# Patient Record
Sex: FEMALE | Race: WHITE | NOT HISPANIC OR LATINO | Employment: FULL TIME | URBAN - METROPOLITAN AREA
[De-identification: names, ages, dates, MRNs, and addresses within clinical notes are randomized per-mention and may not be internally consistent; named-entity substitution may affect disease eponyms.]

---

## 2025-03-05 ENCOUNTER — TELEPHONE (OUTPATIENT)
Dept: ONCOLOGY | Facility: CLINIC | Age: 40
End: 2025-03-05

## 2025-03-05 ENCOUNTER — CONSULT (OUTPATIENT)
Dept: ONCOLOGY | Facility: CLINIC | Age: 40
End: 2025-03-05
Payer: COMMERCIAL

## 2025-03-05 VITALS
TEMPERATURE: 97.3 F | HEART RATE: 84 BPM | BODY MASS INDEX: 28.29 KG/M2 | OXYGEN SATURATION: 96 % | SYSTOLIC BLOOD PRESSURE: 128 MMHG | WEIGHT: 191 LBS | DIASTOLIC BLOOD PRESSURE: 80 MMHG | HEIGHT: 69 IN

## 2025-03-05 DIAGNOSIS — M54.50 CHRONIC LOW BACK PAIN, UNSPECIFIED BACK PAIN LATERALITY, UNSPECIFIED WHETHER SCIATICA PRESENT: Primary | ICD-10-CM

## 2025-03-05 DIAGNOSIS — C50.912 MALIGNANT NEOPLASM OF LEFT BREAST IN FEMALE, ESTROGEN RECEPTOR NEGATIVE, UNSPECIFIED SITE OF BREAST: ICD-10-CM

## 2025-03-05 DIAGNOSIS — Z17.1 MALIGNANT NEOPLASM OF LEFT BREAST IN FEMALE, ESTROGEN RECEPTOR NEGATIVE, UNSPECIFIED SITE OF BREAST: Primary | ICD-10-CM

## 2025-03-05 DIAGNOSIS — C50.912 MALIGNANT NEOPLASM OF LEFT BREAST IN FEMALE, ESTROGEN RECEPTOR NEGATIVE, UNSPECIFIED SITE OF BREAST: Primary | ICD-10-CM

## 2025-03-05 DIAGNOSIS — Z17.1 MALIGNANT NEOPLASM OF LEFT BREAST IN FEMALE, ESTROGEN RECEPTOR NEGATIVE, UNSPECIFIED SITE OF BREAST: ICD-10-CM

## 2025-03-05 DIAGNOSIS — G89.29 CHRONIC LOW BACK PAIN, UNSPECIFIED BACK PAIN LATERALITY, UNSPECIFIED WHETHER SCIATICA PRESENT: Primary | ICD-10-CM

## 2025-03-05 RX ORDER — GABAPENTIN 400 MG/1
2 CAPSULE ORAL 4 TIMES DAILY
COMMUNITY

## 2025-03-05 RX ORDER — LEVOTHYROXINE SODIUM 25 UG/1
1 TABLET ORAL DAILY
COMMUNITY

## 2025-03-05 RX ORDER — LAMOTRIGINE 100 MG/1
3 TABLET ORAL DAILY
COMMUNITY

## 2025-03-05 RX ORDER — LORAZEPAM 1 MG/1
TABLET ORAL
COMMUNITY
Start: 2025-02-17

## 2025-03-05 RX ORDER — ZOLPIDEM TARTRATE 12.5 MG/1
TABLET, FILM COATED, EXTENDED RELEASE ORAL
COMMUNITY
Start: 2025-03-03

## 2025-03-05 RX ORDER — NORETHINDRONE AND ETHINYL ESTRADIOL 0.5-0.035
KIT ORAL
COMMUNITY
Start: 2025-02-27

## 2025-03-05 RX ORDER — METOPROLOL SUCCINATE 50 MG/1
TABLET, EXTENDED RELEASE ORAL
COMMUNITY
Start: 2025-01-13
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
## 2025-03-05 NOTE — TELEPHONE ENCOUNTER
[facility] received a fax that requires your attention. The document has been indexed to the patient’s chart for your review.      Reason for sending: RECEIVED MEDICAL RECORDS FOR SCHEDULED PATIENT    Documents Description: PROGRESS NOTE 03/04/25.> INDEXED IN CHART     Name of Sender: [employer] [phone]    Date Indexed: 03/05/25    Notes (if needed): THANKS!
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
## 2025-03-10 ENCOUNTER — TELEPHONE (OUTPATIENT)
Dept: ONCOLOGY | Facility: CLINIC | Age: 40
End: 2025-03-10
Payer: COMMERCIAL

## 2025-03-10 ENCOUNTER — PATIENT OUTREACH (OUTPATIENT)
Dept: ONCOLOGY | Facility: CLINIC | Age: 40
End: 2025-03-10
Payer: COMMERCIAL

## 2025-03-10 NOTE — TELEPHONE ENCOUNTER
Advised patient Emla cream will be sent to her pharmacy when the APRN sees her tomorrow at her appointment, medications will be signed and sent.  Patient verbalized understanding.

## 2025-03-10 NOTE — TELEPHONE ENCOUNTER
Patient called she had her port put in today, and wants some numbing cream called in. Call this into [facility] in [place], [place].

## 2025-03-10 NOTE — SIGNIFICANT NOTE
Also called Radiology at  and spoke with Lillie. Sent her a fax request for Mammograms, bone scan, mri head, and CT CAP, then sent email to [facility] to ask them to retrieve images (retrieved per Vira's email).     Notified Esau, RN and Breast Navigator, of requesting path slides of breast bx and images per power share, all from [facility]. And wrote down tracking # on successful fax return paper. Put those 2 successful fax return papers on Esau's desk and will also email her since she is out of office today. Notified Farrah in Cancer Registry that I requested path slides/images.

## 2025-03-11 ENCOUNTER — HOSPITAL ENCOUNTER (OUTPATIENT)
Dept: MRI IMAGING | Facility: HOSPITAL | Age: 40
Discharge: HOME | End: 2025-03-11
Payer: COMMERCIAL

## 2025-03-11 ENCOUNTER — HOSPITAL ENCOUNTER (OUTPATIENT)
Dept: CARDIOLOGY | Facility: HOSPITAL | Age: 40
Discharge: HOME | End: 2025-03-11
Payer: COMMERCIAL

## 2025-03-11 ENCOUNTER — LAB (OUTPATIENT)
Dept: LAB | Facility: HOSPITAL | Age: 40
End: 2025-03-11
Payer: COMMERCIAL

## 2025-03-11 ENCOUNTER — HOSPITAL ENCOUNTER (OUTPATIENT)
Dept: ONCOLOGY | Facility: HOSPITAL | Age: 40
Discharge: HOME | End: 2025-03-11
Payer: COMMERCIAL

## 2025-03-11 ENCOUNTER — OFFICE VISIT (OUTPATIENT)
Dept: ONCOLOGY | Facility: CLINIC | Age: 40
End: 2025-03-11
Payer: COMMERCIAL

## 2025-03-11 ENCOUNTER — TELEPHONE (OUTPATIENT)
Dept: ONCOLOGY | Facility: CLINIC | Age: 40
End: 2025-03-11

## 2025-03-11 ENCOUNTER — TELEPHONE (OUTPATIENT)
Dept: ONCOLOGY | Facility: CLINIC | Age: 40
End: 2025-03-11
Payer: COMMERCIAL

## 2025-03-11 ENCOUNTER — SPECIALTY PHARMACY (OUTPATIENT)
Dept: ONCOLOGY | Facility: HOSPITAL | Age: 40
End: 2025-03-11
Payer: COMMERCIAL

## 2025-03-11 VITALS
BODY MASS INDEX: 29.32 KG/M2 | HEIGHT: 69 IN | WEIGHT: 197.97 LBS | SYSTOLIC BLOOD PRESSURE: 137 MMHG | DIASTOLIC BLOOD PRESSURE: 91 MMHG

## 2025-03-11 VITALS
TEMPERATURE: 98 F | DIASTOLIC BLOOD PRESSURE: 87 MMHG | RESPIRATION RATE: 18 BRPM | SYSTOLIC BLOOD PRESSURE: 138 MMHG | BODY MASS INDEX: 29.33 KG/M2 | WEIGHT: 198 LBS | HEART RATE: 72 BPM | OXYGEN SATURATION: 99 % | HEIGHT: 69 IN

## 2025-03-11 DIAGNOSIS — M54.50 CHRONIC LOW BACK PAIN, UNSPECIFIED BACK PAIN LATERALITY, UNSPECIFIED WHETHER SCIATICA PRESENT: ICD-10-CM

## 2025-03-11 DIAGNOSIS — G89.29 CHRONIC LOW BACK PAIN, UNSPECIFIED BACK PAIN LATERALITY, UNSPECIFIED WHETHER SCIATICA PRESENT: ICD-10-CM

## 2025-03-11 DIAGNOSIS — C50.912 MALIGNANT NEOPLASM OF LEFT BREAST IN FEMALE, ESTROGEN RECEPTOR NEGATIVE, UNSPECIFIED SITE OF BREAST: ICD-10-CM

## 2025-03-11 DIAGNOSIS — Z17.1 MALIGNANT NEOPLASM OF LEFT BREAST IN FEMALE, ESTROGEN RECEPTOR NEGATIVE, UNSPECIFIED SITE OF BREAST: ICD-10-CM

## 2025-03-11 DIAGNOSIS — Z17.1 MALIGNANT NEOPLASM OF LEFT BREAST IN FEMALE, ESTROGEN RECEPTOR NEGATIVE, UNSPECIFIED SITE OF BREAST: Primary | ICD-10-CM

## 2025-03-11 DIAGNOSIS — C50.912 MALIGNANT NEOPLASM OF LEFT BREAST IN FEMALE, ESTROGEN RECEPTOR NEGATIVE, UNSPECIFIED SITE OF BREAST: Primary | ICD-10-CM

## 2025-03-11 LAB
ALBUMIN SERPL-MCNC: 4 G/DL (ref 3.5–5.2)
ALBUMIN/GLOB SERPL: 1.3 G/DL
ALP SERPL-CCNC: 63 U/L (ref 39–117)
ALT SERPL W P-5'-P-CCNC: 13 U/L (ref 1–33)
ANION GAP SERPL CALCULATED.3IONS-SCNC: 13 MMOL/L (ref 5–15)
AORTIC DIMENSIONLESS INDEX: 0.7 (DI)
ASCENDING AORTA: 2.4 CM
AST SERPL-CCNC: 13 U/L (ref 1–32)
AV MEAN PRESS GRAD SYS DOP V1V2: 3 MMHG
AV VMAX SYS DOP: 123 CM/SEC
B-HCG UR QL: NEGATIVE
BASOPHILS # BLD AUTO: 0.04 10*3/MM3 (ref 0–0.2)
BASOPHILS NFR BLD AUTO: 0.3 % (ref 0–1.5)
BH CV ECHO LEFT VENTRICLE GLOBAL LONGITUDINAL STRAIN: -21.2 %
BH CV ECHO MEAS - AO MAX PG: 6.1 MMHG
BH CV ECHO MEAS - AO ROOT DIAM: 2.8 CM
BH CV ECHO MEAS - AO V2 VTI: 27.1 CM
BH CV ECHO MEAS - AVA(I,D): 2.21 CM2
BH CV ECHO MEAS - EDV(CUBED): 110.6 ML
BH CV ECHO MEAS - EDV(MOD-SP2): 115 ML
BH CV ECHO MEAS - EDV(MOD-SP4): 110 ML
BH CV ECHO MEAS - EF(MOD-SP2): 64.8 %
BH CV ECHO MEAS - EF(MOD-SP4): 55.7 %
BH CV ECHO MEAS - ESV(CUBED): 27 ML
BH CV ECHO MEAS - ESV(MOD-SP2): 40.5 ML
BH CV ECHO MEAS - ESV(MOD-SP4): 48.7 ML
BH CV ECHO MEAS - FS: 37.5 %
BH CV ECHO MEAS - IVS/LVPW: 1 CM
BH CV ECHO MEAS - IVSD: 0.8 CM
BH CV ECHO MEAS - LA DIMENSION: 3.7 CM
BH CV ECHO MEAS - LAT PEAK E' VEL: 14.8 CM/SEC
BH CV ECHO MEAS - LV DIASTOLIC VOL/BSA (35-75): 53.5 CM2
BH CV ECHO MEAS - LV MASS(C)D: 126.7 GRAMS
BH CV ECHO MEAS - LV MAX PG: 3 MMHG
BH CV ECHO MEAS - LV MEAN PG: 2 MMHG
BH CV ECHO MEAS - LV SYSTOLIC VOL/BSA (12-30): 23.7 CM2
BH CV ECHO MEAS - LV V1 MAX: 86.5 CM/SEC
BH CV ECHO MEAS - LV V1 VTI: 19.1 CM
BH CV ECHO MEAS - LVIDD: 4.8 CM
BH CV ECHO MEAS - LVIDS: 3 CM
BH CV ECHO MEAS - LVOT AREA: 3.1 CM2
BH CV ECHO MEAS - LVOT DIAM: 2 CM
BH CV ECHO MEAS - LVPWD: 0.8 CM
BH CV ECHO MEAS - MED PEAK E' VEL: 9.8 CM/SEC
BH CV ECHO MEAS - MR MAX PG: 95.3 MMHG
BH CV ECHO MEAS - MR MAX VEL: 488 CM/SEC
BH CV ECHO MEAS - MV A MAX VEL: 70.7 CM/SEC
BH CV ECHO MEAS - MV DEC SLOPE: 280 CM/SEC2
BH CV ECHO MEAS - MV DEC TIME: 0.22 SEC
BH CV ECHO MEAS - MV E MAX VEL: 102 CM/SEC
BH CV ECHO MEAS - MV E/A: 1.44
BH CV ECHO MEAS - MV MAX PG: 3.4 MMHG
BH CV ECHO MEAS - MV MEAN PG: 2 MMHG
BH CV ECHO MEAS - MV P1/2T: 102.6 MSEC
BH CV ECHO MEAS - MV V2 VTI: 29.9 CM
BH CV ECHO MEAS - MVA(P1/2T): 2.14 CM2
BH CV ECHO MEAS - MVA(VTI): 2.01 CM2
BH CV ECHO MEAS - PA ACC TIME: 0.09 SEC
BH CV ECHO MEAS - RAP SYSTOLE: 3 MMHG
BH CV ECHO MEAS - RVSP: 21 MMHG
BH CV ECHO MEAS - SV(LVOT): 60 ML
BH CV ECHO MEAS - SV(MOD-SP2): 74.5 ML
BH CV ECHO MEAS - SV(MOD-SP4): 61.3 ML
BH CV ECHO MEAS - SVI(LVOT): 29.2 ML/M2
BH CV ECHO MEAS - SVI(MOD-SP2): 36.2 ML/M2
BH CV ECHO MEAS - SVI(MOD-SP4): 29.8 ML/M2
BH CV ECHO MEAS - TAPSE (>1.6): 2.17 CM
BH CV ECHO MEAS - TR MAX PG: 18 MMHG
BH CV ECHO MEAS - TR MAX VEL: 212 CM/SEC
BH CV ECHO MEASUREMENTS AVERAGE E/E' RATIO: 8.29
BH CV XLRA - RV BASE: 3.4 CM
BH CV XLRA - RV LENGTH: 7.1 CM
BH CV XLRA - RV MID: 2.7 CM
BH CV XLRA - TDI S': 13.6 CM/SEC
BILIRUB SERPL-MCNC: 0.3 MG/DL (ref 0–1.2)
BUN SERPL-MCNC: 15 MG/DL (ref 6–20)
BUN/CREAT SERPL: 21.7 (ref 7–25)
CALCIUM SPEC-SCNC: 8.9 MG/DL (ref 8.6–10.5)
CHLORIDE SERPL-SCNC: 104 MMOL/L (ref 98–107)
CO2 SERPL-SCNC: 23 MMOL/L (ref 22–29)
CORTIS SERPL-MCNC: 1.81 MCG/DL
CREAT SERPL-MCNC: 0.69 MG/DL (ref 0.57–1)
DEPRECATED RDW RBC AUTO: 41.1 FL (ref 37–54)
EGFRCR SERPLBLD CKD-EPI 2021: 112.7 ML/MIN/1.73
EOSINOPHIL # BLD AUTO: 0.01 10*3/MM3 (ref 0–0.4)
EOSINOPHIL NFR BLD AUTO: 0.1 % (ref 0.3–6.2)
ERYTHROCYTE [DISTWIDTH] IN BLOOD BY AUTOMATED COUNT: 12.4 % (ref 12.3–15.4)
GLOBULIN UR ELPH-MCNC: 3.2 GM/DL
GLUCOSE SERPL-MCNC: 81 MG/DL (ref 65–99)
HCT VFR BLD AUTO: 33.5 % (ref 34–46.6)
HGB BLD-MCNC: 11.5 G/DL (ref 12–15.9)
IMM GRANULOCYTES # BLD AUTO: 0.05 10*3/MM3 (ref 0–0.05)
IMM GRANULOCYTES NFR BLD AUTO: 0.4 % (ref 0–0.5)
LEFT ATRIUM VOLUME INDEX: 36.2 ML/M2
LV EF BIPLANE MOD: 59.6 %
LYMPHOCYTES # BLD AUTO: 2.43 10*3/MM3 (ref 0.7–3.1)
LYMPHOCYTES NFR BLD AUTO: 19 % (ref 19.6–45.3)
MCH RBC QN AUTO: 30.7 PG (ref 26.6–33)
MCHC RBC AUTO-ENTMCNC: 34.3 G/DL (ref 31.5–35.7)
MCV RBC AUTO: 89.6 FL (ref 79–97)
MONOCYTES # BLD AUTO: 0.8 10*3/MM3 (ref 0.1–0.9)
MONOCYTES NFR BLD AUTO: 6.3 % (ref 5–12)
NEUTROPHILS NFR BLD AUTO: 73.9 % (ref 42.7–76)
NEUTROPHILS NFR BLD AUTO: 9.43 10*3/MM3 (ref 1.7–7)
PLATELET # BLD AUTO: 273 10*3/MM3 (ref 140–450)
PMV BLD AUTO: 9.5 FL (ref 6–12)
POTASSIUM SERPL-SCNC: 3.9 MMOL/L (ref 3.5–5.2)
PROT SERPL-MCNC: 7.2 G/DL (ref 6–8.5)
RBC # BLD AUTO: 3.74 10*6/MM3 (ref 3.77–5.28)
SODIUM SERPL-SCNC: 140 MMOL/L (ref 136–145)
T4 FREE SERPL-MCNC: 1.32 NG/DL (ref 0.92–1.68)
TSH SERPL DL<=0.05 MIU/L-ACNC: 1.65 UIU/ML (ref 0.27–4.2)
WBC NRBC COR # BLD AUTO: 12.76 10*3/MM3 (ref 3.4–10.8)

## 2025-03-11 PROCEDURE — A9577 INJ MULTIHANCE: HCPCS | Performed by: INTERNAL MEDICINE

## 2025-03-11 PROCEDURE — 36415 COLL VENOUS BLD VENIPUNCTURE: CPT

## 2025-03-11 PROCEDURE — 99214 OFFICE O/P EST MOD 30 MIN: CPT | Performed by: NURSE PRACTITIONER

## 2025-03-11 PROCEDURE — 84439 ASSAY OF FREE THYROXINE: CPT

## 2025-03-11 PROCEDURE — 81025 URINE PREGNANCY TEST: CPT

## 2025-03-11 PROCEDURE — 80050 GENERAL HEALTH PANEL: CPT

## 2025-03-11 PROCEDURE — 93356 MYOCRD STRAIN IMG SPCKL TRCK: CPT

## 2025-03-11 PROCEDURE — 77049 MRI BREAST C-+ W/CAD BI: CPT

## 2025-03-11 PROCEDURE — 93306 TTE W/DOPPLER COMPLETE: CPT

## 2025-03-11 PROCEDURE — 25510000002 GADOBENATE DIMEGLUMINE 529 MG/ML SOLUTION: Performed by: INTERNAL MEDICINE

## 2025-03-11 PROCEDURE — 82533 TOTAL CORTISOL: CPT

## 2025-03-11 RX ORDER — LIDOCAINE AND PRILOCAINE 25; 25 MG/G; MG/G
1 CREAM TOPICAL AS NEEDED
Qty: 30 G | Refills: 3 | Status: SHIPPED | OUTPATIENT
Start: 2025-03-11

## 2025-03-11 RX ORDER — ONDANSETRON 8 MG/1
8 TABLET, FILM COATED ORAL 3 TIMES DAILY PRN
Qty: 30 TABLET | Refills: 3 | Status: SHIPPED | OUTPATIENT
Start: 2025-03-11

## 2025-03-11 RX ORDER — MELOXICAM 15 MG/1
15 TABLET ORAL DAILY
COMMUNITY

## 2025-03-11 RX ADMIN — GADOBENATE DIMEGLUMINE 14 ML: 529 INJECTION, SOLUTION INTRAVENOUS at 10:20

## 2025-03-11 NOTE — TELEPHONE ENCOUNTER
Caller: Brooke Mendez    Relationship: Self    Best call back number: [phone]     What is the best time to reach you: ASAP    Who are you requesting to speak with (clinical staff, provider,  specific staff member):       What was the call regarding: PLEASE CALL PT TO EXPLAIN HER APPTS FOR TODAY, SHE IS NOT SURE WHAT/WHERE THE RESEARCH EVAL APPT AT 12:30 IS FOR.

## 2025-03-11 NOTE — TELEPHONE ENCOUNTER
PATIENT DROPPED OFF FMLA AND STD FORMS TO BE FILLED OUT. THERE IS A POST IT NOTE ON EACH FORT WHERE TO FAX IT TO.     PUT IN MAS BOX. (CHRIS)

## 2025-03-11 NOTE — PROGRESS NOTES
CHEMOTHERAPY PREPARATION    Brooke Mendez  [id number]  1985    Chief Complaint: Treatment preparation and needs assessment    History of present illness:  Brooke Mendez is a 39 y.o. year old female who is here today with her mother and friend for treatment preparation and needs assessment.  The patient has been diagnosed with triple negative breast cancer and is scheduled to begin treatment with Pembrolizumab 200 mg / PACLitaxel / CARBOplatin.     Oncology History:    Oncology/Hematology History   Malignant neoplasm of left breast in female, estrogen receptor negative   3/5/2025 Initial Diagnosis    Malignant neoplasm of left breast in female, estrogen receptor negative     3/12/2025 -  Chemotherapy    OP BREAST Pembrolizumab 200 mg / PACLitaxel / CARBOplatin AUC=1.5 (Weekly)     6/4/2025 -  Chemotherapy    OP BREAST Pembrolizumab 200 mg / DOXOrubicin / Cyclophosphamide          The current medication list and allergy list were reviewed and reconciled.     Past Medical History, Past Surgical History, Social History, Family History have been reviewed and are without significant changes except as mentioned.    Review of Systems:    Review of Systems   Constitutional:  Positive for fatigue. Negative for appetite change, fever and unexpected weight change.   HENT:  Negative for mouth sores, sore throat and trouble swallowing.    Respiratory:  Negative for cough, shortness of breath and wheezing.    Cardiovascular:  Negative for chest pain, palpitations and leg swelling.   Gastrointestinal:  Negative for abdominal distention, abdominal pain, constipation, diarrhea, nausea and vomiting.   Genitourinary:  Negative for difficulty urinating, dysuria and frequency.   Musculoskeletal:  Negative for arthralgias.   Skin:  Negative for pallor, rash and wound.   Neurological:  Negative for dizziness and weakness.   Hematological:  Does not bruise/bleed easily.   Psychiatric/Behavioral:  Positive for sleep disturbance.  Negative for confusion. The patient is nervous/anxious.      Physical Exam:    Vitals:    03/11/25 1359   BP: 138/87   Pulse: 72   Resp: 18   Temp: 98 °F (36.7 °C)   SpO2: 99%     Vitals:    03/11/25 1359   PainSc: 0-No pain  Comment: No new pain. Discomfort from PORT placement 3/10/2025          ECOG: (0) Fully Active - Able to Carry On All Pre-disease Performance Without Restriction    General: well appearing, in no acute distress    Psych: Mood is stable            NEEDS ASSESSMENTS    Genetics  The patient's new diagnosis and family history have been reviewed for genetic counseling needs. A genetic referral is recommended.  She has appointment with genetic counselor tomorrow.    Psychosocial  The patient has completed a PHQ-9 Depression Screening and the Distress Thermometer (DT) today.   PHQ-9 results show 10-14 (Moderate Depression). The patient scored their distress today as 7 on a scale of 0-10 with 0 being no distress and 10 being extreme distress.   Problems marked by the patient as being an issue for them within the last week include practical problems, family problems, emotional problems, spiritual/Restorationist concerns, and physical problems.   Results were reviewed along with psychosocial resources offered by our cancer center.  Our oncology social worker will be flagged for a DT score of 4 or above, and a same day call will be made for a score of 9 or 10.  A mental health referral is offered at this time. The patient is not interested in a referral to [facility].   Copies of patient's questionnaires will be scanned into EMR for details and further reference.    Barriers to care  A barriers form was also completed by the patient today. We discussed services offered by our facility to help her have adequate access to care. The patient was given the name and contact information for our Oncology Social Worker, Liliana Trammell.  Based upon barriers assessment today, the patient will require a follow-up  "call from the  to further discuss needs.  Referral placed to follow up with patient on 3/13/2025.  A copy of the barriers form will also be scanned into EMR for details and further reference.     VAD Assessment  The patient and I discussed planned intervenous chemotherapy as well as other IV treatments that are often needed throughout the course of treatment. These may include, but are not limited to blood transfusions, antibiotics, and IV hydration. The vasculature does appear to be adequate for multiple peripheral IVs throughout their treatment course. Patient had Port-A-Cath placed yesterday.    Advanced Care Planning  The patient and I discussed advanced care planning, \"Conversations that Matter\".   This service was offered, free of charge, for development of advance directives with a certified ACP facilitator.  The patient does not have an up-to-date advanced directive. This document is not on file with our office. The patient is not interested in an appointment with one of our facilitators to create or update their advanced directives.      Palliative Care  The patient and I discussed palliative care services. Palliative care is not the same as Hospice care. This is specialized medical care for people living with serious illness with the goal of improving quality of life for the patient and their family. [facility] offers our patients outpatient palliative care early along with their treatment to assist in coordination of care, symptom management, pain management, and medical decision making.  Oncology criteria for palliative care referral is not met at this time. The patient is not interested in a palliative care consultation.     Additional Referral needs  none      CHEMOTHERAPY EDUCATION    Chemotherapy education completed and consent obtained per oncology pharmacist.  See their documentation for further details.    Booklets Given: Chemotherapy and You [x]  Nutrition for the Patient with Cancer " During Treatment [x]    Sexuality/Fertility Books []     Chemotherapy Regimen:   Treatment Plans       Name Type Plan Dates Plan Provider         Active    OP BREAST Pembrolizumab 200 mg / PACLitaxel / CARBOplatin AUC=1.5 (Weekly) ONCOLOGY TREATMENT 3/11/2025 - Present Fanny Baker MD               Chemotherapy education comprehension reviewed. Questions answered.    Assessment and Plan:    Diagnoses and all orders for this visit:    1. Malignant neoplasm of left breast in female, estrogen receptor negative, unspecified site of breast (Primary)  -     Ambulatory Referral to ONC Social Work  -     lidocaine-prilocaine (EMLA) 2.5-2.5 % cream; Apply 1 Application topically to the appropriate area as directed As Needed (45-60 minutes prior to port access.  Cover with saran/plastic wrap.).  Dispense: 30 g; Refill: 3  -     ondansetron (ZOFRAN) 8 MG tablet; Take 1 tablet by mouth 3 (Three) Times a Day As Needed for Nausea or Vomiting.  Dispense: 30 tablet; Refill: 3  -     Prosthetic Cranial    The patient and I have reviewed their cancer diagnosis and scheduled treatment plan. Needs assessment was completed including genetics, psychosocial needs, barriers to care, VAD evaluation, advanced care planning, and palliative care services. Referrals have been ordered as appropriate based upon our evaluation and patient desires.     Chemotherapy teaching was also completed today per pharmacy.  Adequate time was given to answer questions.  Patient and family are aware of their care team members and contact information if they have questions or problems throughout the treatment course. The patient is adequately prepared to begin treatment as scheduled on 3/13/2025.     Reviewed with patient education regarding EMLA cream and Zofran prescriptions sent to pharmacy.       Patient will have pretreatment labs drawn today.  She had Port-A-Cath placed yesterday.  She had MRI brain that showed no metastatic disease.  Nuclear bone scan  negative.  I reviewed copy of results and will get scanned into the computer.  She had MRI breast today with results pending.  She is scheduled for an echocardiogram this afternoon.    I spent 30 minutes caring for Brooke on this date of service. This time includes time spent by me in the following activities: preparing for the visit, reviewing tests, counseling and educating the patient/family/caregiver, ordering medications, tests, or procedures, documenting information in the medical record, and care coordination.     Elsy Rizzo, APRN  03/11/25

## 2025-03-11 NOTE — PROGRESS NOTES
[facility]  [address] [place] [address]  [phone]      CHEMOTHERAPY EDUCATION    NAME:  Brooke Mendez      : 1985           DATE: 25    Medication Education Sheets: (select all that apply)  Carboplatin, Cyclophosphamide, Doxorubicin, Paclitaxel, and Pembrolizumab    Other Education Sheets: (select all that apply)  CINV, Constipation, Diarrhea, HOPA Immune Checkpoint Inhibitors, Medication Wallet Card, and Symptom Tracker Sheet    Chemotherapy Regimen:   Part 1:  OP BREAST Pembrolizumab 200 mg / PACLitaxel / CARBOplatin AUC=1.5 (Weekly) IV every 21 days for 4 cycles  -pembrolizumab 200 mg IV on day 1  -pactitaxel 80 mg/m2 IV on days 1, 8, and 15  -carboplatin AUC 1.5 IV on days 1, 8, and 15    Part 2:  -pembrolizumab 200 mg IV on day 1  -doxorubicin 60 mg/m2 IV push on day 1  -cyclophosphamide 600 mg/m2 IV on day 1  -pegfilgrastim (Neulasta OnPro) 6 mg subQ on day 1     TOPICS EDUCATION PROVIDED COMMENTS   ANEMIA:  role of RBC, cause, s/s, ways to manage, role of transfusion [x]  Part 1 & 2: Reviewed the role of RBC and the use of transfusions if hemoglobin decreases too much. Patient to notify us if she experiences shortness of breath, dizziness, or palpitations.  Also let patient know she could feel more tired than usual and to try to stay active, but rest if she needs to.    THROMBOCYTOPENIA:  role of platelet, cause, s/s, ways to prevent bleeding, things to avoid, when to seek help [x]  Part 1 & 2: Reviewed the role of platelets in blood clotting and when to call clinic (bloody nose that bleeds for 5 minutes despite pressure, a cut that won't stop bleeding despite pressure, gums that bleed excessively with brushing or flossing). Recommended using a soft bristle toothbrush and blowing the nose gently.    NEUTROPENIA:  role of WBC, cause, infection precautions, s/s of infection, when to call MD [x]  Part 1 & 2: Reviewed the role of WBC, good infection  prevention practices, and when to call the clinic (temperature 100.4F, sore throat, burning urination, etc).   DIARRHEA:  causes, s/s of dehydration, ways to manage, dietary changes, when to call MD [x]  Part 1 & 2: Discussed the risk of diarrhea and immune related colitis. Can use OTC loperamide with diarrhea onset, but call the clinic if 4-6 episodes in 24 hours not relieved by OTC loperamide. Discussed the role of high-dose steroids for the treatment of immune related colitis.    CONSTIPATION:  causes, ways to manage, dietary changes, when to call MD [x]  Part 1: Discussed signs and symptoms of constipation. Provided a handout with dietary recommendations and OTC therapy options.   NAUSEA & VOMITING:  cause, use of antiemetics, dietary changes, when to call MD [x]  Part 1:  Emetic risk: Moderate  Premeds: Palonosetron, Dexamethasone  PRN home meds: Ondansetron 8mg PO TID PRN N/V     Part 2:   Emetic risk: High  Premeds: Dexamethasone, Fosaprepitant, and Palonosetron  Scheduled home meds: Olanzapine 5 mg by mouth at night on days 1-4 after chemotherapy to prevent delayed nausea  PRN home meds: Ondansetron 8mg PO TID PRN N/V  Pharmacy home meds sent to: Prescriptions have not been sent yet. The patient has a needs assessment with Elsy today.      Instructed the patient to take the scheduled anti-nausea medications even if she does not feel nauseous. I explained this is to prevent delayed nausea.     Instructed the patient to take a dose of the PRN medication at the first onset of nausea and if it's not working to call us for additional medications. Also provided non-drug measures to mitigate nausea.   MOUTH SORES:  causes, oral care, ways to manage [x]  Part 2: Mouth sores can be prevented by making a mouth wash mixture of salt, baking soda, and water. The patient was instructed to swish and spit four times daily after meals and before bedtime. Also recommended using a soft bristle toothbrush and avoiding  alcohol-containing OTC mouthwashes.    ALOPECIA:  cause, ways to manage, resources [x]  Part 1 & 2: Discussed the possibility of hair loss with the patient. Informed patient that she could request a prescription for a wig if desired and most of the cost is usually covered by insurance. Recommend covering the head with a hat and/or protecting the skin on the head with SPF 30 or higher.    NERVOUS SYSTEM CHANGES:  causes, s/s, neuropathies, cognitive changes, ways to manage [x]  Part 1: Discussed the adverse effect of peripheral neuropathy and signs/symptoms associated with this adverse effect. Instructed patient to call if symptoms become more frequent or worsen.    PAIN:  causes, ways to manage [x]  Part 1 & 2:  Vesicant Pain: Instructed patient to notify the nurse of any pain at the IV site during the infusion.    EMLA Cream: Discussed rx for EMLA cream, and the benefit of use 45-60 minutes prior to access of port for lab draws / infusions. Rx will be sent to: Prescription has not been sent yet. The patient has a needs assessment with Elsy today.     Muscle/Joint Pain: Discussed muscle and joint aches/pains with chemotherapy and immunotherapy, and recommended the use of OTC pain relief with ibuprofen or acetaminophen if needed.       Part 2:   Growth Factors: Discussed the possibility for bone pain with pegfilgrastim, and reviewed the use of loratadine 10 mg by mouth at night on days 2-8 of each cycle to help manage this side effect.    SKIN & NAIL CHANGES:  cause, s/s, ways to manage [x]  Part 1 & 2: Discussed the potential for a rash or itchy skin from immunotherapy, offered nonpharmacologic prevention strategies, and instructed the patient to call if a rash develops and worsens.   ORGAN TOXICITIES:  cause, s/s, need for diagnostic tests, labs, when to notify MD [x]  Part 1 & 2: Discussed potential effects on organ systems, monitoring, diagnostic tests, labs, and when to notify the clinic. Discussed the  signs/symptoms of the following: cardiotoxicity (ECHO scheduled today on 3/11/25), central neurotoxicity (confusion, vision changes, stiff neck, seizure), eye changes (blurry vision, watery eyes, photophobia, diplopia, eye pain), hepatotoxicity, hormone gland changes (most commonly the thyroid), lung changes, and nephrotoxicity.   INFUSION RELATED REACTIONS or INJECTION-SITE REACTION:  Cause, s/s, anaphylaxis, monitoring, etc. [x]  Part 1 Premeds: Dexamethasone, Diphenhydramine, and Famotidine    Part 2 Premeds: None needed for infusion-related reaction     Part 1 & 2: Discussed the risk of an infusion reaction and symptoms such as: fever, chills, dizziness, itchiness or rash, flushing, trouble breathing, wheezing, sudden back pain, or feeling faint. Instructed the patient to notify her nurse if she starts feeling weird at any point during her infusion. Reviewed how infusion reactions are managed.   SURVIVORSHIP:  distress, distress assessment, secondary malignancies, early/late effects, follow-up, social issues, social support [x]  Part 2: Discussed the rare, but possible risk of secondary malignancies months to years after treatment, most commonly acute myeloid leukemia.   MISCELLANEOUS:  drug interactions, administration, labs, etc. [x]  Discussed chemotherapy schedule, lab draws, infusion times, and total expected visit time.   DDIs with Part 2: Increased risk of CNS depression with olanzapine, zolpidem, lorazepam, gabapentin, and lamotrigine (monitor for sedation, drowsiness)   Lab draws:   Part 1: Labs on days 1, 8, and 15.   Part 2 : Labs on day 1.    Labs can be up to 3 days early.  Part 2: Doxorubicin Red Body Fluids: Discussed the possibility of red body fluids for about 2-3 days after doxorubicin.  Recommended the patient not wear contacts during this time because they could be stained pink.  Patient instructed to contact clinic if urine appears red / pink more than 3 days after receiving  doxorubicin.  Part 2: Reviewed the purpose and application of the Neulasta OnPro device.     INFERTILITY & SEXUALITY:    causes, fertility preservation options, sexuality changes, ways to manage, importance of birth control [x]  IV Oncology Therapy: Reviewed safe sex practices and the importance of minimizing exposure to body fluids for 48 hours after each dose of IV oncology therapy. The patient is of childbearing potential, discussed the importance of using two forms of birth control, including condoms and spermicide.    HOME CARE:  storing of oral chemo, how to manage bodily fluids [x]  IV - Counseled on management of soiled linens and proper flush technique.  Discussed how to manage all the side effects at home and advised when to contact the MD office.      Medications:  Prior to Admission medications    Medication Sig Start Date End Date Taking? Authorizing Provider   gabapentin (NEURONTIN) 400 MG capsule Take 2 capsules by mouth 4 (Four) Times a Day.    Jerad Granado MD   lamoTRIgine (LaMICtal) 100 MG tablet Take 3 tablets by mouth Daily.    Jerad Granado MD   levothyroxine (SYNTHROID, LEVOTHROID) 25 MCG tablet Take 1 tablet by mouth Daily.    Jerad Granado MD   LORazepam (ATIVAN) 1 MG tablet  2/17/25   Jerad Granado MD   metoprolol succinate XL (TOPROL-XL) 50 MG 24 hr tablet  1/13/25   Jerad Granado MD   Nortrel 0.5/35, 28, 0.5-35 MG-MCG per tablet  2/27/25   Jerad Granado MD   zolpidem CR (AMBIEN CR) 12.5 MG CR tablet  3/3/25   Jerad Granado MD     Notes: All questions and concerns were addressed. Provided a personalized treatment calendar to patient (includes treatment and lab schedule). Provided patient with contact information for the pharmacist and clinic while instructing her to call if any questions or concerns arise. Informed consent for treatment was obtained. Patient was receptive to information and expressed understanding.     Anisa JUNG  , PharmD  Clinic Oncology Pharmacy Specialist  [phone]    3/11/2025  13:58 EDT

## 2025-03-12 ENCOUNTER — CLINICAL SUPPORT (OUTPATIENT)
Dept: GENETICS | Facility: HOSPITAL | Age: 40
End: 2025-03-12
Payer: COMMERCIAL

## 2025-03-12 DIAGNOSIS — C50.919 MALIGNANT NEOPLASM OF BREAST IN FEMALE, ESTROGEN RECEPTOR NEGATIVE, UNSPECIFIED LATERALITY, UNSPECIFIED SITE OF BREAST: Primary | ICD-10-CM

## 2025-03-12 DIAGNOSIS — Z80.3 FAMILY HISTORY OF BREAST CANCER: ICD-10-CM

## 2025-03-12 DIAGNOSIS — Z17.1 MALIGNANT NEOPLASM OF LEFT BREAST IN FEMALE, ESTROGEN RECEPTOR NEGATIVE, UNSPECIFIED SITE OF BREAST: Primary | ICD-10-CM

## 2025-03-12 DIAGNOSIS — Z17.1 MALIGNANT NEOPLASM OF BREAST IN FEMALE, ESTROGEN RECEPTOR NEGATIVE, UNSPECIFIED LATERALITY, UNSPECIFIED SITE OF BREAST: Primary | ICD-10-CM

## 2025-03-12 DIAGNOSIS — C50.912 MALIGNANT NEOPLASM OF LEFT BREAST IN FEMALE, ESTROGEN RECEPTOR NEGATIVE, UNSPECIFIED SITE OF BREAST: Primary | ICD-10-CM

## 2025-03-13 ENCOUNTER — DOCUMENTATION (OUTPATIENT)
Dept: OTHER | Facility: HOSPITAL | Age: 40
End: 2025-03-13
Payer: COMMERCIAL

## 2025-03-13 ENCOUNTER — OFFICE VISIT (OUTPATIENT)
Dept: ONCOLOGY | Facility: CLINIC | Age: 40
End: 2025-03-13
Payer: COMMERCIAL

## 2025-03-13 ENCOUNTER — HOSPITAL ENCOUNTER (OUTPATIENT)
Dept: ONCOLOGY | Facility: HOSPITAL | Age: 40
Discharge: HOME | End: 2025-03-13
Payer: COMMERCIAL

## 2025-03-13 ENCOUNTER — RESEARCH ENCOUNTER (OUTPATIENT)
Dept: OTHER | Facility: OTHER | Age: 40
End: 2025-03-13
Payer: COMMERCIAL

## 2025-03-13 ENCOUNTER — TELEPHONE (OUTPATIENT)
Dept: ONCOLOGY | Facility: CLINIC | Age: 40
End: 2025-03-13

## 2025-03-13 ENCOUNTER — DOCUMENTATION (OUTPATIENT)
Dept: NUTRITION | Facility: HOSPITAL | Age: 40
End: 2025-03-13
Payer: COMMERCIAL

## 2025-03-13 ENCOUNTER — CLINICAL SUPPORT (OUTPATIENT)
Dept: GENETICS | Facility: HOSPITAL | Age: 40
End: 2025-03-13
Payer: COMMERCIAL

## 2025-03-13 VITALS — HEART RATE: 80 BPM | DIASTOLIC BLOOD PRESSURE: 88 MMHG | SYSTOLIC BLOOD PRESSURE: 138 MMHG

## 2025-03-13 VITALS
DIASTOLIC BLOOD PRESSURE: 87 MMHG | SYSTOLIC BLOOD PRESSURE: 124 MMHG | OXYGEN SATURATION: 98 % | BODY MASS INDEX: 29.03 KG/M2 | TEMPERATURE: 97.1 F | HEIGHT: 69 IN | WEIGHT: 196 LBS | HEART RATE: 80 BPM

## 2025-03-13 DIAGNOSIS — C50.912 MALIGNANT NEOPLASM OF LEFT BREAST IN FEMALE, ESTROGEN RECEPTOR NEGATIVE, UNSPECIFIED SITE OF BREAST: Primary | ICD-10-CM

## 2025-03-13 DIAGNOSIS — Z17.1 MALIGNANT NEOPLASM OF LEFT BREAST IN FEMALE, ESTROGEN RECEPTOR NEGATIVE, UNSPECIFIED SITE OF BREAST: Primary | ICD-10-CM

## 2025-03-13 DIAGNOSIS — Z13.79 GENETIC TESTING: Primary | ICD-10-CM

## 2025-03-13 PROCEDURE — 96417 CHEMO IV INFUS EACH ADDL SEQ: CPT

## 2025-03-13 PROCEDURE — 25810000003 SODIUM CHLORIDE 0.9 % SOLUTION 250 ML FLEX CONT: Performed by: INTERNAL MEDICINE

## 2025-03-13 PROCEDURE — 25010000002 PALONOSETRON PER 25 MCG: Performed by: INTERNAL MEDICINE

## 2025-03-13 PROCEDURE — 25010000002 DEXAMETHASONE SODIUM PHOSPHATE 100 MG/10ML SOLUTION: Performed by: INTERNAL MEDICINE

## 2025-03-13 PROCEDURE — 96413 CHEMO IV INFUSION 1 HR: CPT

## 2025-03-13 PROCEDURE — 96375 TX/PRO/DX INJ NEW DRUG ADDON: CPT

## 2025-03-13 PROCEDURE — 25010000002 PEMBROLIZUMAB 100 MG/4ML SOLUTION 4 ML VIAL: Performed by: INTERNAL MEDICINE

## 2025-03-13 PROCEDURE — 25010000002 CARBOPLATIN PER 50 MG: Performed by: INTERNAL MEDICINE

## 2025-03-13 PROCEDURE — 25810000003 SODIUM CHLORIDE 0.9 % SOLUTION: Performed by: INTERNAL MEDICINE

## 2025-03-13 PROCEDURE — 25010000002 HEPARIN LOCK FLUSH PER 10 UNITS: Performed by: INTERNAL MEDICINE

## 2025-03-13 PROCEDURE — 25010000002 DIPHENHYDRAMINE PER 50 MG: Performed by: INTERNAL MEDICINE

## 2025-03-13 PROCEDURE — 25010000002 PACLITAXEL PER 1 MG: Performed by: INTERNAL MEDICINE

## 2025-03-13 RX ORDER — FAMOTIDINE 10 MG/ML
20 INJECTION, SOLUTION INTRAVENOUS AS NEEDED
Status: DISCONTINUED | OUTPATIENT
Start: 2025-03-13 | End: 2025-03-14 | Stop reason: HOSPADM

## 2025-03-13 RX ORDER — FAMOTIDINE 10 MG/ML
20 INJECTION, SOLUTION INTRAVENOUS ONCE
Status: CANCELLED | OUTPATIENT
Start: 2025-03-13

## 2025-03-13 RX ORDER — PALONOSETRON 0.05 MG/ML
0.25 INJECTION, SOLUTION INTRAVENOUS ONCE
Status: COMPLETED | OUTPATIENT
Start: 2025-03-13 | End: 2025-03-13

## 2025-03-13 RX ORDER — FAMOTIDINE 10 MG/ML
20 INJECTION, SOLUTION INTRAVENOUS AS NEEDED
Status: CANCELLED | OUTPATIENT
Start: 2025-03-13

## 2025-03-13 RX ORDER — SODIUM CHLORIDE 0.9 % (FLUSH) 0.9 %
20 SYRINGE (ML) INJECTION AS NEEDED
Status: CANCELLED | OUTPATIENT
Start: 2025-03-13

## 2025-03-13 RX ORDER — HEPARIN SODIUM (PORCINE) LOCK FLUSH IV SOLN 100 UNIT/ML 100 UNIT/ML
500 SOLUTION INTRAVENOUS AS NEEDED
Status: DISCONTINUED | OUTPATIENT
Start: 2025-03-13 | End: 2025-03-14 | Stop reason: HOSPADM

## 2025-03-13 RX ORDER — HYDROCORTISONE SODIUM SUCCINATE 100 MG/2ML
100 INJECTION INTRAMUSCULAR; INTRAVENOUS AS NEEDED
Status: CANCELLED | OUTPATIENT
Start: 2025-03-13

## 2025-03-13 RX ORDER — DIPHENHYDRAMINE HYDROCHLORIDE 50 MG/ML
50 INJECTION INTRAMUSCULAR; INTRAVENOUS ONCE
Status: COMPLETED | OUTPATIENT
Start: 2025-03-13 | End: 2025-03-13

## 2025-03-13 RX ORDER — PALONOSETRON 0.05 MG/ML
0.25 INJECTION, SOLUTION INTRAVENOUS ONCE
OUTPATIENT
Start: 2025-03-26

## 2025-03-13 RX ORDER — SODIUM CHLORIDE 9 MG/ML
20 INJECTION, SOLUTION INTRAVENOUS ONCE
OUTPATIENT
Start: 2025-03-19

## 2025-03-13 RX ORDER — SODIUM CHLORIDE 9 MG/ML
20 INJECTION, SOLUTION INTRAVENOUS ONCE
Status: COMPLETED | OUTPATIENT
Start: 2025-03-13 | End: 2025-03-13

## 2025-03-13 RX ORDER — SODIUM CHLORIDE 9 MG/ML
20 INJECTION, SOLUTION INTRAVENOUS ONCE
OUTPATIENT
Start: 2025-03-26

## 2025-03-13 RX ORDER — DIPHENHYDRAMINE HYDROCHLORIDE 50 MG/ML
50 INJECTION INTRAMUSCULAR; INTRAVENOUS AS NEEDED
OUTPATIENT
Start: 2025-03-26

## 2025-03-13 RX ORDER — HYDROCORTISONE SODIUM SUCCINATE 100 MG/2ML
100 INJECTION INTRAMUSCULAR; INTRAVENOUS AS NEEDED
OUTPATIENT
Start: 2025-03-19

## 2025-03-13 RX ORDER — FAMOTIDINE 10 MG/ML
20 INJECTION, SOLUTION INTRAVENOUS ONCE
Status: COMPLETED | OUTPATIENT
Start: 2025-03-13 | End: 2025-03-13

## 2025-03-13 RX ORDER — SODIUM CHLORIDE 0.9 % (FLUSH) 0.9 %
10 SYRINGE (ML) INJECTION AS NEEDED
OUTPATIENT
Start: 2025-03-13

## 2025-03-13 RX ORDER — FAMOTIDINE 10 MG/ML
20 INJECTION, SOLUTION INTRAVENOUS ONCE
OUTPATIENT
Start: 2025-03-26

## 2025-03-13 RX ORDER — SODIUM CHLORIDE 0.9 % (FLUSH) 0.9 %
10 SYRINGE (ML) INJECTION AS NEEDED
Status: CANCELLED | OUTPATIENT
Start: 2025-03-13

## 2025-03-13 RX ORDER — FAMOTIDINE 10 MG/ML
20 INJECTION, SOLUTION INTRAVENOUS AS NEEDED
OUTPATIENT
Start: 2025-03-26

## 2025-03-13 RX ORDER — DIPHENHYDRAMINE HYDROCHLORIDE 50 MG/ML
50 INJECTION INTRAMUSCULAR; INTRAVENOUS AS NEEDED
OUTPATIENT
Start: 2025-03-19

## 2025-03-13 RX ORDER — BUPROPION HCL 150 MG
TABLET, EXTENDED RELEASE 24 HR ORAL
COMMUNITY

## 2025-03-13 RX ORDER — FAMOTIDINE 10 MG/ML
20 INJECTION, SOLUTION INTRAVENOUS ONCE
OUTPATIENT
Start: 2025-03-19

## 2025-03-13 RX ORDER — DIPHENHYDRAMINE HYDROCHLORIDE 50 MG/ML
50 INJECTION INTRAMUSCULAR; INTRAVENOUS AS NEEDED
Status: DISCONTINUED | OUTPATIENT
Start: 2025-03-13 | End: 2025-03-14 | Stop reason: HOSPADM

## 2025-03-13 RX ORDER — PALONOSETRON 0.05 MG/ML
0.25 INJECTION, SOLUTION INTRAVENOUS ONCE
Status: CANCELLED | OUTPATIENT
Start: 2025-03-13

## 2025-03-13 RX ORDER — SODIUM CHLORIDE 0.9 % (FLUSH) 0.9 %
20 SYRINGE (ML) INJECTION AS NEEDED
OUTPATIENT
Start: 2025-03-13

## 2025-03-13 RX ORDER — FAMOTIDINE 10 MG/ML
20 INJECTION, SOLUTION INTRAVENOUS AS NEEDED
OUTPATIENT
Start: 2025-03-19

## 2025-03-13 RX ORDER — SODIUM CHLORIDE 9 MG/ML
20 INJECTION, SOLUTION INTRAVENOUS ONCE
Status: CANCELLED | OUTPATIENT
Start: 2025-03-13

## 2025-03-13 RX ORDER — HYDROCORTISONE SODIUM SUCCINATE 100 MG/2ML
100 INJECTION INTRAMUSCULAR; INTRAVENOUS AS NEEDED
OUTPATIENT
Start: 2025-03-26

## 2025-03-13 RX ORDER — HEPARIN SODIUM (PORCINE) LOCK FLUSH IV SOLN 100 UNIT/ML 100 UNIT/ML
300 SOLUTION INTRAVENOUS ONCE
Status: CANCELLED | OUTPATIENT
Start: 2025-03-13

## 2025-03-13 RX ORDER — PALONOSETRON 0.05 MG/ML
0.25 INJECTION, SOLUTION INTRAVENOUS ONCE
OUTPATIENT
Start: 2025-03-19

## 2025-03-13 RX ORDER — HYDROCORTISONE SODIUM SUCCINATE 100 MG/2ML
100 INJECTION INTRAMUSCULAR; INTRAVENOUS AS NEEDED
Status: DISCONTINUED | OUTPATIENT
Start: 2025-03-13 | End: 2025-03-14 | Stop reason: HOSPADM

## 2025-03-13 RX ORDER — HEPARIN SODIUM (PORCINE) LOCK FLUSH IV SOLN 100 UNIT/ML 100 UNIT/ML
500 SOLUTION INTRAVENOUS AS NEEDED
OUTPATIENT
Start: 2025-03-13

## 2025-03-13 RX ORDER — DIPHENHYDRAMINE HYDROCHLORIDE 50 MG/ML
50 INJECTION INTRAMUSCULAR; INTRAVENOUS AS NEEDED
Status: CANCELLED | OUTPATIENT
Start: 2025-03-13

## 2025-03-13 RX ORDER — HEPARIN SODIUM (PORCINE) LOCK FLUSH IV SOLN 100 UNIT/ML 100 UNIT/ML
300 SOLUTION INTRAVENOUS ONCE
OUTPATIENT
Start: 2025-03-13

## 2025-03-13 RX ADMIN — SODIUM CHLORIDE 20 ML/HR: 9 INJECTION, SOLUTION INTRAVENOUS at 09:40

## 2025-03-13 RX ADMIN — DEXAMETHASONE SODIUM PHOSPHATE 12 MG: 10 INJECTION, SOLUTION INTRAMUSCULAR; INTRAVENOUS at 10:31

## 2025-03-13 RX ADMIN — DIPHENHYDRAMINE HYDROCHLORIDE 50 MG: 50 INJECTION INTRAMUSCULAR; INTRAVENOUS at 10:44

## 2025-03-13 RX ADMIN — HEPARIN 500 UNITS: 100 SYRINGE at 13:30

## 2025-03-13 RX ADMIN — SODIUM CHLORIDE 160 MG: 9 INJECTION, SOLUTION INTRAVENOUS at 11:35

## 2025-03-13 RX ADMIN — PALONOSETRON HYDROCHLORIDE 0.25 MG: 0.25 INJECTION INTRAVENOUS at 10:43

## 2025-03-13 RX ADMIN — SODIUM CHLORIDE 200 MG: 9 INJECTION, SOLUTION INTRAVENOUS at 09:42

## 2025-03-13 RX ADMIN — CARBOPLATIN 230 MG: 10 INJECTION INTRAVENOUS at 12:45

## 2025-03-13 RX ADMIN — FAMOTIDINE 20 MG: 10 INJECTION, SOLUTION INTRAVENOUS at 10:42
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
## 2025-03-13 NOTE — ADDENDUM NOTE
Encounter addended by: Hortencia Soni RN on: 3/13/2025 2:54 PM   Actions taken: Charge Capture section accepted
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
## 2025-03-13 NOTE — RESEARCH
Patient Name:  Brooke Mendez  YOB: 1985  Patient Age:  39 y.o.  Patient's Sex:  female    Date of Service:  03/13/2025    Provider:  ELVIS Baker MD                     RESEARCH NOTE                  Protocol --ICE COMPRESS: Randomized Trial of Limb Cryocompression Versus Continuous Compression Versus Low Cyclic Compression for the Prevention of Taxane-Induced Peripheral Neuropathy   NCT #[id number]     Subject ID: [id number]  ARM 2:  Continuous Compression       Participant here for C1D1.  Planned Taxol/Cb qwk x 12; Keytruda q3wks.    Port in place.  All extremities used for device intervention.  Upon assessment, there were no signs of redness, open wounds or break in skin integrity prior to start of device intervention.  Participant tolerated device intervention without any adjustments.  No AEs observed.     Prior to start of treatment, neuropathy assessments completed as well as the Timed Get Up and Go evaluation.  Participant agreed to participate in the optional blood collection and biobanking.  Specimens collected and processed per protocol.     RTC in 1 week for #2 Taxol and device intervention.       Thank you.  Yanira Fu, RN, BSN, CRC

## 2025-03-13 NOTE — PROGRESS NOTES
Distress Screening Follow-up    Name: Brooke Mendez    : 1985    Diagnosis: Breast cancer    Location of Distress Screening: Breast Surgery     Distress Level: 7 (3/11/2025  2:00 PM)      Physical Concerns:  Sleep: Y  Substance abuse: N  Fatigue: Y  Tobacco use: N  Memory or concentration: Y  Sexual health: N  Changes in eating: N  Loss or change of physical abilities: N  Pain: Y      Emotional Concerns:  Worry or anxiety: Y  Sadness or depression: Y  Loss of interest or enjoyment: Y  Grief or loss: Y  Fear: Y  Loneliness: Y  Anger: Y  Changes in appearance: Y  Feelings of worthlessness or being a burden: Y      Social Concerns:  Relationship with spouse or partner: N  Relationship with children: Y  Relationship with family members: Y  Relationship with friends or coworkers: Y  Communication with health care team: N  Ability to have children: N      Practical Concerns:  Taking care of myself: Y  Taking care of others: Y  Work: Y  School: N  Housing/Utilities: Y  Finances: Y  Insurance: Y  Transportation: N  : N  Having enough food: Y  Access to medicine: Y  Treatment decisions: Y      Spiritual Concerns:  Sense of meaning or purpose: Y  Changes in zohaib or beliefs: N  Death, dying or afterlife: Y  Conflict between beliefs and cancer treatments: N  Relationship with the sacred: N  Ritual or dietary needs: N       Interventions:    Provided wig resource list    Comments:    SEBASTIAN met with pt and her supportive friend in infusion to provide support and assistance with psychosocial needs. Pt was in good spirits, and inquired about getting a wig. SW provided her with a wig resource list, an overview of wig resources, and what she will need for insurance coverage purposes. Pt thanked SW and denied any other needs at this time. SW inquired about psychosocial needs indicated on pt distress screen but she declined any other needs at this time. SEBASTIAN provided pt with an overview of the role and  available resources and provided contact information. Pt agreed to reach out as needed and thanked SW for the support and assistance. SW will remain available for ongoing support and assistance.

## 2025-03-13 NOTE — TELEPHONE ENCOUNTER
Patient asked infusion RN if she needs to get her clip placed tomorrow.  Contacted patient and advised per Dr. Baker that she does recommend clip placement within one week of chemo start. Offered to move the appointment if needed.  Patient verbalized understanding and stated to leave as scheduled.

## 2025-03-13 NOTE — RESEARCH
Patient Name:  Brooke Mendez  YOB: 1985  Patient Age:  39 y.o.  Patient's Sex:  female    Date of Service:  03/11/2025    Provider:  ELVIS Baker MD                                       RESEARCH INFORMED CONSENT                                     Protocol --ICE COMPRESS: Randomized Trial of Limb Cryocompression Versus Continuous Compression Versus Low Cyclic Compression for the Prevention of Taxane-Induced Peripheral Neuropathy?     NCT #[id number]     Information (all 8 elements of the ICF) concerning the protocol including, description of procedures to be followed, participant responsibilities, confidentiality, foreseeable risks, compensation, availability, benefits and alternatives to participation, was reviewed with the research participant in an understandable language. The participant was encouraged to ask questions throughout the informed consent process. Any questions and/or concerns were answered to the participant’s satisfaction.     After giving the participant time to consider, the participant chose to voluntarily participate in the study. The informed consent document signature process was completed.     Participant authorization for the use and disclosure of protected health information for research purposes (HIPAA Privacy Rule) was acknowledged and signed on the date noted on the consent form.     Contact information for the research department and physician/investigator was provided. A copy of the signed informed consent form was given to the study participant.     No study specific assessments were completed prior to obtaining informed consent.      By signing this document, I attest that I participated in the informed consent discussion with the participant.   At the time of consent, the participant was given the Patient Information Sheet.     Required QOLs were completed prior to randomization.     The participant agreed to specimen biobanking which will be collected prior to  start of treatment.     At time of consent, participant also attested to the following:     -she has not previously received neurotoxic chemotherapy for any reason    -she does not have pre-existing clinical peripheral neuropathy from any cause    -she does not have a history of Raynaud's phenomenon, cold agglutinin disease, cryoglobulinemia, cryofibrinogenemia, post-traumatic cold dystrophy, or peripheral arterial ischemia    -she does not have a history of skin or limb metastases   -she does not have any open skin wounds or ulcers of any limbs      Participant plans to begin treatment, 03/13/2025.     Thank you.     DEVON Velazquez, BSN, RN, RNC

## 2025-03-13 NOTE — PROGRESS NOTES
Sample collected for genetic testing as discussed on 3/12/25. Results expected in 2-3 weeks, and patient will be contacted to discuss once results are available.

## 2025-03-13 NOTE — PROGRESS NOTES
Brooke Mendez is a 39-year-old female who was referred for genetic counseling due to a personal history of breast cancer.  Genetic counseling was provided via telehealth, and Ms. Mendez confirmed her name, date of birth, and that she was located in [place] at the time of the appointment. Ms. Mendez was recently diagnosed with a triple breast cancer at age 39, and will be undergoing neoadjuvant chemotherapy.  Ms. Mendez is premenopausal and retains her uterus and ovaries.  Ms. Mendez was interested in discussing her risk for a hereditary cancer syndrome, and decided to pursue genetic testing.   Ms. Mendez opted to pursue comprehensive testing via the CancerNext panel ordered through [employer] which includes BRCA1/2 and 37 additional genes associated with an increased risk of breast cancer and other cancers. Sample collection is planned for 3/13/25, and results are expected 2-3 weeks after.     PERTINENT FAMILY HISTORY:  Mat. Grandmother:  Lung cancer      Suspected breast cacner  Mat. Grandfather:  Lung cancer  Mat. Great-Grandmother: Breast cancer  Mat. Great-Uncle:  Leukemia  Mat. 1st cousin, once-removed: Breast cancer, 50  Mat. 1st cousin, once-removed: Lung cancer  Mat. 2nd cousin:   Thyroid cancer (metastatic)      RISK ASSESSMENT:  Ms. Mendez's personal history of breast cancer in combination with the paternal family history of breast cancer and prostate cancer raises the question of a hereditary cancer syndrome.   NCCN guidelines recommend BRCA1/2 testing for individuals diagnosed with breast cancer diagnosed at or under age 50.  Therefore, testing is appropriate for Ms. Mendez.  We discussed that standard approach to BRCA1/2 testing is via a multigene panel that evaluates BRCA1/2 and multiple other genes known to impact cancer risk. This risk assessment is based on the family history information provided at the time of the appointment.  The assessment could change in the future should new information be obtained.      GENETIC COUNSELING: We reviewed the family history information in detail.  Cases of breast cancer follow three general patterns: sporadic, familial, and hereditary.  While most cancer is sporadic, some cases appear to occur in family clusters.  These cases are said to be familial and account for 10-20% of breast cancer cases.  Familial cases may be due to a combination of shared genes and environmental factors among family members.  In even fewer cases (5-10%), the risk for cancer is inherited, and the genes responsible for the increased cancer risk are known.       Family histories typical of hereditary cancer syndromes usually include multiple first- and second-degree relatives diagnosed with cancer types that define a syndrome.  These cases tend to be diagnosed at younger-than-expected ages and can be bilateral or multifocal.  The cancer in these families follows an autosomal dominant inheritance pattern, which indicates the likely presence of a mutation in a cancer susceptibility gene.  Children and siblings of an individual believed to carry this mutation have a 50% chance of inheriting that mutation, thereby inheriting the increased risk to develop cancer.  These mutations can be passed down from the maternal or the paternal lineage.     Hereditary breast cancer accounts for 5-10% of all cases of breast cancer.  A significant proportion of hereditary breast cancer can be attributed to mutations in the BRCA1 and BRCA2 genes.  Mutations in these genes confer an increased risk for breast cancer, ovarian cancer, male breast cancer, prostate cancer and pancreatic cancer.  There are other genes that are known to be associated with an increased risk for breast cancer, colon cancer, and other cancers.  We briefly discussed Arellano syndrome, due to Ms. Mendez's personal and family history of colon cancer. The standard approach to genetic testing is via a multigene panel.  Genes included on these panels have varying  degrees of risk associated, and management and screening guidelines vary based on the specific gene.  Hereditary cancer syndromes can demonstrate incomplete penetrance and variable expression within families. We discussed the possibility of results that are unexpected and the possibility of learning about cancer risks that were not anticipated based on family history. Based on Ms. Mendez's personal history and her desire to get more information regarding her personal risks and risks for her family, she opted to pursue testing through a panel evaluating 39 genes associated with hereditary risk for cancer.     GENETIC TESTING:  The risks, benefits and limitations of genetic testing and implications for clinical management following testing were reviewed.  DNA test results can influence decisions regarding screening, prevention and surgical management.  Genetic testing can have significant psychological implications for both individuals and families.       We discussed panel testing, which would involve testing for BRCA1/2 as well as several other cancer susceptibility genes at the same time.  The benefits and limitations of genetic testing were discussed and Ms. Mendez decided to pursue testing. The implications of a positive or negative test result were discussed. We discussed the possibility that, in some cases, genetic test results may be uninformative due to the identification of a genetic variant of uncertain significance (VUS). These variants may or may not be associated with an increased cancer risk.  VUSs are frequently reported through multigene panel testing, given the presence of genetic variation in the population and the number of genes being analyzed. Given her personal history, a negative test result would not eliminate all breast cancer risk to her relatives, although the risk would not be as high as it would with positive genetic testing.          PLAN: Sample collection is planned for 3/13/25 when the  patient is onsite at [facility], and results are expected in 2-3 weeks.  In the meantime, Ms. Mendez is welcome to contact our office with any questions or concerns at [phone].        Abby Watts MS, [facility], [facility]  Licensed Certified Genetic Counselor

## 2025-03-13 NOTE — PROGRESS NOTES
"Outpatient Oncology Nutrition     Reason for Visit: Oncology Nutrition Screening and Patient Education / Met with patient and her mother during her initial chemotherapy infusion appointment.      Patient Name:  Brooke Mendez    :  1985    MRN:  [id number]    Date of Encounter: 2025    Nutrition Assessment     Diagnosis: Left breast cancer, triple negative with multiple lymph nodes positive Stage IIIC (cT2, cN2, cM0, G3, ER-, WA-, HER2-)     Neoadjuvant Chemotherapy:  Part 1:  OP BREAST Pembrolizumab 200 mg / PACLitaxel / CARBOplatin AUC=1.5 (Weekly) IV - every 21 days for 4 cycles (start date 3/13/25)  -pembrolizumab 200 mg IV on day 1  -pactitaxel 80 mg/m2 IV on days 1, 8, and 15  -carboplatin AUC 1.5 IV on days 1, 8, and 15     Part 2:  OP BREAST Pembrolizumab 200 mg / DOXOrubicin / Cyclophosphamide   -pembrolizumab 200 mg IV on day 1 (start date 25)  -doxorubicin 60 mg/m2 IV push on day 1  -cyclophosphamide 600 mg/m2 IV on day 1  -pegfilgrastim (Neulasta OnPro) 6 mg subQ on day 1    Surgery: Recommendation was that she would require a mastectomy regardless of downstaging    Adjuvant Treatment: pembrolizumab for 9 cycles vs additional chemotherapy pending response    Patient Active Problem List:    Patient Active Problem List   Diagnosis    Malignant neoplasm of left breast in female, estrogen receptor negative       Food / Nutrition Related History   Patient reports her appetite has been good and states she is eating her normal amount.  She endorses recent weight gain since smoking cessation back in the .    Hydration Status   Discussed the importance of hydration, reviewed hydrating fluids, and recommended she increase her intake.    Goal: ~96 ounces     Enteral Feeding       Anthropometric Measurements     Height:    Ht Readings from Last 1 Encounters:   25 175.3 cm (69.02\")       Weight:    Wt Readings from Last 1 Encounters:   25 88.9 kg (196 lb)       BMI:  28.9 - " Overweight    Weight Change: no recent weight loss     Review of Lab Data (Time Frame - 1 month / 2 month)   Labs reviewed - 3/11/25    Medication Review   MAR reviewed - Zofran noted     Nutrition Focused Physical Findings       Nutrition Impact Symptoms   Diarrhea - 1-2 bowel movements daily / range from formed to watery     Physical Activity   Normal with no limitations    Current Nutritional Intake     Oral diet:  Regular     Oral nutritional supplements: none currently but has drank Fairlife in the past     Intake: oral intake has been normal     Malnutrition Risk Assessment     Recent weight loss over the past 6 months:  0 = No    Eating poorly because of a decreased appetite:  0 = No    Malnutrition Screening Score:     MST = 0 or 1 Patient not at risk for malnutrition    Nutrition Diagnosis     Problem    Etiology    Signs / Symptoms      Nutrition Intervention   Discussed the importance of good nutrition during her treatment course focusing on adequate calorie, protein, nutrient and fluid intake.  Advised her to be consuming smaller more frequent meals/snacks throughout the day to aid with diarrhea and potential nausea symptom management.  Emphasized the importance of protein and its role in the diet; reviewed high protein foods; and recommended she have a protein source at each meal/snack.  Offered several high protein snack ideas she may find more appealing at this time.  Discussed different types of ONS and their roles in the diet.  Encouraged her to resume drinking Fairlife as needed and provided samples of Ensure Complete for her to try at home.  Offered tips to aid with tolerance of ONS.  Discussed tips to aid with diarrhea and potential taste changes.  Reviewed the basics of survivorship nutrition.      Written nutrition resources provided -   Oral Care Recipes  Tips for Control of Diarrhea  Nutritional Considerations in Breast Cancer    Goal   To achieve adequate nutritional and hydration  intake.  To aid with nutrition impact symptom management as needed.     Monitoring / Evaluation   Answered their questions and both voiced understanding of information discussed.  RD's contact information provided and encouraged to call with questions.  Will follow up as indicated.     Nikki Bergman MS, RD, LD

## 2025-03-13 NOTE — ADDENDUM NOTE
Encounter addended by: Hortencia Soni RN on: 3/13/2025 4:29 PM   Actions taken: Patient Education documented on

## 2025-03-14 ENCOUNTER — HOSPITAL ENCOUNTER (OUTPATIENT)
Dept: ULTRASOUND IMAGING | Facility: HOSPITAL | Age: 40
Discharge: HOME | End: 2025-03-14
Payer: COMMERCIAL

## 2025-03-14 ENCOUNTER — HOSPITAL ENCOUNTER (OUTPATIENT)
Dept: MAMMOGRAPHY | Facility: HOSPITAL | Age: 40
Discharge: HOME | End: 2025-03-14
Payer: COMMERCIAL

## 2025-03-14 DIAGNOSIS — C50.912 MALIGNANT NEOPLASM OF LEFT BREAST IN FEMALE, ESTROGEN RECEPTOR NEGATIVE, UNSPECIFIED SITE OF BREAST: ICD-10-CM

## 2025-03-14 DIAGNOSIS — Z17.1 MALIGNANT NEOPLASM OF LEFT BREAST IN FEMALE, ESTROGEN RECEPTOR NEGATIVE, UNSPECIFIED SITE OF BREAST: ICD-10-CM

## 2025-03-14 PROCEDURE — A4648 IMPLANTABLE TISSUE MARKER: HCPCS

## 2025-03-14 PROCEDURE — 25010000002 LIDOCAINE 1 % SOLUTION: Performed by: RADIOLOGY

## 2025-03-14 RX ORDER — LIDOCAINE HYDROCHLORIDE 10 MG/ML
5 INJECTION, SOLUTION INFILTRATION; PERINEURAL ONCE
Status: COMPLETED | OUTPATIENT
Start: 2025-03-14 | End: 2025-03-14

## 2025-03-14 RX ADMIN — Medication 5 ML: at 11:34

## 2025-03-16 ENCOUNTER — HOSPITAL ENCOUNTER (OUTPATIENT)
Dept: MRI IMAGING | Facility: HOSPITAL | Age: 40
Discharge: HOME | End: 2025-03-16
Payer: COMMERCIAL

## 2025-03-16 DIAGNOSIS — G89.29 CHRONIC LOW BACK PAIN, UNSPECIFIED BACK PAIN LATERALITY, UNSPECIFIED WHETHER SCIATICA PRESENT: ICD-10-CM

## 2025-03-16 DIAGNOSIS — M54.50 CHRONIC LOW BACK PAIN, UNSPECIFIED BACK PAIN LATERALITY, UNSPECIFIED WHETHER SCIATICA PRESENT: ICD-10-CM

## 2025-03-16 PROCEDURE — A9577 INJ MULTIHANCE: HCPCS | Performed by: INTERNAL MEDICINE

## 2025-03-16 PROCEDURE — 25510000002 GADOBENATE DIMEGLUMINE 529 MG/ML SOLUTION: Performed by: INTERNAL MEDICINE

## 2025-03-16 PROCEDURE — 72156 MRI NECK SPINE W/O & W/DYE: CPT

## 2025-03-16 PROCEDURE — 72157 MRI CHEST SPINE W/O & W/DYE: CPT

## 2025-03-16 RX ADMIN — GADOBENATE DIMEGLUMINE 20 ML: 529 INJECTION, SOLUTION INTRAVENOUS at 12:42

## 2025-03-19 ENCOUNTER — TELEPHONE (OUTPATIENT)
Dept: ONCOLOGY | Facility: CLINIC | Age: 40
End: 2025-03-19
Payer: COMMERCIAL

## 2025-03-19 NOTE — TELEPHONE ENCOUNTER
Called and advised patient per Dr. Baker regarding recent MRI Thoracic Spine: Please call the patient: no cancer related findings. Patient verbalized understanding. Patient stated she began having the following symptoms yesterday: chills intermittently, generalized pain that began in her hip, feeling like she has the flu, congestion and headache.  Patient stated she has had burning eyes but otherwise no vision changes.  Patient stated she has been taking Claritin, one tablet PO Daily.  Dr. Baker notified and per MD, patient advised that since she has had chills and hasn't been able to check her temperature, she should go to the ER to be evaluated and tested for COVID/influenza, etc and to report any positive results to this office and not come to appointment tomorrow.  Advised to come to appointment if negative.  Patient verbalized understanding.

## 2025-03-20 ENCOUNTER — HOSPITAL ENCOUNTER (OUTPATIENT)
Dept: ONCOLOGY | Facility: HOSPITAL | Age: 40
End: 2025-03-20
Payer: COMMERCIAL

## 2025-03-20 ENCOUNTER — RESEARCH ENCOUNTER (OUTPATIENT)
Dept: OTHER | Facility: OTHER | Age: 40
End: 2025-03-20
Payer: COMMERCIAL

## 2025-03-20 ENCOUNTER — OFFICE VISIT (OUTPATIENT)
Dept: ONCOLOGY | Facility: CLINIC | Age: 40
End: 2025-03-20
Payer: COMMERCIAL

## 2025-03-20 ENCOUNTER — HOSPITAL ENCOUNTER (OUTPATIENT)
Dept: ONCOLOGY | Facility: HOSPITAL | Age: 40
Discharge: HOME | End: 2025-03-20
Payer: COMMERCIAL

## 2025-03-20 VITALS
SYSTOLIC BLOOD PRESSURE: 98 MMHG | DIASTOLIC BLOOD PRESSURE: 70 MMHG | OXYGEN SATURATION: 97 % | HEART RATE: 134 BPM | BODY MASS INDEX: 29.03 KG/M2 | TEMPERATURE: 98.2 F | HEIGHT: 69 IN | WEIGHT: 196 LBS

## 2025-03-20 DIAGNOSIS — C50.912 MALIGNANT NEOPLASM OF LEFT BREAST IN FEMALE, ESTROGEN RECEPTOR NEGATIVE, UNSPECIFIED SITE OF BREAST: Primary | ICD-10-CM

## 2025-03-20 DIAGNOSIS — Z17.1 MALIGNANT NEOPLASM OF LEFT BREAST IN FEMALE, ESTROGEN RECEPTOR NEGATIVE, UNSPECIFIED SITE OF BREAST: Primary | ICD-10-CM

## 2025-03-20 LAB
ALBUMIN SERPL-MCNC: 3.6 G/DL (ref 3.5–5.2)
ALBUMIN/GLOB SERPL: 1.2 G/DL
ALP SERPL-CCNC: 80 U/L (ref 39–117)
ALT SERPL W P-5'-P-CCNC: 40 U/L (ref 1–33)
ANION GAP SERPL CALCULATED.3IONS-SCNC: 15 MMOL/L (ref 5–15)
AST SERPL-CCNC: 31 U/L (ref 1–32)
BASOPHILS # BLD AUTO: 0.02 10*3/MM3 (ref 0–0.2)
BASOPHILS NFR BLD AUTO: 0.3 % (ref 0–1.5)
BILIRUB SERPL-MCNC: 0.6 MG/DL (ref 0–1.2)
BUN SERPL-MCNC: 12 MG/DL (ref 6–20)
BUN/CREAT SERPL: 13.2 (ref 7–25)
CALCIUM SPEC-SCNC: 8.1 MG/DL (ref 8.6–10.5)
CHLORIDE SERPL-SCNC: 96 MMOL/L (ref 98–107)
CO2 SERPL-SCNC: 19 MMOL/L (ref 22–29)
CREAT SERPL-MCNC: 0.91 MG/DL (ref 0.57–1)
DEPRECATED RDW RBC AUTO: 41.1 FL (ref 37–54)
EGFRCR SERPLBLD CKD-EPI 2021: 82 ML/MIN/1.73
EOSINOPHIL # BLD AUTO: 0 10*3/MM3 (ref 0–0.4)
EOSINOPHIL NFR BLD AUTO: 0 % (ref 0.3–6.2)
ERYTHROCYTE [DISTWIDTH] IN BLOOD BY AUTOMATED COUNT: 12.5 % (ref 12.3–15.4)
GLOBULIN UR ELPH-MCNC: 3.1 GM/DL
GLUCOSE SERPL-MCNC: 120 MG/DL (ref 65–99)
HCT VFR BLD AUTO: 34.2 % (ref 34–46.6)
HGB BLD-MCNC: 11.5 G/DL (ref 12–15.9)
IMM GRANULOCYTES # BLD AUTO: 0.11 10*3/MM3 (ref 0–0.05)
IMM GRANULOCYTES NFR BLD AUTO: 1.5 % (ref 0–0.5)
LYMPHOCYTES # BLD AUTO: 0.72 10*3/MM3 (ref 0.7–3.1)
LYMPHOCYTES NFR BLD AUTO: 9.7 % (ref 19.6–45.3)
MCH RBC QN AUTO: 30.3 PG (ref 26.6–33)
MCHC RBC AUTO-ENTMCNC: 33.6 G/DL (ref 31.5–35.7)
MCV RBC AUTO: 90.2 FL (ref 79–97)
MONOCYTES # BLD AUTO: 0.33 10*3/MM3 (ref 0.1–0.9)
MONOCYTES NFR BLD AUTO: 4.4 % (ref 5–12)
NEUTROPHILS NFR BLD AUTO: 6.25 10*3/MM3 (ref 1.7–7)
NEUTROPHILS NFR BLD AUTO: 84.1 % (ref 42.7–76)
PLATELET # BLD AUTO: 176 10*3/MM3 (ref 140–450)
PMV BLD AUTO: 9.5 FL (ref 6–12)
POTASSIUM SERPL-SCNC: 4.5 MMOL/L (ref 3.5–5.2)
PROT SERPL-MCNC: 6.7 G/DL (ref 6–8.5)
RBC # BLD AUTO: 3.79 10*6/MM3 (ref 3.77–5.28)
SODIUM SERPL-SCNC: 130 MMOL/L (ref 136–145)
WBC NRBC COR # BLD AUTO: 7.43 10*3/MM3 (ref 3.4–10.8)

## 2025-03-20 PROCEDURE — 96360 HYDRATION IV INFUSION INIT: CPT

## 2025-03-20 PROCEDURE — 96361 HYDRATE IV INFUSION ADD-ON: CPT

## 2025-03-20 PROCEDURE — 80053 COMPREHEN METABOLIC PANEL: CPT | Performed by: INTERNAL MEDICINE

## 2025-03-20 PROCEDURE — 85025 COMPLETE CBC W/AUTO DIFF WBC: CPT | Performed by: INTERNAL MEDICINE

## 2025-03-20 PROCEDURE — 25010000002 HEPARIN LOCK FLUSH PER 10 UNITS: Performed by: INTERNAL MEDICINE

## 2025-03-20 PROCEDURE — 25810000003 SODIUM CHLORIDE 0.9 % SOLUTION: Performed by: NURSE PRACTITIONER

## 2025-03-20 RX ORDER — ACETAMINOPHEN 325 MG/1
650 TABLET ORAL ONCE
Status: COMPLETED | OUTPATIENT
Start: 2025-03-20 | End: 2025-03-20

## 2025-03-20 RX ORDER — HEPARIN SODIUM (PORCINE) LOCK FLUSH IV SOLN 100 UNIT/ML 100 UNIT/ML
500 SOLUTION INTRAVENOUS AS NEEDED
OUTPATIENT
Start: 2025-03-20

## 2025-03-20 RX ORDER — SODIUM CHLORIDE 0.9 % (FLUSH) 0.9 %
10 SYRINGE (ML) INJECTION AS NEEDED
OUTPATIENT
Start: 2025-03-20

## 2025-03-20 RX ORDER — SODIUM CHLORIDE 0.9 % (FLUSH) 0.9 %
20 SYRINGE (ML) INJECTION AS NEEDED
OUTPATIENT
Start: 2025-03-20

## 2025-03-20 RX ORDER — HEPARIN SODIUM (PORCINE) LOCK FLUSH IV SOLN 100 UNIT/ML 100 UNIT/ML
300 SOLUTION INTRAVENOUS ONCE
OUTPATIENT
Start: 2025-03-20

## 2025-03-20 RX ORDER — SULFAMETHOXAZOLE AND TRIMETHOPRIM 800; 160 MG/1; MG/1
1 TABLET ORAL 2 TIMES DAILY
Qty: 14 TABLET | Refills: 0 | Status: ON HOLD | OUTPATIENT
Start: 2025-03-20 | End: 2025-03-27

## 2025-03-20 RX ORDER — HEPARIN SODIUM (PORCINE) LOCK FLUSH IV SOLN 100 UNIT/ML 100 UNIT/ML
500 SOLUTION INTRAVENOUS AS NEEDED
Status: DISCONTINUED | OUTPATIENT
Start: 2025-03-20 | End: 2025-03-21 | Stop reason: HOSPADM

## 2025-03-20 RX ADMIN — ACETAMINOPHEN 650 MG: 325 TABLET, FILM COATED ORAL at 11:47

## 2025-03-20 RX ADMIN — HEPARIN 500 UNITS: 100 SYRINGE at 13:53

## 2025-03-20 RX ADMIN — SODIUM CHLORIDE 1000 ML: 9 INJECTION, SOLUTION INTRAVENOUS at 11:45

## 2025-03-20 NOTE — RESEARCH
Patient Name:  Brooke Mendez  YOB: 1985  Patient Age:  39 y.o.  Patient's Sex:  female    Date of Service:  03/20/2025    Provider:  ELVIS Baker MD                     RESEARCH NOTE                  Protocol --ICE COMPRESS: Randomized Trial of Limb Cryocompression Versus Continuous Compression Versus Low Cyclic Compression for the Prevention of Taxane-Induced Peripheral Neuropathy   NCT #[id number]     Subject ID: [id number]  ARM 2:  Continuous Compression       Participant here for C2.  Planned Taxol/Cb qwk x 12; Keytruda q3wks.  However, it was determined to delay this cycle and administer IV fluids.  Pt had presented to ER yesterday and received IV fluids as well.    Next appt planned for 3/27/25 for C2.    Thank you.  Yanira Fu, RN, BSN, CRC

## 2025-03-20 NOTE — PROGRESS NOTES
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 WO Contrast  Result Date: 3/11/2025  Narrative: BILATERAL BREAST MRI   HISTORY: 39-year-old female who underwent ultrasound-guided core biopsy of 3 masses in the left breast at [facility] on February 25, 2025. The breast biopsies were performed by a breast surgeon using mammotome core devices with ultrasound guidance according to the records I have received. 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 Negative for microcalcifications, atypical hyperplasia or malignancy. Second specimen was labeled left breast nodules 2 and 3 12:00 9 cm from the nipple invasive ductal carcinoma histologic grade 3 of 3. The patient also underwent fine-needle aspiration of a left axillary lymph node cytology: Malignant epithelial  cells present. Under diagnosis it states consistent with metastatic breast carcinoma. I do have a post procedure mammogram dated 2/20/2025 which demonstrates 2 marking clips in the central to inner upper quadrant. No marking clip is visualized in the axilla where a prominent lymph node is identified.  TECHNIQUE:  MRI was performed on a 1.5 Ai magnet utilizing an 8 channel [employer] breast coil.  Pre-contrast spin-echo T1 weighted and T2 weighted sequences were obtained in the axial plane.  Routine dynamic images were performed following the administration of 14 ml of Multihance contrast.  Four postcontrast runs were obtained. No contrast complications occurred.  Delayed high resolution post contrast T1 weighted sagittal images were also obtained.  A CAD system ([employer]) was utilized for data analysis.   COMPARISON: Outside mammograms dated 2/20/2025, 2/11/2025, 2/7/2025 and 5/4/2023 as well as outside ultrasound dated 2/20/2025. There are no prior breast MRIs available for comparison.   FINDINGS: There is normal vascular enhancement and mild background enhancement. The breast tissue is heterogeneously dense. There is motion artifact which degrades image quality on the sagittal images.  RIGHT BREAST: There are no areas of abnormal morphology or enhancement in the right breast indicate right breast malignancy. There is a small oval enhancing mass that appears to have a fatty hilum in the upper outer quadrant of the right breast consistent with a benign intramammary lymph node which appears stable compared to prior right mammograms.  Mediport projects over the right chest wall  LEFT BREAST: Corresponding to the index malignancy in the central medial superior left breast are 2 masses that appear contiguous particularly on the sagittal sequence. Best visualized on sagittal images is what I believe to be the signal void from the marking clips placed at the time of biopsies which are identified within the anterior aspect  of the first mass in the central aspect of the central mass. Both masses are irregular demonstrating washout kinetics on kinetic analysis. Because of their appearance on MRI I  conclude that the nodule 1 benign core biopsy is nonconcordant and both sites reflect malignancy. The anterior mass measures 2.5 cm x 2.2 cm x 2.4 cm, the posterior mass measures 2 cm x 8.1 mm x 1.9 cm. Once again on the sagittal sequence they do appear contiguous on some images.  There is a third mass measuring 9.4 mm in the posterior upper inner quadrant best visualized on axial subtracted image 205, sagittal image 86 on the DynaCAD software that may have a nonenhancing internal septation. Kinetic analysis demonstrates progressive kinetics. This mass is located 2 cm inferior to the marking clip placed into the most posterior mass. Differential possibilities include an additional site of neoplasia versus a fibroadenoma.  At 3:00 posteriorly in the left breast is a 1.2 cm oval mass with a fatty hilum and nodular cortical thickening best visualized on axial subtracted image 233 sagittal image 39 on the DynaCAD software that appears to reflect an enlarged internal mammary lymph node, I suspect involved with metastatic disease.  There is enhancement of the skin and diffuse skin thickening suggestive of skin involvement.  There is extensive left axillary lymphadenopathy involving level 1 axillary lymph nodes as visualized. On the sagittal image there appears to be a conglomerate of lymph nodes that measures 6.8 cm in greatest dimension when measured sagittally. Because of the posterior location of this area and motion it is difficult to tell whether there is chest wall involvement.  There is an oval mass in the superior left internal mammary chain best visualized on sagittal image 136 that measures 8.3 mm, axial image 188 that does not have a fatty hilum. This may reflect an internal mammary chain lymph node involved with metastatic disease.  Because of motion resolution makes this area difficult to evaluate.         Impression: 1. BI-RADS 6 known biopsy-proven malignancy left breast with metastatic disease diffuse skin wall thickening.  2. There is an additional mass that is not be biopsied which may reflect an additional site of disease versus a fibroadenoma. If this will change clinical management the patient can be referred back for MRI guided biopsy.  3. Similarly there is a prominent intramammary lymph node with nodular cortical thickening for which fine-needle aspiration and /or  core biopsy if this will change clinical management  can be performed.  4. The patient can be referred back for marking clip placement into axillary adenopathy if this will change clinical management.  5. Bulky left axillary lymphadenopathy. Cannot rule out chest wall invasion. PET/CT may be helpful for further evaluation.  6. Indeterminate left internal mammary chain lymph node  RECOMMENDATIONS: Surgical and oncology follow-up. Please refer the patient back for additional procedures as mentioned in the impression if this will change clinical management  BI-RADS CATEGORY: 6 known biopsy-proven malignancy left breast    3/11/2025 3:38 PM by Dr. Nika Eckert MD on Workstation: [id number]      NM outside films  Result Date: 3/10/2025  Narrative: This procedure was auto-finalized with no dictation required.    MRI outside films  Result Date: 3/10/2025  Narrative: This procedure was auto-finalized with no dictation required.    CT outside films  Result Date: 3/10/2025  Narrative: This procedure was auto-finalized with no dictation required.    CT outside films  Result Date: 3/10/2025  Narrative: This procedure was auto-finalized with no dictation required.    MAMMO Outside Films  Result Date: 3/6/2025  Narrative: This procedure was auto-finalized with no dictation required.    MAMMO Outside Films  Result Date: 3/6/2025  Narrative: This procedure was auto-finalized with no  dictation required.      Procedures    Assessment / Plan      Assessment/Plan:   Left breast cancer, triple negative with multiple lymph nodes positive  I reviewed the patient's history, imaging and pathology reports, multifocal T1cN2  We reviewed the potential role for neoadjuvant treatment.  The advantages include potential for downstaging the tumor, and the added prognostic value of assessing pathologic response to chemotherapy.  There is no clear survival advantage to neoadjuvant over adjuvant administration, but for suboptimal neoadjuvant responders, outcomes can be improved with tailoring adjuvant therapy. I recommended Keynote 522 regimen of neoadjuvant pembrolizumab 200 mg Q3W in combination with 4 cycles of paclitaxel + carboplatin, then with 4 cycles of AC. After definitive surgery, recommend adjuvant pembrolizumab for 9 cycles vs additional chemotherapy pending response.   -I personally reviewed her breast MRI and discussed in multidisciplinary breast conference this morning.  Recommendation was that she would require a mastectomy regardless of downstaging.  Therefore, will not pursue additional MRI guided biopsy.  However, will request radiology place an axillary clip for better identification of the positive nodes post neoadjuvant chemotherapy.  -Echocardiogram normal  -MRI of the spine showed no cancer related findings  -Genetics pending    -Will hold treatment today due to above symptoms.  She is tachycardic and hypotensive but afebrile.  White count and ANC are normal.  Platelets are normal at 176 and hemoglobin 11.5.  Sodium is 130 otherwise CMP fairly unremarkable.  Will give her a liter of fluids today.  She will increase her fluid intake at home.  I instructed her to purchase a thermometer and monitor her temperature.  We discussed bowel regimen in detail to include stool softener and MiraLAX daily as needed, can increase to twice daily if needed.  Also discussed use of Senokot.  Discussed with  Dr. Baker given her 1 left axillary swelling and tenderness.  Will order Bactrim in place of the Cefdinir to cover skin/tissue along with UTI.  She will notify us with any new or worsening symptoms.  Otherwise, we will see her back as scheduled on 3/27 for treatment consideration.      2. Peripheral neuropathy risk  -ICE trial screen    3. Back pain, indeterminate CT findings  -MRI showed no cancer related findings    4. HA  -Negative outside head CT with and without contrast is reassuring. Can consider brain MRI if headaches persist.  Could be exacerbated today due to dehydration.      Follow Up:   Weekly with treatment     KELLY Bhardwaj  Hematology and Oncology     Total time of patient care on day of service including time prior to, face to face with patient, and following visit spent in reviewing records, lab results, imaging studies, discussion with patient, discussion with Dr. Baker and documentation/charting was > 40 minutes.

## 2025-03-21 ENCOUNTER — TELEPHONE (OUTPATIENT)
Dept: ONCOLOGY | Facility: CLINIC | Age: 40
End: 2025-03-21
Payer: COMMERCIAL

## 2025-03-21 ENCOUNTER — APPOINTMENT (OUTPATIENT)
Dept: GENERAL RADIOLOGY | Facility: HOSPITAL | Age: 40
DRG: 607 | End: 2025-03-21
Payer: COMMERCIAL

## 2025-03-21 ENCOUNTER — HOSPITAL ENCOUNTER (INPATIENT)
Facility: HOSPITAL | Age: 40
LOS: 2 days | Discharge: HOME | DRG: 607 | End: 2025-03-27
Attending: EMERGENCY MEDICINE | Admitting: STUDENT IN AN ORGANIZED HEALTH CARE EDUCATION/TRAINING PROGRAM
Payer: COMMERCIAL

## 2025-03-21 DIAGNOSIS — C50.912 MALIGNANT NEOPLASM OF LEFT FEMALE BREAST, UNSPECIFIED ESTROGEN RECEPTOR STATUS, UNSPECIFIED SITE OF BREAST: Primary | ICD-10-CM

## 2025-03-21 DIAGNOSIS — R82.81 PYURIA: ICD-10-CM

## 2025-03-21 DIAGNOSIS — R68.89 FLU-LIKE SYMPTOMS: ICD-10-CM

## 2025-03-21 DIAGNOSIS — R21 RASH: ICD-10-CM

## 2025-03-21 PROBLEM — E07.9 THYROID DISEASE: Status: ACTIVE | Noted: 2025-03-21

## 2025-03-21 PROBLEM — I10 ESSENTIAL HYPERTENSION: Status: ACTIVE | Noted: 2025-03-21

## 2025-03-21 PROBLEM — Z79.69 ON ANTINEOPLASTIC CHEMOTHERAPY: Status: ACTIVE | Noted: 2025-03-21

## 2025-03-21 PROBLEM — R50.9 FEVER: Status: ACTIVE | Noted: 2025-03-21

## 2025-03-21 PROBLEM — Z79.899 ON ANTINEOPLASTIC CHEMOTHERAPY: Status: ACTIVE | Noted: 2025-03-21

## 2025-03-21 PROBLEM — K59.00 CONSTIPATION: Status: ACTIVE | Noted: 2025-03-21

## 2025-03-21 LAB
ALBUMIN SERPL-MCNC: 4 G/DL (ref 3.5–5.2)
ALBUMIN/GLOB SERPL: 1 G/DL
ALP SERPL-CCNC: 104 U/L (ref 39–117)
ALT SERPL W P-5'-P-CCNC: 38 U/L (ref 1–33)
ANION GAP SERPL CALCULATED.3IONS-SCNC: 17 MMOL/L (ref 5–15)
AST SERPL-CCNC: 33 U/L (ref 1–32)
B PARAPERT DNA SPEC QL NAA+PROBE: NOT DETECTED
B PERT DNA SPEC QL NAA+PROBE: NOT DETECTED
BACTERIA UR QL AUTO: ABNORMAL /HPF
BASOPHILS # BLD AUTO: 0.03 10*3/MM3 (ref 0–0.2)
BASOPHILS NFR BLD AUTO: 0.5 % (ref 0–1.5)
BILIRUB SERPL-MCNC: 0.5 MG/DL (ref 0–1.2)
BILIRUB UR QL STRIP: NEGATIVE
BUN SERPL-MCNC: 9 MG/DL (ref 6–20)
BUN/CREAT SERPL: 10.7 (ref 7–25)
C PNEUM DNA NPH QL NAA+NON-PROBE: NOT DETECTED
CALCIUM SPEC-SCNC: 9 MG/DL (ref 8.6–10.5)
CHLORIDE SERPL-SCNC: 98 MMOL/L (ref 98–107)
CLARITY UR: CLEAR
CO2 SERPL-SCNC: 20 MMOL/L (ref 22–29)
COLOR UR: YELLOW
CREAT SERPL-MCNC: 0.84 MG/DL (ref 0.57–1)
D-LACTATE SERPL-SCNC: 1.2 MMOL/L (ref 0.5–2)
DEPRECATED RDW RBC AUTO: 41.1 FL (ref 37–54)
EGFRCR SERPLBLD CKD-EPI 2021: 90.2 ML/MIN/1.73
EOSINOPHIL # BLD AUTO: 0.01 10*3/MM3 (ref 0–0.4)
EOSINOPHIL NFR BLD AUTO: 0.2 % (ref 0.3–6.2)
ERYTHROCYTE [DISTWIDTH] IN BLOOD BY AUTOMATED COUNT: 12.7 % (ref 12.3–15.4)
FLUAV SUBTYP SPEC NAA+PROBE: NOT DETECTED
FLUBV RNA ISLT QL NAA+PROBE: NOT DETECTED
GLOBULIN UR ELPH-MCNC: 3.9 GM/DL
GLUCOSE SERPL-MCNC: 98 MG/DL (ref 65–99)
GLUCOSE UR STRIP-MCNC: NEGATIVE MG/DL
HADV DNA SPEC NAA+PROBE: NOT DETECTED
HCOV 229E RNA SPEC QL NAA+PROBE: NOT DETECTED
HCOV HKU1 RNA SPEC QL NAA+PROBE: NOT DETECTED
HCOV NL63 RNA SPEC QL NAA+PROBE: NOT DETECTED
HCOV OC43 RNA SPEC QL NAA+PROBE: NOT DETECTED
HCT VFR BLD AUTO: 38 % (ref 34–46.6)
HGB BLD-MCNC: 12.7 G/DL (ref 12–15.9)
HGB UR QL STRIP.AUTO: NEGATIVE
HMPV RNA NPH QL NAA+NON-PROBE: NOT DETECTED
HOLD SPECIMEN: NORMAL
HPIV1 RNA ISLT QL NAA+PROBE: NOT DETECTED
HPIV2 RNA SPEC QL NAA+PROBE: NOT DETECTED
HPIV3 RNA NPH QL NAA+PROBE: NOT DETECTED
HPIV4 P GENE NPH QL NAA+PROBE: NOT DETECTED
HYALINE CASTS UR QL AUTO: ABNORMAL /LPF
IMM GRANULOCYTES # BLD AUTO: 0.05 10*3/MM3 (ref 0–0.05)
IMM GRANULOCYTES NFR BLD AUTO: 0.8 % (ref 0–0.5)
KETONES UR QL STRIP: ABNORMAL
LEUKOCYTE ESTERASE UR QL STRIP.AUTO: ABNORMAL
LYMPHOCYTES # BLD AUTO: 0.63 10*3/MM3 (ref 0.7–3.1)
LYMPHOCYTES NFR BLD AUTO: 9.8 % (ref 19.6–45.3)
M PNEUMO IGG SER IA-ACNC: NOT DETECTED
MCH RBC QN AUTO: 30 PG (ref 26.6–33)
MCHC RBC AUTO-ENTMCNC: 33.4 G/DL (ref 31.5–35.7)
MCV RBC AUTO: 89.6 FL (ref 79–97)
MONOCYTES # BLD AUTO: 0.27 10*3/MM3 (ref 0.1–0.9)
MONOCYTES NFR BLD AUTO: 4.2 % (ref 5–12)
NEUTROPHILS NFR BLD AUTO: 5.46 10*3/MM3 (ref 1.7–7)
NEUTROPHILS NFR BLD AUTO: 84.5 % (ref 42.7–76)
NITRITE UR QL STRIP: NEGATIVE
NRBC BLD AUTO-RTO: 0 /100 WBC (ref 0–0.2)
PH UR STRIP.AUTO: 6 [PH] (ref 5–8)
PLATELET # BLD AUTO: 182 10*3/MM3 (ref 140–450)
PMV BLD AUTO: 9.6 FL (ref 6–12)
POTASSIUM SERPL-SCNC: 4.9 MMOL/L (ref 3.5–5.2)
PROCALCITONIN SERPL-MCNC: 0.17 NG/ML (ref 0–0.25)
PROT SERPL-MCNC: 7.9 G/DL (ref 6–8.5)
PROT UR QL STRIP: ABNORMAL
RBC # BLD AUTO: 4.24 10*6/MM3 (ref 3.77–5.28)
RBC # UR STRIP: ABNORMAL /HPF
REF LAB TEST METHOD: ABNORMAL
RHINOVIRUS RNA SPEC NAA+PROBE: NOT DETECTED
RSV RNA NPH QL NAA+NON-PROBE: NOT DETECTED
SARS-COV-2 RNA NPH QL NAA+NON-PROBE: NOT DETECTED
SODIUM SERPL-SCNC: 135 MMOL/L (ref 136–145)
SP GR UR STRIP: 1.03 (ref 1–1.03)
SQUAMOUS #/AREA URNS HPF: ABNORMAL /HPF
UROBILINOGEN UR QL STRIP: ABNORMAL
WBC # UR STRIP: ABNORMAL /HPF
WBC NRBC COR # BLD AUTO: 6.45 10*3/MM3 (ref 3.4–10.8)
WHOLE BLOOD HOLD COAG: NORMAL
WHOLE BLOOD HOLD SPECIMEN: NORMAL

## 2025-03-21 PROCEDURE — 80053 COMPREHEN METABOLIC PANEL: CPT | Performed by: EMERGENCY MEDICINE

## 2025-03-21 PROCEDURE — 25010000002 METOCLOPRAMIDE PER 10 MG: Performed by: EMERGENCY MEDICINE

## 2025-03-21 PROCEDURE — 85025 COMPLETE CBC W/AUTO DIFF WBC: CPT | Performed by: EMERGENCY MEDICINE

## 2025-03-21 PROCEDURE — 87086 URINE CULTURE/COLONY COUNT: CPT | Performed by: EMERGENCY MEDICINE

## 2025-03-21 PROCEDURE — 0202U NFCT DS 22 TRGT SARS-COV-2: CPT | Performed by: EMERGENCY MEDICINE

## 2025-03-21 PROCEDURE — 36415 COLL VENOUS BLD VENIPUNCTURE: CPT

## 2025-03-21 PROCEDURE — G0378 HOSPITAL OBSERVATION PER HR: HCPCS

## 2025-03-21 PROCEDURE — 81001 URINALYSIS AUTO W/SCOPE: CPT | Performed by: EMERGENCY MEDICINE

## 2025-03-21 PROCEDURE — 99285 EMERGENCY DEPT VISIT HI MDM: CPT

## 2025-03-21 PROCEDURE — 25010000002 DIPHENHYDRAMINE PER 50 MG: Performed by: EMERGENCY MEDICINE

## 2025-03-21 PROCEDURE — 83605 ASSAY OF LACTIC ACID: CPT | Performed by: EMERGENCY MEDICINE

## 2025-03-21 PROCEDURE — 87040 BLOOD CULTURE FOR BACTERIA: CPT | Performed by: EMERGENCY MEDICINE

## 2025-03-21 PROCEDURE — 25810000003 SODIUM CHLORIDE 0.9 % SOLUTION: Performed by: EMERGENCY MEDICINE

## 2025-03-21 PROCEDURE — 84145 PROCALCITONIN (PCT): CPT | Performed by: EMERGENCY MEDICINE

## 2025-03-21 PROCEDURE — 93005 ELECTROCARDIOGRAM TRACING: CPT | Performed by: EMERGENCY MEDICINE

## 2025-03-21 PROCEDURE — 99222 1ST HOSP IP/OBS MODERATE 55: CPT | Performed by: NURSE PRACTITIONER

## 2025-03-21 PROCEDURE — 25010000002 KETOROLAC TROMETHAMINE PER 15 MG: Performed by: EMERGENCY MEDICINE

## 2025-03-21 RX ORDER — ACETAMINOPHEN 325 MG/1
650 TABLET ORAL ONCE
Status: COMPLETED | OUTPATIENT
Start: 2025-03-21 | End: 2025-03-21

## 2025-03-21 RX ORDER — KETOROLAC TROMETHAMINE 15 MG/ML
15 INJECTION, SOLUTION INTRAMUSCULAR; INTRAVENOUS ONCE
Status: COMPLETED | OUTPATIENT
Start: 2025-03-21 | End: 2025-03-21

## 2025-03-21 RX ORDER — BISACODYL 10 MG
10 SUPPOSITORY, RECTAL RECTAL DAILY PRN
Status: DISCONTINUED | OUTPATIENT
Start: 2025-03-21 | End: 2025-03-25

## 2025-03-21 RX ORDER — ONDANSETRON 2 MG/ML
4 INJECTION INTRAMUSCULAR; INTRAVENOUS EVERY 6 HOURS PRN
Status: DISCONTINUED | OUTPATIENT
Start: 2025-03-21 | End: 2025-03-27 | Stop reason: HOSPADM

## 2025-03-21 RX ORDER — DIPHENHYDRAMINE HYDROCHLORIDE 50 MG/ML
25 INJECTION, SOLUTION INTRAMUSCULAR; INTRAVENOUS EVERY 6 HOURS PRN
Status: DISCONTINUED | OUTPATIENT
Start: 2025-03-21 | End: 2025-03-24 | Stop reason: SDUPTHER

## 2025-03-21 RX ORDER — BUPROPION HYDROCHLORIDE 150 MG/1
150 TABLET ORAL DAILY
Status: DISCONTINUED | OUTPATIENT
Start: 2025-03-22 | End: 2025-03-27 | Stop reason: HOSPADM

## 2025-03-21 RX ORDER — LEVOTHYROXINE SODIUM 25 UG/1
25 TABLET ORAL DAILY
Status: DISCONTINUED | OUTPATIENT
Start: 2025-03-22 | End: 2025-03-27 | Stop reason: HOSPADM

## 2025-03-21 RX ORDER — BISACODYL 5 MG/1
5 TABLET, DELAYED RELEASE ORAL DAILY PRN
Status: DISCONTINUED | OUTPATIENT
Start: 2025-03-21 | End: 2025-03-25

## 2025-03-21 RX ORDER — LAMOTRIGINE 100 MG/1
300 TABLET ORAL DAILY
Status: DISCONTINUED | OUTPATIENT
Start: 2025-03-22 | End: 2025-03-27 | Stop reason: HOSPADM

## 2025-03-21 RX ORDER — SODIUM CHLORIDE 0.9 % (FLUSH) 0.9 %
10 SYRINGE (ML) INJECTION AS NEEDED
Status: DISCONTINUED | OUTPATIENT
Start: 2025-03-21 | End: 2025-03-27 | Stop reason: HOSPADM

## 2025-03-21 RX ORDER — ACETAMINOPHEN 500 MG
1000 TABLET ORAL EVERY 8 HOURS
Status: DISCONTINUED | OUTPATIENT
Start: 2025-03-22 | End: 2025-03-22

## 2025-03-21 RX ORDER — METOCLOPRAMIDE HYDROCHLORIDE 5 MG/ML
5 INJECTION INTRAMUSCULAR; INTRAVENOUS ONCE
Status: COMPLETED | OUTPATIENT
Start: 2025-03-21 | End: 2025-03-21

## 2025-03-21 RX ORDER — SODIUM CHLORIDE 9 MG/ML
75 INJECTION, SOLUTION INTRAVENOUS CONTINUOUS
Status: ACTIVE | OUTPATIENT
Start: 2025-03-22 | End: 2025-03-22

## 2025-03-21 RX ORDER — GABAPENTIN 400 MG/1
800 CAPSULE ORAL EVERY 8 HOURS SCHEDULED
Status: DISCONTINUED | OUTPATIENT
Start: 2025-03-21 | End: 2025-03-27 | Stop reason: HOSPADM

## 2025-03-21 RX ORDER — DIPHENHYDRAMINE HYDROCHLORIDE 50 MG/ML
25 INJECTION, SOLUTION INTRAMUSCULAR; INTRAVENOUS ONCE
Status: COMPLETED | OUTPATIENT
Start: 2025-03-21 | End: 2025-03-21

## 2025-03-21 RX ORDER — NITROGLYCERIN 0.4 MG/1
0.4 TABLET SUBLINGUAL
Status: DISCONTINUED | OUTPATIENT
Start: 2025-03-21 | End: 2025-03-27 | Stop reason: HOSPADM

## 2025-03-21 RX ORDER — AMOXICILLIN 250 MG
2 CAPSULE ORAL 2 TIMES DAILY PRN
Status: DISCONTINUED | OUTPATIENT
Start: 2025-03-21 | End: 2025-03-25

## 2025-03-21 RX ORDER — SODIUM CHLORIDE 0.9 % (FLUSH) 0.9 %
10 SYRINGE (ML) INJECTION EVERY 12 HOURS SCHEDULED
Status: DISCONTINUED | OUTPATIENT
Start: 2025-03-22 | End: 2025-03-27 | Stop reason: HOSPADM

## 2025-03-21 RX ORDER — ZOLPIDEM TARTRATE 5 MG/1
5 TABLET ORAL NIGHTLY PRN
Status: DISPENSED | OUTPATIENT
Start: 2025-03-21 | End: 2025-03-26

## 2025-03-21 RX ORDER — SODIUM CHLORIDE 9 MG/ML
40 INJECTION, SOLUTION INTRAVENOUS AS NEEDED
Status: DISCONTINUED | OUTPATIENT
Start: 2025-03-21 | End: 2025-03-27 | Stop reason: HOSPADM

## 2025-03-21 RX ORDER — POLYETHYLENE GLYCOL 3350 17 G/17G
17 POWDER, FOR SOLUTION ORAL DAILY PRN
Status: DISCONTINUED | OUTPATIENT
Start: 2025-03-21 | End: 2025-03-25

## 2025-03-21 RX ADMIN — METOCLOPRAMIDE 5 MG: 5 INJECTION, SOLUTION INTRAMUSCULAR; INTRAVENOUS at 17:14

## 2025-03-21 RX ADMIN — GABAPENTIN 800 MG: 400 CAPSULE ORAL at 21:33

## 2025-03-21 RX ADMIN — SODIUM CHLORIDE 1000 ML: 9 INJECTION, SOLUTION INTRAVENOUS at 16:40

## 2025-03-21 RX ADMIN — ACETAMINOPHEN 650 MG: 325 TABLET ORAL at 15:28

## 2025-03-21 RX ADMIN — KETOROLAC TROMETHAMINE 15 MG: 15 INJECTION, SOLUTION INTRAMUSCULAR; INTRAVENOUS at 17:13

## 2025-03-21 RX ADMIN — DIPHENHYDRAMINE HYDROCHLORIDE 25 MG: 50 INJECTION INTRAMUSCULAR; INTRAVENOUS at 17:13

## 2025-03-21 RX ADMIN — ACETAMINOPHEN 650 MG: 325 TABLET ORAL at 21:33

## 2025-03-21 RX ADMIN — ZOLPIDEM TARTRATE 5 MG: 5 TABLET ORAL at 21:33

## 2025-03-21 NOTE — TELEPHONE ENCOUNTER
Hub staff attempted to follow warm transfer process and was unsuccessful     Caller: Brooke Mendez    Relationship to patient: Self    Best call back number: [phone]     Patient is needing: PT IS RETURNING MARILOU'S CALL. PLEASE CALL PT BACK.

## 2025-03-21 NOTE — H&P
"    [facility] Medicine Services  HISTORY AND PHYSICAL    Patient Name: Brooke Mendez  : 1985  MRN: [id number]  Primary Care Physician: Sheldon Starkey DO  Date of admission: 3/21/2025    Subjective   Subjective     Chief Complaint:  Flu symptoms and rash    HPI:  Brooke Mendez is a 39 y.o. female with history of metastatic breast cancer, thyroid disease, hypertension presents to the ED tonight for flu symptoms and new onset rash.  Patient has been treated by Dr. Baker for breast cancer with immunotherapy and chemotherapy.  First dose was 1 to 1-1/2 weeks ago.  She felt good after first dose and continue to work until Wednesday when she called in for flulike symptoms including fever 102.5, nausea vomiting, headache, productive coughing, mild nasal congestion, sweats and shakes and aches.  She was seen in the ED from on Wednesday and given cefdinir in hopes that she could get her infusion following day.  She saw Dr. Baker's aprn on Thursday and was started on Bactrim.  She was unable to get her infusion yesterday related to low blood pressure and symptoms so she got IV fluids.  She has tried Claritin.  Tylenol has helped with many of her symptoms.  Benadryl has helped her rash which started today and covers most of her body.  Nothing makes symptoms worse.  Review of systems also positive for intermittent dizziness, a couple of episodes where her vision felt \"off\", mildly sensitive to light, intermittent heart racing with 1 episode of dyspnea.  Also having some constipation last BM 4 days. Also swollen nodes L armipit.  ROS otherwise negative.        Review of Systems   Constitutional:  Positive for chills, diaphoresis, fatigue and fever.   HENT:  Positive for congestion and rhinorrhea. Negative for ear discharge, ear pain, sinus pressure and sore throat.    Eyes:  Positive for photophobia and visual disturbance. Negative for pain.   Respiratory:  Positive for cough. " Negative for shortness of breath and wheezing.    Cardiovascular:  Positive for palpitations. Negative for chest pain and leg swelling.   Gastrointestinal:  Positive for constipation, nausea and vomiting. Negative for abdominal pain and diarrhea.   Genitourinary:  Negative for decreased urine volume, difficulty urinating, dysuria and hematuria.   Musculoskeletal:  Positive for myalgias.   Skin:  Positive for rash. Negative for wound.   Neurological:  Positive for dizziness and headaches. Negative for seizures, syncope and numbness.                Personal History     Past Medical History:   Diagnosis Date    Breast cancer     Disease of thyroid gland     Hypertension          Oncology Problem List:  Malignant neoplasm of left breast in female, estrogen receptor   negative (2025; Status: Active)  Oncology/Hematology History   Malignant neoplasm of left breast in female, estrogen receptor negative   3/5/2025 Initial Diagnosis    Malignant neoplasm of left breast in female, estrogen receptor negative     3/13/2025 -  Chemotherapy    OP BREAST Pembrolizumab 200 mg / PACLitaxel / CARBOplatin AUC=1.5 (Weekly)     3/13/2025 Cancer Staged    Staging form: Breast, AJCC 8th Edition  - Clinical: Stage IIIC (cT2, cN2, cM0, G3, ER-, IA-, HER2-) - Signed by Fanny Baker MD on 3/13/2025     3/13/2025 -  Chemotherapy    OP CENTRAL VENOUS ACCESS DEVICE Access, Care, and Maintenance (CVAD)     3/20/2025 -  Chemotherapy    OP SUPPORTIVE HYDRATION + ANTIEMETICS     2025 -  Chemotherapy    OP BREAST Pembrolizumab 200 mg / DOXOrubicin / Cyclophosphamide          Past Surgical History:   Procedure Laterality Date     SECTION      TONSILLECTOMY         Family History:  family history includes Diabetes in her mother; Hypertension in her mother.     Social History:  reports that she has never smoked. She has never used smokeless tobacco. She reports that she does not currently use alcohol. Drug use questions deferred  to the physician.  Ex smoker- quit in sept 24  Social History     Social History Narrative    Not on file       Medications:  LORazepam, buPROPion XL, gabapentin, lamoTRIgine, levothyroxine, lidocaine-prilocaine, meloxicam, metoprolol succinate XL, ondansetron, sulfamethoxazole-trimethoprim, and zolpidem CR    Allergies   Allergen Reactions    Erythromycin Other (See Comments)    Penicillins Other (See Comments)    Pholcodine Other (See Comments)       Objective   Objective     Vital Signs:   Temp:  [99.1 °F (37.3 °C)] 99.1 °F (37.3 °C)  Heart Rate:  [108-114] 108  Resp:  [20] 20  BP: (117-131)/(72-74) 131/74    Physical Exam     Constitutional: Awake, alert  Eyes: PERRL, sclerae anicteric, no conjunctival injection  HENT: NCAT, mucous membranes moist  Neck: Supple, no thyromegaly, no lymphadenopathy, trachea midline  Respiratory: Clear to auscultation bilaterally, nonlabored respirations   Cardiovascular: RRR, no murmurs, rubs, or gallops, palpable pedal pulses bilaterally, st on monitor  Gastrointestinal: Positive bowel sounds, soft, nontender, nondistended  Musculoskeletal: No bilateral ankle edema, no clubbing or cyanosis to extremities  Psychiatric: Appropriate affect, cooperative  Neurologic: Oriented x 3, strength symmetric in all extremities, Cranial Nerves grossly intact to confrontation, speech clear  Skin: smooth erythematous maculopapular rash covering most of body      Result Review:  I have personally reviewed the results from the time of this admission to 3/21/2025 20:00 EDT and agree with these findings:  [x]  Laboratory list / accordion  [x]  Microbiology  []  Radiology  [x]  EKG/Telemetry   []  Cardiology/Vascular   []  Pathology  [x]  Old records  []  Other:  Most notable findings include: full body rash on exam, recent chemo/immunotherapy    LAB RESULTS:      Lab 03/21/25  1545 03/20/25  1044   WBC 6.45 7.43   HEMOGLOBIN 12.7 11.5*   HEMATOCRIT 38.0 34.2   PLATELETS 182 176   NEUTROS ABS 5.46  6.25   IMMATURE GRANS (ABS) 0.05 0.11*   LYMPHS ABS 0.63* 0.72   MONOS ABS 0.27 0.33   EOS ABS 0.01 0.00   MCV 89.6 90.2   PROCALCITONIN 0.17  --    LACTATE 1.2  --          Lab 03/21/25  1545 03/20/25  1044   SODIUM 135* 130*   POTASSIUM 4.9 4.5   CHLORIDE 98 96*   CO2 20.0* 19.0*   ANION GAP 17.0* 15.0   BUN 9 12   CREATININE 0.84 0.91   EGFR 90.2 82.0   GLUCOSE 98 120*   CALCIUM 9.0 8.1*         Lab 03/21/25  1545 03/20/25  1044   TOTAL PROTEIN 7.9 6.7   ALBUMIN 4.0 3.6   GLOBULIN 3.9 3.1   ALT (SGPT) 38* 40*   AST (SGOT) 33* 31   BILIRUBIN 0.5 0.6   ALK PHOS 104 80                     Brief Urine Lab Results  (Last result in the past 365 days)        Color   Clarity   Blood   Leuk Est   Nitrite   Protein   CREAT   Urine HCG        03/21/25 1555 Yellow   Clear   Negative   Trace   Negative   30 mg/dL (1+)                 Microbiology Results (last 10 days)       Procedure Component Value - Date/Time    Respiratory Panel PCR w/COVID-19(SARS-CoV-2) EFREN/JENNIFER/MICHELLE/PAD/COR/CASSY In-House, NP Swab in UTM/VTM, 2 HR TAT - Swab, Nasopharynx [[id number]]  (Normal) Collected: 03/21/25 1529    Lab Status: Final result Specimen: Swab from Nasopharynx Updated: 03/21/25 3835     ADENOVIRUS, PCR Not Detected     Coronavirus 229E Not Detected     Coronavirus HKU1 Not Detected     Coronavirus NL63 Not Detected     Coronavirus OC43 Not Detected     COVID19 Not Detected     Human Metapneumovirus Not Detected     Human Rhinovirus/Enterovirus Not Detected     Influenza A PCR Not Detected     Influenza B PCR Not Detected     Parainfluenza Virus 1 Not Detected     Parainfluenza Virus 2 Not Detected     Parainfluenza Virus 3 Not Detected     Parainfluenza Virus 4 Not Detected     RSV, PCR Not Detected     Bordetella pertussis pcr Not Detected     Bordetella parapertussis PCR Not Detected     Chlamydophila pneumoniae PCR Not Detected     Mycoplasma pneumo by PCR Not Detected    Narrative:      In the setting of a positive respiratory panel  with a viral infection PLUS a negative procalcitonin without other underlying concern for bacterial infection, consider observing off antibiotics or discontinuation of antibiotics and continue supportive care. If the respiratory panel is positive for atypical bacterial infection (Bordetella pertussis, Chlamydophila pneumoniae, or Mycoplasma pneumoniae), consider antibiotic de-escalation to target atypical bacterial infection.            No radiology results from the last 24 hrs    Results for orders placed during the hospital encounter of 03/11/25    Adult Transthoracic Echo Complete W/ Cont if Necessary Per Protocol    Interpretation Summary    Left ventricular systolic function is normal. Calculated left ventricular EF = 59.6% Left ventricular ejection fraction appears to be 56 - 60%.    Left atrial volume is mildly increased.    Estimated right ventricular systolic pressure from tricuspid regurgitation is normal (<35 mmHg). Calculated right ventricular systolic pressure from tricuspid regurgitation is 21 mmHg.    Normal global longitudinal LV strain (GLS) = -21.2%    There is no previous study available for comparison.      Assessment & Plan   Assessment & Plan       Malignant neoplasm of left breast in female, estrogen receptor negative    Rash    Fever    On antineoplastic chemotherapy and adjuvant immunotherapy regimen    Essential hypertension    Thyroid disease    Feliciano Mendez is a 39 y.o. female with history of metastatic breast cancer, thyroid disease, hypertension presents to the ED [facility] for flu symptoms and new onset rash.  Patient has been treated by Dr. Baker for breast cancer with immunotherapy and chemotherapy.  First dose was 1 to 1-1/2 weeks ago.  She felt good after first dose and continue to work until Wednesday when she called in for flulike symptoms including fever 102.5, nausea vomiting, headache, productive coughing, mild nasal congestion, sweats and shakes and aches.  Has taken cefdenir x1 dose and started bactrim yesterday.  Now new onset rash.  Admitted to the hospitalist for further eval and treatment.      Malignant neoplasm of left breast in female, estrogen receptor negative    On antineoplastic chemotherapy and adjuvant immunotherapy regimen  Sees Dr Baker, will consult Dr Andrew am      Rash  Infectious vs immunotherapy/drug reaction  Pt states is better after benadryl  Continue benadryl prn and will consider solumedrol if blood cultures negative      Fever  Blood cultures sent  Ua with trace leuk, culture sent  Procal negative, lactic normal  Will cover with cefepime for now (cannot use zosyn r/t pcn allergy)  Sced 1000mg tyl tid      Essential hypertension  Metoprolol at home  Has not taken in 2 days  Stable currently  Will continue to hold for now      Thyroid disease  Continue synthroid      Constipation  Prns provided      Time spent with this patient including but not limited to assessment, lab/image interpretation, planning, education, discussion with family, documentation:  55 minutes.    DVT prophylaxis:  scd    CODE STATUS:  full       Expected Discharge  Expected Discharge Date: 3/22/2025; Expected Discharge Time:       This note has been completed as part of a split-shared workflow.     Signature: Electronically signed by KELLY Gallegos, 03/21/25, 8:18 PM EDT

## 2025-03-21 NOTE — TELEPHONE ENCOUNTER
"Patient stated she started Bactrim yesterday.  She stated over night she developed fever of 102.5 and took Tylenol.  She stated this morning her temperature is 102.3.  Patient c/o congestion and headache for which she had blurry vision yesterday but denies any today.  Patient stated she tested negative for COVID/Flu at [facility] 3/19/25.  Obtained urine culture from [facility] and placed to be scanned.  Patient stated her heart rate today was 125 bpm, /78 and that she usually takes Metoprolol but hasn't in a few days due to low BP.  Patient c/o small amount of flank pain.  Patient stated her biopsy site is red but \"it is not any more red than yesterday.\"  Patient stated that site is more edematous than yesterday.  Patient denied dysuria.  Dr. Baker notified and per MD, patient advised to go to  ED to be re-swabbed, scanned to rule out abscess, possible oncology consult and possible steroids if no source identified.  Patient verbalized understanding and agreed.  Report called to Suad at [facility] ER.   "

## 2025-03-21 NOTE — Clinical Note
Level of Care: Telemetry [5]   Diagnosis: Rash [195322]   Admitting Physician: MARIBEL MANRIQUEZ [[id number]]   Is patient appropriate for Inpatient Observation Unit?: No [0]

## 2025-03-21 NOTE — ED NOTES
"Brooke MASON Eldridge    Nursing Report ED to Floor:  Mental status: GCS 15  Ambulatory status: INDEPENDENT   Oxygen Therapy:  RA  Cardiac Rhythm: SINUS TACH   Admitted from: HOME  Safety Concerns:  N/A  Precautions: STANDARD   Social Issues: N/A  ED Room #:  05    ED Nurse Phone Extension - #[phone] or may call [phone].      HPI:   Chief Complaint   Patient presents with    Flu Symptoms       Past Medical History:  Past Medical History:   Diagnosis Date    Breast cancer     Disease of thyroid gland     Hypertension         Past Surgical History:  Past Surgical History:   Procedure Laterality Date     SECTION      TONSILLECTOMY          Admitting Doctor:   Elsy Mccartney MD    Consulting Provider(s):  Consults       No orders found from 2025 to 3/22/2025.             Admitting Diagnosis:   The primary encounter diagnosis was Malignant neoplasm of left female breast, unspecified estrogen receptor status, unspecified site of breast. Diagnoses of Rash, Flu-like symptoms, and Pyuria were also pertinent to this visit.    Most Recent Vitals:   Vitals:    25 1407 25 1641 25 1700   BP: 117/74 121/72 131/74   BP Location: Left arm     Patient Position: Sitting     Pulse: 114 114 108   Resp: 20     Temp: 99.1 °F (37.3 °C)     TempSrc: Oral     SpO2: 96% 96% 99%   Weight: 87.1 kg (192 lb)     Height: 175.3 cm (69\")         Active LDAs/IV Access:   Lines, Drains & Airways       Active LDAs       Name Placement date Placement time Site Days    Peripheral IV 25 1638 Right Antecubital 25  1638  Antecubital  less than 1    Single Lumen Implantable Port 03/10/25 Right Subclavian 03/10/25  0845  Subclavian  11                    Labs (abnormal labs have a star):   Labs Reviewed   COMPREHENSIVE METABOLIC PANEL - Abnormal; Notable for the following components:       Result Value    Sodium 135 (*)     CO2 20.0 (*)     ALT (SGPT) 38 (*)     AST (SGOT) 33 (*)     Anion Gap 17.0 (*)     All other components " within normal limits    Narrative:     GFR Categories in Chronic Kidney Disease (CKD)      GFR Category          GFR (mL/min/1.73)    Interpretation  G1                     90 or greater         Normal or high (1)  G2                      60-89                Mild decrease (1)  G3a                   45-59                Mild to moderate decrease  G3b                   30-44                Moderate to severe decrease  G4                    15-29                Severe decrease  G5                    14 or less           Kidney failure          (1)In the absence of evidence of kidney disease, neither GFR category G1 or G2 fulfill the criteria for CKD.    eGFR calculation 2021 CKD-EPI creatinine equation, which does not include race as a factor   CBC WITH AUTO DIFFERENTIAL - Abnormal; Notable for the following components:    Neutrophil % 84.5 (*)     Lymphocyte % 9.8 (*)     Monocyte % 4.2 (*)     Eosinophil % 0.2 (*)     Immature Grans % 0.8 (*)     Lymphocytes, Absolute 0.63 (*)     All other components within normal limits   URINALYSIS W/ CULTURE IF INDICATED - Abnormal; Notable for the following components:    Ketones, UA Trace (*)     Protein, UA 30 mg/dL (1+) (*)     Leuk Esterase, UA Trace (*)     All other components within normal limits    Narrative:     In absence of clinical symptoms, the presence of pyuria, bacteria, and/or nitrites on the urinalysis result does not correlate with infection.   URINALYSIS, MICROSCOPIC ONLY - Abnormal; Notable for the following components:    WBC, UA 6-10 (*)     Squamous Epithelial Cells, UA 3-6 (*)     All other components within normal limits   RESPIRATORY PANEL PCR W/ COVID-19 (SARS-COV-2), NP SWAB IN UTM/VTP, 2 HR TAT - Normal    Narrative:     In the setting of a positive respiratory panel with a viral infection PLUS a negative procalcitonin without other underlying concern for bacterial infection, consider observing off antibiotics or discontinuation of antibiotics and  "continue supportive care. If the respiratory panel is positive for atypical bacterial infection (Bordetella pertussis, Chlamydophila pneumoniae, or Mycoplasma pneumoniae), consider antibiotic de-escalation to target atypical bacterial infection.   LACTIC ACID, PLASMA - Normal   PROCALCITONIN - Normal    Narrative:     As a Marker for Sepsis (Non-Neonates):    1. <0.5 ng/mL represents a low risk of severe sepsis and/or septic shock.  2. >2 ng/mL represents a high risk of severe sepsis and/or septic shock.    As a Marker for Lower Respiratory Tract Infections that require antibiotic therapy:    PCT on Admission    Antibiotic Therapy       6-12 Hrs later    >0.5                Strongly Recommended  >0.25 - <0.5        Recommended   0.1 - 0.25          Discouraged              Remeasure/reassess PCT  <0.1                Strongly Discouraged     Remeasure/reassess PCT    As 28 day mortality risk marker: \"Change in Procalcitonin Result\" (>80% or <=80%) if Day 0 (or Day 1) and Day 4 values are available. Refer to [email or web address]    Change in PCT <=80%  A decrease of PCT levels below or equal to 80% defines a positive change in PCT test result representing a higher risk for 28-day all-cause mortality of patients diagnosed with severe sepsis for septic shock.    Change in PCT >80%  A decrease of PCT levels of more than 80% defines a negative change in PCT result representing a lower risk for 28-day all-cause mortality of patients diagnosed with severe sepsis or septic shock.      BLOOD CULTURE   BLOOD CULTURE   URINE CULTURE   RAINBOW DRAW    Narrative:     The following orders were created for panel order [place] Draw.  Procedure                               Abnormality         Status                     ---------                               -----------         ------                     Green Top (Gel)[[id number]]                                  Final result               Lavender Top[[id number]]     "                                 Final result               Gold Top - SST[[id number]]                                   Final result               Michael Top[[id number]]                                         Final result               Light Blue Top[[id number]]                                   Final result                 Please view results for these tests on the individual orders.   CBC AND DIFFERENTIAL    Narrative:     The following orders were created for panel order CBC & Differential.  Procedure                               Abnormality         Status                     ---------                               -----------         ------                     CBC Auto Differential[[id number]]        Abnormal            Final result                 Please view results for these tests on the individual orders.   GREEN TOP   LAVENDER TOP   GOLD TOP - SST   GRAY TOP   LIGHT BLUE TOP       Meds Given in ED:   Medications   sodium chloride 0.9 % flush 10 mL (has no administration in time range)   acetaminophen (TYLENOL) tablet 650 mg (650 mg Oral Given 3/21/25 1528)   sodium chloride 0.9 % bolus 1,000 mL (1,000 mL Intravenous New Bag 3/21/25 1640)   ketorolac (TORADOL) injection 15 mg (15 mg Intravenous Given 3/21/25 1713)   diphenhydrAMINE (BENADRYL) injection 25 mg (25 mg Intravenous Given 3/21/25 1713)   metoclopramide (REGLAN) injection 5 mg (5 mg Intravenous Given 3/21/25 1714)           Last NIH score:                                                          Dysphagia screening results:  Patient Factors Component (Dysphagia:Stroke or Rule-out)  Best Eye Response: 4-->(E4) spontaneous (03/21/25 1826)  Best Motor Response: 6-->(M6) obeys commands (03/21/25 1826)  Best Verbal Response: 5-->(V5) oriented (03/21/25 1826)  [place] Coma Scale Score: 15 (03/21/25 1826)     Johann Coma Scale:  No data recorded     CIWA:        Restraint Type:            Isolation Status:  No active isolations

## 2025-03-21 NOTE — TELEPHONE ENCOUNTER
Advised patient contacted on call provider regarding fever overnight last night.  Called patient to check in with her.  No answer received.  Left VM requesting return call to discuss.

## 2025-03-21 NOTE — TELEPHONE ENCOUNTER
Patient called she is sitting in the ER waiting room, and she now has a rash. She said there is a lot of sick people there is there anything else that can be done? Please call.

## 2025-03-21 NOTE — TELEPHONE ENCOUNTER
Advised patient labs collected and are still in process.  ER likely waiting on results for further determination to be made.  Advised patient that if she now has a rash or if it is worsening, she needs to let the triage RN know.  Patient verbalized understanding and stated she will go back to triage and let them know what is happening.  Advised patient that this office will close soon and that if she is discharged, she should call the on call provider over the weekend if needed.  Patient verbalized understanding.

## 2025-03-21 NOTE — ED PROVIDER NOTES
Subjective   History of Present Illness  Patient is a pleasant 39-year-old female with a history of stage III metastatic breast cancer, currently followed by Dr. Baker.  Currently undergoing chemotherapy treatment.  Presents to the emergency department today with a 2-day history of flulike symptoms.  This includes fever, body aches, headache, nausea, vomiting, upper respiratory congestion, cough, and significant fatigue.  She had a infusion scheduled with oncology yesterday and when her symptoms began 2 days ago she was instructed to go to the emergency department to be tested for different infections.  She went to outside hospital emergency department 2 days ago and workup was negative for clear etiology.  She went to the infusion clinic yesterday and did not receive the chemo/immunotherapy infusions instead received IV fluids.  Today her temperature was up over 102 degrees, symptoms were persistently getting worse prompting her visit to the emergency department.  In addition to the above mentioned symptoms she also notes that her left axillary lymph nodes are more enlarged and tender than usual.  Denies similar episodes in the past.  Denies other acute complaints.      Review of Systems   All other systems reviewed and are negative.      Past Medical History:   Diagnosis Date    Breast cancer     Disease of thyroid gland     Hypertension        Allergies   Allergen Reactions    Erythromycin Other (See Comments)    Penicillins Other (See Comments)    Pholcodine Other (See Comments)       Past Surgical History:   Procedure Laterality Date     SECTION      TONSILLECTOMY         Family History   Problem Relation Age of Onset    Hypertension Mother     Diabetes Mother        Social History     Socioeconomic History    Marital status: Single   Tobacco Use    Smoking status: Never    Smokeless tobacco: Never   Vaping Use    Vaping status: Never Used   Substance and Sexual Activity    Alcohol use: Not Currently     Drug use: Defer    Sexual activity: Defer           Objective   Physical Exam  Vitals and nursing note reviewed.   Constitutional:       Appearance: She is ill-appearing.   HENT:      Head: Normocephalic.      Nose: Nose normal.      Mouth/Throat:      Mouth: Mucous membranes are moist.   Eyes:      Extraocular Movements: Extraocular movements intact.      Pupils: Pupils are equal, round, and reactive to light.   Cardiovascular:      Rate and Rhythm: Regular rhythm. Tachycardia present.      Pulses: Normal pulses.      Heart sounds: Normal heart sounds. No murmur heard.  Pulmonary:      Effort: Pulmonary effort is normal. No respiratory distress.      Breath sounds: Normal breath sounds. No wheezing or rhonchi.   Abdominal:      General: Bowel sounds are normal.      Palpations: Abdomen is soft.   Musculoskeletal:         General: Normal range of motion.      Cervical back: Normal range of motion.   Skin:     General: Skin is warm and dry.      Capillary Refill: Capillary refill takes less than 2 seconds.   Neurological:      General: No focal deficit present.      Mental Status: She is oriented to person, place, and time.   Psychiatric:         Behavior: Behavior normal.         Thought Content: Thought content normal.       Procedures           ED Course  ED Course as of 03/22/25 1402   Fri Mar 21, 2025   1444 Radiology called stating that patient was pleasantly declined a chest x-ray that she had performed at an outside facility 2 days ago.  The 1 in the outside facility was reported as normal. [CP]   1902 Case discussed with Dr. Andrew, oncology.  Etiology for rash is not clear at this time.  Blood cultures will help differentiate between infectious versus immunotherapy reaction.  Given Benadryl Benadryl in the emergency department.  Recommend that we not give steroids at this time.  Zosyn antibiotic recommended until cultures have resulted.  I will discuss case with internal medicine attending and patient  will be admitted for further evaluation and management. [CP]      ED Course User Index  [CP] Karthik Harris, DO      Recent Results (from the past 24 hours)   Respiratory Panel PCR w/COVID-19(SARS-CoV-2) [facility]/JENNIFER/MICHELLE/PAD/COR/CASSY In-House, NP Swab in UTM/VTM, 2 HR TAT - Swab, Nasopharynx    Collection Time: 03/21/25  3:29 PM    Specimen: Nasopharynx; Swab   Result Value Ref Range    ADENOVIRUS, PCR Not Detected Not Detected    Coronavirus 229E Not Detected Not Detected    Coronavirus HKU1 Not Detected Not Detected    Coronavirus NL63 Not Detected Not Detected    Coronavirus OC43 Not Detected Not Detected    COVID19 Not Detected Not Detected - Ref. Range    Human Metapneumovirus Not Detected Not Detected    Human Rhinovirus/Enterovirus Not Detected Not Detected    Influenza A PCR Not Detected Not Detected    Influenza B PCR Not Detected Not Detected    Parainfluenza Virus 1 Not Detected Not Detected    Parainfluenza Virus 2 Not Detected Not Detected    Parainfluenza Virus 3 Not Detected Not Detected    Parainfluenza Virus 4 Not Detected Not Detected    RSV, PCR Not Detected Not Detected    Bordetella pertussis pcr Not Detected Not Detected    Bordetella parapertussis PCR Not Detected Not Detected    Chlamydophila pneumoniae PCR Not Detected Not Detected    Mycoplasma pneumo by PCR Not Detected Not Detected   Comprehensive Metabolic Panel    Collection Time: 03/21/25  3:45 PM    Specimen: Arm, Right; Blood   Result Value Ref Range    Glucose 98 65 - 99 mg/dL    BUN 9 6 - 20 mg/dL    Creatinine 0.84 0.57 - 1.00 mg/dL    Sodium 135 (L) 136 - 145 mmol/L    Potassium 4.9 3.5 - 5.2 mmol/L    Chloride 98 98 - 107 mmol/L    CO2 20.0 (L) 22.0 - 29.0 mmol/L    Calcium 9.0 8.6 - 10.5 mg/dL    Total Protein 7.9 6.0 - 8.5 g/dL    Albumin 4.0 3.5 - 5.2 g/dL    ALT (SGPT) 38 (H) 1 - 33 U/L    AST (SGOT) 33 (H) 1 - 32 U/L    Alkaline Phosphatase 104 39 - 117 U/L    Total Bilirubin 0.5 0.0 - 1.2 mg/dL    Globulin 3.9 gm/dL    A/G Ratio  1.0 g/dL    BUN/Creatinine Ratio 10.7 7.0 - 25.0    Anion Gap 17.0 (H) 5.0 - 15.0 mmol/L    eGFR 90.2 >60.0 mL/min/1.73   Lactic Acid, Plasma    Collection Time: 03/21/25  3:45 PM    Specimen: Arm, Right; Blood   Result Value Ref Range    Lactate 1.2 0.5 - 2.0 mmol/L   Procalcitonin    Collection Time: 03/21/25  3:45 PM    Specimen: Arm, Right; Blood   Result Value Ref Range    Procalcitonin 0.17 0.00 - 0.25 ng/mL   Green Top (Gel)    Collection Time: 03/21/25  3:45 PM   Result Value Ref Range    Extra Tube Hold for add-ons.    Lavender Top    Collection Time: 03/21/25  3:45 PM   Result Value Ref Range    Extra Tube hold for add-on    Gray Top    Collection Time: 03/21/25  3:45 PM   Result Value Ref Range    Extra Tube Hold for add-ons.    CBC Auto Differential    Collection Time: 03/21/25  3:45 PM    Specimen: Arm, Right; Blood   Result Value Ref Range    WBC 6.45 3.40 - 10.80 10*3/mm3    RBC 4.24 3.77 - 5.28 10*6/mm3    Hemoglobin 12.7 12.0 - 15.9 g/dL    Hematocrit 38.0 34.0 - 46.6 %    MCV 89.6 79.0 - 97.0 fL    MCH 30.0 26.6 - 33.0 pg    MCHC 33.4 31.5 - 35.7 g/dL    RDW 12.7 12.3 - 15.4 %    RDW-SD 41.1 37.0 - 54.0 fl    MPV 9.6 6.0 - 12.0 fL    Platelets 182 140 - 450 10*3/mm3    Neutrophil % 84.5 (H) 42.7 - 76.0 %    Lymphocyte % 9.8 (L) 19.6 - 45.3 %    Monocyte % 4.2 (L) 5.0 - 12.0 %    Eosinophil % 0.2 (L) 0.3 - 6.2 %    Basophil % 0.5 0.0 - 1.5 %    Immature Grans % 0.8 (H) 0.0 - 0.5 %    Neutrophils, Absolute 5.46 1.70 - 7.00 10*3/mm3    Lymphocytes, Absolute 0.63 (L) 0.70 - 3.10 10*3/mm3    Monocytes, Absolute 0.27 0.10 - 0.90 10*3/mm3    Eosinophils, Absolute 0.01 0.00 - 0.40 10*3/mm3    Basophils, Absolute 0.03 0.00 - 0.20 10*3/mm3    Immature Grans, Absolute 0.05 0.00 - 0.05 10*3/mm3    nRBC 0.0 0.0 - 0.2 /100 WBC   ECG 12 Lead Other; Sepsis    Collection Time: 03/21/25  3:52 PM   Result Value Ref Range    QT Interval 300 ms    QTC Interval 415 ms   Urinalysis With Culture If Indicated - Urine,  Clean Catch    Collection Time: 03/21/25  3:55 PM    Specimen: Urine, Clean Catch   Result Value Ref Range    Color, UA Yellow Yellow, Straw    Appearance, UA Clear Clear    pH, UA 6.0 5.0 - 8.0    Specific Gravity, UA 1.026 1.001 - 1.030    Glucose, UA Negative Negative    Ketones, UA Trace (A) Negative    Bilirubin, UA Negative Negative    Blood, UA Negative Negative    Protein, UA 30 mg/dL (1+) (A) Negative    Leuk Esterase, UA Trace (A) Negative    Nitrite, UA Negative Negative    Urobilinogen, UA 0.2 E.U./dL 0.2 - 1.0 E.U./dL   Urinalysis, Microscopic Only - Urine, Clean Catch    Collection Time: 03/21/25  3:55 PM    Specimen: Urine, Clean Catch   Result Value Ref Range    RBC, UA 0-2 None Seen, 0-2 /HPF    WBC, UA 6-10 (A) None Seen, 0-2 /HPF    Bacteria, UA None Seen None Seen, Trace /HPF    Squamous Epithelial Cells, UA 3-6 (A) None Seen, 0-2 /HPF    Hyaline Casts, UA 0-6 0 - 6 /LPF    Methodology Automated Microscopy    Urine Culture - Urine, Urine, Clean Catch    Collection Time: 03/21/25  3:55 PM    Specimen: Urine, Clean Catch   Result Value Ref Range    Urine Culture No growth    Gold Top - SST    Collection Time: 03/21/25  4:39 PM   Result Value Ref Range    Extra Tube Hold for add-ons.    Light Blue Top    Collection Time: 03/21/25  4:39 PM   Result Value Ref Range    Extra Tube Hold for add-ons.    ECG 12 Lead Tachycardia    Collection Time: 03/22/25  3:33 AM   Result Value Ref Range    QT Interval 298 ms    QTC Interval 415 ms   Basic Metabolic Panel    Collection Time: 03/22/25  6:39 AM    Specimen: Blood   Result Value Ref Range    Glucose 112 (H) 65 - 99 mg/dL    BUN 9 6 - 20 mg/dL    Creatinine 0.75 0.57 - 1.00 mg/dL    Sodium 133 (L) 136 - 145 mmol/L    Potassium 4.4 3.5 - 5.2 mmol/L    Chloride 100 98 - 107 mmol/L    CO2 19.0 (L) 22.0 - 29.0 mmol/L    Calcium 7.9 (L) 8.6 - 10.5 mg/dL    BUN/Creatinine Ratio 12.0 7.0 - 25.0    Anion Gap 14.0 5.0 - 15.0 mmol/L    eGFR 103.4 >60.0 mL/min/1.73    Magnesium    Collection Time: 03/22/25  6:39 AM    Specimen: Blood   Result Value Ref Range    Magnesium 1.9 1.6 - 2.6 mg/dL   Phosphorus    Collection Time: 03/22/25  6:39 AM    Specimen: Blood   Result Value Ref Range    Phosphorus 2.5 2.5 - 4.5 mg/dL   CBC Auto Differential    Collection Time: 03/22/25  6:39 AM    Specimen: Blood   Result Value Ref Range    WBC 4.45 3.40 - 10.80 10*3/mm3    RBC 3.36 (L) 3.77 - 5.28 10*6/mm3    Hemoglobin 10.1 (L) 12.0 - 15.9 g/dL    Hematocrit 30.3 (L) 34.0 - 46.6 %    MCV 90.2 79.0 - 97.0 fL    MCH 30.1 26.6 - 33.0 pg    MCHC 33.3 31.5 - 35.7 g/dL    RDW 12.7 12.3 - 15.4 %    RDW-SD 41.8 37.0 - 54.0 fl    MPV 10.1 6.0 - 12.0 fL    Platelets 157 140 - 450 10*3/mm3    Neutrophil % 76.0 42.7 - 76.0 %    Lymphocyte % 16.9 (L) 19.6 - 45.3 %    Monocyte % 5.8 5.0 - 12.0 %    Eosinophil % 0.2 (L) 0.3 - 6.2 %    Basophil % 0.4 0.0 - 1.5 %    Immature Grans % 0.7 (H) 0.0 - 0.5 %    Neutrophils, Absolute 3.38 1.70 - 7.00 10*3/mm3    Lymphocytes, Absolute 0.75 0.70 - 3.10 10*3/mm3    Monocytes, Absolute 0.26 0.10 - 0.90 10*3/mm3    Eosinophils, Absolute 0.01 0.00 - 0.40 10*3/mm3    Basophils, Absolute 0.02 0.00 - 0.20 10*3/mm3    Immature Grans, Absolute 0.03 0.00 - 0.05 10*3/mm3    nRBC 0.0 0.0 - 0.2 /100 WBC     Note: In addition to lab results from this visit, the labs listed above may include labs taken at another facility or during a different encounter within the last 24 hours. Please correlate lab times with ED admission and discharge times for further clarification of the services performed during this visit.    XR Chest 1 View   Final Result   Impression:   No acute cardiopulmonary findings.         Electronically Signed: Leno Salcedo MD     3/22/2025 11:50 AM EDT     Workstation ID: [id number]        Vitals:    03/22/25 0913 03/22/25 1143 03/22/25 1154 03/22/25 1242   BP: 109/61 117/72     BP Location: Left arm Left arm     Patient Position: Lying Lying     Pulse: 99 113      Resp: 18 18     Temp: 98.8 °F (37.1 °C) 98.6 °F (37 °C) (!) 101.4 °F (38.6 °C) 99.9 °F (37.7 °C)   TempSrc: Oral Oral Axillary Oral   SpO2: 96% 99%     Weight:       Height:         Medications   sodium chloride 0.9 % flush 10 mL (has no administration in time range)   lamoTRIgine (LaMICtal) tablet 300 mg (300 mg Oral Given 3/22/25 0910)   levothyroxine (SYNTHROID, LEVOTHROID) tablet 25 mcg (25 mcg Oral Given 3/22/25 0524)   buPROPion XL (WELLBUTRIN XL) 24 hr tablet 150 mg (150 mg Oral Given 3/22/25 0910)   sodium chloride 0.9 % flush 10 mL (10 mL Intravenous Given 3/22/25 0910)   sodium chloride 0.9 % flush 10 mL (has no administration in time range)   sodium chloride 0.9 % infusion 40 mL (has no administration in time range)   nitroglycerin (NITROSTAT) SL tablet 0.4 mg (has no administration in time range)   sennosides-docusate (PERICOLACE) 8.6-50 MG per tablet 2 tablet (has no administration in time range)     And   polyethylene glycol (MIRALAX) packet 17 g (has no administration in time range)     And   bisacodyl (DULCOLAX) EC tablet 5 mg (5 mg Oral Given 3/22/25 0018)     And   bisacodyl (DULCOLAX) suppository 10 mg (has no administration in time range)   ondansetron (ZOFRAN) injection 4 mg (4 mg Intravenous Given 3/22/25 0911)   acetaminophen (TYLENOL) tablet 1,000 mg (1,000 mg Oral Given 3/22/25 0513)   diphenhydrAMINE (BENADRYL) injection 25 mg (25 mg Intravenous Given 3/22/25 0007)   sodium chloride 0.9 % infusion (75 mL/hr Intravenous Currently Infusing 3/22/25 1153)   cefepime 1000 mg IVPB in 100 mL NS (MBP) (0 mg Intravenous Stopped 3/22/25 1153)   zolpidem (AMBIEN) tablet 5 mg (5 mg Oral Given 3/21/25 2133)   gabapentin (NEURONTIN) capsule 800 mg (800 mg Oral Given 3/22/25 6944)   aspirin-acetaminophen-caffeine (EXCEDRIN MIGRAINE) 250-250-65 MG per tablet 2 tablet (2 tablets Oral Given 3/22/25 1151)   acetaminophen (TYLENOL) tablet 650 mg (650 mg Oral Given 3/21/25 1528)   sodium chloride 0.9 % bolus  1,000 mL (0 mL Intravenous Stopped 3/21/25 1950)   ketorolac (TORADOL) injection 15 mg (15 mg Intravenous Given 3/21/25 1713)   diphenhydrAMINE (BENADRYL) injection 25 mg (25 mg Intravenous Given 3/21/25 1713)   metoclopramide (REGLAN) injection 5 mg (5 mg Intravenous Given 3/21/25 1714)   cefepime 1000 mg IVPB in 100 mL NS (MBP) (0 mg Intravenous Stopped 3/22/25 0757)   acetaminophen (TYLENOL) tablet 650 mg (650 mg Oral Given 3/21/25 2133)     ECG/EMG Results (last 24 hours)       Procedure Component Value Units Date/Time    ECG 12 Lead Other; Sepsis [[id number]] Collected: 03/21/25 1552     Updated: 03/21/25 1553     QT Interval 300 ms      QTC Interval 415 ms     Narrative:      Test Reason : Other~  Blood Pressure :   */*   mmHG  Vent. Rate : 115 BPM     Atrial Rate : 115 BPM     P-R Int : 128 ms          QRS Dur :  82 ms      QT Int : 300 ms       P-R-T Axes :  -3  37  -4 degrees    QTcB Int : 415 ms    Sinus tachycardia  Otherwise normal ECG  No previous ECGs available    Referred By:            Confirmed By:     ECG 12 Lead Tachycardia [[id number]] Collected: 03/22/25 0333     Updated: 03/22/25 0333     QT Interval 298 ms      QTC Interval 415 ms     Narrative:      Test Reason : Tachycardia  Blood Pressure :   */*   mmHG  Vent. Rate : 117 BPM     Atrial Rate : 117 BPM     P-R Int : 148 ms          QRS Dur :  84 ms      QT Int : 298 ms       P-R-T Axes :  45  26  14 degrees    QTcB Int : 415 ms    Sinus tachycardia  Possible Left atrial enlargement  Borderline ECG  When compared with ECG of 21-Mar-2025 15:52, (Unconfirmed)  No significant change was found    Referred By:            Confirmed By:           ECG 12 Lead Tachycardia   Preliminary Result   Test Reason : Tachycardia   Blood Pressure :   */*   mmHG   Vent. Rate : 117 BPM     Atrial Rate : 117 BPM      P-R Int : 148 ms          QRS Dur :  84 ms       QT Int : 298 ms       P-R-T Axes :  45  26  14 degrees     QTcB Int : 415 ms      Sinus tachycardia    Possible Left atrial enlargement   Borderline ECG   When compared with ECG of 21-Mar-2025 15:52, (Unconfirmed)   No significant change was found      Referred By:            Confirmed By:       ECG 12 Lead Other; Sepsis   Preliminary Result   Test Reason : Other~   Blood Pressure :   */*   mmHG   Vent. Rate : 115 BPM     Atrial Rate : 115 BPM      P-R Int : 128 ms          QRS Dur :  82 ms       QT Int : 300 ms       P-R-T Axes :  -3  37  -4 degrees     QTcB Int : 415 ms      Sinus tachycardia   Otherwise normal ECG   No previous ECGs available      Referred By:            Confirmed By:                                                              Medical Decision Making  Case discussed with Dr. Andrew, oncology.  Etiology of her symptoms is not clear.  Infectious versus reaction from her immunotherapy are both possibilities.  We will cover with antibiotics while awaiting blood culture results.  Patient's rash has not worsened.  She is not itching and other than feeling generally ill with flulike symptoms she denies other particular complaints.  Recommendations discussed with the internal medicine attending.  Patient will admitted for further evaluation and management.    Problems Addressed:  Flu-like symptoms: complicated acute illness or injury  Malignant neoplasm of left female breast, unspecified estrogen receptor status, unspecified site of breast: complicated acute illness or injury  Pyuria: complicated acute illness or injury  Rash: complicated acute illness or injury    Amount and/or Complexity of Data Reviewed  External Data Reviewed: notes.  Labs: ordered. Decision-making details documented in ED Course.  Radiology: ordered and independent interpretation performed. Decision-making details documented in ED Course.  ECG/medicine tests: ordered and independent interpretation performed. Decision-making details documented in ED Course.    Risk  OTC drugs.  Prescription drug management.  Decision regarding  hospitalization.        Final diagnoses:   Malignant neoplasm of left female breast, unspecified estrogen receptor status, unspecified site of breast   Rash   Flu-like symptoms   Pyuria       ED Disposition  ED Disposition       ED Disposition   Decision to Admit    Condition   --    Comment   Level of Care: Telemetry [5]   Diagnosis: Rash [[id number]]   Admitting Physician: MARIBEL MANRIQUEZ [[id number]]                  Karthik Harris DO  03/22/25 4825

## 2025-03-22 ENCOUNTER — APPOINTMENT (OUTPATIENT)
Dept: GENERAL RADIOLOGY | Facility: HOSPITAL | Age: 40
DRG: 607 | End: 2025-03-22
Payer: COMMERCIAL

## 2025-03-22 LAB
ANION GAP SERPL CALCULATED.3IONS-SCNC: 14 MMOL/L (ref 5–15)
BASOPHILS # BLD AUTO: 0.02 10*3/MM3 (ref 0–0.2)
BASOPHILS NFR BLD AUTO: 0.4 % (ref 0–1.5)
BUN SERPL-MCNC: 9 MG/DL (ref 6–20)
BUN/CREAT SERPL: 12 (ref 7–25)
CALCIUM SPEC-SCNC: 7.9 MG/DL (ref 8.6–10.5)
CHLORIDE SERPL-SCNC: 100 MMOL/L (ref 98–107)
CO2 SERPL-SCNC: 19 MMOL/L (ref 22–29)
CREAT SERPL-MCNC: 0.75 MG/DL (ref 0.57–1)
DEPRECATED RDW RBC AUTO: 41.8 FL (ref 37–54)
EGFRCR SERPLBLD CKD-EPI 2021: 103.4 ML/MIN/1.73
EOSINOPHIL # BLD AUTO: 0.01 10*3/MM3 (ref 0–0.4)
EOSINOPHIL NFR BLD AUTO: 0.2 % (ref 0.3–6.2)
ERYTHROCYTE [DISTWIDTH] IN BLOOD BY AUTOMATED COUNT: 12.7 % (ref 12.3–15.4)
GLUCOSE SERPL-MCNC: 112 MG/DL (ref 65–99)
HCT VFR BLD AUTO: 30.3 % (ref 34–46.6)
HGB BLD-MCNC: 10.1 G/DL (ref 12–15.9)
IMM GRANULOCYTES # BLD AUTO: 0.03 10*3/MM3 (ref 0–0.05)
IMM GRANULOCYTES NFR BLD AUTO: 0.7 % (ref 0–0.5)
LYMPHOCYTES # BLD AUTO: 0.75 10*3/MM3 (ref 0.7–3.1)
LYMPHOCYTES NFR BLD AUTO: 16.9 % (ref 19.6–45.3)
MAGNESIUM SERPL-MCNC: 1.9 MG/DL (ref 1.6–2.6)
MCH RBC QN AUTO: 30.1 PG (ref 26.6–33)
MCHC RBC AUTO-ENTMCNC: 33.3 G/DL (ref 31.5–35.7)
MCV RBC AUTO: 90.2 FL (ref 79–97)
MONOCYTES # BLD AUTO: 0.26 10*3/MM3 (ref 0.1–0.9)
MONOCYTES NFR BLD AUTO: 5.8 % (ref 5–12)
NEUTROPHILS NFR BLD AUTO: 3.38 10*3/MM3 (ref 1.7–7)
NEUTROPHILS NFR BLD AUTO: 76 % (ref 42.7–76)
NRBC BLD AUTO-RTO: 0 /100 WBC (ref 0–0.2)
PHOSPHATE SERPL-MCNC: 2.5 MG/DL (ref 2.5–4.5)
PLATELET # BLD AUTO: 157 10*3/MM3 (ref 140–450)
PMV BLD AUTO: 10.1 FL (ref 6–12)
POTASSIUM SERPL-SCNC: 4.4 MMOL/L (ref 3.5–5.2)
RBC # BLD AUTO: 3.36 10*6/MM3 (ref 3.77–5.28)
SODIUM SERPL-SCNC: 133 MMOL/L (ref 136–145)
WBC NRBC COR # BLD AUTO: 4.45 10*3/MM3 (ref 3.4–10.8)

## 2025-03-22 PROCEDURE — 25010000002 CEFEPIME PER 500 MG: Performed by: NURSE PRACTITIONER

## 2025-03-22 PROCEDURE — 99232 SBSQ HOSP IP/OBS MODERATE 35: CPT | Performed by: HOSPITALIST

## 2025-03-22 PROCEDURE — 93010 ELECTROCARDIOGRAM REPORT: CPT | Performed by: INTERNAL MEDICINE

## 2025-03-22 PROCEDURE — 99215 OFFICE O/P EST HI 40 MIN: CPT | Performed by: INTERNAL MEDICINE

## 2025-03-22 PROCEDURE — 25810000003 SODIUM CHLORIDE 0.9 % SOLUTION: Performed by: NURSE PRACTITIONER

## 2025-03-22 PROCEDURE — 71045 X-RAY EXAM CHEST 1 VIEW: CPT

## 2025-03-22 PROCEDURE — 80048 BASIC METABOLIC PNL TOTAL CA: CPT | Performed by: NURSE PRACTITIONER

## 2025-03-22 PROCEDURE — 84100 ASSAY OF PHOSPHORUS: CPT | Performed by: NURSE PRACTITIONER

## 2025-03-22 PROCEDURE — 93005 ELECTROCARDIOGRAM TRACING: CPT | Performed by: NURSE PRACTITIONER

## 2025-03-22 PROCEDURE — 85025 COMPLETE CBC W/AUTO DIFF WBC: CPT | Performed by: NURSE PRACTITIONER

## 2025-03-22 PROCEDURE — 25010000002 ONDANSETRON PER 1 MG: Performed by: NURSE PRACTITIONER

## 2025-03-22 PROCEDURE — G0378 HOSPITAL OBSERVATION PER HR: HCPCS

## 2025-03-22 PROCEDURE — 83735 ASSAY OF MAGNESIUM: CPT | Performed by: NURSE PRACTITIONER

## 2025-03-22 PROCEDURE — 25010000002 DIPHENHYDRAMINE PER 50 MG: Performed by: NURSE PRACTITIONER

## 2025-03-22 RX ORDER — DIPHENHYDRAMINE HYDROCHLORIDE, ZINC ACETATE 2; .1 G/100G; G/100G
1 CREAM TOPICAL 3 TIMES DAILY PRN
Status: DISCONTINUED | OUTPATIENT
Start: 2025-03-22 | End: 2025-03-27 | Stop reason: HOSPADM

## 2025-03-22 RX ORDER — IBUPROFEN 400 MG/1
400 TABLET, FILM COATED ORAL EVERY 6 HOURS PRN
Status: DISCONTINUED | OUTPATIENT
Start: 2025-03-22 | End: 2025-03-27 | Stop reason: HOSPADM

## 2025-03-22 RX ORDER — ACETAMINOPHEN 325 MG/1
650 TABLET ORAL EVERY 6 HOURS PRN
Status: DISCONTINUED | OUTPATIENT
Start: 2025-03-22 | End: 2025-03-25 | Stop reason: SDUPTHER

## 2025-03-22 RX ORDER — ACETAMINOPHEN 650 MG/1
650 SUPPOSITORY RECTAL ONCE
Status: DISCONTINUED | OUTPATIENT
Start: 2025-03-22 | End: 2025-03-27 | Stop reason: HOSPADM

## 2025-03-22 RX ADMIN — SODIUM CHLORIDE 75 ML/HR: 9 INJECTION, SOLUTION INTRAVENOUS at 02:40

## 2025-03-22 RX ADMIN — ACETAMINOPHEN 1000 MG: 500 TABLET ORAL at 05:13

## 2025-03-22 RX ADMIN — SODIUM CHLORIDE 75 ML/HR: 9 INJECTION, SOLUTION INTRAVENOUS at 09:11

## 2025-03-22 RX ADMIN — DIPHENHYDRAMINE HYDROCHLORIDE 25 MG: 50 INJECTION INTRAMUSCULAR; INTRAVENOUS at 00:07

## 2025-03-22 RX ADMIN — ONDANSETRON 4 MG: 2 INJECTION INTRAMUSCULAR; INTRAVENOUS at 09:11

## 2025-03-22 RX ADMIN — CEFEPIME HYDROCHLORIDE 1000 MG: 1 INJECTION, POWDER, FOR SOLUTION INTRAMUSCULAR; INTRAVENOUS at 09:09

## 2025-03-22 RX ADMIN — ACETAMINOPHEN 650 MG: 325 TABLET, FILM COATED ORAL at 20:02

## 2025-03-22 RX ADMIN — GABAPENTIN 800 MG: 400 CAPSULE ORAL at 21:40

## 2025-03-22 RX ADMIN — CEFEPIME HYDROCHLORIDE 1000 MG: 1 INJECTION, POWDER, FOR SOLUTION INTRAMUSCULAR; INTRAVENOUS at 15:01

## 2025-03-22 RX ADMIN — ONDANSETRON 4 MG: 2 INJECTION INTRAMUSCULAR; INTRAVENOUS at 19:34

## 2025-03-22 RX ADMIN — Medication 10 ML: at 00:12

## 2025-03-22 RX ADMIN — BUPROPION HYDROCHLORIDE 150 MG: 150 TABLET, FILM COATED, EXTENDED RELEASE ORAL at 09:10

## 2025-03-22 RX ADMIN — ACETAMINOPHEN, ASPIRIN, CAFFEINE 2 TABLET: 250; 65; 250 TABLET, FILM COATED ORAL at 11:51

## 2025-03-22 RX ADMIN — Medication 10 ML: at 21:40

## 2025-03-22 RX ADMIN — ACETAMINOPHEN 1000 MG: 500 TABLET ORAL at 15:01

## 2025-03-22 RX ADMIN — DIPHENHYDRAMINE HYDROCHLORIDE 25 MG: 50 INJECTION INTRAMUSCULAR; INTRAVENOUS at 16:05

## 2025-03-22 RX ADMIN — GABAPENTIN 800 MG: 400 CAPSULE ORAL at 15:01

## 2025-03-22 RX ADMIN — ZOLPIDEM TARTRATE 5 MG: 5 TABLET ORAL at 21:40

## 2025-03-22 RX ADMIN — ONDANSETRON 4 MG: 2 INJECTION INTRAMUSCULAR; INTRAVENOUS at 02:42

## 2025-03-22 RX ADMIN — LEVOTHYROXINE SODIUM 25 MCG: 0.03 TABLET ORAL at 05:24

## 2025-03-22 RX ADMIN — CEFEPIME HYDROCHLORIDE 1000 MG: 1 INJECTION, POWDER, FOR SOLUTION INTRAMUSCULAR; INTRAVENOUS at 00:11

## 2025-03-22 RX ADMIN — IBUPROFEN 400 MG: 400 TABLET, FILM COATED ORAL at 22:28

## 2025-03-22 RX ADMIN — GABAPENTIN 800 MG: 400 CAPSULE ORAL at 05:24

## 2025-03-22 RX ADMIN — LAMOTRIGINE 300 MG: 100 TABLET ORAL at 09:10

## 2025-03-22 RX ADMIN — Medication 10 ML: at 09:10

## 2025-03-22 RX ADMIN — BISACODYL 5 MG: 5 TABLET, COATED ORAL at 00:18

## 2025-03-22 NOTE — PROGRESS NOTES
HEMATOLOGY/ONCOLOGY PROGRESS NOTE    Subjective      CC: Diffuse pruritic rash with tens to 103    SUBJECTIVE: Pruritus not quite as bad but still has an uncomfortable rash        Past Medical History, Past Surgical History, Social History, Family History have been reviewed and are without significant changes except as mentioned.      Medications:  The current medication list was reviewed in the EMR    ALLERGIES:   Allergies   Allergen Reactions    Erythromycin Other (See Comments)    Pholcodine Other (See Comments)    Penicillins Rash     Childhood        ROS:  Having slight headache but no neck stiffness or photophobia with her fever      Objective      Vitals:    03/22/25 1242 03/22/25 1503 03/22/25 1505 03/22/25 1557   BP:    109/60   BP Location:    Left arm   Patient Position:    Lying   Pulse:  101  106   Resp:    18   Temp: 99.9 °F (37.7 °C)  99.8 °F (37.7 °C) 99.3 °F (37.4 °C)   TempSrc: Oral  Axillary Oral   SpO2:  98%  94%   Weight:       Height:                       ECOG 1.    General: well appearing, in no acute distress  HEENT: sclerae anicteric, oropharynx clear    Lungs: clear to auscultation bilaterally    Skin: Diffuse macular rash on arms torso neck  Neuro: Alert and oriented x 3. Moves all extremities.    RECENT LABS:    Results from last 7 days   Lab Units 03/22/25  0639 03/21/25  1545 03/20/25  1044   WBC 10*3/mm3 4.45 6.45 7.43   HEMOGLOBIN g/dL 10.1* 12.7 11.5*   PLATELETS 10*3/mm3 157 182 176     Results from last 7 days   Lab Units 03/22/25  0639 03/21/25  1545 03/20/25  1044   SODIUM mmol/L 133* 135* 130*   POTASSIUM mmol/L 4.4 4.9 4.5   CO2 mmol/L 19.0* 20.0* 19.0*   BUN mg/dL 9 9 12   CREATININE mg/dL 0.75 0.84 0.91   GLUCOSE mg/dL 112* 98 120*     Results from last 7 days   Lab Units 03/21/25  1545 03/20/25  1044   AST (SGOT) U/L 33* 31   ALT (SGPT) U/L 38* 40*   BILIRUBIN mg/dL 0.5 0.6   ALK PHOS U/L 104 80         XR Chest 1 View  Result Date: 3/22/2025  Impression: No acute  cardiopulmonary findings. Electronically Signed: Leno Salcedo MD  3/22/2025 11:50 AM EDT  Workstation ID: [id number]    MRI Thoracic Spine With & Without Contrast  Result Date: 3/18/2025  Impression: Mild degenerative changes of thoracic spine as described above. Electronically Signed: Kyler Lainez MD  3/18/2025 9:21 AM EDT  Workstation ID: [id number]    MRI Cervical Spine With & Without Contrast  Result Date: 3/17/2025  Impression: Generally mild multilevel cervical spondylosis is noted as above. There is no evidence of high-grade spinal canal narrowing. There is moderate narrowing of the left neural foramen at C5-6. Electronically Signed: Salinas Coley MD  3/17/2025 7:22 AM EDT  Workstation ID: [id number]              Assessment   ASSESSMENT & PLAN:    1.  Fevers to 103 with diffuse rash in the face of cycle 1 neoadjuvant carbo Taxol Keytruda 3/13/2025 for triple negative multi node positive multifocal left breast cancer with diffuse erythematous and pruritic rash.Respiratory panel negative.  Blood cultures pending.  Urinalysis showed 1+ protein trace leukocyte esterase but urine culture no growth.  Blood culture pending but procalcitonin was normal going against the likelihood of infection.  With diffuse erythematous rash and fever that I suspect is related to her Keytruda in the face of her king hair and northern  descent, I will start her on Medrol to try to put the cutaneous fire out and help with the fever.  If this improves over the next couple of days then she can probably be transition to prednisone and tapered and whether or not she can get on a low enough dose of steroids to keep the immunotherapeutic fire at bay while still getting Keytruda effectively remains to be seen and will defer ultimately obviously to the excellent judgment of Dr. Baker.  Patient reassured by this plan          Total time of care today inclusive of time spent today prior to patient visit reviewing past records from  Dr. Baker and during visit reviewing her current records and interviewing her as to signs or symptoms of her disease and the management thereof took 50 minutes patient care time throughout the day today.        Matthew Andrew MD    3/22/2025                       Skyrizi Counseling: I discussed with the patient the risks of risankizumab-rzaa including but not limited to immunosuppression, and serious infections.  The patient understands that monitoring is required including a PPD at baseline and must alert us or the primary physician if symptoms of infection or other concerning signs are noted.

## 2025-03-22 NOTE — PLAN OF CARE
Goal Outcome Evaluation:         Patient presented with fever, tachycardia, rash, and headache on admission to the floor.  Fluids and abx administered for fluid replacement and potential infection, respectively.  Scheduled tylenol was administered to lower fever.  Plan is for oncology to rule out infection vs reaction to chemo as source of inflammation.

## 2025-03-22 NOTE — PLAN OF CARE
Goal Outcome Evaluation:  Plan of Care Reviewed With: patient           Outcome Evaluation: Pt a/o, VSS, RA, Sat. 94 %,  SR/S.tach,  c/o pain/ fever addressed with PRN meds. Continue monitoring.

## 2025-03-22 NOTE — PROGRESS NOTES
"    [facility] Medicine Services  PROGRESS NOTE    Patient Name: Brooke Mendez  : 1985  MRN: [id number]    Date of Admission: 3/21/2025  Primary Care Physician: Sheldon Starkey DO    Subjective   Subjective     CC:  F/U fever, rash    HPI:  Seen this morning. Feels a little better. Rash has not improved much from yesterday. Not that itchy, more \"uncomfortable.\"      Objective   Objective     Vital Signs:   Temp:  [98.6 °F (37 °C)-101.4 °F (38.6 °C)] 99.8 °F (37.7 °C)  Heart Rate:  [] 101  Resp:  [18-24] 18  BP: (102-131)/(61-74) 117/72     Physical Exam:  Gen-no acute distress  HENT-NCAT, mucous membranes moist  CV-RRR, S1 S2 normal, no m/r/g  Resp-CTAB, no wheezes or rales  Abd-soft, NT, ND, +BS  Ext-no edema  Neuro-A&Ox3, no focal deficits  Skin-diffuse maculopapular rash most pronounced over trunk/abdomen region but also on back, arms, legs  Psych-appropriate mood      Results Reviewed:  LAB RESULTS:      Lab 25  0639 25  1545 25  1044   WBC 4.45 6.45 7.43   HEMOGLOBIN 10.1* 12.7 11.5*   HEMATOCRIT 30.3* 38.0 34.2   PLATELETS 157 182 176   NEUTROS ABS 3.38 5.46 6.25   IMMATURE GRANS (ABS) 0.03 0.05 0.11*   LYMPHS ABS 0.75 0.63* 0.72   MONOS ABS 0.26 0.27 0.33   EOS ABS 0.01 0.01 0.00   MCV 90.2 89.6 90.2   PROCALCITONIN  --  0.17  --    LACTATE  --  1.2  --          Lab 25  0639 25  1545 25  1044   SODIUM 133* 135* 130*   POTASSIUM 4.4 4.9 4.5   CHLORIDE 100 98 96*   CO2 19.0* 20.0* 19.0*   ANION GAP 14.0 17.0* 15.0   BUN 9 9 12   CREATININE 0.75 0.84 0.91   EGFR 103.4 90.2 82.0   GLUCOSE 112* 98 120*   CALCIUM 7.9* 9.0 8.1*   MAGNESIUM 1.9  --   --    PHOSPHORUS 2.5  --   --          Lab 25  1545 25  1044   TOTAL PROTEIN 7.9 6.7   ALBUMIN 4.0 3.6   GLOBULIN 3.9 3.1   ALT (SGPT) 38* 40*   AST (SGOT) 33* 31   BILIRUBIN 0.5 0.6   ALK PHOS 104 80                     Brief Urine Lab Results  (Last result in the past 365 days) "        Color   Clarity   Blood   Leuk Est   Nitrite   Protein   CREAT   Urine HCG        03/21/25 1555 Yellow   Clear   Negative   Trace   Negative   30 mg/dL (1+)                   Microbiology Results Abnormal       None            XR Chest 1 View  Result Date: 3/22/2025  XR CHEST 1 VW Date of Exam: 3/22/2025 10:30 AM EDT Indication: Fever Comparison: None available. Findings: There is a right chest port with the catheter terminating at the upper right atrium. The cardiomediastinal silhouette is normal. The lungs are clear. No pleural effusion or pneumothorax. No acute osseous findings.     Impression: Impression: No acute cardiopulmonary findings. Electronically Signed: Leno Salcedo MD  3/22/2025 11:50 AM EDT  Workstation ID: [id number]      Results for orders placed during the hospital encounter of 03/11/25    Adult Transthoracic Echo Complete W/ Cont if Necessary Per Protocol    Interpretation Summary    Left ventricular systolic function is normal. Calculated left ventricular EF = 59.6% Left ventricular ejection fraction appears to be 56 - 60%.    Left atrial volume is mildly increased.    Estimated right ventricular systolic pressure from tricuspid regurgitation is normal (<35 mmHg). Calculated right ventricular systolic pressure from tricuspid regurgitation is 21 mmHg.    Normal global longitudinal LV strain (GLS) = -21.2%    There is no previous study available for comparison.      Current medications:  Scheduled Meds:buPROPion XL, 150 mg, Oral, Daily  cefepime, 1,000 mg, Intravenous, Q8H  gabapentin, 800 mg, Oral, Q8H  lamoTRIgine, 300 mg, Oral, Daily  levothyroxine, 25 mcg, Oral, Daily  sodium chloride, 10 mL, Intravenous, Q12H      Continuous Infusions:   PRN Meds:.  acetaminophen    aspirin-acetaminophen-caffeine    senna-docusate sodium **AND** polyethylene glycol **AND** bisacodyl **AND** bisacodyl    diphenhydrAMINE    diphenhydrAMINE-zinc acetate    nitroglycerin    ondansetron    sodium chloride     sodium chloride    sodium chloride    zolpidem    Assessment & Plan   Assessment & Plan     Active Hospital Problems    Diagnosis  POA    **Malignant neoplasm of left breast in female, estrogen receptor negative [C50.912, Z17.1]  Not Applicable    Rash [R21]  Yes    Fever [R50.9]  Yes    On antineoplastic chemotherapy and adjuvant immunotherapy regimen [Z79.899]  Not Applicable    Essential hypertension [I10]  Unknown    Thyroid disease [E07.9]  Unknown    Constipation [K59.00]  Unknown      Resolved Hospital Problems   No resolved problems to display.        Brief Hospital Course to date:  Brooke Mendez is a 39 y.o. female with history of metastatic breast cancer, thyroid disease, and hypertension presents to the ED for flu-like symptoms and new onset rash. Patient has been treated by Dr. Baker for breast cancer with immunotherapy and chemotherapy.  First dose was 3/13/25, second dose was planned for 3/20/25 but she did not receive it due to onset of chills, night sweats, headache, and malaise two days before that. Went to [facility] ED for evaluation, apparently had concern for UTI so was sent home with prescription for Cefdinir. She was switched to Bactrim during Oncology office visit on 3/20/25 to cover for UTI as well as skin/soft tissue infection as there was concern for left axillary swelling and tenderness (clip had been placed by radiology there the previous week). Now has new onset rash and fevers up to 102.5 at home. Admitted for further management.    This patient's problems and plans were partially entered by my partner and updated as appropriate by me 03/22/25. Copied text in this note has been reviewed and is accurate as of today's date.      Malignant neoplasm of left breast in female, estrogen receptor negative  --On antineoplastic chemotherapy and adjuvant immunotherapy regimen, initiated on 3/13/25, dose held 3/20/25  --Sees Dr. Baker/Oncology as outpatient, consulted here       Rash  --Located on trunk/abdomen, back, arms, legs  --Recent antibiotics - Cefdinir and Bactrim, will hold both for now  --Infectious vs immunotherapy/drug reaction  --Continue Benadryl PRN for now     Fevers  --WBC normal, ANC normal, procal reassuring, lacate normal  --Blood cultures pending  --UA with trace leukocytes, 6-10 WBC - urine culture sent, possibly antibiotic modified given recent Cefdinir and Bactrim  --Will cover with Cefepime for now  --PRN Tylenol    Headaches  --Recent OSH CT head unremarkable  --Consider MRI brain if persists? Defer to Oncology team     Essential hypertension  --On Metoprolol at home but has not taken for two days prior to admission  --BP stable/low currently, will continue to hold     Thyroid disease  --Recent normal TSH, free T4 on 3/11/25  --Continue Synthroid     Constipation  --Bowel regimen  --Had BM 3/21/25 after not having one for 4 days    Expected Discharge Location and Transportation: home  Expected Discharge   Expected Discharge Date: 3/24/2025; Expected Discharge Time:      VTE Prophylaxis:  Mechanical VTE prophylaxis orders are present.         AM-PAC 6 Clicks Score (PT): 24 (03/22/25 0934)    CODE STATUS:   Code Status and Medical Interventions: CPR (Attempt to Resuscitate); Full Support   Ordered at: 03/21/25 2041     Code Status (Patient has no pulse and is not breathing):    CPR (Attempt to Resuscitate)     Medical Interventions (Patient has pulse or is breathing):    Full Support     Level Of Support Discussed With:    Patient       Anette Phipps MD  03/22/25

## 2025-03-23 ENCOUNTER — APPOINTMENT (OUTPATIENT)
Dept: MRI IMAGING | Facility: HOSPITAL | Age: 40
DRG: 607 | End: 2025-03-23
Payer: COMMERCIAL

## 2025-03-23 PROBLEM — N17.9 ACUTE KIDNEY INJURY: Status: ACTIVE | Noted: 2025-03-23

## 2025-03-23 LAB
ANION GAP SERPL CALCULATED.3IONS-SCNC: 12 MMOL/L (ref 5–15)
BACTERIA SPEC AEROBE CULT: NO GROWTH
BUN SERPL-MCNC: 18 MG/DL (ref 6–20)
BUN/CREAT SERPL: 11.4 (ref 7–25)
CALCIUM SPEC-SCNC: 8 MG/DL (ref 8.6–10.5)
CHLORIDE SERPL-SCNC: 98 MMOL/L (ref 98–107)
CO2 SERPL-SCNC: 21 MMOL/L (ref 22–29)
CREAT SERPL-MCNC: 1.58 MG/DL (ref 0.57–1)
DEPRECATED RDW RBC AUTO: 43.6 FL (ref 37–54)
EGFRCR SERPLBLD CKD-EPI 2021: 42.3 ML/MIN/1.73
ERYTHROCYTE [DISTWIDTH] IN BLOOD BY AUTOMATED COUNT: 13 % (ref 12.3–15.4)
GLUCOSE SERPL-MCNC: 103 MG/DL (ref 65–99)
HCT VFR BLD AUTO: 33.3 % (ref 34–46.6)
HGB BLD-MCNC: 11.2 G/DL (ref 12–15.9)
MCH RBC QN AUTO: 30.5 PG (ref 26.6–33)
MCHC RBC AUTO-ENTMCNC: 33.6 G/DL (ref 31.5–35.7)
MCV RBC AUTO: 90.7 FL (ref 79–97)
PLATELET # BLD AUTO: 168 10*3/MM3 (ref 140–450)
PMV BLD AUTO: 9.9 FL (ref 6–12)
POTASSIUM SERPL-SCNC: 4.4 MMOL/L (ref 3.5–5.2)
RBC # BLD AUTO: 3.67 10*6/MM3 (ref 3.77–5.28)
SODIUM SERPL-SCNC: 131 MMOL/L (ref 136–145)
WBC NRBC COR # BLD AUTO: 3.59 10*3/MM3 (ref 3.4–10.8)

## 2025-03-23 PROCEDURE — 85027 COMPLETE CBC AUTOMATED: CPT | Performed by: HOSPITALIST

## 2025-03-23 PROCEDURE — 25010000002 DIAZEPAM PER 5 MG: Performed by: HOSPITALIST

## 2025-03-23 PROCEDURE — 80048 BASIC METABOLIC PNL TOTAL CA: CPT | Performed by: HOSPITALIST

## 2025-03-23 PROCEDURE — 99215 OFFICE O/P EST HI 40 MIN: CPT | Performed by: INTERNAL MEDICINE

## 2025-03-23 PROCEDURE — 99232 SBSQ HOSP IP/OBS MODERATE 35: CPT | Performed by: HOSPITALIST

## 2025-03-23 PROCEDURE — 25010000002 METHYLPREDNISOLONE PER 125 MG: Performed by: HOSPITALIST

## 2025-03-23 PROCEDURE — A9577 INJ MULTIHANCE: HCPCS | Performed by: HOSPITALIST

## 2025-03-23 PROCEDURE — G0378 HOSPITAL OBSERVATION PER HR: HCPCS

## 2025-03-23 PROCEDURE — 25810000003 SODIUM CHLORIDE 0.9 % SOLUTION: Performed by: HOSPITALIST

## 2025-03-23 PROCEDURE — 25010000002 CEFEPIME PER 500 MG: Performed by: NURSE PRACTITIONER

## 2025-03-23 PROCEDURE — 70553 MRI BRAIN STEM W/O & W/DYE: CPT

## 2025-03-23 PROCEDURE — 25010000002 DIPHENHYDRAMINE PER 50 MG: Performed by: NURSE PRACTITIONER

## 2025-03-23 PROCEDURE — 25010000002 ONDANSETRON PER 1 MG: Performed by: NURSE PRACTITIONER

## 2025-03-23 PROCEDURE — 25010000002 CEFEPIME PER 500 MG

## 2025-03-23 PROCEDURE — 25510000002 GADOBENATE DIMEGLUMINE 529 MG/ML SOLUTION: Performed by: HOSPITALIST

## 2025-03-23 RX ORDER — SODIUM CHLORIDE 9 MG/ML
75 INJECTION, SOLUTION INTRAVENOUS CONTINUOUS
Status: ACTIVE | OUTPATIENT
Start: 2025-03-23 | End: 2025-03-23

## 2025-03-23 RX ORDER — METOPROLOL SUCCINATE 25 MG/1
25 TABLET, EXTENDED RELEASE ORAL DAILY
Status: DISCONTINUED | OUTPATIENT
Start: 2025-03-23 | End: 2025-03-27 | Stop reason: HOSPADM

## 2025-03-23 RX ORDER — METOPROLOL SUCCINATE 25 MG/1
12.5 TABLET, EXTENDED RELEASE ORAL DAILY
Status: DISCONTINUED | OUTPATIENT
Start: 2025-03-23 | End: 2025-03-23

## 2025-03-23 RX ORDER — ACETAMINOPHEN 325 MG/1
650 TABLET ORAL EVERY 6 HOURS
Status: DISCONTINUED | OUTPATIENT
Start: 2025-03-23 | End: 2025-03-25

## 2025-03-23 RX ORDER — WATER 10 ML/10ML
INJECTION INTRAMUSCULAR; INTRAVENOUS; SUBCUTANEOUS
Status: COMPLETED
Start: 2025-03-23 | End: 2025-03-23

## 2025-03-23 RX ORDER — DIPHENHYDRAMINE HYDROCHLORIDE AND LIDOCAINE HYDROCHLORIDE AND ALUMINUM HYDROXIDE AND MAGNESIUM HYDRO
5 KIT EVERY 6 HOURS
Status: DISCONTINUED | OUTPATIENT
Start: 2025-03-23 | End: 2025-03-27 | Stop reason: HOSPADM

## 2025-03-23 RX ORDER — GUAIFENESIN 600 MG/1
600 TABLET, EXTENDED RELEASE ORAL EVERY 12 HOURS SCHEDULED
Status: DISCONTINUED | OUTPATIENT
Start: 2025-03-23 | End: 2025-03-27 | Stop reason: HOSPADM

## 2025-03-23 RX ORDER — METHYLPREDNISOLONE SODIUM SUCCINATE 125 MG/2ML
60 INJECTION, POWDER, LYOPHILIZED, FOR SOLUTION INTRAMUSCULAR; INTRAVENOUS EVERY 12 HOURS
Status: DISCONTINUED | OUTPATIENT
Start: 2025-03-23 | End: 2025-03-24

## 2025-03-23 RX ORDER — DIAZEPAM 10 MG/2ML
5 INJECTION, SOLUTION INTRAMUSCULAR; INTRAVENOUS ONCE
Status: COMPLETED | OUTPATIENT
Start: 2025-03-23 | End: 2025-03-23

## 2025-03-23 RX ORDER — LORAZEPAM 0.5 MG/1
0.5 TABLET ORAL 2 TIMES DAILY PRN
Status: DISCONTINUED | OUTPATIENT
Start: 2025-03-23 | End: 2025-03-27 | Stop reason: HOSPADM

## 2025-03-23 RX ADMIN — ACETAMINOPHEN 650 MG: 325 TABLET, FILM COATED ORAL at 14:17

## 2025-03-23 RX ADMIN — CEFEPIME HYDROCHLORIDE 1000 MG: 1 INJECTION, POWDER, FOR SOLUTION INTRAMUSCULAR; INTRAVENOUS at 20:20

## 2025-03-23 RX ADMIN — METOPROLOL SUCCINATE 25 MG: 25 TABLET, EXTENDED RELEASE ORAL at 10:22

## 2025-03-23 RX ADMIN — ACETAMINOPHEN 650 MG: 325 TABLET, FILM COATED ORAL at 20:01

## 2025-03-23 RX ADMIN — ACETAMINOPHEN 650 MG: 325 TABLET, FILM COATED ORAL at 03:15

## 2025-03-23 RX ADMIN — METHYLPREDNISOLONE SODIUM SUCCINATE 60 MG: 125 INJECTION INTRAMUSCULAR; INTRAVENOUS at 20:12

## 2025-03-23 RX ADMIN — CEFEPIME HYDROCHLORIDE 1000 MG: 1 INJECTION, POWDER, FOR SOLUTION INTRAMUSCULAR; INTRAVENOUS at 00:09

## 2025-03-23 RX ADMIN — LORAZEPAM 0.5 MG: 0.5 TABLET ORAL at 10:22

## 2025-03-23 RX ADMIN — GUAIFENESIN 600 MG: 600 TABLET ORAL at 20:04

## 2025-03-23 RX ADMIN — GABAPENTIN 800 MG: 400 CAPSULE ORAL at 05:25

## 2025-03-23 RX ADMIN — LEVOTHYROXINE SODIUM 25 MCG: 0.03 TABLET ORAL at 05:25

## 2025-03-23 RX ADMIN — ACETAMINOPHEN 650 MG: 325 TABLET, FILM COATED ORAL at 08:58

## 2025-03-23 RX ADMIN — GABAPENTIN 800 MG: 400 CAPSULE ORAL at 13:00

## 2025-03-23 RX ADMIN — DIAZEPAM 5 MG: 10 INJECTION, SOLUTION INTRAMUSCULAR; INTRAVENOUS at 20:49

## 2025-03-23 RX ADMIN — BUPROPION HYDROCHLORIDE 150 MG: 150 TABLET, FILM COATED, EXTENDED RELEASE ORAL at 08:58

## 2025-03-23 RX ADMIN — Medication 10 ML: at 08:58

## 2025-03-23 RX ADMIN — DIPHENHYDRAMINE HYDROCHLORIDE 25 MG: 50 INJECTION INTRAMUSCULAR; INTRAVENOUS at 09:24

## 2025-03-23 RX ADMIN — WATER 10 ML: 1 INJECTION INTRAMUSCULAR; INTRAVENOUS; SUBCUTANEOUS at 08:57

## 2025-03-23 RX ADMIN — GABAPENTIN 800 MG: 400 CAPSULE ORAL at 20:20

## 2025-03-23 RX ADMIN — LAMOTRIGINE 300 MG: 100 TABLET ORAL at 08:58

## 2025-03-23 RX ADMIN — DIPHENHYDRAMINE HYDROCHLORIDE AND LIDOCAINE HYDROCHLORIDE AND ALUMINUM HYDROXIDE AND MAGNESIUM HYDRO 5 ML: KIT at 15:11

## 2025-03-23 RX ADMIN — GADOBENATE DIMEGLUMINE 19 ML: 529 INJECTION, SOLUTION INTRAVENOUS at 21:27

## 2025-03-23 RX ADMIN — SODIUM CHLORIDE 75 ML/HR: 9 INJECTION, SOLUTION INTRAVENOUS at 10:29

## 2025-03-23 RX ADMIN — METHYLPREDNISOLONE SODIUM SUCCINATE 60 MG: 125 INJECTION INTRAMUSCULAR; INTRAVENOUS at 08:57

## 2025-03-23 RX ADMIN — SODIUM CHLORIDE 75 ML/HR: 9 INJECTION, SOLUTION INTRAVENOUS at 12:56

## 2025-03-23 RX ADMIN — Medication 10 ML: at 20:00

## 2025-03-23 RX ADMIN — GUAIFENESIN 600 MG: 600 TABLET ORAL at 10:22

## 2025-03-23 RX ADMIN — ONDANSETRON 4 MG: 2 INJECTION INTRAMUSCULAR; INTRAVENOUS at 09:33

## 2025-03-23 NOTE — PROGRESS NOTES
HEMATOLOGY/ONCOLOGY PROGRESS NOTE    Subjective      CC: Rash improving but still feeling tired and worn out from fever last night    SUBJECTIVE: Same as chief complaint        Past Medical History, Past Surgical History, Social History, Family History have been reviewed and are without significant changes except as mentioned.      Medications:  The current medication list was reviewed in the EMR    ALLERGIES:   Allergies   Allergen Reactions    Erythromycin Other (See Comments)    Pholcodine Other (See Comments)    Penicillins Rash     Childhood        ROS:  A little better but still with a headache      Objective      Vitals:    03/23/25 0600 03/23/25 0915 03/23/25 1100 03/23/25 1420   BP: 107/63 125/75 102/64    BP Location:  Left arm Left arm    Patient Position:  Lying Lying    Pulse: 104 120 104    Resp:  18 18    Temp:  100.5 °F (38.1 °C) 98.9 °F (37.2 °C) 99.8 °F (37.7 °C)   TempSrc:  Axillary Oral    SpO2: 97% 99% 95%    Weight:       Height:                       ECOG: (2) Ambulatory & Capable of Self Care, Unable to Carry Out Work Activity, Up & About Greater Than 50% of Waking Hours    General: well appearing, in no acute distress  HEENT: sclerae anicteric, oropharynx clear  Lymphatics: no cervical, supraclavicular, inguinal, or axillary adenopathy  Cardiovascular: regular rate and rhythm, no murmurs  Lungs: clear to auscultation bilaterally  Abdomen: soft, nontender, nondistended.  No palpable organomegaly  Extremities: no lower extremity edema  Skin: Diffuse malar rash arms and trunk and cheeks faded from yesterday but still present   neuro: Alert and oriented x 3. Moves all extremities.  No neck stiffness    RECENT LABS:    Results from last 7 days   Lab Units 03/23/25  0810 03/22/25  0639 03/21/25  1545   WBC 10*3/mm3 3.59 4.45 6.45   HEMOGLOBIN g/dL 11.2* 10.1* 12.7   PLATELETS 10*3/mm3 168 157 182     Results from last 7 days   Lab Units 03/23/25  0810 03/22/25  0639 03/21/25  1545   SODIUM mmol/L  131* 133* 135*   POTASSIUM mmol/L 4.4 4.4 4.9   CO2 mmol/L 21.0* 19.0* 20.0*   BUN mg/dL 18 9 9   CREATININE mg/dL 1.58* 0.75 0.84   GLUCOSE mg/dL 103* 112* 98     Results from last 7 days   Lab Units 03/21/25  1545 03/20/25  1044   AST (SGOT) U/L 33* 31   ALT (SGPT) U/L 38* 40*   BILIRUBIN mg/dL 0.5 0.6   ALK PHOS U/L 104 80         XR Chest 1 View  Result Date: 3/22/2025  Impression: No acute cardiopulmonary findings. Electronically Signed: Leno Salcedo MD  3/22/2025 11:50 AM EDT  Workstation ID: [id number]    MRI Thoracic Spine With & Without Contrast  Result Date: 3/18/2025  Impression: Mild degenerative changes of thoracic spine as described above. Electronically Signed: Kyler Lainez MD  3/18/2025 9:21 AM EDT  Workstation ID: [id number]    MRI Cervical Spine With & Without Contrast  Result Date: 3/17/2025  Impression: Generally mild multilevel cervical spondylosis is noted as above. There is no evidence of high-grade spinal canal narrowing. There is moderate narrowing of the left neural foramen at C5-6. Electronically Signed: Salinas Coley MD  3/17/2025 7:22 AM EDT  Workstation ID: [id number]              Assessment   ASSESSMENT & PLAN:  1.  Rash and fevers: Rash has improved since yesterday.  Still present and bothersome.  Started Solu-Medrol 60 mg every 12 hours yesterday.  Creatinine a little higher.  If this continues to rise we will likely need to get nephrology to see to help sort out autoimmune nephritis as well.  No clear-cut infection with cefdinir and Bactrim now on hold.  Subsequent treatment plans pending the results of steroid impact on rash, fever, and renal function.  Not in any acute distress.  Had temps to 102 last night.  Still with headaches.  Doubt meningitis but will get MRI brain.    Time of care today inclusive of time spent today prior to patient's visit reviewing interval data and other providers notes and during visit interviewing her as to signs or symptoms of her disease and the  management thereof and after visit instituting this plan took 50 minutes patient care time throughout the day today.          Matthew Andrew MD    3/23/2025

## 2025-03-23 NOTE — PROGRESS NOTES
[facility] Medicine Services  PROGRESS NOTE    Patient Name: Brooke Mendez  : 1985  MRN: [id number]    Date of Admission: 3/21/2025  Primary Care Physician: Sheldon Starkey DO    Subjective   Subjective     CC:  F/U fever, rash    HPI:  Seen this morning. Feels bad, trying to break current fever. Asking for meds to be scheduled to control the fevers. Complains of cough, more productive today. Rash not any better. Apparently steroids were recommended yesterday but not ordered.       Objective   Objective     Vital Signs:   Temp:  [98.6 °F (37 °C)-102.7 °F (39.3 °C)] 98.9 °F (37.2 °C)  Heart Rate:  [101-122] 104  Resp:  [17-18] 18  BP: ()/(52-75) 102/64     Physical Exam:  Gen-looks miserable this morning  HENT-NCAT, mucous membranes moist  CV-tachycardic, S1 S2 normal, no m/r/g  Resp-overall CTAB, no wheezes or rales, +active cough  Abd-soft, NT, ND, +BS  Ext-no edema  Neuro-A&Ox3, no focal deficits  Skin-diffuse maculopapular rash most pronounced over trunk/abdomen region but also on back, arms, legs; warm to touch all over  Psych-appropriate mood      Results Reviewed:  LAB RESULTS:      Lab 25  0810 25  0639 25  1545 25  1044   WBC 3.59 4.45 6.45 7.43   HEMOGLOBIN 11.2* 10.1* 12.7 11.5*   HEMATOCRIT 33.3* 30.3* 38.0 34.2   PLATELETS 168 157 182 176   NEUTROS ABS  --  3.38 5.46 6.25   IMMATURE GRANS (ABS)  --  0.03 0.05 0.11*   LYMPHS ABS  --  0.75 0.63* 0.72   MONOS ABS  --  0.26 0.27 0.33   EOS ABS  --  0.01 0.01 0.00   MCV 90.7 90.2 89.6 90.2   PROCALCITONIN  --   --  0.17  --    LACTATE  --   --  1.2  --          Lab 25  0810 25  0639 25  1545 25  1044   SODIUM 131* 133* 135* 130*   POTASSIUM 4.4 4.4 4.9 4.5   CHLORIDE 98 100 98 96*   CO2 21.0* 19.0* 20.0* 19.0*   ANION GAP 12.0 14.0 17.0* 15.0   BUN 18 9 9 12   CREATININE 1.58* 0.75 0.84 0.91   EGFR 42.3* 103.4 90.2 82.0   GLUCOSE 103* 112* 98 120*   CALCIUM 8.0*  7.9* 9.0 8.1*   MAGNESIUM  --  1.9  --   --    PHOSPHORUS  --  2.5  --   --          Lab 03/21/25  1545 03/20/25  1044   TOTAL PROTEIN 7.9 6.7   ALBUMIN 4.0 3.6   GLOBULIN 3.9 3.1   ALT (SGPT) 38* 40*   AST (SGOT) 33* 31   BILIRUBIN 0.5 0.6   ALK PHOS 104 80                     Brief Urine Lab Results  (Last result in the past 365 days)        Color   Clarity   Blood   Leuk Est   Nitrite   Protein   CREAT   Urine HCG        03/21/25 1555 Yellow   Clear   Negative   Trace   Negative   30 mg/dL (1+)                   Microbiology Results Abnormal       None            XR Chest 1 View  Result Date: 3/22/2025  XR CHEST 1 VW Date of Exam: 3/22/2025 10:30 AM EDT Indication: Fever Comparison: None available. Findings: There is a right chest port with the catheter terminating at the upper right atrium. The cardiomediastinal silhouette is normal. The lungs are clear. No pleural effusion or pneumothorax. No acute osseous findings.     Impression: Impression: No acute cardiopulmonary findings. Electronically Signed: Leno Salcedo MD  3/22/2025 11:50 AM EDT  Workstation ID: [id number]      Results for orders placed during the hospital encounter of 03/11/25    Adult Transthoracic Echo Complete W/ Cont if Necessary Per Protocol    Interpretation Summary    Left ventricular systolic function is normal. Calculated left ventricular EF = 59.6% Left ventricular ejection fraction appears to be 56 - 60%.    Left atrial volume is mildly increased.    Estimated right ventricular systolic pressure from tricuspid regurgitation is normal (<35 mmHg). Calculated right ventricular systolic pressure from tricuspid regurgitation is 21 mmHg.    Normal global longitudinal LV strain (GLS) = -21.2%    There is no previous study available for comparison.      Current medications:  Scheduled Meds:acetaminophen, 650 mg, Rectal, Once  acetaminophen, 650 mg, Oral, Q6H  buPROPion XL, 150 mg, Oral, Daily  cefepime, 1,000 mg, Intravenous, Q12H  First  Mouthwash (Magic Mouthwash), 5 mL, Swish & Spit, Q6H  gabapentin, 800 mg, Oral, Q8H  guaiFENesin, 600 mg, Oral, Q12H  lamoTRIgine, 300 mg, Oral, Daily  levothyroxine, 25 mcg, Oral, Daily  methylPREDNISolone sodium succinate, 60 mg, Intravenous, Q12H  metoprolol succinate XL, 25 mg, Oral, Daily  sodium chloride, 10 mL, Intravenous, Q12H      Continuous Infusions:sodium chloride, 75 mL/hr, Last Rate: 75 mL/hr (03/23/25 1256)      PRN Meds:.  acetaminophen    aspirin-acetaminophen-caffeine    senna-docusate sodium **AND** polyethylene glycol **AND** bisacodyl **AND** bisacodyl    diphenhydrAMINE    diphenhydrAMINE-zinc acetate    ibuprofen    LORazepam    nitroglycerin    ondansetron    phenol    sodium chloride    sodium chloride    sodium chloride    zolpidem    Assessment & Plan   Assessment & Plan     Active Hospital Problems    Diagnosis  POA    **Rash [R21]  Yes    Acute kidney injury [N17.9]  No    Fever [R50.9]  Yes    On antineoplastic chemotherapy and adjuvant immunotherapy regimen [Z79.899]  Not Applicable    Essential hypertension [I10]  Unknown    Thyroid disease [E07.9]  Unknown    Constipation [K59.00]  Unknown    Malignant neoplasm of left breast in female, estrogen receptor negative [C50.912, Z17.1]  Not Applicable      Resolved Hospital Problems   No resolved problems to display.        Brief Hospital Course to date:  Brooke Mendez is a 39 y.o. female with history of metastatic breast cancer, thyroid disease, and hypertension presents to the ED for flu-like symptoms and new onset rash. Patient has been treated by Dr. Baker for breast cancer with immunotherapy and chemotherapy.  First dose was 3/13/25, second dose was planned for 3/20/25 but she did not receive it due to onset of chills, night sweats, headache, and malaise two days before that. Went to [facility] ED for evaluation, apparently had concern for UTI so was sent home with prescription for Cefdinir. She was switched to Bactrim  during Oncology office visit on 3/20/25 to cover for UTI as well as skin/soft tissue infection as there was concern for left axillary swelling and tenderness (clip had been placed by radiology there the previous week). Now has new onset rash and fevers up to 102.5 at home. Admitted for further management.    This patient's problems and plans were partially entered by my partner and updated as appropriate by me 03/23/25. Copied text in this note has been reviewed and is accurate as of today's date.      Rash  Fevers  --Rash located on trunk/abdomen, back, arms, legs  --Recent antibiotics - Cefdinir and Bactrim, will hold both for now  --Infectious vs immunotherapy/drug reaction  --WBC normal, ANC normal, procal reassuring, lacate normal  --Blood cultures NGTD  --UA with trace leukocytes, 6-10 WBC - urine culture sent, possibly antibiotic modified given recent Cefdinir and Bactrim  --CXR no acute findings but does have a cough - negative full respiratory PCR panel  --Continue on Cefepime for now  --Oncology feels this is likely related to recent Keytruda - recommends IV Solumedrol, started 60 mg Q12H today  --Scheduled Tylenol to help with fevers; PRN ibuprofen in place as well  --Continue Benadryl PRN     Acute kidney injury  --Cr normal on admission, worsened to 1.58 on 3/23/25  --Has not been eating or drinking well since admission and with continued fevers I suspect she is likely dehydrated  --Start IV fluids today  --Repeat labs in AM     Headaches  --Recent OSH CT head unremarkable  --Consider MRI brain if persists? Defer to Oncology team    Malignant neoplasm of left breast in female, estrogen receptor negative  --On antineoplastic chemotherapy and adjuvant immunotherapy regimen, initiated on 3/13/25, dose held 3/20/25  --Sees Dr. Baker/Oncology as outpatient, consulted here to follow     Essential hypertension  --On Metoprolol at home, resumed 3/23/25 with hold parameters     Thyroid disease  --Recent normal  TSH, free T4 on 3/11/25  --Continue Synthroid     Constipation  --Bowel regimen  --Had BM 3/21/25 after not having one for 4 days    Anxiety   Depression  --Continue Wellbutrin, Lamictal, and Ativan  --Follows with outpatient Psych provider in [place]    Expected Discharge Location and Transportation: home  Expected Discharge   Expected Discharge Date: 3/25/2025; Expected Discharge Time:      VTE Prophylaxis:  Mechanical VTE prophylaxis orders are present.         AM-PAC 6 Clicks Score (PT): 24 (03/22/25 1800)    CODE STATUS:   Code Status and Medical Interventions: CPR (Attempt to Resuscitate); Full Support   Ordered at: 03/21/25 2041     Code Status (Patient has no pulse and is not breathing):    CPR (Attempt to Resuscitate)     Medical Interventions (Patient has pulse or is breathing):    Full Support     Level Of Support Discussed With:    Patient       Anette Phipps MD  03/23/25

## 2025-03-23 NOTE — PLAN OF CARE
Goal Outcome Evaluation:      Pt alert and oriented. RA, sinus tach. VSS. Pt did remain febrile at start of shift. MD notified, PO Advil q6hr PRN ordered. Temp 98.8 at midnight, and pt has remained afebrile since. Mom present at bedside. Bed in lowest position, call light within reach, and no other requests at this time. Care on-going.

## 2025-03-24 LAB
ALBUMIN SERPL-MCNC: 3 G/DL (ref 3.5–5.2)
ALBUMIN/GLOB SERPL: 1 G/DL
ALP SERPL-CCNC: 88 U/L (ref 39–117)
ALT SERPL W P-5'-P-CCNC: 32 U/L (ref 1–33)
ANION GAP SERPL CALCULATED.3IONS-SCNC: 15 MMOL/L (ref 5–15)
AST SERPL-CCNC: 29 U/L (ref 1–32)
BASOPHILS # BLD AUTO: 0.01 10*3/MM3 (ref 0–0.2)
BASOPHILS NFR BLD AUTO: 0.3 % (ref 0–1.5)
BILIRUB SERPL-MCNC: 0.3 MG/DL (ref 0–1.2)
BUN SERPL-MCNC: 21 MG/DL (ref 6–20)
BUN/CREAT SERPL: 23.6 (ref 7–25)
BURR CELLS BLD QL SMEAR: NORMAL
CALCIUM SPEC-SCNC: 8.3 MG/DL (ref 8.6–10.5)
CHLORIDE SERPL-SCNC: 104 MMOL/L (ref 98–107)
CO2 SERPL-SCNC: 16 MMOL/L (ref 22–29)
CREAT SERPL-MCNC: 0.89 MG/DL (ref 0.57–1)
DEPRECATED RDW RBC AUTO: 42.2 FL (ref 37–54)
EGFRCR SERPLBLD CKD-EPI 2021: 84.2 ML/MIN/1.73
EOSINOPHIL # BLD AUTO: 0 10*3/MM3 (ref 0–0.4)
EOSINOPHIL NFR BLD AUTO: 0 % (ref 0.3–6.2)
ERYTHROCYTE [DISTWIDTH] IN BLOOD BY AUTOMATED COUNT: 12.7 % (ref 12.3–15.4)
GLOBULIN UR ELPH-MCNC: 3 GM/DL
GLUCOSE SERPL-MCNC: 199 MG/DL (ref 65–99)
HCT VFR BLD AUTO: 29.7 % (ref 34–46.6)
HGB BLD-MCNC: 10 G/DL (ref 12–15.9)
IMM GRANULOCYTES # BLD AUTO: 0.04 10*3/MM3 (ref 0–0.05)
IMM GRANULOCYTES NFR BLD AUTO: 1.3 % (ref 0–0.5)
LYMPHOCYTES # BLD AUTO: 0.97 10*3/MM3 (ref 0.7–3.1)
LYMPHOCYTES NFR BLD AUTO: 30.4 % (ref 19.6–45.3)
MCH RBC QN AUTO: 30.5 PG (ref 26.6–33)
MCHC RBC AUTO-ENTMCNC: 33.7 G/DL (ref 31.5–35.7)
MCV RBC AUTO: 90.5 FL (ref 79–97)
MONOCYTES # BLD AUTO: 0.08 10*3/MM3 (ref 0.1–0.9)
MONOCYTES NFR BLD AUTO: 2.5 % (ref 5–12)
NEUTROPHILS NFR BLD AUTO: 2.09 10*3/MM3 (ref 1.7–7)
NEUTROPHILS NFR BLD AUTO: 65.5 % (ref 42.7–76)
NRBC BLD AUTO-RTO: 0 /100 WBC (ref 0–0.2)
PLAT MORPH BLD: NORMAL
PLATELET # BLD AUTO: 161 10*3/MM3 (ref 140–450)
PMV BLD AUTO: 10.1 FL (ref 6–12)
POTASSIUM SERPL-SCNC: 4.7 MMOL/L (ref 3.5–5.2)
PROT SERPL-MCNC: 6 G/DL (ref 6–8.5)
QT INTERVAL: 298 MS
QTC INTERVAL: 415 MS
RBC # BLD AUTO: 3.28 10*6/MM3 (ref 3.77–5.28)
SODIUM SERPL-SCNC: 135 MMOL/L (ref 136–145)
WBC MORPH BLD: NORMAL
WBC NRBC COR # BLD AUTO: 3.19 10*3/MM3 (ref 3.4–10.8)

## 2025-03-24 PROCEDURE — 80053 COMPREHEN METABOLIC PANEL: CPT | Performed by: HOSPITALIST

## 2025-03-24 PROCEDURE — G0378 HOSPITAL OBSERVATION PER HR: HCPCS

## 2025-03-24 PROCEDURE — 25010000002 CEFEPIME PER 500 MG

## 2025-03-24 PROCEDURE — 99232 SBSQ HOSP IP/OBS MODERATE 35: CPT | Performed by: NURSE PRACTITIONER

## 2025-03-24 PROCEDURE — 99232 SBSQ HOSP IP/OBS MODERATE 35: CPT | Performed by: HOSPITALIST

## 2025-03-24 PROCEDURE — 63710000001 DIPHENHYDRAMINE PER 50 MG: Performed by: HOSPITALIST

## 2025-03-24 PROCEDURE — 85025 COMPLETE CBC W/AUTO DIFF WBC: CPT | Performed by: HOSPITALIST

## 2025-03-24 PROCEDURE — 25010000002 DIPHENHYDRAMINE PER 50 MG: Performed by: NURSE PRACTITIONER

## 2025-03-24 PROCEDURE — 25010000002 MORPHINE PER 10 MG: Performed by: HOSPITALIST

## 2025-03-24 PROCEDURE — 25010000002 METHYLPREDNISOLONE PER 125 MG: Performed by: HOSPITALIST

## 2025-03-24 PROCEDURE — 85007 BL SMEAR W/DIFF WBC COUNT: CPT | Performed by: HOSPITALIST

## 2025-03-24 PROCEDURE — 25010000002 CEFEPIME PER 500 MG: Performed by: HOSPITALIST

## 2025-03-24 RX ORDER — DIPHENHYDRAMINE HCL 25 MG
25 CAPSULE ORAL EVERY 6 HOURS
Status: DISCONTINUED | OUTPATIENT
Start: 2025-03-24 | End: 2025-03-25

## 2025-03-24 RX ORDER — TETRAHYDROZOLINE HCL 0.05 %
2 DROPS OPHTHALMIC (EYE) 4 TIMES DAILY PRN
Status: DISCONTINUED | OUTPATIENT
Start: 2025-03-24 | End: 2025-03-27 | Stop reason: HOSPADM

## 2025-03-24 RX ORDER — MORPHINE SULFATE 2 MG/ML
2 INJECTION, SOLUTION INTRAMUSCULAR; INTRAVENOUS EVERY 4 HOURS PRN
Status: DISCONTINUED | OUTPATIENT
Start: 2025-03-24 | End: 2025-03-27 | Stop reason: HOSPADM

## 2025-03-24 RX ORDER — OXYCODONE HYDROCHLORIDE 5 MG/1
5 TABLET ORAL EVERY 4 HOURS PRN
Status: DISCONTINUED | OUTPATIENT
Start: 2025-03-24 | End: 2025-03-27 | Stop reason: HOSPADM

## 2025-03-24 RX ORDER — LIDOCAINE 40 MG/G
1 CREAM TOPICAL ONCE AS NEEDED
Status: DISPENSED | OUTPATIENT
Start: 2025-03-24 | End: 2025-03-27

## 2025-03-24 RX ORDER — METHYLPREDNISOLONE SODIUM SUCCINATE 125 MG/2ML
80 INJECTION, POWDER, LYOPHILIZED, FOR SOLUTION INTRAMUSCULAR; INTRAVENOUS EVERY 12 HOURS
Status: DISCONTINUED | OUTPATIENT
Start: 2025-03-24 | End: 2025-03-26

## 2025-03-24 RX ADMIN — DIPHENHYDRAMINE HYDROCHLORIDE AND LIDOCAINE HYDROCHLORIDE AND ALUMINUM HYDROXIDE AND MAGNESIUM HYDRO 5 ML: KIT at 03:11

## 2025-03-24 RX ADMIN — MORPHINE SULFATE 2 MG: 2 INJECTION, SOLUTION INTRAMUSCULAR; INTRAVENOUS at 17:06

## 2025-03-24 RX ADMIN — Medication 10 ML: at 08:50

## 2025-03-24 RX ADMIN — IBUPROFEN 400 MG: 400 TABLET, FILM COATED ORAL at 16:16

## 2025-03-24 RX ADMIN — DIPHENHYDRAMINE HYDROCHLORIDE AND LIDOCAINE HYDROCHLORIDE AND ALUMINUM HYDROXIDE AND MAGNESIUM HYDRO 5 ML: KIT at 14:06

## 2025-03-24 RX ADMIN — CEFEPIME HYDROCHLORIDE 1000 MG: 1 INJECTION, POWDER, FOR SOLUTION INTRAMUSCULAR; INTRAVENOUS at 17:06

## 2025-03-24 RX ADMIN — BUPROPION HYDROCHLORIDE 150 MG: 150 TABLET, FILM COATED, EXTENDED RELEASE ORAL at 08:50

## 2025-03-24 RX ADMIN — LORAZEPAM 0.5 MG: 0.5 TABLET ORAL at 17:49

## 2025-03-24 RX ADMIN — GUAIFENESIN 600 MG: 600 TABLET ORAL at 20:52

## 2025-03-24 RX ADMIN — LAMOTRIGINE 300 MG: 100 TABLET ORAL at 08:50

## 2025-03-24 RX ADMIN — ACETAMINOPHEN 650 MG: 325 TABLET, FILM COATED ORAL at 03:10

## 2025-03-24 RX ADMIN — GABAPENTIN 800 MG: 400 CAPSULE ORAL at 23:06

## 2025-03-24 RX ADMIN — METOPROLOL SUCCINATE 25 MG: 25 TABLET, EXTENDED RELEASE ORAL at 08:49

## 2025-03-24 RX ADMIN — METHYLPREDNISOLONE SODIUM SUCCINATE 80 MG: 125 INJECTION INTRAMUSCULAR; INTRAVENOUS at 20:52

## 2025-03-24 RX ADMIN — ACETAMINOPHEN 650 MG: 325 TABLET, FILM COATED ORAL at 20:51

## 2025-03-24 RX ADMIN — ACETAMINOPHEN 650 MG: 325 TABLET, FILM COATED ORAL at 08:49

## 2025-03-24 RX ADMIN — METHYLPREDNISOLONE SODIUM SUCCINATE 60 MG: 125 INJECTION INTRAMUSCULAR; INTRAVENOUS at 08:48

## 2025-03-24 RX ADMIN — ACETAMINOPHEN 650 MG: 325 TABLET, FILM COATED ORAL at 14:05

## 2025-03-24 RX ADMIN — DIPHENHYDRAMINE HYDROCHLORIDE 25 MG: 25 CAPSULE ORAL at 23:06

## 2025-03-24 RX ADMIN — OXYCODONE 5 MG: 5 TABLET ORAL at 17:49

## 2025-03-24 RX ADMIN — GABAPENTIN 800 MG: 400 CAPSULE ORAL at 14:05

## 2025-03-24 RX ADMIN — DIPHENHYDRAMINE HYDROCHLORIDE 25 MG: 50 INJECTION INTRAMUSCULAR; INTRAVENOUS at 14:55

## 2025-03-24 RX ADMIN — CEFEPIME HYDROCHLORIDE 1000 MG: 1 INJECTION, POWDER, FOR SOLUTION INTRAMUSCULAR; INTRAVENOUS at 08:50

## 2025-03-24 RX ADMIN — ZOLPIDEM TARTRATE 5 MG: 5 TABLET ORAL at 20:51

## 2025-03-24 RX ADMIN — LEVOTHYROXINE SODIUM 25 MCG: 0.03 TABLET ORAL at 05:20

## 2025-03-24 RX ADMIN — GUAIFENESIN 600 MG: 600 TABLET ORAL at 08:50

## 2025-03-24 RX ADMIN — Medication 2 DROP: at 17:07

## 2025-03-24 RX ADMIN — GABAPENTIN 800 MG: 400 CAPSULE ORAL at 05:20

## 2025-03-24 NOTE — PROGRESS NOTES
HEMATOLOGY/ONCOLOGY PROGRESS NOTE    Subjective      CC: Rash    SUBJECTIVE: Feels better today and states that her rash is slightly better.  Fevers improved but taking Tylenol and ibuprofen.  Mouth sore.  Headache resolved.      Past Medical History, Past Surgical History, Social History, Family History have been reviewed and are without significant changes except as mentioned.      Medications:  The current medication list was reviewed in the EMR    ALLERGIES:   Allergies   Allergen Reactions    Erythromycin Other (See Comments)    Pholcodine Other (See Comments)    Penicillins Rash     Childhood      ROS:  Rash, fevers      Objective      Vitals:    03/24/25 0021 03/24/25 0419 03/24/25 0845 03/24/25 1100   BP: 107/65 111/66 116/71 120/79   BP Location: Left arm Left arm Left arm Left arm   Patient Position: Lying Lying Lying Lying   Pulse: 87 82 83 76   Resp: 16 16 16 16   Temp: 98.8 °F (37.1 °C) 97.7 °F (36.5 °C) 97.9 °F (36.6 °C) 98 °F (36.7 °C)   TempSrc: Oral Axillary Oral Oral   SpO2: 90% 94% 95% 97%   Weight:       Height:             ECOG: (2) Ambulatory & Capable of Self Care, Unable to Carry Out Work Activity, Up & About Greater Than 50% of Waking Hours    General: well appearing, in no acute distress  HEENT: Small ulcers inside lower lip, mouth dry  Cardiovascular: regular rate and rhythm, no murmurs  Lungs: clear to auscultation bilaterally  Abdomen: soft, nontender, nondistended.  No palpable organomegaly  Extremities: no lower extremity edema  Skin: Diffuse maculopapular rash on back, chest, abdomen also on arms and legs but not as pronounced as other areas  Neuro: Alert and oriented x 3. Moves all extremities.      RECENT LABS:    Results from last 7 days   Lab Units 03/24/25  0401 03/23/25  0810 03/22/25  0639   WBC 10*3/mm3 3.19* 3.59 4.45   HEMOGLOBIN g/dL 10.0* 11.2* 10.1*   PLATELETS 10*3/mm3 161 168 157     Results from last 7 days   Lab Units 03/24/25  0401 03/23/25  0810 03/22/25  0639    SODIUM mmol/L 135* 131* 133*   POTASSIUM mmol/L 4.7 4.4 4.4   CO2 mmol/L 16.0* 21.0* 19.0*   BUN mg/dL 21* 18 9   CREATININE mg/dL 0.89 1.58* 0.75   GLUCOSE mg/dL 199* 103* 112*     Results from last 7 days   Lab Units 03/24/25  0401 03/21/25  1545 03/20/25  1044   AST (SGOT) U/L 29 33* 31   ALT (SGPT) U/L 32 38* 40*   BILIRUBIN mg/dL 0.3 0.5 0.6   ALK PHOS U/L 88 104 80         MRI Brain With & Without Contrast  Result Date: 3/24/2025  Impression: Unremarkable brain MRI. No acute process. No evidence of metastatic disease. Electronically Signed: Panchito Hampton MD  3/24/2025 8:09 AM EDT  Workstation ID: [id number]    XR Chest 1 View  Result Date: 3/22/2025  Impression: No acute cardiopulmonary findings. Electronically Signed: Leno Salcedo MD  3/22/2025 11:50 AM EDT  Workstation ID: [id number]      Assessment   ASSESSMENT & PLAN:  1.  Left breast cancer, triple negative with multiple lymph nodes positive status post first dose of Keytruda carbo Taxol on 3/13  2.  Rash and fevers    Discussion:  Rash slightly improved per patient report.  Continue Solu-Medrol 60 mg every 12 hours for now.  If continues to improve can transition to oral prednisone.  Suspect rash and fevers related to Keytruda.  Creatinine improved today.  Cultures negative to date.  MRI brain negative.  Continue magic mouthwash for oral mucositis.  Due for treatment 3/27 but will defer to Dr. Baker who will see patient tomorrow.        Elsy Rizzo, APRN    3/24/2025

## 2025-03-24 NOTE — CASE MANAGEMENT/SOCIAL WORK
Discharge Planning Assessment  [facility]     Patient Name: Brooke Mendez  MRN: [id number]  Today's Date: 3/24/2025    Admit Date: 3/21/2025    Plan: discharge plan   Discharge Needs Assessment       Row Name 03/24/25 1603       Living Environment    People in Home child(joe), adult    Name(s) of People in Home Lives with 19 y/o sonWilliams    Current Living Arrangements home    Primary Care Provided by self    Provides Primary Care For no one    Family Caregiver if Needed parent(s)    Family Caregiver Names Christine Winters(mother)    Quality of Family Relationships supportive    Able to Return to Prior Arrangements yes    Living Arrangement Comments I met with pt at bedside with permission to initiate discharge plannng. Pt resides in [facility] with 19y/o son       Transition Planning    Patient/Family Anticipates Transition to home with family    Patient/Family Anticipated Services at Transition     Transportation Anticipated family or friend will provide       Discharge Needs Assessment    Readmission Within the Last 30 Days no previous admission in last 30 days    Equipment Currently Used at Home none    Concerns to be Addressed discharge planning;adjustment to diagnosis/illness    Equipment Needed After Discharge none    Discharge Coordination/Progress Pt confirms that she has [employer] with prescription coverage and uses [facility] in [place]. Pt is independent with ADLs, uses no DME and still works [employer]. Pt is history of mets breast cancer and received immunotherapy and chemotherapy starting 1 1/2 weeks ago. Pt is here with rash. Discharge plan is home and does not anticpate discharge needs at this time. CM will cont to follow                   Discharge Plan       Row Name 03/24/25 1610       Plan    Plan discharge plan    Plan Comments Pt is history of mets breast cancer and received immunotherapy and chemotherapy starting 1 1/2 weeks ago. Pt is here with rash. Discharge plan is home  and does not anticpate discharge needs at this time. CM will cont to follow    Final Discharge Disposition Code 01 - home or self-care                    Expected Discharge Date and Time       Expected Discharge Date Expected Discharge Time    Mar 26, 2025            Demographic Summary       Row Name 03/24/25 1602       General Information    General Information Comments PCP is VICENTE STRONG       Contact Information    Permission Granted to Share Info With     Contact Information Obtained for     Contact Information Comments Christine Winters(mother) [phone]                   Functional Status       Row Name 03/24/25 1603       Functional Status    Functional Status Comments Pt is independent with ADls                   Psychosocial    No documentation.                  Abuse/Neglect    No documentation.                  Legal    No documentation.                  Substance Abuse    No documentation.                  Patient Forms    No documentation.                     Susan Richardson RN

## 2025-03-24 NOTE — PLAN OF CARE
Goal Outcome Evaluation:  Plan of Care Reviewed With: patient, parent        Progress: no change  Outcome Evaluation: Pt a/o, VSS, RA, Sat. 94 %,  SR/S.tach,  c/o pain, provider notified. SEE MAR.  c/o itching with increase in rash surface area, provider notified, meds adjusted per provider order, SEE MAR.  call light within reach, bed in lowest position, family at bedside. pt ambulated in room this shift. No other complaints this shift. continue poc.

## 2025-03-24 NOTE — PROGRESS NOTES
[facility] Medicine Services  PROGRESS NOTE    Patient Name: Brooke Mendez  : 1985  MRN: [id number]    Date of Admission: 3/21/2025  Primary Care Physician: Sheldon Starkey DO    Subjective   Subjective     CC:  F/U fever, rash    HPI:  Seen this morning. Reports rash is slightly better today. Fevers improved. Having trouble eating due to sore mouth.     Objective   Objective     Vital Signs:   Temp:  [97.7 °F (36.5 °C)-98.8 °F (37.1 °C)] 98 °F (36.7 °C)  Heart Rate:  [76-91] 76  Resp:  [16-18] 16  BP: (104-120)/(61-81) 120/79     Physical Exam:  Gen-looks much more comfortable today  HENT-NCAT, mucous membranes moist; mild oral thrush noted and some aphthous ulcers on inside of lower lip  CV-RRR, S1 S2 normal, no m/r/g  Resp-overall CTAB, no wheezes or rales  Abd-soft, NT, ND, +BS  Ext-no edema  Neuro-A&Ox3, no focal deficits  Skin-diffuse maculopapular rash most pronounced over trunk/abdomen region but also on back, arms, legs; improved warmth today and less angry in appearance  Psych-appropriate mood      Results Reviewed:  LAB RESULTS:      Lab 25  0401 25  0810 25  0639 25  1545 25  1044   WBC 3.19* 3.59 4.45 6.45 7.43   HEMOGLOBIN 10.0* 11.2* 10.1* 12.7 11.5*   HEMATOCRIT 29.7* 33.3* 30.3* 38.0 34.2   PLATELETS 161 168 157 182 176   NEUTROS ABS 2.09  --  3.38 5.46 6.25   IMMATURE GRANS (ABS) 0.04  --  0.03 0.05 0.11*   LYMPHS ABS 0.97  --  0.75 0.63* 0.72   MONOS ABS 0.08*  --  0.26 0.27 0.33   EOS ABS 0.00  --  0.01 0.01 0.00   MCV 90.5 90.7 90.2 89.6 90.2   PROCALCITONIN  --   --   --  0.17  --    LACTATE  --   --   --  1.2  --          Lab 25  0401 25  0810 25  0639 25  1545 25  1044   SODIUM 135* 131* 133* 135* 130*   POTASSIUM 4.7 4.4 4.4 4.9 4.5   CHLORIDE 104 98 100 98 96*   CO2 16.0* 21.0* 19.0* 20.0* 19.0*   ANION GAP 15.0 12.0 14.0 17.0* 15.0   BUN 21* 18 9 9 12   CREATININE 0.89 1.58* 0.75 0.84  0.91   EGFR 84.2 42.3* 103.4 90.2 82.0   GLUCOSE 199* 103* 112* 98 120*   CALCIUM 8.3* 8.0* 7.9* 9.0 8.1*   MAGNESIUM  --   --  1.9  --   --    PHOSPHORUS  --   --  2.5  --   --          Lab 03/24/25  0401 03/21/25  1545 03/20/25  1044   TOTAL PROTEIN 6.0 7.9 6.7   ALBUMIN 3.0* 4.0 3.6   GLOBULIN 3.0 3.9 3.1   ALT (SGPT) 32 38* 40*   AST (SGOT) 29 33* 31   BILIRUBIN 0.3 0.5 0.6   ALK PHOS 88 104 80                     Brief Urine Lab Results  (Last result in the past 365 days)        Color   Clarity   Blood   Leuk Est   Nitrite   Protein   CREAT   Urine HCG        03/21/25 1555 Yellow   Clear   Negative   Trace   Negative   30 mg/dL (1+)                   Microbiology Results Abnormal       None            MRI Brain With & Without Contrast  Result Date: 3/24/2025  MRI BRAIN W WO CONTRAST Date of Exam: 3/23/2025 9:21 PM EDT Indication: Fever, breast cancer, headache.  Comparison: None available. Technique:  Routine multiplanar/multisequence sequence images of the brain were obtained before and after the uneventful administration of 15 cc Multihance. Findings: There is no diffusion restriction to suggest acute infarct. There is no evidence of acute or chronic intracranial hemorrhage. No mass effect or midline shift. No abnormal extra-axial collections. The basal ganglia, brainstem and cerebellum appear within normal limits. Midline structures are intact. No significant cortical abnormality is identified. Calvarial and superficial soft tissue signal is within normal limits. Orbits appear unremarkable. The paranasal sinuses and the mastoid air cells appear well aerated. There is no abnormal intracranial enhancement. There is normal enhancement within the intracranial vasculature including the dural venous sinuses.     Impression: Impression: Unremarkable brain MRI. No acute process. No evidence of metastatic disease. Electronically Signed: Panchito Hampton MD  3/24/2025 8:09 AM EDT  Workstation ID: [id number]      Results  for orders placed during the hospital encounter of 03/11/25    Adult Transthoracic Echo Complete W/ Cont if Necessary Per Protocol    Interpretation Summary    Left ventricular systolic function is normal. Calculated left ventricular EF = 59.6% Left ventricular ejection fraction appears to be 56 - 60%.    Left atrial volume is mildly increased.    Estimated right ventricular systolic pressure from tricuspid regurgitation is normal (<35 mmHg). Calculated right ventricular systolic pressure from tricuspid regurgitation is 21 mmHg.    Normal global longitudinal LV strain (GLS) = -21.2%    There is no previous study available for comparison.      Current medications:  Scheduled Meds:acetaminophen, 650 mg, Rectal, Once  acetaminophen, 650 mg, Oral, Q6H  buPROPion XL, 150 mg, Oral, Daily  cefepime, 1,000 mg, Intravenous, Q8H  First Mouthwash (Magic Mouthwash), 5 mL, Swish & Spit, Q6H  gabapentin, 800 mg, Oral, Q8H  guaiFENesin, 600 mg, Oral, Q12H  lamoTRIgine, 300 mg, Oral, Daily  levothyroxine, 25 mcg, Oral, Daily  methylPREDNISolone sodium succinate, 60 mg, Intravenous, Q12H  metoprolol succinate XL, 25 mg, Oral, Daily  sodium chloride, 10 mL, Intravenous, Q12H      Continuous Infusions:     PRN Meds:.  acetaminophen    aspirin-acetaminophen-caffeine    senna-docusate sodium **AND** polyethylene glycol **AND** bisacodyl **AND** bisacodyl    diphenhydrAMINE    diphenhydrAMINE-zinc acetate    ibuprofen    lidocaine    LORazepam    nitroglycerin    ondansetron    phenol    sodium chloride    sodium chloride    sodium chloride    zolpidem    Assessment & Plan   Assessment & Plan     Active Hospital Problems    Diagnosis  POA    **Rash [R21]  Yes    Acute kidney injury [N17.9]  No    Fever [R50.9]  Yes    On antineoplastic chemotherapy and adjuvant immunotherapy regimen [Z79.899]  Not Applicable    Essential hypertension [I10]  Unknown    Thyroid disease [E07.9]  Unknown    Constipation [K59.00]  Unknown    Malignant  neoplasm of left breast in female, estrogen receptor negative [C50.912, Z17.1]  Not Applicable      Resolved Hospital Problems   No resolved problems to display.        Brief Hospital Course to date:  Brooke Mendez is a 39 y.o. female with history of metastatic breast cancer, thyroid disease, and hypertension presents to the ED for flu-like symptoms and new onset rash. Patient has been treated by Dr. Baker for breast cancer with immunotherapy and chemotherapy.  First dose was 3/13/25, second dose was planned for 3/20/25 but she did not receive it due to onset of chills, night sweats, headache, and malaise two days before that. Went to [facility] ED for evaluation, apparently had concern for UTI so was sent home with prescription for Cefdinir. She was switched to Bactrim during Oncology office visit on 3/20/25 to cover for UTI as well as skin/soft tissue infection as there was concern for left axillary swelling and tenderness (clip had been placed by radiology there the previous week). Now has new onset rash and fevers up to 102.5 at home. Admitted for further management.    This patient's problems and plans were partially entered by my partner and updated as appropriate by me 03/24/25. Copied text in this note has been reviewed and is accurate as of today's date.      Rash  Fevers  --Rash located on trunk/abdomen, back, arms, legs  --Recent antibiotics - Cefdinir and Bactrim, will hold both for now  --Infectious vs immunotherapy/drug reaction  --WBC normal, ANC normal, procal reassuring, lacate normal  --Blood cultures NGTD  --UA with trace leukocytes, 6-10 WBC - urine culture negative, possibly antibiotic modified given recent Cefdinir and Bactrim  --CXR no acute findings but does have a cough - negative full respiratory PCR panel  --Continue on Cefepime for now but suspect we can stop soon as appears more immunotherapy/drug reaction at this point  --Oncology feels this is likely related to recent Keytruda  - recommends IV Solumedrol, started 60 mg Q12H on 3/23/25  --Scheduled Tylenol to help with fevers; PRN ibuprofen in place as well  --Continue Benadryl PRN     Acute kidney injury, resolved  --Cr normal on admission, worsened to 1.58 on 3/23/25  --Has not been eating or drinking well since admission and with continued fevers I suspect she is likely dehydrated  --Improved s/p IV fluids  --Monitor closely, avoid nephrotoxic meds    Oral thrush/mucositis  --Magic mouthwash     Headaches  --Recent OSH CT head unremarkable  --Negative MRI brain 3/24/25    Malignant neoplasm of left breast in female, estrogen receptor negative  --On antineoplastic chemotherapy and adjuvant immunotherapy regimen, initiated on 3/13/25, dose held 3/20/25  --Sees Dr. Baker/Oncology as outpatient, consulted here to follow     Essential hypertension  --On Metoprolol at home, resumed 3/23/25 with hold parameters     Thyroid disease  --Recent normal TSH, free T4 on 3/11/25  --Continue Synthroid     Constipation  --Bowel regimen  --Had BM 3/21/25 after not having one for 4 days    Anxiety   Depression  --Continue Wellbutrin, Lamictal, and Ativan  --Follows with outpatient Psych provider in [place]    Expected Discharge Location and Transportation: home  Expected Discharge   Expected Discharge Date: 3/26/2025; Expected Discharge Time:      VTE Prophylaxis:  Mechanical VTE prophylaxis orders are present.         AM-PAC 6 Clicks Score (PT): 24 (03/24/25 0820)    CODE STATUS:   Code Status and Medical Interventions: CPR (Attempt to Resuscitate); Full Support   Ordered at: 03/21/25 2041     Code Status (Patient has no pulse and is not breathing):    CPR (Attempt to Resuscitate)     Medical Interventions (Patient has pulse or is breathing):    Full Support     Level Of Support Discussed With:    Patient       Anette Phipps MD  03/24/25

## 2025-03-25 LAB
ANION GAP SERPL CALCULATED.3IONS-SCNC: 13 MMOL/L (ref 5–15)
BASOPHILS # BLD AUTO: 0.01 10*3/MM3 (ref 0–0.2)
BASOPHILS NFR BLD AUTO: 0.1 % (ref 0–1.5)
BUN SERPL-MCNC: 18 MG/DL (ref 6–20)
BUN/CREAT SERPL: 26.9 (ref 7–25)
CALCIUM SPEC-SCNC: 8.6 MG/DL (ref 8.6–10.5)
CHLORIDE SERPL-SCNC: 103 MMOL/L (ref 98–107)
CO2 SERPL-SCNC: 17 MMOL/L (ref 22–29)
CREAT SERPL-MCNC: 0.67 MG/DL (ref 0.57–1)
DEPRECATED RDW RBC AUTO: 42 FL (ref 37–54)
EGFRCR SERPLBLD CKD-EPI 2021: 113.5 ML/MIN/1.73
EOSINOPHIL # BLD AUTO: 0 10*3/MM3 (ref 0–0.4)
EOSINOPHIL NFR BLD AUTO: 0 % (ref 0.3–6.2)
ERYTHROCYTE [DISTWIDTH] IN BLOOD BY AUTOMATED COUNT: 12.9 % (ref 12.3–15.4)
GLUCOSE SERPL-MCNC: 150 MG/DL (ref 65–99)
HCT VFR BLD AUTO: 28.9 % (ref 34–46.6)
HGB BLD-MCNC: 9.5 G/DL (ref 12–15.9)
IMM GRANULOCYTES # BLD AUTO: 0.07 10*3/MM3 (ref 0–0.05)
IMM GRANULOCYTES NFR BLD AUTO: 0.9 % (ref 0–0.5)
LYMPHOCYTES # BLD AUTO: 1.13 10*3/MM3 (ref 0.7–3.1)
LYMPHOCYTES NFR BLD AUTO: 14.7 % (ref 19.6–45.3)
MCH RBC QN AUTO: 29.4 PG (ref 26.6–33)
MCHC RBC AUTO-ENTMCNC: 32.9 G/DL (ref 31.5–35.7)
MCV RBC AUTO: 89.5 FL (ref 79–97)
MONOCYTES # BLD AUTO: 0.41 10*3/MM3 (ref 0.1–0.9)
MONOCYTES NFR BLD AUTO: 5.3 % (ref 5–12)
NEUTROPHILS NFR BLD AUTO: 6.06 10*3/MM3 (ref 1.7–7)
NEUTROPHILS NFR BLD AUTO: 79 % (ref 42.7–76)
NRBC BLD AUTO-RTO: 0 /100 WBC (ref 0–0.2)
PLATELET # BLD AUTO: 233 10*3/MM3 (ref 140–450)
PMV BLD AUTO: 9.9 FL (ref 6–12)
POTASSIUM SERPL-SCNC: 5 MMOL/L (ref 3.5–5.2)
RBC # BLD AUTO: 3.23 10*6/MM3 (ref 3.77–5.28)
SODIUM SERPL-SCNC: 133 MMOL/L (ref 136–145)
WBC NRBC COR # BLD AUTO: 7.68 10*3/MM3 (ref 3.4–10.8)

## 2025-03-25 PROCEDURE — 80048 BASIC METABOLIC PNL TOTAL CA: CPT | Performed by: HOSPITALIST

## 2025-03-25 PROCEDURE — 99232 SBSQ HOSP IP/OBS MODERATE 35: CPT | Performed by: INTERNAL MEDICINE

## 2025-03-25 PROCEDURE — 99232 SBSQ HOSP IP/OBS MODERATE 35: CPT | Performed by: HOSPITALIST

## 2025-03-25 PROCEDURE — 85025 COMPLETE CBC W/AUTO DIFF WBC: CPT | Performed by: HOSPITALIST

## 2025-03-25 PROCEDURE — 25010000002 CEFEPIME PER 500 MG: Performed by: HOSPITALIST

## 2025-03-25 PROCEDURE — 25010000002 ONDANSETRON PER 1 MG: Performed by: NURSE PRACTITIONER

## 2025-03-25 PROCEDURE — 25010000002 METHYLPREDNISOLONE PER 125 MG: Performed by: HOSPITALIST

## 2025-03-25 PROCEDURE — 63710000001 DIPHENHYDRAMINE PER 50 MG: Performed by: HOSPITALIST

## 2025-03-25 PROCEDURE — 25010000002 MORPHINE PER 10 MG: Performed by: HOSPITALIST

## 2025-03-25 RX ORDER — BISACODYL 10 MG
10 SUPPOSITORY, RECTAL RECTAL DAILY PRN
Status: DISCONTINUED | OUTPATIENT
Start: 2025-03-25 | End: 2025-03-27 | Stop reason: HOSPADM

## 2025-03-25 RX ORDER — DIPHENHYDRAMINE HCL 25 MG
25 CAPSULE ORAL EVERY 6 HOURS PRN
Status: DISCONTINUED | OUTPATIENT
Start: 2025-03-25 | End: 2025-03-27 | Stop reason: HOSPADM

## 2025-03-25 RX ORDER — AMOXICILLIN 250 MG
2 CAPSULE ORAL 2 TIMES DAILY
Status: DISCONTINUED | OUTPATIENT
Start: 2025-03-25 | End: 2025-03-27 | Stop reason: HOSPADM

## 2025-03-25 RX ORDER — POLYETHYLENE GLYCOL 3350 17 G/17G
17 POWDER, FOR SOLUTION ORAL DAILY PRN
Status: DISCONTINUED | OUTPATIENT
Start: 2025-03-25 | End: 2025-03-27 | Stop reason: HOSPADM

## 2025-03-25 RX ORDER — BISACODYL 5 MG/1
5 TABLET, DELAYED RELEASE ORAL DAILY PRN
Status: DISCONTINUED | OUTPATIENT
Start: 2025-03-25 | End: 2025-03-27 | Stop reason: HOSPADM

## 2025-03-25 RX ORDER — ACETAMINOPHEN 325 MG/1
650 TABLET ORAL EVERY 6 HOURS PRN
Status: DISCONTINUED | OUTPATIENT
Start: 2025-03-25 | End: 2025-03-27 | Stop reason: HOSPADM

## 2025-03-25 RX ADMIN — Medication 10 ML: at 20:29

## 2025-03-25 RX ADMIN — LORAZEPAM 0.5 MG: 0.5 TABLET ORAL at 21:48

## 2025-03-25 RX ADMIN — ACETAMINOPHEN 650 MG: 325 TABLET, FILM COATED ORAL at 15:11

## 2025-03-25 RX ADMIN — DIPHENHYDRAMINE HYDROCHLORIDE AND LIDOCAINE HYDROCHLORIDE AND ALUMINUM HYDROXIDE AND MAGNESIUM HYDRO 5 ML: KIT at 09:00

## 2025-03-25 RX ADMIN — ONDANSETRON 4 MG: 2 INJECTION INTRAMUSCULAR; INTRAVENOUS at 23:09

## 2025-03-25 RX ADMIN — GABAPENTIN 800 MG: 400 CAPSULE ORAL at 20:23

## 2025-03-25 RX ADMIN — GABAPENTIN 800 MG: 400 CAPSULE ORAL at 05:50

## 2025-03-25 RX ADMIN — METHYLPREDNISOLONE SODIUM SUCCINATE 80 MG: 125 INJECTION INTRAMUSCULAR; INTRAVENOUS at 08:58

## 2025-03-25 RX ADMIN — ACETAMINOPHEN 650 MG: 325 TABLET, FILM COATED ORAL at 04:48

## 2025-03-25 RX ADMIN — CEFEPIME HYDROCHLORIDE 1000 MG: 1 INJECTION, POWDER, FOR SOLUTION INTRAMUSCULAR; INTRAVENOUS at 08:59

## 2025-03-25 RX ADMIN — DOCUSATE SODIUM 50MG AND SENNOSIDES 8.6MG 2 TABLET: 8.6; 5 TABLET, FILM COATED ORAL at 20:23

## 2025-03-25 RX ADMIN — ACETAMINOPHEN 650 MG: 325 TABLET, FILM COATED ORAL at 08:58

## 2025-03-25 RX ADMIN — OXYCODONE 5 MG: 5 TABLET ORAL at 08:58

## 2025-03-25 RX ADMIN — LEVOTHYROXINE SODIUM 25 MCG: 0.03 TABLET ORAL at 05:50

## 2025-03-25 RX ADMIN — GUAIFENESIN 600 MG: 600 TABLET ORAL at 20:23

## 2025-03-25 RX ADMIN — DIPHENHYDRAMINE HYDROCHLORIDE 25 MG: 25 CAPSULE ORAL at 17:59

## 2025-03-25 RX ADMIN — DIPHENHYDRAMINE HYDROCHLORIDE AND LIDOCAINE HYDROCHLORIDE AND ALUMINUM HYDROXIDE AND MAGNESIUM HYDRO 5 ML: KIT at 15:12

## 2025-03-25 RX ADMIN — GABAPENTIN 800 MG: 400 CAPSULE ORAL at 15:11

## 2025-03-25 RX ADMIN — LORAZEPAM 0.5 MG: 0.5 TABLET ORAL at 05:57

## 2025-03-25 RX ADMIN — LAMOTRIGINE 300 MG: 100 TABLET ORAL at 08:58

## 2025-03-25 RX ADMIN — Medication 10 ML: at 09:59

## 2025-03-25 RX ADMIN — CEFEPIME HYDROCHLORIDE 1000 MG: 1 INJECTION, POWDER, FOR SOLUTION INTRAMUSCULAR; INTRAVENOUS at 00:23

## 2025-03-25 RX ADMIN — METHYLPREDNISOLONE SODIUM SUCCINATE 80 MG: 125 INJECTION INTRAMUSCULAR; INTRAVENOUS at 20:23

## 2025-03-25 RX ADMIN — DIPHENHYDRAMINE HYDROCHLORIDE 25 MG: 25 CAPSULE ORAL at 04:48

## 2025-03-25 RX ADMIN — METOPROLOL SUCCINATE 25 MG: 25 TABLET, EXTENDED RELEASE ORAL at 08:58

## 2025-03-25 RX ADMIN — IBUPROFEN 400 MG: 400 TABLET, FILM COATED ORAL at 05:57

## 2025-03-25 RX ADMIN — BUPROPION HYDROCHLORIDE 150 MG: 150 TABLET, FILM COATED, EXTENDED RELEASE ORAL at 08:58

## 2025-03-25 RX ADMIN — CEFEPIME HYDROCHLORIDE 1000 MG: 1 INJECTION, POWDER, FOR SOLUTION INTRAMUSCULAR; INTRAVENOUS at 17:59

## 2025-03-25 RX ADMIN — DIPHENHYDRAMINE HYDROCHLORIDE 25 MG: 25 CAPSULE ORAL at 12:21

## 2025-03-25 RX ADMIN — DIPHENHYDRAMINE HYDROCHLORIDE AND LIDOCAINE HYDROCHLORIDE AND ALUMINUM HYDROXIDE AND MAGNESIUM HYDRO 5 ML: KIT at 20:25

## 2025-03-25 RX ADMIN — MORPHINE SULFATE 2 MG: 2 INJECTION, SOLUTION INTRAMUSCULAR; INTRAVENOUS at 09:59

## 2025-03-25 RX ADMIN — ZOLPIDEM TARTRATE 5 MG: 5 TABLET ORAL at 21:48

## 2025-03-25 RX ADMIN — OXYCODONE 5 MG: 5 TABLET ORAL at 16:07

## 2025-03-25 RX ADMIN — GUAIFENESIN 600 MG: 600 TABLET ORAL at 08:58

## 2025-03-25 RX ADMIN — Medication 10 ML: at 09:00

## 2025-03-25 NOTE — PROGRESS NOTES
"  HEMATOLOGY/ONCOLOGY PROGRESS NOTE    S: Rash continues to worsen on her face and neck per pt, but per hospitalist truncal rash is better today.   Her mother notes that every time she gets scheduled Benadryl flushing and erythema worsen on her neck and face. No oral lesions.  Dry cough.  No fever, but has been on scheduled Tylenol.    Medications:  The current medication list was reviewed in the EMR    ALLERGIES:    Allergies   Allergen Reactions    Erythromycin Other (See Comments)    Pholcodine Other (See Comments)    Penicillins Rash     Childhood          Physical Exam    VITAL SIGNS:  /81 (BP Location: Left arm, Patient Position: Lying)   Pulse 82   Temp 98.2 °F (36.8 °C) (Oral)   Resp 18   Ht 175.3 cm (69\")   Wt 87.1 kg (192 lb)   LMP 03/02/2025 Comment: denies preg 3/11/2025  SpO2 92%   BMI 28.35 kg/m²   Temp:  [97.8 °F (36.6 °C)-98.2 °F (36.8 °C)] 98.2 °F (36.8 °C)      Performance Status:                Physical Exam    General: uncomfortable, no resp distress  HEENT: sclerae anicteric, no mucositis  Lungs: clear to auscultation bilaterally  Abdomen: soft, nontender, nondistended.  Extremities: no lower extremity edema  Skin: diffuse macular rash centered on trunk, scattered on legs. Flushed neck, receded during our conversation        RECENT LABS:    Lab Results   Component Value Date    HGB 9.5 (L) 03/25/2025    HCT 28.9 (L) 03/25/2025    MCV 89.5 03/25/2025     03/25/2025    WBC 7.68 03/25/2025    NEUTROABS 6.06 03/25/2025    LYMPHSABS 1.13 03/25/2025    MONOSABS 0.41 03/25/2025    EOSABS 0.00 03/25/2025    BASOSABS 0.01 03/25/2025       Lab Results   Component Value Date    GLUCOSE 150 (H) 03/25/2025    BUN 18 03/25/2025    CREATININE 0.67 03/25/2025     (L) 03/25/2025    K 5.0 03/25/2025     03/25/2025    CO2 17.0 (L) 03/25/2025    CALCIUM 8.6 03/25/2025    PROTEINTOT 6.0 03/24/2025    ALBUMIN 3.0 (L) 03/24/2025    BILITOT 0.3 03/24/2025    ALKPHOS 88 03/24/2025    AST " 29 03/24/2025    ALT 32 03/24/2025         Assessment/Plan  Triple negative breast cancer  S/p recent chemo immunotherapy intiation. Hold treatment pending resolution. Likely will discontinue further immunotherapy    Fever, rash  -Most likely immunotherapy related. Responding to IV steroids. Recommend steroid 1 mg/kg pred equivalent with slow taper. When able, wean benadryl and tylenol to as needed. Would like to ensure she remains fever free off tylenol prior to d/c  -Covering with cefepime per hospitalist team. Previously on bactrim    Headaches  -MRI brain negative  -Consider LP if persist    Discussed with attending hospitalist    Fanny Baker MD  [facility] Hematology and Oncology    3/25/2025

## 2025-03-25 NOTE — PROGRESS NOTES
[facility] Medicine Services  PROGRESS NOTE    Patient Name: Brooke Mendez  : 1985  MRN: [id number]    Date of Admission: 3/21/2025  Primary Care Physician: Sheldon Starkey,     Subjective   Subjective     CC:  F/U fever, rash    HPI:  Seen this morning. Still with headache. Her left eye is irritated, started yesterday. She thinks the eye worsens as well as the rash every time she gets the Benadryl.     Objective   Objective     Vital Signs:   Temp:  [97.8 °F (36.6 °C)-98.2 °F (36.8 °C)] 98 °F (36.7 °C)  Heart Rate:  [80-96] 80  Resp:  [18] 18  BP: (121-132)/(79-84) 132/82     Physical Exam:  Gen-looks much more comfortable today  HENT-NCAT, mucous membranes moist; mild oral thrush noted and some aphthous ulcers on inside of lower lip, left eye with slightly inflamed conjunctiva   CV-RRR, S1 S2 normal, no m/r/g  Resp-CTAB, no wheezes or rales  Abd-soft, NT, ND, +BS  Ext-no edema  Neuro-A&Ox3, no focal deficits  Skin-diffuse maculopapular rash most pronounced over trunk/abdomen region but also on back, arms, legs; appears much improved to me, certainly not as warm to touch and appears to be starting to fade at least on her stomach/trunk region  Psych-appropriate mood      Results Reviewed:  LAB RESULTS:      Lab 25  0526 25  0401 25  0810 25  0639 25  1545 25  1044   WBC 7.68 3.19* 3.59 4.45 6.45 7.43   HEMOGLOBIN 9.5* 10.0* 11.2* 10.1* 12.7 11.5*   HEMATOCRIT 28.9* 29.7* 33.3* 30.3* 38.0 34.2   PLATELETS 233 161 168 157 182 176   NEUTROS ABS 6.06 2.09  --  3.38 5.46 6.25   IMMATURE GRANS (ABS) 0.07* 0.04  --  0.03 0.05 0.11*   LYMPHS ABS 1.13 0.97  --  0.75 0.63* 0.72   MONOS ABS 0.41 0.08*  --  0.26 0.27 0.33   EOS ABS 0.00 0.00  --  0.01 0.01 0.00   MCV 89.5 90.5 90.7 90.2 89.6 90.2   PROCALCITONIN  --   --   --   --  0.17  --    LACTATE  --   --   --   --  1.2  --          Lab 25  0526 25  0401 25  0810  03/22/25  0639 03/21/25  1545   SODIUM 133* 135* 131* 133* 135*   POTASSIUM 5.0 4.7 4.4 4.4 4.9   CHLORIDE 103 104 98 100 98   CO2 17.0* 16.0* 21.0* 19.0* 20.0*   ANION GAP 13.0 15.0 12.0 14.0 17.0*   BUN 18 21* 18 9 9   CREATININE 0.67 0.89 1.58* 0.75 0.84   EGFR 113.5 84.2 42.3* 103.4 90.2   GLUCOSE 150* 199* 103* 112* 98   CALCIUM 8.6 8.3* 8.0* 7.9* 9.0   MAGNESIUM  --   --   --  1.9  --    PHOSPHORUS  --   --   --  2.5  --          Lab 03/24/25  0401 03/21/25  1545 03/20/25  1044   TOTAL PROTEIN 6.0 7.9 6.7   ALBUMIN 3.0* 4.0 3.6   GLOBULIN 3.0 3.9 3.1   ALT (SGPT) 32 38* 40*   AST (SGOT) 29 33* 31   BILIRUBIN 0.3 0.5 0.6   ALK PHOS 88 104 80                     Brief Urine Lab Results  (Last result in the past 365 days)        Color   Clarity   Blood   Leuk Est   Nitrite   Protein   CREAT   Urine HCG        03/21/25 1555 Yellow   Clear   Negative   Trace   Negative   30 mg/dL (1+)                   Microbiology Results Abnormal       None            MRI Brain With & Without Contrast  Result Date: 3/24/2025  MRI BRAIN W WO CONTRAST Date of Exam: 3/23/2025 9:21 PM EDT Indication: Fever, breast cancer, headache.  Comparison: None available. Technique:  Routine multiplanar/multisequence sequence images of the brain were obtained before and after the uneventful administration of 15 cc Multihance. Findings: There is no diffusion restriction to suggest acute infarct. There is no evidence of acute or chronic intracranial hemorrhage. No mass effect or midline shift. No abnormal extra-axial collections. The basal ganglia, brainstem and cerebellum appear within normal limits. Midline structures are intact. No significant cortical abnormality is identified. Calvarial and superficial soft tissue signal is within normal limits. Orbits appear unremarkable. The paranasal sinuses and the mastoid air cells appear well aerated. There is no abnormal intracranial enhancement. There is normal enhancement within the intracranial  vasculature including the dural venous sinuses.     Impression: Impression: Unremarkable brain MRI. No acute process. No evidence of metastatic disease. Electronically Signed: Panchito Hampton MD  3/24/2025 8:09 AM EDT  Workstation ID: [id number]      Results for orders placed during the hospital encounter of 03/11/25    Adult Transthoracic Echo Complete W/ Cont if Necessary Per Protocol    Interpretation Summary    Left ventricular systolic function is normal. Calculated left ventricular EF = 59.6% Left ventricular ejection fraction appears to be 56 - 60%.    Left atrial volume is mildly increased.    Estimated right ventricular systolic pressure from tricuspid regurgitation is normal (<35 mmHg). Calculated right ventricular systolic pressure from tricuspid regurgitation is 21 mmHg.    Normal global longitudinal LV strain (GLS) = -21.2%    There is no previous study available for comparison.      Current medications:  Scheduled Meds:acetaminophen, 650 mg, Rectal, Once  acetaminophen, 650 mg, Oral, Q6H  buPROPion XL, 150 mg, Oral, Daily  cefepime, 1,000 mg, Intravenous, Q8H  diphenhydrAMINE, 25 mg, Oral, Q6H  First Mouthwash (Magic Mouthwash), 5 mL, Swish & Spit, Q6H  gabapentin, 800 mg, Oral, Q8H  guaiFENesin, 600 mg, Oral, Q12H  lamoTRIgine, 300 mg, Oral, Daily  levothyroxine, 25 mcg, Oral, Daily  methylPREDNISolone sodium succinate, 80 mg, Intravenous, Q12H  metoprolol succinate XL, 25 mg, Oral, Daily  senna-docusate sodium, 2 tablet, Oral, BID  sodium chloride, 10 mL, Intravenous, Q12H      Continuous Infusions:     PRN Meds:.  acetaminophen    aspirin-acetaminophen-caffeine    senna-docusate sodium **AND** polyethylene glycol **AND** bisacodyl **AND** bisacodyl    diphenhydrAMINE-zinc acetate    ibuprofen    lidocaine    LORazepam    Morphine    nitroglycerin    ondansetron    oxyCODONE    phenol    sodium chloride    sodium chloride    sodium chloride    tetrahydrozoline    zolpidem    Assessment & Plan    Assessment & Plan     Active Hospital Problems    Diagnosis  POA    **Rash [R21]  Yes    Acute kidney injury [N17.9]  No    Fever [R50.9]  Yes    On antineoplastic chemotherapy and adjuvant immunotherapy regimen [Z79.899]  Not Applicable    Essential hypertension [I10]  Unknown    Thyroid disease [E07.9]  Unknown    Constipation [K59.00]  Unknown    Malignant neoplasm of left breast in female, estrogen receptor negative [C50.912, Z17.1]  Not Applicable      Resolved Hospital Problems   No resolved problems to display.        Brief Hospital Course to date:  Brooke Mendez is a 39 y.o. female with history of metastatic breast cancer, thyroid disease, and hypertension presents to the ED for flu-like symptoms and new onset rash. Patient has been treated by Dr. Baker for breast cancer with immunotherapy and chemotherapy.  First dose was 3/13/25, second dose was planned for 3/20/25 but she did not receive it due to onset of chills, night sweats, headache, and malaise two days before that. Went to [facility] ED for evaluation, apparently had concern for UTI so was sent home with prescription for Cefdinir. She was switched to Bactrim during Oncology office visit on 3/20/25 to cover for UTI as well as skin/soft tissue infection as there was concern for left axillary swelling and tenderness (clip had been placed by radiology there the previous week). Now has new onset rash and fevers up to 102.5 at home. Admitted for further management.    This patient's problems and plans were partially entered by my partner and updated as appropriate by me 03/25/25. Copied text in this note has been reviewed and is accurate as of today's date.      Rash  Fevers  --Rash located on trunk/abdomen, back, arms, legs  --Recent antibiotics - Cefdinir and Bactrim, will hold both for now  --Infectious vs immunotherapy/drug reaction  --WBC normal, ANC normal, procal reassuring, lacate normal  --Blood cultures NGTD  --UA with trace  leukocytes, 6-10 WBC - urine culture negative, possibly antibiotic modified given recent Cefdinir and Bactrim  --CXR no acute findings but does have a cough - negative full respiratory PCR panel  --Continue on Cefepime for now but suspect we can stop soon as appears more immunotherapy/drug reaction at this point  --Oncology feels this is likely related to recent Keytruda - recommends IV Solumedrol, started 3/23/25 and increased to 80 mg Q12H on 3/24/25  --Scheduled Tylenol to help with fevers; PRN ibuprofen in place as well  --Scheduled Benadryl 3/24/25  --ADDENDUM 19:20 EDT Discussed with Dr. Baker, will stop scheduled Tylenol and Benadryl and monitor to make sure we are not masking anything - if she does not improve with steroids, may need to consider further workup with LP given her ongoing headaches, with possible Infectious Disease consultation  --Clinically appears to be improving slowly    Acute kidney injury, resolved  --Cr normal on admission, worsened to 1.58 on 3/23/25  --Has not been eating or drinking well since admission and with high fevers suspect she was likely dehydrated  --Improved s/p IV fluids  --Monitor closely, avoid nephrotoxic meds    Oral thrush/mucositis  --Magic mouthwash     Headaches  --Recent OSH CT head unremarkable  --Negative MRI brain 3/24/25  --Supportive care, pain meds as needed    Malignant neoplasm of left breast in female, estrogen receptor negative  --On antineoplastic chemotherapy and adjuvant immunotherapy regimen, initiated on 3/13/25, dose held 3/20/25  --Sees Dr. Baker/Oncology as outpatient, consulted here to follow     Essential hypertension  --On Metoprolol at home, resumed 3/23/25 with hold parameters     Thyroid disease  --Recent normal TSH, free T4 on 3/11/25  --Continue Synthroid     Constipation  --Had BM 3/21/25 after not having one for 4 days  --Not receiving PRN bowel meds, will schedule bowel regimen     Anxiety   Depression  --Continue Wellbutrin,  Lamictal, and Ativan  --Follows with outpatient Psych provider in [place]    Expected Discharge Location and Transportation: home  Expected Discharge   Expected Discharge Date: 3/28/2025; Expected Discharge Time:      VTE Prophylaxis:  Mechanical VTE prophylaxis orders are present.         AM-PAC 6 Clicks Score (PT): 24 (03/25/25 0830)    CODE STATUS:   Code Status and Medical Interventions: CPR (Attempt to Resuscitate); Full Support   Ordered at: 03/21/25 2041     Code Status (Patient has no pulse and is not breathing):    CPR (Attempt to Resuscitate)     Medical Interventions (Patient has pulse or is breathing):    Full Support     Level Of Support Discussed With:    Patient       Anette Phipps MD  03/25/25

## 2025-03-26 LAB
ANION GAP SERPL CALCULATED.3IONS-SCNC: 15 MMOL/L (ref 5–15)
BACTERIA SPEC AEROBE CULT: NORMAL
BACTERIA SPEC AEROBE CULT: NORMAL
BUN SERPL-MCNC: 18 MG/DL (ref 6–20)
BUN/CREAT SERPL: 56.3 (ref 7–25)
CALCIUM SPEC-SCNC: 8.7 MG/DL (ref 8.6–10.5)
CHLORIDE SERPL-SCNC: 101 MMOL/L (ref 98–107)
CO2 SERPL-SCNC: 18 MMOL/L (ref 22–29)
CREAT SERPL-MCNC: 0.32 MG/DL (ref 0.57–1)
DEPRECATED RDW RBC AUTO: 42.9 FL (ref 37–54)
EGFRCR SERPLBLD CKD-EPI 2021: 135.6 ML/MIN/1.73
ERYTHROCYTE [DISTWIDTH] IN BLOOD BY AUTOMATED COUNT: 13 % (ref 12.3–15.4)
GLUCOSE SERPL-MCNC: 117 MG/DL (ref 65–99)
HCT VFR BLD AUTO: 29.2 % (ref 34–46.6)
HGB BLD-MCNC: 9.6 G/DL (ref 12–15.9)
MCH RBC QN AUTO: 29.5 PG (ref 26.6–33)
MCHC RBC AUTO-ENTMCNC: 32.9 G/DL (ref 31.5–35.7)
MCV RBC AUTO: 89.8 FL (ref 79–97)
PLATELET # BLD AUTO: 273 10*3/MM3 (ref 140–450)
PMV BLD AUTO: 9.5 FL (ref 6–12)
POTASSIUM SERPL-SCNC: 5.2 MMOL/L (ref 3.5–5.2)
RBC # BLD AUTO: 3.25 10*6/MM3 (ref 3.77–5.28)
SODIUM SERPL-SCNC: 134 MMOL/L (ref 136–145)
WBC NRBC COR # BLD AUTO: 7.09 10*3/MM3 (ref 3.4–10.8)

## 2025-03-26 PROCEDURE — 85027 COMPLETE CBC AUTOMATED: CPT | Performed by: HOSPITALIST

## 2025-03-26 PROCEDURE — 25010000002 METHYLPREDNISOLONE PER 125 MG: Performed by: HOSPITALIST

## 2025-03-26 PROCEDURE — 80048 BASIC METABOLIC PNL TOTAL CA: CPT | Performed by: HOSPITALIST

## 2025-03-26 PROCEDURE — 63710000001 DIPHENHYDRAMINE PER 50 MG: Performed by: HOSPITALIST

## 2025-03-26 PROCEDURE — 99232 SBSQ HOSP IP/OBS MODERATE 35: CPT | Performed by: INTERNAL MEDICINE

## 2025-03-26 PROCEDURE — 25010000002 CEFEPIME PER 500 MG: Performed by: HOSPITALIST

## 2025-03-26 PROCEDURE — 99232 SBSQ HOSP IP/OBS MODERATE 35: CPT | Performed by: STUDENT IN AN ORGANIZED HEALTH CARE EDUCATION/TRAINING PROGRAM

## 2025-03-26 RX ORDER — PREDNISONE 20 MG/1
80 TABLET ORAL
Status: DISCONTINUED | OUTPATIENT
Start: 2025-03-27 | End: 2025-03-27 | Stop reason: HOSPADM

## 2025-03-26 RX ORDER — PREDNISONE 10 MG/1
TABLET ORAL
Qty: 136 TABLET | Refills: 0 | Status: SHIPPED | OUTPATIENT
Start: 2025-03-26 | End: 2025-04-27

## 2025-03-26 RX ORDER — ZOLPIDEM TARTRATE 5 MG/1
5 TABLET ORAL NIGHTLY PRN
Status: DISCONTINUED | OUTPATIENT
Start: 2025-03-26 | End: 2025-03-27 | Stop reason: HOSPADM

## 2025-03-26 RX ORDER — CALCIUM CARBONATE 500 MG/1
2 TABLET, CHEWABLE ORAL 3 TIMES DAILY PRN
Status: DISCONTINUED | OUTPATIENT
Start: 2025-03-26 | End: 2025-03-27 | Stop reason: HOSPADM

## 2025-03-26 RX ADMIN — ACETAMINOPHEN 650 MG: 325 TABLET, FILM COATED ORAL at 16:42

## 2025-03-26 RX ADMIN — GABAPENTIN 800 MG: 400 CAPSULE ORAL at 13:43

## 2025-03-26 RX ADMIN — DIPHENHYDRAMINE HYDROCHLORIDE AND LIDOCAINE HYDROCHLORIDE AND ALUMINUM HYDROXIDE AND MAGNESIUM HYDRO 5 ML: KIT at 08:22

## 2025-03-26 RX ADMIN — OXYCODONE 5 MG: 5 TABLET ORAL at 08:19

## 2025-03-26 RX ADMIN — DIPHENHYDRAMINE HYDROCHLORIDE AND LIDOCAINE HYDROCHLORIDE AND ALUMINUM HYDROXIDE AND MAGNESIUM HYDRO 5 ML: KIT at 13:45

## 2025-03-26 RX ADMIN — CALCIUM CARBONATE (ANTACID) CHEW TAB 500 MG 2 TABLET: 500 CHEW TAB at 13:43

## 2025-03-26 RX ADMIN — OXYCODONE 5 MG: 5 TABLET ORAL at 16:42

## 2025-03-26 RX ADMIN — Medication 10 ML: at 21:25

## 2025-03-26 RX ADMIN — LEVOTHYROXINE SODIUM 25 MCG: 0.03 TABLET ORAL at 05:43

## 2025-03-26 RX ADMIN — METHYLPREDNISOLONE SODIUM SUCCINATE 80 MG: 125 INJECTION INTRAMUSCULAR; INTRAVENOUS at 08:21

## 2025-03-26 RX ADMIN — OXYCODONE 5 MG: 5 TABLET ORAL at 03:25

## 2025-03-26 RX ADMIN — DIPHENHYDRAMINE HYDROCHLORIDE 25 MG: 25 CAPSULE ORAL at 12:23

## 2025-03-26 RX ADMIN — DOCUSATE SODIUM 50MG AND SENNOSIDES 8.6MG 2 TABLET: 8.6; 5 TABLET, FILM COATED ORAL at 08:20

## 2025-03-26 RX ADMIN — BUPROPION HYDROCHLORIDE 150 MG: 150 TABLET, FILM COATED, EXTENDED RELEASE ORAL at 08:19

## 2025-03-26 RX ADMIN — Medication 10 ML: at 08:22

## 2025-03-26 RX ADMIN — DOCUSATE SODIUM 50MG AND SENNOSIDES 8.6MG 2 TABLET: 8.6; 5 TABLET, FILM COATED ORAL at 21:24

## 2025-03-26 RX ADMIN — DIPHENHYDRAMINE HYDROCHLORIDE AND LIDOCAINE HYDROCHLORIDE AND ALUMINUM HYDROXIDE AND MAGNESIUM HYDRO 5 ML: KIT at 21:25

## 2025-03-26 RX ADMIN — ACETAMINOPHEN 650 MG: 325 TABLET, FILM COATED ORAL at 08:19

## 2025-03-26 RX ADMIN — GUAIFENESIN 600 MG: 600 TABLET ORAL at 08:20

## 2025-03-26 RX ADMIN — CEFEPIME HYDROCHLORIDE 1000 MG: 1 INJECTION, POWDER, FOR SOLUTION INTRAMUSCULAR; INTRAVENOUS at 08:22

## 2025-03-26 RX ADMIN — ZOLPIDEM TARTRATE 5 MG: 5 TABLET ORAL at 21:24

## 2025-03-26 RX ADMIN — METOPROLOL SUCCINATE 25 MG: 25 TABLET, EXTENDED RELEASE ORAL at 08:19

## 2025-03-26 RX ADMIN — DIPHENHYDRAMINE HYDROCHLORIDE 25 MG: 25 CAPSULE ORAL at 06:45

## 2025-03-26 RX ADMIN — GABAPENTIN 800 MG: 400 CAPSULE ORAL at 21:24

## 2025-03-26 RX ADMIN — CEFEPIME HYDROCHLORIDE 1000 MG: 1 INJECTION, POWDER, FOR SOLUTION INTRAMUSCULAR; INTRAVENOUS at 01:00

## 2025-03-26 RX ADMIN — CEFEPIME HYDROCHLORIDE 1000 MG: 1 INJECTION, POWDER, FOR SOLUTION INTRAMUSCULAR; INTRAVENOUS at 16:33

## 2025-03-26 RX ADMIN — GUAIFENESIN 600 MG: 600 TABLET ORAL at 21:24

## 2025-03-26 RX ADMIN — GABAPENTIN 800 MG: 400 CAPSULE ORAL at 05:42

## 2025-03-26 RX ADMIN — LAMOTRIGINE 300 MG: 100 TABLET ORAL at 08:19

## 2025-03-26 RX ADMIN — OXYCODONE 5 MG: 5 TABLET ORAL at 21:24

## 2025-03-26 NOTE — PROGRESS NOTES
[facility] Medicine Services  PROGRESS NOTE    Patient Name: Brooke Mendez  : 1985  MRN: [id number]    Date of Admission: 3/21/2025  Primary Care Physician: Sheldon Starkey DO    Subjective   Subjective     CC:  F/U fever, rash    HPI:  Headaches persist.  Rash is getting better    Objective   Objective     Vital Signs:   Temp:  [97.8 °F (36.6 °C)-98.2 °F (36.8 °C)] 98 °F (36.7 °C)  Heart Rate:  [77-90] 85  Resp:  [18] 18  BP: (122-136)/(75-88) 131/87     Physical Exam:  Gen-no acute distress  HENT-NCAT, mucous membranes moist; mild oral thrush noted and some aphthous ulcers on inside of lower lip, left eye with slightly inflamed conjunctiva   CV-RRR, S1 S2 normal, no m/r/g  Resp-CTAB, no wheezes or rales  Abd-soft, NT, ND, +BS  Ext-no edema  Neuro-A&Ox3, no focal deficits  Skin-diffuse maculopapular rash most pronounced over trunk/abdomen region but also on back, arms,   Psych-appropriate mood      Results Reviewed:  LAB RESULTS:      Lab 25  0441 25  0526 25  0401 25  0810 25  0639 25  1545 25  1044   WBC 7.09 7.68 3.19* 3.59 4.45 6.45 7.43   HEMOGLOBIN 9.6* 9.5* 10.0* 11.2* 10.1* 12.7 11.5*   HEMATOCRIT 29.2* 28.9* 29.7* 33.3* 30.3* 38.0 34.2   PLATELETS 273 233 161 168 157 182 176   NEUTROS ABS  --  6.06 2.09  --  3.38 5.46 6.25   IMMATURE GRANS (ABS)  --  0.07* 0.04  --  0.03 0.05 0.11*   LYMPHS ABS  --  1.13 0.97  --  0.75 0.63* 0.72   MONOS ABS  --  0.41 0.08*  --  0.26 0.27 0.33   EOS ABS  --  0.00 0.00  --  0.01 0.01 0.00   MCV 89.8 89.5 90.5 90.7 90.2 89.6 90.2   PROCALCITONIN  --   --   --   --   --  0.17  --    LACTATE  --   --   --   --   --  1.2  --          Lab 25  0441 25  0526 25  0401 25  0810 25  0639   SODIUM 134* 133* 135* 131* 133*   POTASSIUM 5.2 5.0 4.7 4.4 4.4   CHLORIDE 101 103 104 98 100   CO2 18.0* 17.0* 16.0* 21.0* 19.0*   ANION GAP 15.0 13.0 15.0 12.0 14.0   BUN 18 18 21*  18 9   CREATININE 0.32* 0.67 0.89 1.58* 0.75   EGFR 135.6 113.5 84.2 42.3* 103.4   GLUCOSE 117* 150* 199* 103* 112*   CALCIUM 8.7 8.6 8.3* 8.0* 7.9*   MAGNESIUM  --   --   --   --  1.9   PHOSPHORUS  --   --   --   --  2.5         Lab 03/24/25  0401 03/21/25  1545 03/20/25  1044   TOTAL PROTEIN 6.0 7.9 6.7   ALBUMIN 3.0* 4.0 3.6   GLOBULIN 3.0 3.9 3.1   ALT (SGPT) 32 38* 40*   AST (SGOT) 29 33* 31   BILIRUBIN 0.3 0.5 0.6   ALK PHOS 88 104 80                     Brief Urine Lab Results  (Last result in the past 365 days)        Color   Clarity   Blood   Leuk Est   Nitrite   Protein   CREAT   Urine HCG        03/21/25 1555 Yellow   Clear   Negative   Trace   Negative   30 mg/dL (1+)                   Microbiology Results Abnormal       None            No radiology results from the last 24 hrs      Results for orders placed during the hospital encounter of 03/11/25    Adult Transthoracic Echo Complete W/ Cont if Necessary Per Protocol    Interpretation Summary    Left ventricular systolic function is normal. Calculated left ventricular EF = 59.6% Left ventricular ejection fraction appears to be 56 - 60%.    Left atrial volume is mildly increased.    Estimated right ventricular systolic pressure from tricuspid regurgitation is normal (<35 mmHg). Calculated right ventricular systolic pressure from tricuspid regurgitation is 21 mmHg.    Normal global longitudinal LV strain (GLS) = -21.2%    There is no previous study available for comparison.      Current medications:  Scheduled Meds:acetaminophen, 650 mg, Rectal, Once  buPROPion XL, 150 mg, Oral, Daily  cefepime, 1,000 mg, Intravenous, Q8H  First Mouthwash (Magic Mouthwash), 5 mL, Swish & Spit, Q6H  gabapentin, 800 mg, Oral, Q8H  guaiFENesin, 600 mg, Oral, Q12H  lamoTRIgine, 300 mg, Oral, Daily  levothyroxine, 25 mcg, Oral, Daily  methylPREDNISolone sodium succinate, 80 mg, Intravenous, Q12H  metoprolol succinate XL, 25 mg, Oral, Daily  senna-docusate sodium, 2 tablet,  Oral, BID  sodium chloride, 10 mL, Intravenous, Q12H      Continuous Infusions:     PRN Meds:.  acetaminophen    aspirin-acetaminophen-caffeine    senna-docusate sodium **AND** polyethylene glycol **AND** bisacodyl **AND** bisacodyl    calcium carbonate    diphenhydrAMINE    diphenhydrAMINE-zinc acetate    ibuprofen    lidocaine    LORazepam    Morphine    nitroglycerin    ondansetron    oxyCODONE    phenol    sodium chloride    sodium chloride    sodium chloride    tetrahydrozoline    zolpidem    Assessment & Plan   Assessment & Plan     Active Hospital Problems    Diagnosis  POA    **Rash [R21]  Yes    Acute kidney injury [N17.9]  No    Fever [R50.9]  Yes    On antineoplastic chemotherapy and adjuvant immunotherapy regimen [Z79.899]  Not Applicable    Essential hypertension [I10]  Unknown    Thyroid disease [E07.9]  Unknown    Constipation [K59.00]  Unknown    Malignant neoplasm of left breast in female, estrogen receptor negative [C50.912, Z17.1]  Not Applicable      Resolved Hospital Problems   No resolved problems to display.        Brief Hospital Course to date:  Brooke Mendez is a 39 y.o. female with history of metastatic breast cancer, thyroid disease, and hypertension presents to the ED for flu-like symptoms and new onset rash. Patient has been treated by Dr. Baker for breast cancer with immunotherapy and chemotherapy.  First dose was 3/13/25, second dose was planned for 3/20/25 but she did not receive it due to onset of chills, night sweats, headache, and malaise two days before that. Went to [facility] ED for evaluation, apparently had concern for UTI so was sent home with prescription for Cefdinir. She was switched to Bactrim during Oncology office visit on 3/20/25 to cover for UTI as well as skin/soft tissue infection as there was concern for left axillary swelling and tenderness (clip had been placed by radiology there the previous week). Now has new onset rash and fevers up to 102.5 at home.  Admitted for further management.    This patient's problems and plans were partially entered by my partner and updated as appropriate by me 03/26/25. Copied text in this note has been reviewed and is accurate as of today's date.      Rash  Fevers  --Rash located on trunk/abdomen, back, arms, legs  --Recent antibiotics - Cefdinir and Bactrim, will hold both for now  --Infectious vs immunotherapy/drug reaction  --WBC normal, ANC normal, procal reassuring, lacate normal  --Blood cultures NGTD (on day 4)  --UA with trace leukocytes, 6-10 WBC - urine culture negative, possibly antibiotic modified given recent Cefdinir and Bactrim  --CXR no acute findings but does have a cough - negative full respiratory PCR panel  --Continue on Cefepime for now but maybe can stop soon if secondary to immunotherapy/drug reaction at this point  --Oncology feels this is likely related to recent Keytruda - recommends IV Solumedrol, started 3/23/25 and increased to 80 mg Q12H on 3/24/25  --now off scheduled tylenol. PRN tylenol and ibuprofen (she has been using this for headache). No new fevers  --PRN Benadryl 3/24/25  --D/w Dr Baker-- given ongoing headaches we would recommend LP/neuro input. Discussed with neuro, there is actually IR coverage so will consult for LP  --Clinically appears to be improving slowly    Acute kidney injury, resolved  --Cr normal on admission, worsened to 1.58 on 3/23/25  --Has not been eating or drinking well since admission and with high fevers suspect she was likely dehydrated  --Improved s/p IV fluids  --Monitor closely, avoid nephrotoxic meds    Oral thrush/mucositis  --Magic mouthwash     Headaches  --Recent OSH CT head unremarkable  --Negative MRI brain 3/24/25  --Supportive care, pain meds as needed    Malignant neoplasm of left breast in female, estrogen receptor negative  --On antineoplastic chemotherapy and adjuvant immunotherapy regimen, initiated on 3/13/25, dose held 3/20/25  --Sees   Samuel/Oncology as outpatient, consulted here to follow     Essential hypertension  --On Metoprolol at home, resumed 3/23/25 with hold parameters     Thyroid disease  --Recent normal TSH, free T4 on 3/11/25  --Continue Synthroid     Constipation  scheduled bowel regimen     Anxiety   Depression  --Continue Wellbutrin, Lamictal, and Ativan  --Follows with outpatient Psych provider in [place]    Expected Discharge Location and Transportation: home  Expected Discharge   Expected Discharge Date: 3/28/2025; Expected Discharge Time:      VTE Prophylaxis:  Mechanical VTE prophylaxis orders are present.         AM-PAC 6 Clicks Score (PT): 24 (03/25/25 2023)    CODE STATUS:   Code Status and Medical Interventions: CPR (Attempt to Resuscitate); Full Support   Ordered at: 03/21/25 2041     Code Status (Patient has no pulse and is not breathing):    CPR (Attempt to Resuscitate)     Medical Interventions (Patient has pulse or is breathing):    Full Support     Level Of Support Discussed With:    Patient       Joyce Villanueva MD  03/26/25

## 2025-03-26 NOTE — PLAN OF CARE
Goal Outcome Evaluation:   Pt is Aox4, VSS on RA, NSR per tele. Pain and N/V managed with PRN medications. New IV placed via u/s. Bed in lowest position, call light within reach.

## 2025-03-26 NOTE — PLAN OF CARE
Goal Outcome Evaluation:  Plan of Care Reviewed With: patient   Pt a&o, RA, nsr on monitor, last bp 135/80. Pt complained of neck pain intermittently relieved with hydrocodone and tylenol. Skin rash still persisting all over. Care ongoing.     Progress: no change

## 2025-03-26 NOTE — CASE MANAGEMENT/SOCIAL WORK
Continued Stay Note  [facility]     Patient Name: Brooke Mendez  MRN: [id number]  Today's Date: 3/26/2025    Admit Date: 3/21/2025    Plan: discharge plan   Discharge Plan       Row Name 03/26/25 1615       Plan    Plan discharge plan    Plan Comments I met with pt and pt's mother in room regarding discharge plan. Plan is home with family at discharge. Pt currently denies discharge needs. CM will cont to follow    Final Discharge Disposition Code 01 - home or self-care                   Discharge Codes    No documentation.                 Expected Discharge Date and Time       Expected Discharge Date Expected Discharge Time    Mar 28, 2025               Susan Richardson RN

## 2025-03-27 ENCOUNTER — READMISSION MANAGEMENT (OUTPATIENT)
Dept: CALL CENTER | Facility: HOSPITAL | Age: 40
End: 2025-03-27
Payer: COMMERCIAL

## 2025-03-27 VITALS
HEIGHT: 69 IN | WEIGHT: 192 LBS | OXYGEN SATURATION: 96 % | HEART RATE: 75 BPM | TEMPERATURE: 98.6 F | DIASTOLIC BLOOD PRESSURE: 85 MMHG | RESPIRATION RATE: 18 BRPM | BODY MASS INDEX: 28.44 KG/M2 | SYSTOLIC BLOOD PRESSURE: 135 MMHG

## 2025-03-27 PROBLEM — R50.9 FEVER: Status: RESOLVED | Noted: 2025-03-21 | Resolved: 2025-03-27

## 2025-03-27 PROBLEM — N17.9 ACUTE KIDNEY INJURY: Status: RESOLVED | Noted: 2025-03-23 | Resolved: 2025-03-27

## 2025-03-27 PROBLEM — K59.00 CONSTIPATION: Status: RESOLVED | Noted: 2025-03-21 | Resolved: 2025-03-27

## 2025-03-27 LAB
ANION GAP SERPL CALCULATED.3IONS-SCNC: 16 MMOL/L (ref 5–15)
BASOPHILS # BLD MANUAL: 0 10*3/MM3 (ref 0–0.2)
BASOPHILS NFR BLD MANUAL: 0 % (ref 0–1.5)
BUN SERPL-MCNC: 20 MG/DL (ref 6–20)
BUN/CREAT SERPL: 26.7 (ref 7–25)
CALCIUM SPEC-SCNC: 8.7 MG/DL (ref 8.6–10.5)
CHLORIDE SERPL-SCNC: 100 MMOL/L (ref 98–107)
CO2 SERPL-SCNC: 18 MMOL/L (ref 22–29)
CREAT SERPL-MCNC: 0.75 MG/DL (ref 0.57–1)
DEPRECATED RDW RBC AUTO: 42.2 FL (ref 37–54)
EGFRCR SERPLBLD CKD-EPI 2021: 103.4 ML/MIN/1.73
EOSINOPHIL # BLD MANUAL: 0 10*3/MM3 (ref 0–0.4)
EOSINOPHIL NFR BLD MANUAL: 0 % (ref 0.3–6.2)
ERYTHROCYTE [DISTWIDTH] IN BLOOD BY AUTOMATED COUNT: 12.7 % (ref 12.3–15.4)
GLUCOSE SERPL-MCNC: 134 MG/DL (ref 65–99)
HCT VFR BLD AUTO: 30.5 % (ref 34–46.6)
HGB BLD-MCNC: 10.1 G/DL (ref 12–15.9)
LYMPHOCYTES # BLD MANUAL: 1.65 10*3/MM3 (ref 0.7–3.1)
LYMPHOCYTES NFR BLD MANUAL: 10 % (ref 5–12)
MCH RBC QN AUTO: 30.1 PG (ref 26.6–33)
MCHC RBC AUTO-ENTMCNC: 33.1 G/DL (ref 31.5–35.7)
MCV RBC AUTO: 91 FL (ref 79–97)
METAMYELOCYTES NFR BLD MANUAL: 5 % (ref 0–0)
MONOCYTES # BLD: 1.03 10*3/MM3 (ref 0.1–0.9)
NEUTROPHILS # BLD AUTO: 7.13 10*3/MM3 (ref 1.7–7)
NEUTROPHILS NFR BLD MANUAL: 68 % (ref 42.7–76)
NEUTS BAND NFR BLD MANUAL: 1 % (ref 0–5)
PLAT MORPH BLD: NORMAL
PLATELET # BLD AUTO: 313 10*3/MM3 (ref 140–450)
PMV BLD AUTO: 9.2 FL (ref 6–12)
POTASSIUM SERPL-SCNC: 4.5 MMOL/L (ref 3.5–5.2)
RBC # BLD AUTO: 3.35 10*6/MM3 (ref 3.77–5.28)
RBC MORPH BLD: NORMAL
SODIUM SERPL-SCNC: 134 MMOL/L (ref 136–145)
VARIANT LYMPHS NFR BLD MANUAL: 16 % (ref 19.6–45.3)
WBC MORPH BLD: NORMAL
WBC NRBC COR # BLD AUTO: 10.33 10*3/MM3 (ref 3.4–10.8)

## 2025-03-27 PROCEDURE — 99239 HOSP IP/OBS DSCHRG MGMT >30: CPT | Performed by: STUDENT IN AN ORGANIZED HEALTH CARE EDUCATION/TRAINING PROGRAM

## 2025-03-27 PROCEDURE — 85007 BL SMEAR W/DIFF WBC COUNT: CPT | Performed by: STUDENT IN AN ORGANIZED HEALTH CARE EDUCATION/TRAINING PROGRAM

## 2025-03-27 PROCEDURE — 80048 BASIC METABOLIC PNL TOTAL CA: CPT | Performed by: STUDENT IN AN ORGANIZED HEALTH CARE EDUCATION/TRAINING PROGRAM

## 2025-03-27 PROCEDURE — 63710000001 DIPHENHYDRAMINE PER 50 MG: Performed by: HOSPITALIST

## 2025-03-27 PROCEDURE — 25010000002 MORPHINE PER 10 MG: Performed by: HOSPITALIST

## 2025-03-27 PROCEDURE — 85025 COMPLETE CBC W/AUTO DIFF WBC: CPT | Performed by: STUDENT IN AN ORGANIZED HEALTH CARE EDUCATION/TRAINING PROGRAM

## 2025-03-27 PROCEDURE — 25010000002 CEFEPIME PER 500 MG: Performed by: HOSPITALIST

## 2025-03-27 PROCEDURE — 63710000001 PREDNISONE PER 1 MG: Performed by: INTERNAL MEDICINE

## 2025-03-27 RX ORDER — FLUCONAZOLE 100 MG/1
150 TABLET ORAL ONCE
Qty: 2 TABLET | Refills: 0 | Status: SHIPPED | OUTPATIENT
Start: 2025-03-27 | End: 2025-03-27

## 2025-03-27 RX ADMIN — DIPHENHYDRAMINE HYDROCHLORIDE 25 MG: 25 CAPSULE ORAL at 10:37

## 2025-03-27 RX ADMIN — METOPROLOL SUCCINATE 25 MG: 25 TABLET, EXTENDED RELEASE ORAL at 08:09

## 2025-03-27 RX ADMIN — DIPHENHYDRAMINE HYDROCHLORIDE AND LIDOCAINE HYDROCHLORIDE AND ALUMINUM HYDROXIDE AND MAGNESIUM HYDRO 5 ML: KIT at 13:04

## 2025-03-27 RX ADMIN — PREDNISONE 80 MG: 20 TABLET ORAL at 08:09

## 2025-03-27 RX ADMIN — Medication 10 ML: at 08:12

## 2025-03-27 RX ADMIN — ACETAMINOPHEN 650 MG: 325 TABLET, FILM COATED ORAL at 08:19

## 2025-03-27 RX ADMIN — OXYCODONE 5 MG: 5 TABLET ORAL at 08:20

## 2025-03-27 RX ADMIN — LAMOTRIGINE 300 MG: 100 TABLET ORAL at 08:09

## 2025-03-27 RX ADMIN — DIPHENHYDRAMINE HYDROCHLORIDE AND LIDOCAINE HYDROCHLORIDE AND ALUMINUM HYDROXIDE AND MAGNESIUM HYDRO 5 ML: KIT at 08:12

## 2025-03-27 RX ADMIN — GABAPENTIN 800 MG: 400 CAPSULE ORAL at 06:43

## 2025-03-27 RX ADMIN — CEFEPIME HYDROCHLORIDE 1000 MG: 1 INJECTION, POWDER, FOR SOLUTION INTRAMUSCULAR; INTRAVENOUS at 00:50

## 2025-03-27 RX ADMIN — OXYCODONE 5 MG: 5 TABLET ORAL at 13:03

## 2025-03-27 RX ADMIN — GUAIFENESIN 600 MG: 600 TABLET ORAL at 08:09

## 2025-03-27 RX ADMIN — CEFEPIME HYDROCHLORIDE 1000 MG: 1 INJECTION, POWDER, FOR SOLUTION INTRAMUSCULAR; INTRAVENOUS at 08:08

## 2025-03-27 RX ADMIN — GABAPENTIN 800 MG: 400 CAPSULE ORAL at 13:03

## 2025-03-27 RX ADMIN — BUPROPION HYDROCHLORIDE 150 MG: 150 TABLET, FILM COATED, EXTENDED RELEASE ORAL at 08:09

## 2025-03-27 RX ADMIN — ACETAMINOPHEN 650 MG: 325 TABLET, FILM COATED ORAL at 13:03

## 2025-03-27 RX ADMIN — MORPHINE SULFATE 2 MG: 2 INJECTION, SOLUTION INTRAMUSCULAR; INTRAVENOUS at 10:37

## 2025-03-27 RX ADMIN — LEVOTHYROXINE SODIUM 25 MCG: 0.03 TABLET ORAL at 06:43

## 2025-03-27 RX ADMIN — OXYCODONE 5 MG: 5 TABLET ORAL at 03:48

## 2025-03-27 RX ADMIN — LORAZEPAM 0.5 MG: 0.5 TABLET ORAL at 03:48

## 2025-03-27 RX ADMIN — DOCUSATE SODIUM 50MG AND SENNOSIDES 8.6MG 2 TABLET: 8.6; 5 TABLET, FILM COATED ORAL at 08:09

## 2025-03-27 NOTE — PROGRESS NOTES
"HEMATOLOGY/ONCOLOGY PROGRESS NOTE    S: Reports feeling better. Rash is fading. Left axillary adenopathy stable or improving. No headaches since early this AM. Thinks insomnia and neck stiffness from sleeping in hospital bed were contributing      Medications:  The current medication list was reviewed in the EMR    ALLERGIES:    Allergies   Allergen Reactions    Erythromycin Other (See Comments)    Pholcodine Other (See Comments)    Penicillins Rash     Childhood          Physical Exam    VITAL SIGNS:  /87 (BP Location: Left arm, Patient Position: Lying)   Pulse 85   Temp 98 °F (36.7 °C) (Oral)   Resp 18   Ht 175.3 cm (69\")   Wt 87.1 kg (192 lb)   LMP 03/02/2025 Comment: denies preg 3/11/2025  SpO2 96%   BMI 28.35 kg/m²   Temp:  [97.9 °F (36.6 °C)-98.7 °F (37.1 °C)] 98 °F (36.7 °C)      Performance Status: 0                Physical Exam    General: well appearing, in no acute distress  HEENT: sclerae anicteric, oropharynx clear, neck is supple  Lymphatics: no cervical, supraclavicular, or axillary adenopathy  Cardiovascular: regular rate and rhythm, no murmurs, rubs or gallops  Lungs: clear to auscultation bilaterally  Abdomen: soft, nontender, nondistended.  No palpable organomegaly  Extremities: no lower extremity edema  Skin: Improving rash  Msk:  Shows no weakness of the large muscle groups  Breast: left breast mass stable. Left axillary LN not discretely palpable        RECENT LABS:    Lab Results   Component Value Date    HGB 9.6 (L) 03/26/2025    HCT 29.2 (L) 03/26/2025    MCV 89.8 03/26/2025     03/26/2025    WBC 7.09 03/26/2025    NEUTROABS 6.06 03/25/2025    LYMPHSABS 1.13 03/25/2025    MONOSABS 0.41 03/25/2025    EOSABS 0.00 03/25/2025    BASOSABS 0.01 03/25/2025       Lab Results   Component Value Date    GLUCOSE 117 (H) 03/26/2025    BUN 18 03/26/2025    CREATININE 0.32 (L) 03/26/2025     (L) 03/26/2025    K 5.2 03/26/2025     03/26/2025    CO2 18.0 (L) 03/26/2025    " CALCIUM 8.7 03/26/2025    PROTEINTOT 6.0 03/24/2025    ALBUMIN 3.0 (L) 03/24/2025    BILITOT 0.3 03/24/2025    ALKPHOS 88 03/24/2025    AST 29 03/24/2025    ALT 32 03/24/2025         Assessment/Plan  Triple negative breast cancer  S/p recent chemo immunotherapy intiation. Hold treatment pending resolution. Likely will discontinue further immunotherapy. Discussed with pt today. Reschedule this weeks treatment to 4/2 and follow up with me in office before.      Fever, rash  -Most likely immunotherapy related. Responding to IV steroids.   Change to oral steroid taper starting tomorrow as follows:  -Prednisone 10 mg tabs: mishel 8 tablets by mouth Daily for 3 days, THEN 7 tablets Daily for 4 days, THEN 6 tablets Daily for 4 days, THEN 5 tablets Daily for 4 days, THEN 4 tablets Daily for 4 days, THEN 3 tablets Daily for 4 days, THEN 2 tablets Daily for 4 days, THEN 1 tablet Daily for 4 days.  Script called to outpatient pharmacy     Headaches  -MRI brain negative  -Consider LP if persist: discussed with pt and hospital ist, as headaches have now resolved, cancel LP     Hopefully home tomorrow pending antibiotic plan per primary team      Fanny Baker MD  [facility] Hematology and Oncology    3/26/2025

## 2025-03-27 NOTE — DISCHARGE SUMMARY
[facility] Medicine Services  DISCHARGE SUMMARY    Patient Name: Brooke Mendez  : 1985  MRN: [id number]    Date of Admission: 3/21/2025  4:17 PM  Date of Discharge:  3/27/2025  Primary Care Physician: Sheldon Starkey DO    Consults       Date and Time Order Name Status Description    3/21/2025 11:21 PM Inpatient Hematology & Oncology Consult              Hospital Course     Presenting Problem: rash    Active Hospital Problems    Diagnosis  POA    **Rash [R21]  Yes    On antineoplastic chemotherapy and adjuvant immunotherapy regimen [Z79.899]  Not Applicable    Essential hypertension [I10]  Unknown    Thyroid disease [E07.9]  Unknown    Malignant neoplasm of left breast in female, estrogen receptor negative [C50.912, Z17.1]  Not Applicable      Resolved Hospital Problems    Diagnosis Date Resolved POA    Acute kidney injury [N17.9] 2025 No    Fever [R50.9] 2025 Yes    Constipation [K59.00] 2025 Unknown          Hospital Course:  Brooke Mendez is a 39 y.o. female  with history of metastatic breast cancer, thyroid disease, and hypertension presents to the ED for flu-like symptoms and new onset rash. Patient has been treated by Dr. Baker for breast cancer with immunotherapy and chemotherapy.  First dose was 3/13/25, second dose was planned for 3/20/25 but she did not receive it due to onset of chills, night sweats, headache, and malaise two days before that. Went to [facility] ED for evaluation, apparently had concern for UTI so was sent home with prescription for Cefdinir. She was switched to Bactrim during Oncology office visit on 3/20/25 to cover for UTI as well as skin/soft tissue infection as there was concern for left axillary swelling and tenderness (clip had been placed by radiology there the previous week). Now has new onset rash and fevers up to 102.5 at home. Admitted for further management.       Rash  Fevers  She was worked up for  infectious versus immunotherapy related causes.  Infectious workup negative, she completed an empiric course of cefepime.  Oncology consulted and suspects reaction to Keytruda.  She was started on IV Solu-Medrol and subsequently rash and symptoms improved.  She did have a persistent headache and an LP was considered however with resolution of her headache it was decided to defer LP for now.  She will be discharged on an oral steroid taper.         Acute kidney injury, resolved  --Cr normal on admission, worsened to 1.58 on 3/23/25  --Has not been eating or drinking well since admission and with high fevers suspect she was likely dehydrated  --Improved s/p IV fluids  --Monitor closely, avoid nephrotoxic meds     Oral thrush/mucositis  --Magic mouthwash     Headaches  --Recent OSH CT head unremarkable  --Negative MRI brain 3/24/25  --Supportive care, pain meds as needed     Malignant neoplasm of left breast in female, estrogen receptor negative  --On antineoplastic chemotherapy and adjuvant immunotherapy regimen, initiated on 3/13/25, dose held 3/20/25  -- She will follow-up with Dr. Baker for continued outpatient treatment     Essential hypertension  --On Metoprolol at home, resumed 3/23/25 with hold parameters     Thyroid disease  --Recent normal TSH, free T4 on 3/11/25  --Continue Synthroid     Constipation--resolved  scheduled bowel regimen      Anxiety   Depression  --Continue Wellbutrin, Lamictal, and Ativan  --Follows with outpatient Psych provider in [place]      Discharge Follow Up Recommendations for outpatient labs/diagnostics:   PCP, oncology    Day of Discharge     HPI:   Rash improving, headache improving. No new overnight events    Review of Systems  Gen- No fevers, chills  CV- No chest pain, palpitations  Resp- No cough, dyspnea  GI- No N/V/D, abd pain      Vital Signs:   Temp:  [97.5 °F (36.4 °C)-98.6 °F (37 °C)] 98.6 °F (37 °C)  Heart Rate:  [] 75  Resp:  [18] 18  BP: (126-138)/(79-86)  135/85      Physical Exam:  Gen-no acute distress  HENT-NCAT, mucous membranes moist; mild oral thrush noted and some aphthous ulcers on inside of lower lip, left eye with slightly inflamed conjunctiva   CV-RRR, S1 S2 normal, no m/r/g  Resp-CTAB, no wheezes or rales  Abd-soft, NT, ND, +BS  Ext-no edema  Neuro-A&Ox3, no focal deficits  Skin-diffuse maculopapular rash most pronounced over trunk/abdomen region but also on back, arms,   Psych-appropriate mood    Pertinent  and/or Most Recent Results     LAB RESULTS:      Lab 03/27/25 0426 03/26/25 0441 03/25/25  0526 03/24/25  0401 03/23/25  0810 03/22/25  0639 03/21/25  1545   WBC 10.33 7.09 7.68 3.19* 3.59 4.45 6.45   HEMOGLOBIN 10.1* 9.6* 9.5* 10.0* 11.2* 10.1* 12.7   HEMATOCRIT 30.5* 29.2* 28.9* 29.7* 33.3* 30.3* 38.0   PLATELETS 313 273 233 161 168 157 182   NEUTROS ABS 7.13*  --  6.06 2.09  --  3.38 5.46   IMMATURE GRANS (ABS)  --   --  0.07* 0.04  --  0.03 0.05   LYMPHS ABS  --   --  1.13 0.97  --  0.75 0.63*   MONOS ABS  --   --  0.41 0.08*  --  0.26 0.27   EOS ABS 0.00  --  0.00 0.00  --  0.01 0.01   MCV 91.0 89.8 89.5 90.5 90.7 90.2 89.6   PROCALCITONIN  --   --   --   --   --   --  0.17   LACTATE  --   --   --   --   --   --  1.2         Lab 03/27/25 0426 03/26/25 0441 03/25/25  0526 03/24/25  0401 03/23/25  0810 03/22/25  0639   SODIUM 134* 134* 133* 135* 131* 133*   POTASSIUM 4.5 5.2 5.0 4.7 4.4 4.4   CHLORIDE 100 101 103 104 98 100   CO2 18.0* 18.0* 17.0* 16.0* 21.0* 19.0*   ANION GAP 16.0* 15.0 13.0 15.0 12.0 14.0   BUN 20 18 18 21* 18 9   CREATININE 0.75 0.32* 0.67 0.89 1.58* 0.75   EGFR 103.4 135.6 113.5 84.2 42.3* 103.4   GLUCOSE 134* 117* 150* 199* 103* 112*   CALCIUM 8.7 8.7 8.6 8.3* 8.0* 7.9*   MAGNESIUM  --   --   --   --   --  1.9   PHOSPHORUS  --   --   --   --   --  2.5         Lab 03/24/25  0401 03/21/25  1545   TOTAL PROTEIN 6.0 7.9   ALBUMIN 3.0* 4.0   GLOBULIN 3.0 3.9   ALT (SGPT) 32 38*   AST (SGOT) 29 33*   BILIRUBIN 0.3 0.5   ALK PHOS  88 104                     Brief Urine Lab Results  (Last result in the past 365 days)        Color   Clarity   Blood   Leuk Est   Nitrite   Protein   CREAT   Urine HCG        03/21/25 1555 Yellow   Clear   Negative   Trace   Negative   30 mg/dL (1+)                 Microbiology Results (last 10 days)       Procedure Component Value - Date/Time    Blood Culture - Blood, Arm, Left [[id number]]  (Normal) Collected: 03/21/25 1648    Lab Status: Final result Specimen: Blood from Arm, Left Updated: 03/26/25 1900     Blood Culture No growth at 5 days    Urine Culture - Urine, Urine, Clean Catch [[id number]]  (Normal) Collected: 03/21/25 1555    Lab Status: Final result Specimen: Urine, Clean Catch Updated: 03/23/25 1054     Urine Culture No growth    Blood Culture - Blood, Arm, Right [[id number]]  (Normal) Collected: 03/21/25 1545    Lab Status: Final result Specimen: Blood from Arm, Right Updated: 03/26/25 1900     Blood Culture No growth at 5 days    Respiratory Panel PCR w/COVID-19(SARS-CoV-2) EFREN/JENNIFER/MICHELLE/PAD/COR/CASSY In-House, NP Swab in UTM/VTM, 2 HR TAT - Swab, Nasopharynx [[id number]]  (Normal) Collected: 03/21/25 1529    Lab Status: Final result Specimen: Swab from Nasopharynx Updated: 03/21/25 1718     ADENOVIRUS, PCR Not Detected     Coronavirus 229E Not Detected     Coronavirus HKU1 Not Detected     Coronavirus NL63 Not Detected     Coronavirus OC43 Not Detected     COVID19 Not Detected     Human Metapneumovirus Not Detected     Human Rhinovirus/Enterovirus Not Detected     Influenza A PCR Not Detected     Influenza B PCR Not Detected     Parainfluenza Virus 1 Not Detected     Parainfluenza Virus 2 Not Detected     Parainfluenza Virus 3 Not Detected     Parainfluenza Virus 4 Not Detected     RSV, PCR Not Detected     Bordetella pertussis pcr Not Detected     Bordetella parapertussis PCR Not Detected     Chlamydophila pneumoniae PCR Not Detected     Mycoplasma pneumo by PCR Not Detected    Narrative:      In the  setting of a positive respiratory panel with a viral infection PLUS a negative procalcitonin without other underlying concern for bacterial infection, consider observing off antibiotics or discontinuation of antibiotics and continue supportive care. If the respiratory panel is positive for atypical bacterial infection (Bordetella pertussis, Chlamydophila pneumoniae, or Mycoplasma pneumoniae), consider antibiotic de-escalation to target atypical bacterial infection.            MRI Brain With & Without Contrast  Result Date: 3/24/2025  MRI BRAIN W WO CONTRAST Date of Exam: 3/23/2025 9:21 PM EDT Indication: Fever, breast cancer, headache.  Comparison: None available. Technique:  Routine multiplanar/multisequence sequence images of the brain were obtained before and after the uneventful administration of 15 cc Multihance. Findings: There is no diffusion restriction to suggest acute infarct. There is no evidence of acute or chronic intracranial hemorrhage. No mass effect or midline shift. No abnormal extra-axial collections. The basal ganglia, brainstem and cerebellum appear within normal limits. Midline structures are intact. No significant cortical abnormality is identified. Calvarial and superficial soft tissue signal is within normal limits. Orbits appear unremarkable. The paranasal sinuses and the mastoid air cells appear well aerated. There is no abnormal intracranial enhancement. There is normal enhancement within the intracranial vasculature including the dural venous sinuses.     Impression: Unremarkable brain MRI. No acute process. No evidence of metastatic disease. Electronically Signed: Panchito Hampton MD  3/24/2025 8:09 AM EDT  Workstation ID: [id number]    XR Chest 1 View  Result Date: 3/22/2025  XR CHEST 1 VW Date of Exam: 3/22/2025 10:30 AM EDT Indication: Fever Comparison: None available. Findings: There is a right chest port with the catheter terminating at the upper right atrium. The cardiomediastinal  silhouette is normal. The lungs are clear. No pleural effusion or pneumothorax. No acute osseous findings.     Impression: No acute cardiopulmonary findings. Electronically Signed: Leno Salcedo MD  3/22/2025 11:50 AM EDT  Workstation ID: [id number]              Results for orders placed during the hospital encounter of 03/11/25    Adult Transthoracic Echo Complete W/ Cont if Necessary Per Protocol    Interpretation Summary    Left ventricular systolic function is normal. Calculated left ventricular EF = 59.6% Left ventricular ejection fraction appears to be 56 - 60%.    Left atrial volume is mildly increased.    Estimated right ventricular systolic pressure from tricuspid regurgitation is normal (<35 mmHg). Calculated right ventricular systolic pressure from tricuspid regurgitation is 21 mmHg.    Normal global longitudinal LV strain (GLS) = -21.2%    There is no previous study available for comparison.      Plan for Follow-up of Pending Labs/Results: no pending results    Discharge Details        Discharge Medications        New Medications        Instructions Start Date   fluconazole 100 MG tablet  Commonly known as: Diflucan   150 mg, Oral, Once      predniSONE 10 MG tablet  Commonly known as: DELTASONE   Take 8 tablets by mouth daily for 3 days; then 7 tabs daily for 4 days; then 6 tabs daily for 4 days; then 5 tabs daily for 4 days; then 4 tabs daily for 4 days; then 3 tabs daily for 4 days; then 2 tabs daily for 4 days; then 1 tablet daily for 4 days.   Start Date: March 26, 2025            Continue These Medications        Instructions Start Date   gabapentin 400 MG capsule  Commonly known as: NEURONTIN   2 capsules, 4 Times Daily      lamoTRIgine 100 MG tablet  Commonly known as: LaMICtal   3 tablets, Daily      levothyroxine 25 MCG tablet  Commonly known as: SYNTHROID, LEVOTHROID   1 tablet, Daily      lidocaine-prilocaine 2.5-2.5 % cream  Commonly known as: EMLA   1 Application, Topical, As Needed       LORazepam 0.5 MG tablet  Commonly known as: ATIVAN   Take 1 tablet by mouth 2 (Two) Times a Day As Needed for Anxiety.      meloxicam 15 MG tablet  Commonly known as: MOBIC   15 mg, Daily      metoprolol succinate XL 50 MG 24 hr tablet  Commonly known as: TOPROL-XL   0.5 tablets Daily. Recently told to stop on 3/20 for low BP      ondansetron 8 MG tablet  Commonly known as: ZOFRAN   8 mg, Oral, 3 Times Daily PRN      Wellbutrin  MG 24 hr tablet  Generic drug: buPROPion XL       zolpidem CR 12.5 MG CR tablet  Commonly known as: AMBIEN CR              Stop These Medications      sulfamethoxazole-trimethoprim 800-160 MG per tablet  Commonly known as: BACTRIM DS,SEPTRA DS              Allergies   Allergen Reactions    Erythromycin Other (See Comments)    Pholcodine Other (See Comments)    Penicillins Rash     Childhood          Discharge Disposition:  home    Diet:  Hospital:  Diet Order   Procedures    Diet: Regular/House; Fluid Consistency: Thin (IDDSI 0)            Activity:  as tolerated    Restrictions or Other Recommendations:  none       CODE STATUS:    Code Status and Medical Interventions: CPR (Attempt to Resuscitate); Full Support   Ordered at: 03/21/25 2041     Code Status (Patient has no pulse and is not breathing):    CPR (Attempt to Resuscitate)     Medical Interventions (Patient has pulse or is breathing):    Full Support     Level Of Support Discussed With:    Patient       Future Appointments   Date Time Provider Department Center   4/1/2025  7:30 AM ACCESS AND LAB DRAW CHAIR 1  [facility]   4/1/2025  7:45 AM Fanny Baker MD [facility]   4/1/2025  8:00 AM CHAIR 6  [facility]                 Joyce Villanueva MD  03/27/25      Time Spent on Discharge:  I spent  45  minutes on this discharge activity which included: face-to-face encounter with the patient, reviewing the data in the system, coordination of the care with the nursing staff as well as consultants, documentation, and  entering orders.

## 2025-03-28 ENCOUNTER — DOCUMENTATION (OUTPATIENT)
Dept: GENETICS | Facility: HOSPITAL | Age: 40
End: 2025-03-28
Payer: COMMERCIAL

## 2025-03-28 ENCOUNTER — TELEPHONE (OUTPATIENT)
Dept: GENETICS | Facility: HOSPITAL | Age: 40
End: 2025-03-28
Payer: COMMERCIAL

## 2025-03-28 NOTE — OUTREACH NOTE
Prep Survey      Flowsheet Row Responses   [facility] facility patient discharged from? [facility]   Is LACE score < 7 ? No   Eligibility Readm Mgmt   Discharge diagnosis Rash-Malignant neoplasm of left breast   Does the patient have one of the following disease processes/diagnoses(primary or secondary)? Other   Does the patient have Home health ordered? No   Is there a DME ordered? No   Prep survey completed? Yes            PREM MCCORMICK - Registered Nurse

## 2025-03-28 NOTE — PROGRESS NOTES
Brooke Mendez is a 39-year-old female who was referred for genetic counseling due to a personal history of breast cancer.  Genetic counseling was provided via telehealth, and Ms. Mendez confirmed her name, date of birth, and that she was located in [place] at the time of the appointment. Ms. Mendez was recently diagnosed with a triple breast cancer at age 39, and will be undergoing neoadjuvant chemotherapy.  Ms. Mendez is premenopausal and retains her uterus and ovaries.  Ms. Mendez was interested in discussing her risk for a hereditary cancer syndrome, and decided to pursue genetic testing.   Ms. Mendez opted to pursue comprehensive testing via the CancerNext panel ordered through [facility] which includes BRCA1/2 and 37 additional genes associated with an increased risk of breast cancer and other cancers. Genetic testing was negative for pathogenic germline mutations in BRCA1/2 and 37 additional genes on the CancerNext panel.  These normal results were discussed with Ms. Mendez by telephone on 3/28/25.      PERTINENT FAMILY HISTORY:  Mat. Grandmother:   Lung cancer       Suspected breast cancer  Mat. Grandfather:   Lung cancer  Mat. Great-Grandmother:  Breast cancer  Mat. Great-Uncle:   Leukemia  Mat. 1st cousin, once-removed: Breast cancer, 50  Mat. 1st cousin, once-removed: Lung cancer  Mat. 2nd cousin:   Thyroid cancer (metastatic)      RISK ASSESSMENT:  Ms. Mendez's personal history of breast cancer in combination with the paternal family history of breast cancer and prostate cancer raises the question of a hereditary cancer syndrome.   NCCN guidelines recommend BRCA1/2 testing for individuals diagnosed with breast cancer diagnosed at or under age 50.  Therefore, testing is appropriate for Ms. Mendez.  We discussed that standard approach to BRCA1/2 testing is via a multigene panel that evaluates BRCA1/2 and multiple other genes known to impact cancer risk. This risk assessment is based on the family history information  provided at the time of the appointment.  The assessment could change in the future should new information be obtained.     GENETIC COUNSELING: We reviewed the family history information in detail.  Cases of breast cancer follow three general patterns: sporadic, familial, and hereditary.  While most cancer is sporadic, some cases appear to occur in family clusters.  These cases are said to be familial and account for 10-20% of breast cancer cases.  Familial cases may be due to a combination of shared genes and environmental factors among family members.  In even fewer cases (5-10%), the risk for cancer is inherited, and the genes responsible for the increased cancer risk are known.       Family histories typical of hereditary cancer syndromes usually include multiple first- and second-degree relatives diagnosed with cancer types that define a syndrome.  These cases tend to be diagnosed at younger-than-expected ages and can be bilateral or multifocal.  The cancer in these families follows an autosomal dominant inheritance pattern, which indicates the likely presence of a mutation in a cancer susceptibility gene.  Children and siblings of an individual believed to carry this mutation have a 50% chance of inheriting that mutation, thereby inheriting the increased risk to develop cancer.  These mutations can be passed down from the maternal or the paternal lineage.     Hereditary breast cancer accounts for 5-10% of all cases of breast cancer.  A significant proportion of hereditary breast cancer can be attributed to mutations in the BRCA1 and BRCA2 genes.  Mutations in these genes confer an increased risk for breast cancer, ovarian cancer, male breast cancer, prostate cancer and pancreatic cancer.  There are other genes that are known to be associated with an increased risk for breast cancer, colon cancer, and other cancers.  We briefly discussed Arellano syndrome, due to MsSenait Mendez's personal and family history of colon  cancer. The standard approach to genetic testing is via a multigene panel.  Genes included on these panels have varying degrees of risk associated, and management and screening guidelines vary based on the specific gene.  Hereditary cancer syndromes can demonstrate incomplete penetrance and variable expression within families. We discussed the possibility of results that are unexpected and the possibility of learning about cancer risks that were not anticipated based on family history. Based on Ms. Mendez's personal history and her desire to get more information regarding her personal risks and risks for her family, she opted to pursue testing through a panel evaluating 39 genes associated with hereditary risk for cancer.     GENETIC TESTING:  The risks, benefits and limitations of genetic testing and implications for clinical management following testing were reviewed.  DNA test results can influence decisions regarding screening, prevention and surgical management.  Genetic testing can have significant psychological implications for both individuals and families.       We discussed panel testing, which would involve testing for BRCA1/2 as well as several other cancer susceptibility genes at the same time.  The benefits and limitations of genetic testing were discussed and Ms. Mendez decided to pursue testing. The implications of a positive or negative test result were discussed. We discussed the possibility that, in some cases, genetic test results may be uninformative due to the identification of a genetic variant of uncertain significance (VUS). These variants may or may not be associated with an increased cancer risk.  VUSs are frequently reported through multigene panel testing, given the presence of genetic variation in the population and the number of genes being analyzed. Given her personal history, a negative test result would not eliminate all breast cancer risk to her relatives, although the risk would not  be as high as it would with positive genetic testing.       TEST RESULTS:  Genetic testing was negative for pathogenic germline mutations in the 39 genes evaluated on the CancerNext panel (see attached results). This negative result greatly lowers the risk of a hereditary cancer syndrome.   There could be genes that have yet to be described that contribute to risk for breast cancer, or the possibility of a combination of genetic and environmental factors contributing to risk in the family. This assessment is based on the information provided at time of consultation.    CANCER SCREENING: Despite the negative genetic test results, Ms. Mendez's female relatives may have a somewhat increased lifetime risk for breast cancer based on family history. Female relatives could have a risk assessment performed using a family history-based model, such as the Tyrer-Cuzick model, to determine their individual risks.  Given the increased risk, options available to individuals with a high lifetime risk for breast cancer were briefly discussed.  Surveillance for individuals with a high lifetime risk of breast cancer (>20%, versus the average risk of 12%), based on NCCN guidelines, would consist of semi-annual clinical breast exams and monthly self-breast exams starting by age 18 and annual mammography starting 10 years younger than the earliest diagnosis in a close relative, or starting by age 40, whichever is earliest.  According to NCCN guidelines and the American Cancer Society, annual breast MRI should be offered to women whose lifetime risk of breast cancer is 20% or more, also starting by age 40 or 10 years before the earliest diagnosis in a close relative.      PLAN: Genetic counseling remains available to Ms. Mendez. She is welcome to contact our office with any questions or concerns at [phone].        Abby Watts MS, [facility], [facility]  Licensed Certified Genetic Counselor      Cc: MD Brooke Jones

## 2025-03-28 NOTE — TELEPHONE ENCOUNTER
Spoke with patient and disclosed negative genetic results. Informed patient these results would be on [facility] and sent to their

## 2025-04-01 ENCOUNTER — TELEPHONE (OUTPATIENT)
Dept: ONCOLOGY | Facility: CLINIC | Age: 40
End: 2025-04-01

## 2025-04-01 ENCOUNTER — APPOINTMENT (OUTPATIENT)
Dept: ONCOLOGY | Facility: HOSPITAL | Age: 40
DRG: 607 | End: 2025-04-01
Payer: COMMERCIAL

## 2025-04-01 ENCOUNTER — HOSPITAL ENCOUNTER (INPATIENT)
Dept: ONCOLOGY | Facility: HOSPITAL | Age: 40
Discharge: HOME | DRG: 607 | End: 2025-04-01
Payer: COMMERCIAL

## 2025-04-01 ENCOUNTER — OFFICE VISIT (OUTPATIENT)
Dept: ONCOLOGY | Facility: CLINIC | Age: 40
End: 2025-04-01
Payer: COMMERCIAL

## 2025-04-01 VITALS
BODY MASS INDEX: 29.62 KG/M2 | TEMPERATURE: 97.3 F | SYSTOLIC BLOOD PRESSURE: 153 MMHG | HEART RATE: 83 BPM | OXYGEN SATURATION: 96 % | WEIGHT: 200 LBS | DIASTOLIC BLOOD PRESSURE: 94 MMHG | HEIGHT: 69 IN

## 2025-04-01 VITALS — SYSTOLIC BLOOD PRESSURE: 119 MMHG | HEART RATE: 77 BPM | DIASTOLIC BLOOD PRESSURE: 81 MMHG | OXYGEN SATURATION: 93 %

## 2025-04-01 DIAGNOSIS — C50.912 MALIGNANT NEOPLASM OF LEFT BREAST IN FEMALE, ESTROGEN RECEPTOR NEGATIVE, UNSPECIFIED SITE OF BREAST: Primary | ICD-10-CM

## 2025-04-01 DIAGNOSIS — C50.912 MALIGNANT NEOPLASM OF LEFT BREAST IN FEMALE, ESTROGEN RECEPTOR NEGATIVE, UNSPECIFIED SITE OF BREAST: ICD-10-CM

## 2025-04-01 DIAGNOSIS — Z17.1 MALIGNANT NEOPLASM OF LEFT BREAST IN FEMALE, ESTROGEN RECEPTOR NEGATIVE, UNSPECIFIED SITE OF BREAST: ICD-10-CM

## 2025-04-01 DIAGNOSIS — Z17.1 MALIGNANT NEOPLASM OF LEFT BREAST IN FEMALE, ESTROGEN RECEPTOR NEGATIVE, UNSPECIFIED SITE OF BREAST: Primary | ICD-10-CM

## 2025-04-01 LAB
ALBUMIN SERPL-MCNC: 3.5 G/DL (ref 3.5–5.2)
ALBUMIN/GLOB SERPL: 1.5 G/DL
ALP SERPL-CCNC: 78 U/L (ref 39–117)
ALT SERPL W P-5'-P-CCNC: 45 U/L (ref 1–33)
ANION GAP SERPL CALCULATED.3IONS-SCNC: 11 MMOL/L (ref 5–15)
AST SERPL-CCNC: 16 U/L (ref 1–32)
BASOPHILS # BLD AUTO: 0.01 10*3/MM3 (ref 0–0.2)
BASOPHILS NFR BLD AUTO: 0.1 % (ref 0–1.5)
BILIRUB SERPL-MCNC: 0.4 MG/DL (ref 0–1.2)
BUN SERPL-MCNC: 23 MG/DL (ref 6–20)
BUN/CREAT SERPL: 32.9 (ref 7–25)
CALCIUM SPEC-SCNC: 9.1 MG/DL (ref 8.6–10.5)
CHLORIDE SERPL-SCNC: 102 MMOL/L (ref 98–107)
CO2 SERPL-SCNC: 24 MMOL/L (ref 22–29)
CREAT SERPL-MCNC: 0.7 MG/DL (ref 0.57–1)
DEPRECATED RDW RBC AUTO: 45.7 FL (ref 37–54)
EGFRCR SERPLBLD CKD-EPI 2021: 112.3 ML/MIN/1.73
EOSINOPHIL # BLD AUTO: 0 10*3/MM3 (ref 0–0.4)
EOSINOPHIL NFR BLD AUTO: 0 % (ref 0.3–6.2)
ERYTHROCYTE [DISTWIDTH] IN BLOOD BY AUTOMATED COUNT: 13.5 % (ref 12.3–15.4)
GLOBULIN UR ELPH-MCNC: 2.3 GM/DL
GLUCOSE SERPL-MCNC: 91 MG/DL (ref 65–99)
HCT VFR BLD AUTO: 30.9 % (ref 34–46.6)
HGB BLD-MCNC: 10.3 G/DL (ref 12–15.9)
IMM GRANULOCYTES # BLD AUTO: 0.73 10*3/MM3 (ref 0–0.05)
IMM GRANULOCYTES NFR BLD AUTO: 4.6 % (ref 0–0.5)
LYMPHOCYTES # BLD AUTO: 2.42 10*3/MM3 (ref 0.7–3.1)
LYMPHOCYTES NFR BLD AUTO: 15.3 % (ref 19.6–45.3)
MCH RBC QN AUTO: 30.7 PG (ref 26.6–33)
MCHC RBC AUTO-ENTMCNC: 33.3 G/DL (ref 31.5–35.7)
MCV RBC AUTO: 92.2 FL (ref 79–97)
MONOCYTES # BLD AUTO: 1.25 10*3/MM3 (ref 0.1–0.9)
MONOCYTES NFR BLD AUTO: 7.9 % (ref 5–12)
NEUTROPHILS NFR BLD AUTO: 11.44 10*3/MM3 (ref 1.7–7)
NEUTROPHILS NFR BLD AUTO: 72.1 % (ref 42.7–76)
PLATELET # BLD AUTO: 352 10*3/MM3 (ref 140–450)
PMV BLD AUTO: 8.9 FL (ref 6–12)
POTASSIUM SERPL-SCNC: 4.1 MMOL/L (ref 3.5–5.2)
PROT SERPL-MCNC: 5.8 G/DL (ref 6–8.5)
RBC # BLD AUTO: 3.35 10*6/MM3 (ref 3.77–5.28)
SODIUM SERPL-SCNC: 137 MMOL/L (ref 136–145)
WBC NRBC COR # BLD AUTO: 15.85 10*3/MM3 (ref 3.4–10.8)

## 2025-04-01 PROCEDURE — 96411 CHEMO IV PUSH ADDL DRUG: CPT

## 2025-04-01 PROCEDURE — 25010000002 DEXAMETHASONE SODIUM PHOSPHATE 100 MG/10ML SOLUTION: Performed by: INTERNAL MEDICINE

## 2025-04-01 PROCEDURE — 96413 CHEMO IV INFUSION 1 HR: CPT

## 2025-04-01 PROCEDURE — 25010000002 PALONOSETRON PER 25 MCG: Performed by: INTERNAL MEDICINE

## 2025-04-01 PROCEDURE — 25010000002 FAMOTIDINE 10 MG/ML SOLUTION: Performed by: INTERNAL MEDICINE

## 2025-04-01 PROCEDURE — 25010000002 PROCHLORPERAZINE 10 MG/2ML SOLUTION: Performed by: INTERNAL MEDICINE

## 2025-04-01 PROCEDURE — 80053 COMPREHEN METABOLIC PANEL: CPT | Performed by: INTERNAL MEDICINE

## 2025-04-01 PROCEDURE — 96375 TX/PRO/DX INJ NEW DRUG ADDON: CPT

## 2025-04-01 PROCEDURE — 25010000002 PACLITAXEL PER 1 MG: Performed by: INTERNAL MEDICINE

## 2025-04-01 PROCEDURE — 25010000002 DIPHENHYDRAMINE PER 50 MG: Performed by: INTERNAL MEDICINE

## 2025-04-01 PROCEDURE — 85025 COMPLETE CBC W/AUTO DIFF WBC: CPT | Performed by: INTERNAL MEDICINE

## 2025-04-01 PROCEDURE — 25010000002 HYDROCORTISONE SOD SUC (PF) 100 MG RECONSTITUTED SOLUTION: Performed by: INTERNAL MEDICINE

## 2025-04-01 PROCEDURE — 25010000002 HEPARIN LOCK FLUSH PER 10 UNITS: Performed by: INTERNAL MEDICINE

## 2025-04-01 PROCEDURE — 96376 TX/PRO/DX INJ SAME DRUG ADON: CPT

## 2025-04-01 PROCEDURE — 25010000002 CARBOPLATIN PER 50 MG: Performed by: INTERNAL MEDICINE

## 2025-04-01 PROCEDURE — 25810000003 SODIUM CHLORIDE 0.9 % SOLUTION 250 ML FLEX CONT: Performed by: INTERNAL MEDICINE

## 2025-04-01 RX ORDER — FAMOTIDINE 10 MG/ML
20 INJECTION, SOLUTION INTRAVENOUS AS NEEDED
Status: COMPLETED | OUTPATIENT
Start: 2025-04-01 | End: 2025-04-01

## 2025-04-01 RX ORDER — VALACYCLOVIR HYDROCHLORIDE 500 MG/1
TABLET, FILM COATED ORAL
Qty: 118 TABLET | Refills: 0 | Status: SHIPPED | OUTPATIENT
Start: 2025-04-01 | End: 2025-07-07

## 2025-04-01 RX ORDER — PROCHLORPERAZINE EDISYLATE 5 MG/ML
5 INJECTION INTRAMUSCULAR; INTRAVENOUS ONCE
Status: COMPLETED | OUTPATIENT
Start: 2025-04-01 | End: 2025-04-01

## 2025-04-01 RX ORDER — PROCHLORPERAZINE MALEATE 10 MG
5-10 TABLET ORAL EVERY 6 HOURS PRN
Qty: 30 TABLET | Refills: 2 | Status: SHIPPED | OUTPATIENT
Start: 2025-04-01

## 2025-04-01 RX ORDER — HYDROCORTISONE SODIUM SUCCINATE 100 MG/2ML
50 INJECTION INTRAMUSCULAR; INTRAVENOUS ONCE
Status: COMPLETED | OUTPATIENT
Start: 2025-04-01 | End: 2025-04-01

## 2025-04-01 RX ORDER — PACLITAXEL 100 MG/20ML
125 INJECTION, POWDER, LYOPHILIZED, FOR SUSPENSION INTRAVENOUS ONCE
Start: 2025-04-08 | End: 2025-04-08

## 2025-04-01 RX ORDER — DIPHENHYDRAMINE, LIDOCAINE, NYSTATIN
KIT ORAL
Qty: 240 ML | Refills: 3 | Status: SHIPPED | OUTPATIENT
Start: 2025-04-01

## 2025-04-01 RX ORDER — DIPHENHYDRAMINE HYDROCHLORIDE 50 MG/ML
50 INJECTION, SOLUTION INTRAMUSCULAR; INTRAVENOUS AS NEEDED
Status: COMPLETED | OUTPATIENT
Start: 2025-04-01 | End: 2025-04-01

## 2025-04-01 RX ORDER — HYDROCORTISONE SODIUM SUCCINATE 100 MG/2ML
100 INJECTION INTRAMUSCULAR; INTRAVENOUS AS NEEDED
Status: COMPLETED | OUTPATIENT
Start: 2025-04-01 | End: 2025-04-01

## 2025-04-01 RX ORDER — ONDANSETRON 8 MG/1
8 TABLET, ORALLY DISINTEGRATING ORAL EVERY 8 HOURS PRN
Qty: 60 TABLET | Refills: 5 | Status: SHIPPED | OUTPATIENT
Start: 2025-04-01

## 2025-04-01 RX ORDER — HEPARIN SODIUM (PORCINE) LOCK FLUSH IV SOLN 100 UNIT/ML 100 UNIT/ML
500 SOLUTION INTRAVENOUS AS NEEDED
Status: DISCONTINUED | OUTPATIENT
Start: 2025-04-01 | End: 2025-04-02 | Stop reason: HOSPADM

## 2025-04-01 RX ORDER — TRAMADOL HYDROCHLORIDE 50 MG/1
50 TABLET ORAL EVERY 6 HOURS PRN
Qty: 30 TABLET | Refills: 0 | Status: SHIPPED | OUTPATIENT
Start: 2025-04-01 | End: 2025-04-01 | Stop reason: SDUPTHER

## 2025-04-01 RX ORDER — PALONOSETRON 0.05 MG/ML
0.25 INJECTION, SOLUTION INTRAVENOUS ONCE
Status: COMPLETED | OUTPATIENT
Start: 2025-04-01 | End: 2025-04-01

## 2025-04-01 RX ORDER — SODIUM CHLORIDE 0.9 % (FLUSH) 0.9 %
20 SYRINGE (ML) INJECTION AS NEEDED
OUTPATIENT
Start: 2025-04-01

## 2025-04-01 RX ORDER — TRAMADOL HYDROCHLORIDE 50 MG/1
50 TABLET ORAL EVERY 6 HOURS PRN
Qty: 30 TABLET | Refills: 0 | Status: SHIPPED | OUTPATIENT
Start: 2025-04-01

## 2025-04-01 RX ORDER — OMEPRAZOLE 40 MG/1
40 CAPSULE, DELAYED RELEASE ORAL
Qty: 30 CAPSULE | Refills: 5 | Status: SHIPPED | OUTPATIENT
Start: 2025-04-01

## 2025-04-01 RX ORDER — FAMOTIDINE 20 MG/1
20 TABLET, FILM COATED ORAL 2 TIMES DAILY
Qty: 60 TABLET | Refills: 0 | Status: SHIPPED | OUTPATIENT
Start: 2025-04-01

## 2025-04-01 RX ORDER — FAMOTIDINE 10 MG/ML
20 INJECTION, SOLUTION INTRAVENOUS ONCE
Status: COMPLETED | OUTPATIENT
Start: 2025-04-01 | End: 2025-04-01

## 2025-04-01 RX ORDER — SODIUM CHLORIDE 0.9 % (FLUSH) 0.9 %
10 SYRINGE (ML) INJECTION AS NEEDED
OUTPATIENT
Start: 2025-04-01

## 2025-04-01 RX ORDER — SODIUM CHLORIDE 9 MG/ML
20 INJECTION, SOLUTION INTRAVENOUS ONCE
Status: DISCONTINUED | OUTPATIENT
Start: 2025-04-01 | End: 2025-04-02 | Stop reason: HOSPADM

## 2025-04-01 RX ORDER — DIPHENHYDRAMINE HYDROCHLORIDE 50 MG/ML
25 INJECTION, SOLUTION INTRAMUSCULAR; INTRAVENOUS ONCE
Status: COMPLETED | OUTPATIENT
Start: 2025-04-01 | End: 2025-04-01

## 2025-04-01 RX ORDER — HEPARIN SODIUM (PORCINE) LOCK FLUSH IV SOLN 100 UNIT/ML 100 UNIT/ML
300 SOLUTION INTRAVENOUS ONCE
OUTPATIENT
Start: 2025-04-01

## 2025-04-01 RX ORDER — HEPARIN SODIUM (PORCINE) LOCK FLUSH IV SOLN 100 UNIT/ML 100 UNIT/ML
500 SOLUTION INTRAVENOUS AS NEEDED
OUTPATIENT
Start: 2025-04-01

## 2025-04-01 RX ADMIN — PALONOSETRON HYDROCHLORIDE 0.25 MG: 0.25 INJECTION INTRAVENOUS at 09:39

## 2025-04-01 RX ADMIN — DIPHENHYDRAMINE HYDROCHLORIDE 25 MG: 50 INJECTION INTRAMUSCULAR; INTRAVENOUS at 09:39

## 2025-04-01 RX ADMIN — DEXAMETHASONE SODIUM PHOSPHATE 12 MG: 10 INJECTION, SOLUTION INTRAMUSCULAR; INTRAVENOUS at 09:32

## 2025-04-01 RX ADMIN — DIPHENHYDRAMINE HYDROCHLORIDE 50 MG: 50 INJECTION INTRAMUSCULAR; INTRAVENOUS at 10:46

## 2025-04-01 RX ADMIN — FAMOTIDINE 20 MG: 10 INJECTION, SOLUTION INTRAVENOUS at 10:43

## 2025-04-01 RX ADMIN — FAMOTIDINE 20 MG: 10 INJECTION, SOLUTION INTRAVENOUS at 09:32

## 2025-04-01 RX ADMIN — SODIUM CHLORIDE 160 MG: 9 INJECTION, SOLUTION INTRAVENOUS at 10:30

## 2025-04-01 RX ADMIN — PROCHLORPERAZINE EDISYLATE 5 MG: 5 INJECTION INTRAMUSCULAR; INTRAVENOUS at 10:51

## 2025-04-01 RX ADMIN — HYDROCORTISONE SODIUM SUCCINATE 100 MG: 100 INJECTION, POWDER, FOR SOLUTION INTRAMUSCULAR; INTRAVENOUS at 10:45

## 2025-04-01 RX ADMIN — HEPARIN 500 UNITS: 100 SYRINGE at 12:45

## 2025-04-01 RX ADMIN — CARBOPLATIN 230 MG: 10 INJECTION INTRAVENOUS at 12:00

## 2025-04-01 RX ADMIN — HYDROCORTISONE SODIUM SUCCINATE 50 MG: 100 INJECTION, POWDER, FOR SOLUTION INTRAMUSCULAR; INTRAVENOUS at 10:55

## 2025-04-01 NOTE — ADDENDUM NOTE
Encounter addended by: Hortencia Soni RN on: 4/1/2025 3:35 PM   Actions taken: Specialty comments modified

## 2025-04-01 NOTE — TELEPHONE ENCOUNTER
----- Message from Hortencia GARCIA sent at 4/1/2025  9:19 AM EDT -----  Regarding: ARPITA/ pain  Patient is in pain 6/10 head and neck, she is also reporting pain in mouth related to mouth sores, may she have something for pain, please. Patient took 2 tylenol this morning. Derek Soni [id number]

## 2025-04-01 NOTE — PROGRESS NOTES
"10 minutes into Taxol infusion patient called out of her room stating she felt like her skin was \"on fire\".  Patient was flushed hypertensive, tachycardic, nauseated. Patient stated her chest felt tight. Taxol was stopped immediately, emergency meds given. Dr. Baker at bedside. Patient nauseated stating her chest felt tight after emergency meds, additional solu cortef and compazine given. Patient became hypotensive and lethargic, Dr. Baker remained at bedside until patient BP came up. Patient back to baseline able to tolerate Carboplatin Infusion. Patient back to baseline and hemodynamically stable at discharge.   Hortencia Soni RN  "

## 2025-04-01 NOTE — TELEPHONE ENCOUNTER
Dr. Baker notified and Hortencia advised that patient can have her mother  prescription for Ultram now at  Retail pharmacy if she is needing something for pain now, this office will send prescription here.  Hortencia advised patient per Dr. Baker and stated patient will have her mother go and  Ultram at  Retail pharmacy.  New refill request to go to [facility] retail routed to Dr. Baker to sign and this RN contacted [facility] and spoke with Estefanía and advised them to cancel prescription sent in today by Dr. Baker for Ultram.  Estefanía verbalized understanding.

## 2025-04-01 NOTE — PROGRESS NOTES
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 10 minutes into Taxol which resolved with additional premedications.  She proceeded with carboplatin.  Will omit further Taxol and change to Abraxane for her upcoming infusion.  -  Follow Up:   Weekly with treatment     Fanny Baker MD  Hematology and Oncology     I have spent a total of 40 min on reviewing test results/preparing to see patient, counseling patient, performing medically appropriate exam and documenting clinical information in the electronic or other health record

## 2025-04-02 ENCOUNTER — READMISSION MANAGEMENT (OUTPATIENT)
Dept: CALL CENTER | Facility: HOSPITAL | Age: 40
End: 2025-04-02
Payer: COMMERCIAL

## 2025-04-02 LAB
QT INTERVAL: 300 MS
QTC INTERVAL: 415 MS

## 2025-04-02 NOTE — OUTREACH NOTE
Medical Week 1 Survey      Flowsheet Row Responses   [facility] patient discharged from? [place]   Does the patient have one of the following disease processes/diagnoses(primary or secondary)? Other   Week 1 attempt successful? Yes   Call start time 1202   Call end time 1205   Discharge diagnosis Rash-Malignant neoplasm of left breast   Meds reviewed with patient/caregiver? Yes   Is the patient having any side effects they believe may be caused by any medication additions or changes? No   Does the patient have all medications ordered at discharge? Yes   Is the patient taking all medications as directed (includes completed medication regime)? Yes   Does the patient have a primary care provider?  Yes   Does the patient have an appointment with their PCP within 7 days of discharge? --  [will reschedule PCP appt]   Has the patient kept scheduled appointments due by today? Yes   Has home health visited the patient within 72 hours of discharge? N/A   Psychosocial issues? No   Did the patient receive a copy of their discharge instructions? Yes   Nursing interventions Reviewed instructions with patient   What is the patient's perception of their health status since discharge? Improving   Is the patient/caregiver able to teach back signs and symptoms related to disease process for when to call PCP? Yes   Is the patient/caregiver able to teach back signs and symptoms related to disease process for when to call 911? Yes   Is the patient/caregiver able to teach back the hierarchy of who to call/visit for symptoms/problems? PCP, Specialist, Home health nurse, Urgent Care, ED, 911 Yes   Week 1 call completed? Yes   Graduated Yes   Graduated/Revoked comments P kit rash has improved. Pt has seen oncology. Pt had chemo yesterday and she spoke with PCP and will reschedule Fu appt for later. No needs.   Call end time 1205            ARGELIA GARCIA - Registered Nurse

## 2025-04-08 ENCOUNTER — HOSPITAL ENCOUNTER (OUTPATIENT)
Dept: ONCOLOGY | Facility: HOSPITAL | Age: 40
Discharge: HOME | End: 2025-04-08
Payer: COMMERCIAL

## 2025-04-08 ENCOUNTER — SPECIALTY PHARMACY (OUTPATIENT)
Dept: ONCOLOGY | Facility: HOSPITAL | Age: 40
End: 2025-04-08
Payer: COMMERCIAL

## 2025-04-08 ENCOUNTER — DOCUMENTATION (OUTPATIENT)
Dept: OTHER | Facility: HOSPITAL | Age: 40
End: 2025-04-08
Payer: COMMERCIAL

## 2025-04-08 ENCOUNTER — PATIENT OUTREACH (OUTPATIENT)
Dept: OTHER | Facility: HOSPITAL | Age: 40
End: 2025-04-08
Payer: COMMERCIAL

## 2025-04-08 ENCOUNTER — TELEPHONE (OUTPATIENT)
Dept: ONCOLOGY | Facility: CLINIC | Age: 40
End: 2025-04-08

## 2025-04-08 ENCOUNTER — RESEARCH ENCOUNTER (OUTPATIENT)
Dept: OTHER | Facility: OTHER | Age: 40
End: 2025-04-08
Payer: COMMERCIAL

## 2025-04-08 ENCOUNTER — HOSPITAL ENCOUNTER (OUTPATIENT)
Dept: ONCOLOGY | Facility: HOSPITAL | Age: 40
End: 2025-04-08
Payer: COMMERCIAL

## 2025-04-08 ENCOUNTER — OFFICE VISIT (OUTPATIENT)
Dept: ONCOLOGY | Facility: CLINIC | Age: 40
End: 2025-04-08
Payer: COMMERCIAL

## 2025-04-08 VITALS
SYSTOLIC BLOOD PRESSURE: 138 MMHG | TEMPERATURE: 98.5 F | OXYGEN SATURATION: 98 % | RESPIRATION RATE: 18 BRPM | DIASTOLIC BLOOD PRESSURE: 78 MMHG | BODY MASS INDEX: 29.4 KG/M2 | HEART RATE: 80 BPM | WEIGHT: 199.2 LBS

## 2025-04-08 DIAGNOSIS — Z17.1 MALIGNANT NEOPLASM OF LEFT BREAST IN FEMALE, ESTROGEN RECEPTOR NEGATIVE, UNSPECIFIED SITE OF BREAST: Primary | ICD-10-CM

## 2025-04-08 DIAGNOSIS — C50.912 MALIGNANT NEOPLASM OF LEFT BREAST IN FEMALE, ESTROGEN RECEPTOR NEGATIVE, UNSPECIFIED SITE OF BREAST: Primary | ICD-10-CM

## 2025-04-08 LAB
ALBUMIN SERPL-MCNC: 3.9 G/DL (ref 3.5–5.2)
ALBUMIN/GLOB SERPL: 1.3 G/DL
ALP SERPL-CCNC: 72 U/L (ref 39–117)
ALT SERPL W P-5'-P-CCNC: 41 U/L (ref 1–33)
ANION GAP SERPL CALCULATED.3IONS-SCNC: 11 MMOL/L (ref 5–15)
AST SERPL-CCNC: 16 U/L (ref 1–32)
BASOPHILS # BLD AUTO: 0.01 10*3/MM3 (ref 0–0.2)
BASOPHILS NFR BLD AUTO: 0.1 % (ref 0–1.5)
BILIRUB SERPL-MCNC: 0.4 MG/DL (ref 0–1.2)
BUN SERPL-MCNC: 23 MG/DL (ref 6–20)
BUN/CREAT SERPL: 32.9 (ref 7–25)
CALCIUM SPEC-SCNC: 9.3 MG/DL (ref 8.6–10.5)
CHLORIDE SERPL-SCNC: 101 MMOL/L (ref 98–107)
CO2 SERPL-SCNC: 25 MMOL/L (ref 22–29)
CREAT SERPL-MCNC: 0.7 MG/DL (ref 0.57–1)
DEPRECATED RDW RBC AUTO: 48.7 FL (ref 37–54)
EGFRCR SERPLBLD CKD-EPI 2021: 112.3 ML/MIN/1.73
EOSINOPHIL # BLD AUTO: 0.01 10*3/MM3 (ref 0–0.4)
EOSINOPHIL NFR BLD AUTO: 0.1 % (ref 0.3–6.2)
ERYTHROCYTE [DISTWIDTH] IN BLOOD BY AUTOMATED COUNT: 14.5 % (ref 12.3–15.4)
GLOBULIN UR ELPH-MCNC: 2.9 GM/DL
GLUCOSE SERPL-MCNC: 85 MG/DL (ref 65–99)
HCT VFR BLD AUTO: 33.1 % (ref 34–46.6)
HGB BLD-MCNC: 11 G/DL (ref 12–15.9)
IMM GRANULOCYTES # BLD AUTO: 0.84 10*3/MM3 (ref 0–0.05)
IMM GRANULOCYTES NFR BLD AUTO: 5.8 % (ref 0–0.5)
LYMPHOCYTES # BLD AUTO: 2.92 10*3/MM3 (ref 0.7–3.1)
LYMPHOCYTES NFR BLD AUTO: 20 % (ref 19.6–45.3)
MCH RBC QN AUTO: 31.5 PG (ref 26.6–33)
MCHC RBC AUTO-ENTMCNC: 33.2 G/DL (ref 31.5–35.7)
MCV RBC AUTO: 94.8 FL (ref 79–97)
MONOCYTES # BLD AUTO: 1 10*3/MM3 (ref 0.1–0.9)
MONOCYTES NFR BLD AUTO: 6.9 % (ref 5–12)
NEUTROPHILS NFR BLD AUTO: 67.1 % (ref 42.7–76)
NEUTROPHILS NFR BLD AUTO: 9.79 10*3/MM3 (ref 1.7–7)
PLATELET # BLD AUTO: 236 10*3/MM3 (ref 140–450)
PMV BLD AUTO: 8.9 FL (ref 6–12)
POTASSIUM SERPL-SCNC: 3.8 MMOL/L (ref 3.5–5.2)
PROT SERPL-MCNC: 6.8 G/DL (ref 6–8.5)
RBC # BLD AUTO: 3.49 10*6/MM3 (ref 3.77–5.28)
SODIUM SERPL-SCNC: 137 MMOL/L (ref 136–145)
WBC NRBC COR # BLD AUTO: 14.57 10*3/MM3 (ref 3.4–10.8)

## 2025-04-08 PROCEDURE — 25010000002 HEPARIN LOCK FLUSH PER 10 UNITS: Performed by: INTERNAL MEDICINE

## 2025-04-08 PROCEDURE — 85025 COMPLETE CBC W/AUTO DIFF WBC: CPT | Performed by: INTERNAL MEDICINE

## 2025-04-08 PROCEDURE — 25010000002 FAMOTIDINE 10 MG/ML SOLUTION: Performed by: INTERNAL MEDICINE

## 2025-04-08 PROCEDURE — 25010000002 DEXAMETHASONE SODIUM PHOSPHATE 100 MG/10ML SOLUTION: Performed by: INTERNAL MEDICINE

## 2025-04-08 PROCEDURE — 25010000002 PACLITAXEL PROTEIN-BOUND PART PER 1 MG: Performed by: INTERNAL MEDICINE

## 2025-04-08 PROCEDURE — 96413 CHEMO IV INFUSION 1 HR: CPT

## 2025-04-08 PROCEDURE — 96375 TX/PRO/DX INJ NEW DRUG ADDON: CPT

## 2025-04-08 PROCEDURE — 25010000002 PALONOSETRON PER 25 MCG: Performed by: INTERNAL MEDICINE

## 2025-04-08 PROCEDURE — 25010000002 CARBOPLATIN PER 50 MG: Performed by: INTERNAL MEDICINE

## 2025-04-08 PROCEDURE — 25810000003 SODIUM CHLORIDE 0.9 % SOLUTION: Performed by: INTERNAL MEDICINE

## 2025-04-08 PROCEDURE — 80053 COMPREHEN METABOLIC PANEL: CPT | Performed by: INTERNAL MEDICINE

## 2025-04-08 PROCEDURE — 99215 OFFICE O/P EST HI 40 MIN: CPT | Performed by: NURSE PRACTITIONER

## 2025-04-08 PROCEDURE — 96417 CHEMO IV INFUS EACH ADDL SEQ: CPT

## 2025-04-08 PROCEDURE — 25010000002 DIPHENHYDRAMINE PER 50 MG: Performed by: INTERNAL MEDICINE

## 2025-04-08 RX ORDER — SODIUM CHLORIDE 0.9 % (FLUSH) 0.9 %
10 SYRINGE (ML) INJECTION AS NEEDED
OUTPATIENT
Start: 2025-04-08

## 2025-04-08 RX ORDER — SODIUM CHLORIDE 9 MG/ML
20 INJECTION, SOLUTION INTRAVENOUS ONCE
Status: COMPLETED | OUTPATIENT
Start: 2025-04-08 | End: 2025-04-08

## 2025-04-08 RX ORDER — TRAMADOL HYDROCHLORIDE 50 MG/1
50 TABLET ORAL EVERY 6 HOURS PRN
Qty: 30 TABLET | Refills: 0 | Status: SHIPPED | OUTPATIENT
Start: 2025-04-08

## 2025-04-08 RX ORDER — PACLITAXEL 100 MG/20ML
125 INJECTION, POWDER, LYOPHILIZED, FOR SUSPENSION INTRAVENOUS ONCE
Status: COMPLETED | OUTPATIENT
Start: 2025-04-08 | End: 2025-04-08

## 2025-04-08 RX ORDER — HEPARIN SODIUM (PORCINE) LOCK FLUSH IV SOLN 100 UNIT/ML 100 UNIT/ML
300 SOLUTION INTRAVENOUS ONCE
OUTPATIENT
Start: 2025-04-08

## 2025-04-08 RX ORDER — PALONOSETRON 0.05 MG/ML
0.25 INJECTION, SOLUTION INTRAVENOUS ONCE
Status: COMPLETED | OUTPATIENT
Start: 2025-04-08 | End: 2025-04-08

## 2025-04-08 RX ORDER — FLUCONAZOLE 100 MG/1
TABLET ORAL
COMMUNITY
Start: 2025-03-27

## 2025-04-08 RX ORDER — HEPARIN SODIUM (PORCINE) LOCK FLUSH IV SOLN 100 UNIT/ML 100 UNIT/ML
500 SOLUTION INTRAVENOUS AS NEEDED
Status: DISCONTINUED | OUTPATIENT
Start: 2025-04-08 | End: 2025-04-09 | Stop reason: HOSPADM

## 2025-04-08 RX ORDER — LORAZEPAM 1 MG/1
TABLET ORAL
COMMUNITY
Start: 2025-04-02

## 2025-04-08 RX ORDER — FAMOTIDINE 10 MG/ML
20 INJECTION, SOLUTION INTRAVENOUS ONCE
Status: COMPLETED | OUTPATIENT
Start: 2025-04-08 | End: 2025-04-08

## 2025-04-08 RX ORDER — DIPHENHYDRAMINE HYDROCHLORIDE 50 MG/ML
25 INJECTION, SOLUTION INTRAMUSCULAR; INTRAVENOUS ONCE
Status: COMPLETED | OUTPATIENT
Start: 2025-04-08 | End: 2025-04-08

## 2025-04-08 RX ORDER — NYSTATIN 100000 [USP'U]/ML
SUSPENSION ORAL
COMMUNITY
Start: 2025-04-01

## 2025-04-08 RX ORDER — HEPARIN SODIUM (PORCINE) LOCK FLUSH IV SOLN 100 UNIT/ML 100 UNIT/ML
500 SOLUTION INTRAVENOUS AS NEEDED
OUTPATIENT
Start: 2025-04-08

## 2025-04-08 RX ORDER — SODIUM CHLORIDE 0.9 % (FLUSH) 0.9 %
20 SYRINGE (ML) INJECTION AS NEEDED
OUTPATIENT
Start: 2025-04-08

## 2025-04-08 RX ORDER — NORETHINDRONE AND ETHINYL ESTRADIOL 0.5-0.035
KIT ORAL
COMMUNITY
Start: 2025-03-27

## 2025-04-08 RX ADMIN — HEPARIN 500 UNITS: 100 SYRINGE at 13:47

## 2025-04-08 RX ADMIN — CARBOPLATIN 230 MG: 10 INJECTION INTRAVENOUS at 12:55

## 2025-04-08 RX ADMIN — FAMOTIDINE 20 MG: 10 INJECTION, SOLUTION INTRAVENOUS at 10:53

## 2025-04-08 RX ADMIN — PACLITAXEL 255 MG: 100 INJECTION, POWDER, LYOPHILIZED, FOR SUSPENSION INTRAVENOUS at 11:45

## 2025-04-08 RX ADMIN — DEXAMETHASONE SODIUM PHOSPHATE 12 MG: 10 INJECTION, SOLUTION INTRAMUSCULAR; INTRAVENOUS at 10:53

## 2025-04-08 RX ADMIN — PALONOSETRON HYDROCHLORIDE 0.25 MG: 0.25 INJECTION INTRAVENOUS at 10:53

## 2025-04-08 RX ADMIN — SODIUM CHLORIDE 20 ML/HR: 9 INJECTION, SOLUTION INTRAVENOUS at 10:53

## 2025-04-08 RX ADMIN — DIPHENHYDRAMINE HYDROCHLORIDE 25 MG: 50 INJECTION INTRAMUSCULAR; INTRAVENOUS at 10:53

## 2025-04-08 NOTE — RESEARCH
Patient Name:  Brooke Mendez  YOB: 1985  Patient Age:  39 y.o.  Patient's Sex:  female    Date of Service:  04/08/2025    Provider:  ELVIS Baker MD                     RESEARCH NOTE                  Protocol --ICE COMPRESS: Randomized Trial of Limb Cryocompression Versus Continuous Compression Versus Low Cyclic Compression for the Prevention of Taxane-Induced Peripheral Neuropathy   NCT #[id number]     Subject ID: [id number]  ARM 2:  Continuous Compression       Participant here for C3.  Planned Taxol/Cb qwk x 12; Keytruda q3wks. Keytruda has been discontinued r/t rash. Pt continuing on steroids and rash has much improved. Taxol changed to Abraxane which was administered today w/o incidence.    Port in place. ?Device used on legs only per pt request.   Upon assessment, there were no signs of redness, open wounds or break in skin integrity prior to start of device intervention.   Participant tolerated device intervention without any adjustments. ?No AEs observed.      Next appt planned for 4/15/25 for C4.     Thank you.    DEVON Velazquez, BSN, RN, RNC

## 2025-04-08 NOTE — TELEPHONE ENCOUNTER
Caller: Brooke Mendez    Relationship: Self    Best call back number: [phone]      What was the call regarding: PATIENT IS RUNNING LATE TO APPOINTMENT, SHE WASN'T SURE HOW LATE

## 2025-04-08 NOTE — PROGRESS NOTES
SEBASTIAN met with pt and her supportive mother in infusion to provide support and assess for psychosocial needs. Pt was asleep at time of visit so SW spoke with pt's mother. SW provided introductions and an overview of the role and available resources. Pt's mother was in good spirits, and denied any needs. SW provided pt's supportive mother with contact information and encouraged him to reach out should needs arise. Pt's mother agreed to plan and thanked SEBASTIAN for the visit. SEBASTIAN will remain available for ongoing support and assistance.

## 2025-04-08 NOTE — SIGNIFICANT NOTE
Met with patient in infusion to provide wig resources. Pt still has her hair, but expressed frustration about trying to get a wig. Provided info on [employer], [employer], and info about wig reimbursement from insurance. Encouraged pt to reach out with any questions or needs.

## 2025-04-08 NOTE — PROGRESS NOTES
Outpatient Infusion  [address] [place] [address]  [phone]      CHEMOTHERAPY EDUCATION    NAME:  Brooke Mendez      : 1985           DATE: 25    Medication Education Sheets: (select all that apply)  Protein-Bound Paclitaxel    Other Education Sheets: (select all that apply)  CINV, Diarrhea, and Symptom Tracker Sheet    Chemotherapy Regimen:   OP BREAST Protein-Bound PACLitaxel / CARBOplatin AUC=1.5 (Weekly) every 21 days    TOPICS EDUCATION PROVIDED COMMENTS   ANEMIA:  role of RBC, cause, s/s, ways to manage, role of transfusion [x] Reviewed the role of RBC and the use of transfusions if hemoglobin decreases too much.  Patient to notify us if she experiences shortness of breath, dizziness, or palpitations.  Also let patient know she could feel more tired than usual and to try to stay active, but rest if she needs to.    THROMBOCYTOPENIA:  role of platelet, cause, s/s, ways to prevent bleeding, things to avoid, when to seek help [x] Reviewed the role of platelets in blood clotting and when to call clinic (bloody nose that bleeds for 5 minutes despite pressure, a cut that won't stop bleeding despite pressure, gums that bleed excessively with brushing or flossing). Recommended using a soft bristle toothbrush and blowing the nose gently.    NEUTROPENIA:  role of WBC, cause, infection precautions, s/s of infection, when to call MD [x] Reviewed the role of WBC, good infection prevention practices, and when to call the clinic (temperature 100.4F, sore throat, burning urination, etc).   NUTRITION & APPETITE CHANGES:  importance of maintaining healthy diet & weight, ways to manage to improve intake, dietary consult, exercise regimen, electrolyte and/or blood glucose abnormalities [x] Decreased Appetite: Discussed the risk of decreased appetite. Recommended eating smaller, more frequent meals. Instructed the patient to contact the clinic if losing weight or having  difficulty eating enough to maintain energy level.   DIARRHEA:  causes, s/s of dehydration, ways to manage, dietary changes, when to call MD [x] Discussed the risk of diarrhea. Instructed patient on use OTC loperamide with diarrhea onset, but emphasized the importance of calling the clinic if 4-6 episodes in 24 hours not relieved by OTC loperamide.   CONSTIPATION:  causes, ways to manage, dietary changes, when to call MD [x] Discussed use of docusate and other OTC therapies to manage constipation.  Instructed patient to call the clinic if medications aren't working.    NAUSEA & VOMITING:  cause, use of antiemetics, dietary changes, when to call MD [x] Emetic risk: Moderate (overall)  Premeds: Dexamethasone and Palonosetron  PRN home meds: Ondansetron 8mg PO every 8 hours PRN nausea / vomiting  Pharmacy home meds previously sent to: [facility] in [place]    Instructed the patient to take a dose of the PRN medication at the first onset of nausea and if it's not working to call us for additional medications.  Also provided non-drug measures to mitigate nausea.   ALOPECIA:  cause, ways to manage, resources [x] Discussed the possibility of hair loss with the patient. Informed patient that she could request a prescription for a wig if desired and most of the cost is usually covered by insurance. Recommended covering the head with a hat and/or protecting the skin on the head with SPF 30 or higher.    NERVOUS SYSTEM CHANGES:  causes, s/s, neuropathies, cognitive changes, ways to manage [x] Peripheral Neuropathy: Discussed the adverse effect of peripheral neuropathy and signs/symptoms associated with this adverse effect. Instructed patient to call if symptoms become more frequent or worsen.    SKIN & NAIL CHANGES:  cause, s/s, ways to manage [x] Generalized Rash: Discussed the potential for a rash or itchy skin, and instructed the patient to call if a rash develops and worsens.   ORGAN TOXICITIES:  cause, s/s,  need for diagnostic tests, labs, when to notify MD [x] Discussed potential effects on organ systems, monitoring, diagnostic tests, labs, and when to notify the clinic. Discussed the signs/symptoms of the following: cardiotoxicity, eye changes (blurry vision, watery eyes, photophobia), fluid retention, and hepatotoxicity.   INFUSION RELATED REACTIONS or INJECTION-SITE REACTION:  Cause, s/s, anaphylaxis, monitoring, etc. [x] Premeds: Dexamethasone, Diphenhydramine, and Famotidine    Discussed the lower risk of an infusion reaction (4% vs 31-45% incidence) and symptoms such as: fever, chills, dizziness, itchiness or rash, flushing, trouble breathing, wheezing, sudden back pain, or feeling faint.  Instructed the patient to notify her nurse if she starts feeling weird at any point during her infusion.    Reviewed how infusion reactions are managed.   MISCELLANEOUS:  drug interactions, administration, labs, etc. [x] Discussed chemotherapy schedule, lab draws, infusion times, and total expected visit time.   DDIs: Patient was advised to avoid eating grapefruits or drinking grapefruit juice.  Lab draws: On or before days 1,8,15 of each cycle, no sooner than 3 days early.   INFERTILITY & SEXUALITY:    causes, fertility preservation options, sexuality changes, ways to manage, importance of birth control [x] IV Oncology Therapy: Reviewed safe sex practices and the importance of minimizing exposure to body fluids for 48 hours after each dose of IV oncology therapy. The patient is of childbearing potential.  Two methods of birth control were recommended.   HOME CARE:  storing of oral chemo, how to manage bodily fluids [x] IV - Counseled on management of soiled linens and proper flush technique.  Discussed how to manage all the side effects at home and advised when to contact the MD office.     Medications:  Prior to Admission medications    Medication Sig Start Date End Date Taking? Authorizing Provider   famotidine (PEPCID) 20  MG tablet Take 1 tablet by mouth 2 (Two) Times a Day. 4/1/25   Fanny Baker MD   fluconazole (Diflucan) 100 MG tablet Take 1.5 tablets by mouth 1 (One) Time for 1 dose. 3/27/25 3/27/25  Joyce Villanueva MD   gabapentin (NEURONTIN) 400 MG capsule Take 2 capsules by mouth 4 (Four) Times a Day.    Jerad Granado MD   lamoTRIgine (LaMICtal) 100 MG tablet Take 3 tablets by mouth Daily.    Jerad Granado MD   levothyroxine (SYNTHROID, LEVOTHROID) 25 MCG tablet Take 1 tablet by mouth Daily.    Jerad Granado MD   lidocaine-prilocaine (EMLA) 2.5-2.5 % cream Apply 1 Application topically to the appropriate area as directed As Needed (45-60 minutes prior to port access.  Cover with saran/plastic wrap.). 3/11/25   Elsy Rizzo APRN   LORazepam (ATIVAN) 0.5 MG tablet Take 1 tablet by mouth 2 (Two) Times a Day As Needed for Anxiety. 2/17/25   Jerad Granado MD   meloxicam (MOBIC) 15 MG tablet Take 1 tablet by mouth Daily.    Jerad Granado MD   metoprolol succinate XL (TOPROL-XL) 50 MG 24 hr tablet 0.5 tablets Daily. Recently told to stop on 3/20 for low BP 1/13/25   Jerad Granado MD   nystatin susp + lidocaine viscous (MAGIC MOUTHWASH) oral suspension 5-10 ml swish and spit or swallow QID prn 4/1/25   Fanny Baker MD   omeprazole (priLOSEC) 40 MG capsule Take 1 capsule by mouth Daily Before Supper. 4/1/25   Fanny Baker MD   ondansetron (ZOFRAN) 8 MG tablet Take 1 tablet by mouth 3 (Three) Times a Day As Needed for Nausea or Vomiting. 3/11/25   Elsy Rizzo APRN   ondansetron ODT (ZOFRAN-ODT) 8 MG disintegrating tablet Take 1 tablet by mouth Every 8 (Eight) Hours As Needed for Nausea or Vomiting for up to 60 doses. 4/1/25   Fanny Baker MD   predniSONE (DELTASONE) 10 MG tablet Take 8 tablets by mouth daily for 3 days; then 7 tabs daily for 4 days; then 6 tabs daily for 4 days; then 5 tabs daily for 4 days; then 4 tabs daily for 4 days; then 3 tabs daily for 4 days;  then 2 tabs daily for 4 days; then 1 tablet daily for 4 days. 3/26/25 4/27/25  Fanny Baker MD   prochlorperazine (COMPAZINE) 10 MG tablet Take 0.5-1 tablets by mouth Every 6 (Six) Hours As Needed for Nausea or Vomiting. 4/1/25   Fanny Baker MD   traMADol (ULTRAM) 50 MG tablet Take 1 tablet by mouth Every 6 (Six) Hours As Needed for Moderate Pain. 4/1/25   Fanny Baker MD   valACYclovir (VALTREX) 500 MG tablet Take 2 tablets by mouth 2 (Two) Times a Day for 7 days, THEN 1 tablet Daily for 90 days. Two tablets TID 4/1/25 7/7/25  Fanny Baker MD   Wellbutrin  MG 24 hr tablet     ProviderJerad MD   zolpidem CR (AMBIEN CR) 12.5 MG CR tablet  3/3/25   ProviderJerad MD   Nortrel 0.5/35, 28, 0.5-35 MG-MCG per tablet  2/27/25 3/13/25  Jerad Granado MD   sulfamethoxazole-trimethoprim (BACTRIM DS,SEPTRA DS) 800-160 MG per tablet Take 1 tablet by mouth 2 (Two) Times a Day for 7 days. 3/20/25 3/27/25  Elsy Rizzo APRN   traMADol (ULTRAM) 50 MG tablet Take 1 tablet by mouth Every 6 (Six) Hours As Needed for Moderate Pain. 4/1/25 4/1/25  Fanny Baker MD     Notes: All questions and concerns were addressed. Provided a personalized treatment calendar to patient (includes treatment and lab schedule). Provided patient with contact information for the pharmacist and clinic while instructing her to call if any questions or concerns arise. Informed consent for treatment was obtained. Patient was receptive to information and expressed understanding.     Ann Cowden Mayer, PharmD, [facility]  Oncology Clinical Pharmacist  Phone [phone]    4/8/2025  08:20 EDT

## 2025-04-08 NOTE — PROGRESS NOTES
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 will change clinical management  BI-RADS CATEGORY: 6 known biopsy-proven malignancy left breast    3/11/2025 3:38 PM by Dr. Nika Eckert MD on Workstation: [id number]        Procedures    Assessment / Plan      Assessment/Plan:   Left breast cancer, triple negative with multiple lymph nodes positive  I reviewed the patient's history, imaging and pathology reports, multifocal T1cN2  We reviewed the potential role for neoadjuvant treatment.  The advantages include potential for downstaging the tumor, and the added prognostic value of assessing pathologic response to chemotherapy.  There is no clear survival advantage to neoadjuvant over adjuvant administration, but for suboptimal neoadjuvant responders, outcomes can be improved with tailoring adjuvant therapy. I recommended Keynote 522 regimen of neoadjuvant pembrolizumab 200 mg Q3W in combination with 4 cycles of paclitaxel + carboplatin, then with 4 cycles of AC. After definitive surgery, recommend adjuvant pembrolizumab for 9 cycles vs additional chemotherapy pending response.   -I personally reviewed her breast MRI and discussed in multidisciplinary breast conference this morning.  Recommendation was that she would require a mastectomy regardless of downstaging.   Therefore, will not pursue additional MRI guided biopsy.  However, will request radiology place an axillary clip for better identification of the positive nodes post neoadjuvant chemotherapy.  -Echocardiogram normal  -MRI of the spine pending  -Genetics pending  -Labs reviewed and adequate for treatment today.  Taxol omitted due to infusion reaction on cycle 1 day 8 (4/1/2025). Start Abraxane along with carboplatin cycle 1 day 15 today.  -Chemotherapy orders signed assuming her rash continues to resolve, we can likely speed up the steroid taper  -Omit Keytruda and continue steroid taper: Current dose is for a taper by 10 mg every 4 days.  -She requests second opinion [facility]    2. Peripheral neuropathy risk  -ICE trial screen    3. Back pain, indeterminate CT findings  -MRI negative  -Tramadol PRN. Refill Tramadol sent to pharmacy today.     4. HA  -Brain MRI negative    5. Mucositis  -Continue  valtrex and magic mouthwash.     6. GERD  -Continue PPI +pepcid    7.  Infusion reaction   -she developed a severe infusion reaction 10 minutes into Taxol which resolved with additional premedications.  She proceeded with carboplatin.  Will omit further Taxol and change to Abraxane for her upcoming infusion. Will start with Abraxane today.   -  Follow Up:   Weekly with treatment     KELLY Coreas    Hematology and Oncology     I spent 55 minutes caring for Brooke on this date of service. This time includes time spent by me in the following activities: preparing for the visit, reviewing tests, performing a medically appropriate examination and/or evaluation, counseling and educating the patient/family/caregiver, documenting information in the medical record, independently interpreting results and communicating that information with the patient/family/caregiver, ordering medications, and obtaining a separately obtained history

## 2025-04-15 ENCOUNTER — HOSPITAL ENCOUNTER (OUTPATIENT)
Dept: ONCOLOGY | Facility: HOSPITAL | Age: 40
Discharge: HOME | End: 2025-04-15
Admitting: INTERNAL MEDICINE
Payer: COMMERCIAL

## 2025-04-15 ENCOUNTER — OFFICE VISIT (OUTPATIENT)
Dept: ONCOLOGY | Facility: CLINIC | Age: 40
End: 2025-04-15
Payer: COMMERCIAL

## 2025-04-15 ENCOUNTER — RESEARCH ENCOUNTER (OUTPATIENT)
Dept: OTHER | Facility: OTHER | Age: 40
End: 2025-04-15
Payer: COMMERCIAL

## 2025-04-15 ENCOUNTER — APPOINTMENT (OUTPATIENT)
Dept: ONCOLOGY | Facility: HOSPITAL | Age: 40
End: 2025-04-15
Payer: COMMERCIAL

## 2025-04-15 VITALS
WEIGHT: 202 LBS | SYSTOLIC BLOOD PRESSURE: 138 MMHG | BODY MASS INDEX: 29.92 KG/M2 | HEART RATE: 102 BPM | TEMPERATURE: 97.9 F | DIASTOLIC BLOOD PRESSURE: 84 MMHG | OXYGEN SATURATION: 97 % | HEIGHT: 69 IN

## 2025-04-15 DIAGNOSIS — C50.912 MALIGNANT NEOPLASM OF LEFT BREAST IN FEMALE, ESTROGEN RECEPTOR NEGATIVE, UNSPECIFIED SITE OF BREAST: Primary | ICD-10-CM

## 2025-04-15 DIAGNOSIS — Z17.1 MALIGNANT NEOPLASM OF LEFT BREAST IN FEMALE, ESTROGEN RECEPTOR NEGATIVE, UNSPECIFIED SITE OF BREAST: Primary | ICD-10-CM

## 2025-04-15 LAB
ALBUMIN SERPL-MCNC: 4.1 G/DL (ref 3.5–5.2)
ALBUMIN/GLOB SERPL: 1.6 G/DL
ALP SERPL-CCNC: 63 U/L (ref 39–117)
ALT SERPL W P-5'-P-CCNC: 30 U/L (ref 1–33)
ANION GAP SERPL CALCULATED.3IONS-SCNC: 15 MMOL/L (ref 5–15)
AST SERPL-CCNC: 16 U/L (ref 1–32)
BASOPHILS # BLD AUTO: 0.04 10*3/MM3 (ref 0–0.2)
BASOPHILS NFR BLD AUTO: 0.4 % (ref 0–1.5)
BILIRUB SERPL-MCNC: 0.3 MG/DL (ref 0–1.2)
BUN SERPL-MCNC: 24 MG/DL (ref 6–20)
BUN/CREAT SERPL: 32.4 (ref 7–25)
CALCIUM SPEC-SCNC: 9 MG/DL (ref 8.6–10.5)
CHLORIDE SERPL-SCNC: 100 MMOL/L (ref 98–107)
CO2 SERPL-SCNC: 22 MMOL/L (ref 22–29)
CREAT SERPL-MCNC: 0.74 MG/DL (ref 0.57–1)
DEPRECATED RDW RBC AUTO: 50.9 FL (ref 37–54)
EGFRCR SERPLBLD CKD-EPI 2021: 105 ML/MIN/1.73
EOSINOPHIL # BLD AUTO: 0.01 10*3/MM3 (ref 0–0.4)
EOSINOPHIL NFR BLD AUTO: 0.1 % (ref 0.3–6.2)
ERYTHROCYTE [DISTWIDTH] IN BLOOD BY AUTOMATED COUNT: 15 % (ref 12.3–15.4)
GLOBULIN UR ELPH-MCNC: 2.5 GM/DL
GLUCOSE SERPL-MCNC: 87 MG/DL (ref 65–99)
HCT VFR BLD AUTO: 35.1 % (ref 34–46.6)
HGB BLD-MCNC: 11.5 G/DL (ref 12–15.9)
IMM GRANULOCYTES # BLD AUTO: 0.6 10*3/MM3 (ref 0–0.05)
IMM GRANULOCYTES NFR BLD AUTO: 6.4 % (ref 0–0.5)
LYMPHOCYTES # BLD AUTO: 2.7 10*3/MM3 (ref 0.7–3.1)
LYMPHOCYTES NFR BLD AUTO: 28.8 % (ref 19.6–45.3)
MCH RBC QN AUTO: 31.3 PG (ref 26.6–33)
MCHC RBC AUTO-ENTMCNC: 32.8 G/DL (ref 31.5–35.7)
MCV RBC AUTO: 95.6 FL (ref 79–97)
MONOCYTES # BLD AUTO: 0.43 10*3/MM3 (ref 0.1–0.9)
MONOCYTES NFR BLD AUTO: 4.6 % (ref 5–12)
NEUTROPHILS NFR BLD AUTO: 5.59 10*3/MM3 (ref 1.7–7)
NEUTROPHILS NFR BLD AUTO: 59.7 % (ref 42.7–76)
PLATELET # BLD AUTO: 220 10*3/MM3 (ref 140–450)
PMV BLD AUTO: 9.3 FL (ref 6–12)
POTASSIUM SERPL-SCNC: 4.6 MMOL/L (ref 3.5–5.2)
PROT SERPL-MCNC: 6.6 G/DL (ref 6–8.5)
RBC # BLD AUTO: 3.67 10*6/MM3 (ref 3.77–5.28)
SODIUM SERPL-SCNC: 137 MMOL/L (ref 136–145)
WBC NRBC COR # BLD AUTO: 9.37 10*3/MM3 (ref 3.4–10.8)

## 2025-04-15 PROCEDURE — 25010000002 DEXAMETHASONE SODIUM PHOSPHATE 100 MG/10ML SOLUTION: Performed by: INTERNAL MEDICINE

## 2025-04-15 PROCEDURE — 25010000002 FAMOTIDINE 10 MG/ML SOLUTION: Performed by: INTERNAL MEDICINE

## 2025-04-15 PROCEDURE — 85025 COMPLETE CBC W/AUTO DIFF WBC: CPT | Performed by: INTERNAL MEDICINE

## 2025-04-15 PROCEDURE — 96375 TX/PRO/DX INJ NEW DRUG ADDON: CPT

## 2025-04-15 PROCEDURE — 25010000002 PACLITAXEL PROTEIN-BOUND PART PER 1 MG: Performed by: INTERNAL MEDICINE

## 2025-04-15 PROCEDURE — 96417 CHEMO IV INFUS EACH ADDL SEQ: CPT

## 2025-04-15 PROCEDURE — 99214 OFFICE O/P EST MOD 30 MIN: CPT | Performed by: INTERNAL MEDICINE

## 2025-04-15 PROCEDURE — 96413 CHEMO IV INFUSION 1 HR: CPT

## 2025-04-15 PROCEDURE — 25010000002 HEPARIN LOCK FLUSH PER 10 UNITS: Performed by: INTERNAL MEDICINE

## 2025-04-15 PROCEDURE — 25010000002 PALONOSETRON PER 25 MCG: Performed by: INTERNAL MEDICINE

## 2025-04-15 PROCEDURE — 25010000002 DIPHENHYDRAMINE PER 50 MG: Performed by: INTERNAL MEDICINE

## 2025-04-15 PROCEDURE — 80053 COMPREHEN METABOLIC PANEL: CPT | Performed by: INTERNAL MEDICINE

## 2025-04-15 PROCEDURE — 25010000002 CARBOPLATIN PER 50 MG: Performed by: INTERNAL MEDICINE

## 2025-04-15 RX ORDER — HYDROCORTISONE SODIUM SUCCINATE 100 MG/2ML
100 INJECTION INTRAMUSCULAR; INTRAVENOUS AS NEEDED
OUTPATIENT
Start: 2025-04-22

## 2025-04-15 RX ORDER — FAMOTIDINE 10 MG/ML
20 INJECTION, SOLUTION INTRAVENOUS AS NEEDED
Status: DISCONTINUED | OUTPATIENT
Start: 2025-04-15 | End: 2025-04-16 | Stop reason: HOSPADM

## 2025-04-15 RX ORDER — SODIUM CHLORIDE 0.9 % (FLUSH) 0.9 %
10 SYRINGE (ML) INJECTION AS NEEDED
Status: DISCONTINUED | OUTPATIENT
Start: 2025-04-15 | End: 2025-04-16 | Stop reason: HOSPADM

## 2025-04-15 RX ORDER — HYDROCORTISONE SODIUM SUCCINATE 100 MG/2ML
100 INJECTION INTRAMUSCULAR; INTRAVENOUS AS NEEDED
Status: DISCONTINUED | OUTPATIENT
Start: 2025-04-15 | End: 2025-04-16 | Stop reason: HOSPADM

## 2025-04-15 RX ORDER — HEPARIN SODIUM (PORCINE) LOCK FLUSH IV SOLN 100 UNIT/ML 100 UNIT/ML
300 SOLUTION INTRAVENOUS ONCE
OUTPATIENT
Start: 2025-04-15

## 2025-04-15 RX ORDER — SODIUM CHLORIDE 0.9 % (FLUSH) 0.9 %
20 SYRINGE (ML) INJECTION AS NEEDED
OUTPATIENT
Start: 2025-04-15

## 2025-04-15 RX ORDER — HEPARIN SODIUM (PORCINE) LOCK FLUSH IV SOLN 100 UNIT/ML 100 UNIT/ML
500 SOLUTION INTRAVENOUS AS NEEDED
Status: DISCONTINUED | OUTPATIENT
Start: 2025-04-15 | End: 2025-04-16 | Stop reason: HOSPADM

## 2025-04-15 RX ORDER — HYDROCORTISONE SODIUM SUCCINATE 100 MG/2ML
100 INJECTION INTRAMUSCULAR; INTRAVENOUS AS NEEDED
OUTPATIENT
Start: 2025-04-29

## 2025-04-15 RX ORDER — SODIUM CHLORIDE 9 MG/ML
20 INJECTION, SOLUTION INTRAVENOUS ONCE
OUTPATIENT
Start: 2025-04-29

## 2025-04-15 RX ORDER — PALONOSETRON 0.05 MG/ML
0.25 INJECTION, SOLUTION INTRAVENOUS ONCE
OUTPATIENT
Start: 2025-04-29

## 2025-04-15 RX ORDER — DIPHENHYDRAMINE HYDROCHLORIDE 50 MG/ML
50 INJECTION, SOLUTION INTRAMUSCULAR; INTRAVENOUS AS NEEDED
OUTPATIENT
Start: 2025-04-22

## 2025-04-15 RX ORDER — SODIUM CHLORIDE 0.9 % (FLUSH) 0.9 %
10 SYRINGE (ML) INJECTION AS NEEDED
OUTPATIENT
Start: 2025-04-15

## 2025-04-15 RX ORDER — HEPARIN SODIUM (PORCINE) LOCK FLUSH IV SOLN 100 UNIT/ML 100 UNIT/ML
500 SOLUTION INTRAVENOUS AS NEEDED
OUTPATIENT
Start: 2025-04-15

## 2025-04-15 RX ORDER — PACLITAXEL 100 MG/20ML
125 INJECTION, POWDER, LYOPHILIZED, FOR SUSPENSION INTRAVENOUS ONCE
Status: COMPLETED | OUTPATIENT
Start: 2025-04-15 | End: 2025-04-15

## 2025-04-15 RX ORDER — DIPHENHYDRAMINE HYDROCHLORIDE 50 MG/ML
50 INJECTION, SOLUTION INTRAMUSCULAR; INTRAVENOUS AS NEEDED
Status: CANCELLED | OUTPATIENT
Start: 2025-04-15

## 2025-04-15 RX ORDER — PALONOSETRON 0.05 MG/ML
0.25 INJECTION, SOLUTION INTRAVENOUS ONCE
Status: CANCELLED | OUTPATIENT
Start: 2025-04-15

## 2025-04-15 RX ORDER — DIPHENHYDRAMINE HYDROCHLORIDE 50 MG/ML
25 INJECTION, SOLUTION INTRAMUSCULAR; INTRAVENOUS ONCE
Status: COMPLETED | OUTPATIENT
Start: 2025-04-15 | End: 2025-04-15

## 2025-04-15 RX ORDER — SODIUM CHLORIDE 0.9 % (FLUSH) 0.9 %
20 SYRINGE (ML) INJECTION AS NEEDED
Status: DISCONTINUED | OUTPATIENT
Start: 2025-04-15 | End: 2025-04-16 | Stop reason: HOSPADM

## 2025-04-15 RX ORDER — PALONOSETRON 0.05 MG/ML
0.25 INJECTION, SOLUTION INTRAVENOUS ONCE
Status: COMPLETED | OUTPATIENT
Start: 2025-04-15 | End: 2025-04-15

## 2025-04-15 RX ORDER — FAMOTIDINE 10 MG/ML
20 INJECTION, SOLUTION INTRAVENOUS AS NEEDED
OUTPATIENT
Start: 2025-04-29

## 2025-04-15 RX ORDER — PACLITAXEL 100 MG/20ML
125 INJECTION, POWDER, LYOPHILIZED, FOR SUSPENSION INTRAVENOUS ONCE
Status: CANCELLED
Start: 2025-04-22 | End: 2025-04-22

## 2025-04-15 RX ORDER — PACLITAXEL 100 MG/20ML
125 INJECTION, POWDER, LYOPHILIZED, FOR SUSPENSION INTRAVENOUS ONCE
Status: CANCELLED
Start: 2025-04-15 | End: 2025-04-15

## 2025-04-15 RX ORDER — FAMOTIDINE 10 MG/ML
20 INJECTION, SOLUTION INTRAVENOUS ONCE
Status: CANCELLED | OUTPATIENT
Start: 2025-04-15

## 2025-04-15 RX ORDER — FAMOTIDINE 10 MG/ML
20 INJECTION, SOLUTION INTRAVENOUS ONCE
OUTPATIENT
Start: 2025-04-22

## 2025-04-15 RX ORDER — DIPHENHYDRAMINE HYDROCHLORIDE 50 MG/ML
50 INJECTION, SOLUTION INTRAMUSCULAR; INTRAVENOUS AS NEEDED
OUTPATIENT
Start: 2025-04-29

## 2025-04-15 RX ORDER — SODIUM CHLORIDE 9 MG/ML
20 INJECTION, SOLUTION INTRAVENOUS ONCE
OUTPATIENT
Start: 2025-04-22

## 2025-04-15 RX ORDER — SODIUM CHLORIDE 9 MG/ML
20 INJECTION, SOLUTION INTRAVENOUS ONCE
Status: CANCELLED | OUTPATIENT
Start: 2025-04-15

## 2025-04-15 RX ORDER — FAMOTIDINE 10 MG/ML
20 INJECTION, SOLUTION INTRAVENOUS ONCE
OUTPATIENT
Start: 2025-04-29

## 2025-04-15 RX ORDER — FAMOTIDINE 10 MG/ML
20 INJECTION, SOLUTION INTRAVENOUS AS NEEDED
Status: CANCELLED | OUTPATIENT
Start: 2025-04-15

## 2025-04-15 RX ORDER — PALONOSETRON 0.05 MG/ML
0.25 INJECTION, SOLUTION INTRAVENOUS ONCE
OUTPATIENT
Start: 2025-04-22

## 2025-04-15 RX ORDER — SODIUM CHLORIDE 9 MG/ML
20 INJECTION, SOLUTION INTRAVENOUS ONCE
Status: DISCONTINUED | OUTPATIENT
Start: 2025-04-15 | End: 2025-04-16 | Stop reason: HOSPADM

## 2025-04-15 RX ORDER — FAMOTIDINE 10 MG/ML
20 INJECTION, SOLUTION INTRAVENOUS AS NEEDED
OUTPATIENT
Start: 2025-04-22

## 2025-04-15 RX ORDER — PACLITAXEL 100 MG/20ML
125 INJECTION, POWDER, LYOPHILIZED, FOR SUSPENSION INTRAVENOUS ONCE
Status: CANCELLED
Start: 2025-04-29 | End: 2025-04-29

## 2025-04-15 RX ORDER — TRAMADOL HYDROCHLORIDE 50 MG/1
50 TABLET ORAL EVERY 6 HOURS PRN
Qty: 120 TABLET | Refills: 0 | Status: SHIPPED | OUTPATIENT
Start: 2025-04-15

## 2025-04-15 RX ORDER — FAMOTIDINE 10 MG/ML
20 INJECTION, SOLUTION INTRAVENOUS ONCE
Status: COMPLETED | OUTPATIENT
Start: 2025-04-15 | End: 2025-04-15

## 2025-04-15 RX ORDER — HYDROCORTISONE SODIUM SUCCINATE 100 MG/2ML
100 INJECTION INTRAMUSCULAR; INTRAVENOUS AS NEEDED
Status: CANCELLED | OUTPATIENT
Start: 2025-04-15

## 2025-04-15 RX ORDER — DIPHENHYDRAMINE HYDROCHLORIDE 50 MG/ML
50 INJECTION, SOLUTION INTRAMUSCULAR; INTRAVENOUS AS NEEDED
Status: DISCONTINUED | OUTPATIENT
Start: 2025-04-15 | End: 2025-04-16 | Stop reason: HOSPADM

## 2025-04-15 RX ADMIN — PACLITAXEL 255 MG: 100 INJECTION, POWDER, LYOPHILIZED, FOR SUSPENSION INTRAVENOUS at 10:22

## 2025-04-15 RX ADMIN — HEPARIN 500 UNITS: 100 SYRINGE at 11:57

## 2025-04-15 RX ADMIN — FAMOTIDINE 20 MG: 10 INJECTION, SOLUTION INTRAVENOUS at 09:18

## 2025-04-15 RX ADMIN — CARBOPLATIN 230 MG: 10 INJECTION INTRAVENOUS at 11:18

## 2025-04-15 RX ADMIN — DIPHENHYDRAMINE HYDROCHLORIDE 25 MG: 50 INJECTION INTRAMUSCULAR; INTRAVENOUS at 09:21

## 2025-04-15 RX ADMIN — DEXAMETHASONE SODIUM PHOSPHATE 12 MG: 10 INJECTION, SOLUTION INTRAMUSCULAR; INTRAVENOUS at 09:27

## 2025-04-15 RX ADMIN — PALONOSETRON HYDROCHLORIDE 0.25 MG: 0.25 INJECTION INTRAVENOUS at 09:24

## 2025-04-15 NOTE — PATIENT INSTRUCTIONS
Prednisone 3 tabs daily for 3 days; then 2 tabs daily for 3 days; then 1 tablet daily for 3 days then stop.

## 2025-04-15 NOTE — RESEARCH
Patient Name:  Brooke Mendez  YOB: 1985  Patient Age:  39 y.o.  Patient's Sex:  female    Date of Service:  04/15/2025    Provider:  ELVIS Baker MD                     RESEARCH NOTE                  Protocol --ICE COMPRESS: Randomized Trial of Limb Cryocompression Versus Continuous Compression Versus Low Cyclic Compression for the Prevention of Taxane-Induced Peripheral Neuropathy   NCT #[id number]     Subject ID: [id number]  ARM 2:  Continuous Compression       Participant here for C4 of taxane therapy. (C2D1 )  Planned Taxol/Cb qwk x 12 with Keytruda q3wks. However, Keytruda was discontinued r/t rash. Taxol was changed to Abraxane due to reaction as well.  Today is second dose of Abraxane.    Pt states rash resolved throughout this week.  She has about a week of steroids left.     Port in place. ?Device used on all extremities.   Upon assessment, there were no signs of redness, open wounds or break in skin integrity prior to start of device intervention.   Participant tolerated device intervention without any adjustments. ?No AEs observed.      Next appt planned for 4/22/25 for C5 of taxane treatment.     Thank you.    Yanira Fu, RN, BSN, CRC
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
## 2025-04-22 ENCOUNTER — OFFICE VISIT (OUTPATIENT)
Dept: ONCOLOGY | Facility: CLINIC | Age: 40
End: 2025-04-22
Payer: COMMERCIAL

## 2025-04-22 ENCOUNTER — APPOINTMENT (OUTPATIENT)
Dept: ONCOLOGY | Facility: HOSPITAL | Age: 40
End: 2025-04-22
Payer: COMMERCIAL

## 2025-04-22 ENCOUNTER — RESEARCH ENCOUNTER (OUTPATIENT)
Dept: OTHER | Facility: OTHER | Age: 40
End: 2025-04-22
Payer: COMMERCIAL

## 2025-04-22 ENCOUNTER — HOSPITAL ENCOUNTER (OUTPATIENT)
Dept: ONCOLOGY | Facility: HOSPITAL | Age: 40
Discharge: HOME | End: 2025-04-22
Admitting: INTERNAL MEDICINE
Payer: COMMERCIAL

## 2025-04-22 ENCOUNTER — DOCUMENTATION (OUTPATIENT)
Dept: NUTRITION | Facility: HOSPITAL | Age: 40
End: 2025-04-22
Payer: COMMERCIAL

## 2025-04-22 VITALS
TEMPERATURE: 97.9 F | HEART RATE: 93 BPM | DIASTOLIC BLOOD PRESSURE: 76 MMHG | HEIGHT: 69 IN | RESPIRATION RATE: 18 BRPM | SYSTOLIC BLOOD PRESSURE: 127 MMHG | BODY MASS INDEX: 30.51 KG/M2 | OXYGEN SATURATION: 96 % | WEIGHT: 206 LBS

## 2025-04-22 DIAGNOSIS — C50.912 MALIGNANT NEOPLASM OF LEFT BREAST IN FEMALE, ESTROGEN RECEPTOR NEGATIVE, UNSPECIFIED SITE OF BREAST: Primary | ICD-10-CM

## 2025-04-22 DIAGNOSIS — Z17.1 MALIGNANT NEOPLASM OF LEFT BREAST IN FEMALE, ESTROGEN RECEPTOR NEGATIVE, UNSPECIFIED SITE OF BREAST: Primary | ICD-10-CM

## 2025-04-22 LAB
ALBUMIN SERPL-MCNC: 3.9 G/DL (ref 3.5–5.2)
ALBUMIN/GLOB SERPL: 1.5 G/DL
ALP SERPL-CCNC: 62 U/L (ref 39–117)
ALT SERPL W P-5'-P-CCNC: 58 U/L (ref 1–33)
ANION GAP SERPL CALCULATED.3IONS-SCNC: 11 MMOL/L (ref 5–15)
AST SERPL-CCNC: 34 U/L (ref 1–32)
BASOPHILS # BLD AUTO: 0.04 10*3/MM3 (ref 0–0.2)
BASOPHILS NFR BLD AUTO: 1 % (ref 0–1.5)
BILIRUB SERPL-MCNC: 0.2 MG/DL (ref 0–1.2)
BUN SERPL-MCNC: 20 MG/DL (ref 6–20)
BUN/CREAT SERPL: 26 (ref 7–25)
CALCIUM SPEC-SCNC: 8.9 MG/DL (ref 8.6–10.5)
CHLORIDE SERPL-SCNC: 102 MMOL/L (ref 98–107)
CO2 SERPL-SCNC: 24 MMOL/L (ref 22–29)
CREAT SERPL-MCNC: 0.77 MG/DL (ref 0.57–1)
DEPRECATED RDW RBC AUTO: 52.3 FL (ref 37–54)
EGFRCR SERPLBLD CKD-EPI 2021: 100.2 ML/MIN/1.73
EOSINOPHIL # BLD AUTO: 0.02 10*3/MM3 (ref 0–0.4)
EOSINOPHIL NFR BLD AUTO: 0.5 % (ref 0.3–6.2)
ERYTHROCYTE [DISTWIDTH] IN BLOOD BY AUTOMATED COUNT: 15 % (ref 12.3–15.4)
GLOBULIN UR ELPH-MCNC: 2.6 GM/DL
GLUCOSE SERPL-MCNC: 101 MG/DL (ref 65–99)
HCT VFR BLD AUTO: 31 % (ref 34–46.6)
HGB BLD-MCNC: 10.4 G/DL (ref 12–15.9)
IMM GRANULOCYTES # BLD AUTO: 0.27 10*3/MM3 (ref 0–0.05)
IMM GRANULOCYTES NFR BLD AUTO: 6.9 % (ref 0–0.5)
LYMPHOCYTES # BLD AUTO: 1.06 10*3/MM3 (ref 0.7–3.1)
LYMPHOCYTES NFR BLD AUTO: 27.1 % (ref 19.6–45.3)
MCH RBC QN AUTO: 32.3 PG (ref 26.6–33)
MCHC RBC AUTO-ENTMCNC: 33.5 G/DL (ref 31.5–35.7)
MCV RBC AUTO: 96.3 FL (ref 79–97)
MONOCYTES # BLD AUTO: 0.18 10*3/MM3 (ref 0.1–0.9)
MONOCYTES NFR BLD AUTO: 4.6 % (ref 5–12)
NEUTROPHILS NFR BLD AUTO: 2.34 10*3/MM3 (ref 1.7–7)
NEUTROPHILS NFR BLD AUTO: 59.9 % (ref 42.7–76)
PLATELET # BLD AUTO: 223 10*3/MM3 (ref 140–450)
PMV BLD AUTO: 9.2 FL (ref 6–12)
POTASSIUM SERPL-SCNC: 4.4 MMOL/L (ref 3.5–5.2)
PROT SERPL-MCNC: 6.5 G/DL (ref 6–8.5)
RBC # BLD AUTO: 3.22 10*6/MM3 (ref 3.77–5.28)
SODIUM SERPL-SCNC: 137 MMOL/L (ref 136–145)
WBC NRBC COR # BLD AUTO: 3.91 10*3/MM3 (ref 3.4–10.8)

## 2025-04-22 PROCEDURE — 25010000002 FAMOTIDINE 10 MG/ML SOLUTION: Performed by: INTERNAL MEDICINE

## 2025-04-22 PROCEDURE — 85025 COMPLETE CBC W/AUTO DIFF WBC: CPT | Performed by: INTERNAL MEDICINE

## 2025-04-22 PROCEDURE — 25010000002 HEPARIN LOCK FLUSH PER 10 UNITS: Performed by: INTERNAL MEDICINE

## 2025-04-22 PROCEDURE — 25010000002 PACLITAXEL PROTEIN-BOUND PART PER 1 MG: Performed by: INTERNAL MEDICINE

## 2025-04-22 PROCEDURE — 96417 CHEMO IV INFUS EACH ADDL SEQ: CPT

## 2025-04-22 PROCEDURE — 80053 COMPREHEN METABOLIC PANEL: CPT | Performed by: INTERNAL MEDICINE

## 2025-04-22 PROCEDURE — 25010000002 DEXAMETHASONE SODIUM PHOSPHATE 100 MG/10ML SOLUTION: Performed by: INTERNAL MEDICINE

## 2025-04-22 PROCEDURE — 25810000003 SODIUM CHLORIDE 0.9 % SOLUTION: Performed by: INTERNAL MEDICINE

## 2025-04-22 PROCEDURE — 96413 CHEMO IV INFUSION 1 HR: CPT

## 2025-04-22 PROCEDURE — 96375 TX/PRO/DX INJ NEW DRUG ADDON: CPT

## 2025-04-22 PROCEDURE — 99214 OFFICE O/P EST MOD 30 MIN: CPT | Performed by: NURSE PRACTITIONER

## 2025-04-22 PROCEDURE — 25010000002 PALONOSETRON PER 25 MCG: Performed by: INTERNAL MEDICINE

## 2025-04-22 PROCEDURE — 25010000002 DIPHENHYDRAMINE PER 50 MG: Performed by: INTERNAL MEDICINE

## 2025-04-22 PROCEDURE — 25010000002 CARBOPLATIN PER 50 MG: Performed by: INTERNAL MEDICINE

## 2025-04-22 RX ORDER — DIPHENHYDRAMINE HYDROCHLORIDE 50 MG/ML
50 INJECTION, SOLUTION INTRAMUSCULAR; INTRAVENOUS AS NEEDED
Status: DISCONTINUED | OUTPATIENT
Start: 2025-04-22 | End: 2025-04-23 | Stop reason: HOSPADM

## 2025-04-22 RX ORDER — CLONAZEPAM 0.5 MG/1
0.5 TABLET ORAL 3 TIMES DAILY PRN
COMMUNITY
Start: 2025-04-21

## 2025-04-22 RX ORDER — SODIUM CHLORIDE 0.9 % (FLUSH) 0.9 %
20 SYRINGE (ML) INJECTION AS NEEDED
OUTPATIENT
Start: 2025-04-22

## 2025-04-22 RX ORDER — HYDROCORTISONE SODIUM SUCCINATE 100 MG/2ML
100 INJECTION INTRAMUSCULAR; INTRAVENOUS AS NEEDED
Status: DISCONTINUED | OUTPATIENT
Start: 2025-04-22 | End: 2025-04-23 | Stop reason: HOSPADM

## 2025-04-22 RX ORDER — FAMOTIDINE 10 MG/ML
20 INJECTION, SOLUTION INTRAVENOUS AS NEEDED
Status: DISCONTINUED | OUTPATIENT
Start: 2025-04-22 | End: 2025-04-23 | Stop reason: HOSPADM

## 2025-04-22 RX ORDER — PALONOSETRON 0.05 MG/ML
0.25 INJECTION, SOLUTION INTRAVENOUS ONCE
Status: COMPLETED | OUTPATIENT
Start: 2025-04-22 | End: 2025-04-22

## 2025-04-22 RX ORDER — SODIUM CHLORIDE 0.9 % (FLUSH) 0.9 %
10 SYRINGE (ML) INJECTION AS NEEDED
OUTPATIENT
Start: 2025-04-22

## 2025-04-22 RX ORDER — HEPARIN SODIUM (PORCINE) LOCK FLUSH IV SOLN 100 UNIT/ML 100 UNIT/ML
500 SOLUTION INTRAVENOUS AS NEEDED
Status: DISCONTINUED | OUTPATIENT
Start: 2025-04-22 | End: 2025-04-23 | Stop reason: HOSPADM

## 2025-04-22 RX ORDER — HEPARIN SODIUM (PORCINE) LOCK FLUSH IV SOLN 100 UNIT/ML 100 UNIT/ML
500 SOLUTION INTRAVENOUS AS NEEDED
OUTPATIENT
Start: 2025-04-22

## 2025-04-22 RX ORDER — HEPARIN SODIUM (PORCINE) LOCK FLUSH IV SOLN 100 UNIT/ML 100 UNIT/ML
300 SOLUTION INTRAVENOUS ONCE
OUTPATIENT
Start: 2025-04-22

## 2025-04-22 RX ORDER — FAMOTIDINE 10 MG/ML
20 INJECTION, SOLUTION INTRAVENOUS ONCE
Status: COMPLETED | OUTPATIENT
Start: 2025-04-22 | End: 2025-04-22

## 2025-04-22 RX ORDER — PACLITAXEL 100 MG/20ML
125 INJECTION, POWDER, LYOPHILIZED, FOR SUSPENSION INTRAVENOUS ONCE
Start: 2025-04-29

## 2025-04-22 RX ORDER — PACLITAXEL 100 MG/20ML
125 INJECTION, POWDER, LYOPHILIZED, FOR SUSPENSION INTRAVENOUS ONCE
Status: COMPLETED | OUTPATIENT
Start: 2025-04-22 | End: 2025-04-22

## 2025-04-22 RX ORDER — DIPHENHYDRAMINE HYDROCHLORIDE 50 MG/ML
25 INJECTION, SOLUTION INTRAMUSCULAR; INTRAVENOUS ONCE
Status: COMPLETED | OUTPATIENT
Start: 2025-04-22 | End: 2025-04-22

## 2025-04-22 RX ORDER — SODIUM CHLORIDE 9 MG/ML
20 INJECTION, SOLUTION INTRAVENOUS ONCE
Status: COMPLETED | OUTPATIENT
Start: 2025-04-22 | End: 2025-04-22

## 2025-04-22 RX ADMIN — FAMOTIDINE 20 MG: 10 INJECTION INTRAVENOUS at 12:05

## 2025-04-22 RX ADMIN — HEPARIN 500 UNITS: 100 SYRINGE at 14:25

## 2025-04-22 RX ADMIN — DEXAMETHASONE SODIUM PHOSPHATE 12 MG: 10 INJECTION, SOLUTION INTRAMUSCULAR; INTRAVENOUS at 12:04

## 2025-04-22 RX ADMIN — PACLITAXEL 255 MG: 100 INJECTION, POWDER, LYOPHILIZED, FOR SUSPENSION INTRAVENOUS at 12:56

## 2025-04-22 RX ADMIN — SODIUM CHLORIDE 20 ML/HR: 9 INJECTION, SOLUTION INTRAVENOUS at 12:00

## 2025-04-22 RX ADMIN — CARBOPLATIN 230 MG: 10 INJECTION INTRAVENOUS at 13:46

## 2025-04-22 RX ADMIN — DIPHENHYDRAMINE HYDROCHLORIDE 25 MG: 50 INJECTION INTRAMUSCULAR; INTRAVENOUS at 12:05

## 2025-04-22 RX ADMIN — PALONOSETRON HYDROCHLORIDE 0.25 MG: 0.25 INJECTION INTRAVENOUS at 12:01
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
## 2025-04-22 NOTE — PROGRESS NOTES
Onc Nutrition    Patient: Brooke Mendez  YOB: 1985    Diagnosis: Left breast cancer, triple negative with multiple lymph nodes positive Stage IIIC (cT2, cN2, cM0, G3, ER-, [place]-, HER2-)      Neoadjuvant Chemotherapy:  Part 1:  OP BREAST Pembrolizumab 200 mg / PACLitaxel / CARBOplatin AUC=1.5 (Weekly) IV - every 21 days for 4 cycles (start date 3/13/25)  -pembrolizumab 200 mg IV on day 1 - d/c'd after 1st dose d/t intolerance  -paclitaxel 80 mg/m2 IV on days 1, 8, and 15 - changed to abraxane on D15C1 d/t reaction to paclitaxel   -carboplatin AUC 1.5 IV on days 1, 8, and 15     Part 2:  OP BREAST Pembrolizumab 200 mg / DOXOrubicin / Cyclophosphamide   -pembrolizumab 200 mg IV on day 1 (start date 6/4/25)  -doxorubicin 60 mg/m2 IV push on day 1  -cyclophosphamide 600 mg/m2 IV on day 1  -pegfilgrastim (Neulasta OnPro) 6 mg subQ on day 1    Weight - 206# / ~10# weight gain x ~5 weeks     Follow up with patient and her friend during her D8C2 infusion appointment.  Patient reports having a good appetite and states she has been eating more than her usual amount due to being on steroids.  She states her constipation is well managed with stool softeners and reports her mouth sores have resolved.  She denies other significant nutritional complaints at this time.    Reviewed the importance of good nutrition throughout her treatment.  Advised her to be consuming smaller portions at one time but to eat more often throughout her day to aid with nausea management.  Encouraged her to include high protein food at each meal / snack.  Advised her to sip on water / hydrating fluids throughout her day to aid with adequate hydration.  Encouraged good oral care and to use the baking soda / salt water mouth rinse to aid with prevention of mouth sores.     She denies nutritional questions / concerns at this time.  Advised her to call RD if questions arise.  She voiced understanding of information discussed.   Will follow up as  indicated.     Nikki Bergman, FABIANO  04/22/25

## 2025-04-28 RX ORDER — FAMOTIDINE 20 MG/1
20 TABLET, FILM COATED ORAL 2 TIMES DAILY
Qty: 60 TABLET | Refills: 1 | Status: SHIPPED | OUTPATIENT
Start: 2025-04-28

## 2025-04-28 NOTE — TELEPHONE ENCOUNTER
Last Office Visit - This Dept  4/22/2025 Elsy Rizzo, APRN  Next office visit 4/29/25  Last refill 4/1/25 60 tablets 0 refills

## 2025-04-29 ENCOUNTER — HOSPITAL ENCOUNTER (OUTPATIENT)
Dept: ONCOLOGY | Facility: HOSPITAL | Age: 40
Discharge: HOME | End: 2025-04-29
Payer: COMMERCIAL

## 2025-04-29 ENCOUNTER — RESEARCH ENCOUNTER (OUTPATIENT)
Dept: OTHER | Facility: OTHER | Age: 40
End: 2025-04-29
Payer: COMMERCIAL

## 2025-04-29 ENCOUNTER — HOSPITAL ENCOUNTER (OUTPATIENT)
Dept: CT IMAGING | Facility: HOSPITAL | Age: 40
Discharge: HOME | End: 2025-04-29
Payer: COMMERCIAL

## 2025-04-29 ENCOUNTER — TELEPHONE (OUTPATIENT)
Dept: ONCOLOGY | Facility: CLINIC | Age: 40
End: 2025-04-29
Payer: COMMERCIAL

## 2025-04-29 ENCOUNTER — OFFICE VISIT (OUTPATIENT)
Dept: ONCOLOGY | Facility: CLINIC | Age: 40
End: 2025-04-29
Payer: COMMERCIAL

## 2025-04-29 ENCOUNTER — APPOINTMENT (OUTPATIENT)
Dept: ONCOLOGY | Facility: HOSPITAL | Age: 40
End: 2025-04-29
Payer: COMMERCIAL

## 2025-04-29 VITALS
OXYGEN SATURATION: 97 % | BODY MASS INDEX: 30.75 KG/M2 | TEMPERATURE: 98.2 F | WEIGHT: 207.6 LBS | SYSTOLIC BLOOD PRESSURE: 114 MMHG | DIASTOLIC BLOOD PRESSURE: 76 MMHG | RESPIRATION RATE: 14 BRPM | HEART RATE: 86 BPM | HEIGHT: 69 IN

## 2025-04-29 DIAGNOSIS — Z17.1 MALIGNANT NEOPLASM OF LEFT BREAST IN FEMALE, ESTROGEN RECEPTOR NEGATIVE, UNSPECIFIED SITE OF BREAST: Primary | ICD-10-CM

## 2025-04-29 DIAGNOSIS — C50.912 MALIGNANT NEOPLASM OF LEFT BREAST IN FEMALE, ESTROGEN RECEPTOR NEGATIVE, UNSPECIFIED SITE OF BREAST: Primary | ICD-10-CM

## 2025-04-29 DIAGNOSIS — Z17.1 MALIGNANT NEOPLASM OF LEFT BREAST IN FEMALE, ESTROGEN RECEPTOR NEGATIVE, UNSPECIFIED SITE OF BREAST: ICD-10-CM

## 2025-04-29 DIAGNOSIS — C50.912 MALIGNANT NEOPLASM OF LEFT BREAST IN FEMALE, ESTROGEN RECEPTOR NEGATIVE, UNSPECIFIED SITE OF BREAST: ICD-10-CM

## 2025-04-29 LAB
ALBUMIN SERPL-MCNC: 4 G/DL (ref 3.5–5.2)
ALBUMIN/GLOB SERPL: 1.3 G/DL
ALP SERPL-CCNC: 71 U/L (ref 39–117)
ALT SERPL W P-5'-P-CCNC: 69 U/L (ref 1–33)
ANION GAP SERPL CALCULATED.3IONS-SCNC: 13 MMOL/L (ref 5–15)
AST SERPL-CCNC: 35 U/L (ref 1–32)
BASOPHILS # BLD AUTO: 0.03 10*3/MM3 (ref 0–0.2)
BASOPHILS NFR BLD AUTO: 0.8 % (ref 0–1.5)
BILIRUB SERPL-MCNC: 0.3 MG/DL (ref 0–1.2)
BUN SERPL-MCNC: 14 MG/DL (ref 6–20)
BUN/CREAT SERPL: 19.2 (ref 7–25)
CALCIUM SPEC-SCNC: 9.3 MG/DL (ref 8.6–10.5)
CHLORIDE SERPL-SCNC: 104 MMOL/L (ref 98–107)
CO2 SERPL-SCNC: 23 MMOL/L (ref 22–29)
CREAT SERPL-MCNC: 0.73 MG/DL (ref 0.57–1)
DEPRECATED RDW RBC AUTO: 50.1 FL (ref 37–54)
EGFRCR SERPLBLD CKD-EPI 2021: 106.8 ML/MIN/1.73
EOSINOPHIL # BLD AUTO: 0.02 10*3/MM3 (ref 0–0.4)
EOSINOPHIL NFR BLD AUTO: 0.5 % (ref 0.3–6.2)
ERYTHROCYTE [DISTWIDTH] IN BLOOD BY AUTOMATED COUNT: 15 % (ref 12.3–15.4)
GLOBULIN UR ELPH-MCNC: 3 GM/DL
GLUCOSE SERPL-MCNC: 118 MG/DL (ref 65–99)
HCT VFR BLD AUTO: 29.5 % (ref 34–46.6)
HGB BLD-MCNC: 9.9 G/DL (ref 12–15.9)
IMM GRANULOCYTES # BLD AUTO: 0.32 10*3/MM3 (ref 0–0.05)
IMM GRANULOCYTES NFR BLD AUTO: 8.3 % (ref 0–0.5)
LYMPHOCYTES # BLD AUTO: 0.81 10*3/MM3 (ref 0.7–3.1)
LYMPHOCYTES NFR BLD AUTO: 21.1 % (ref 19.6–45.3)
MCH RBC QN AUTO: 31.5 PG (ref 26.6–33)
MCHC RBC AUTO-ENTMCNC: 33.6 G/DL (ref 31.5–35.7)
MCV RBC AUTO: 93.9 FL (ref 79–97)
MONOCYTES # BLD AUTO: 0.31 10*3/MM3 (ref 0.1–0.9)
MONOCYTES NFR BLD AUTO: 8.1 % (ref 5–12)
NEUTROPHILS NFR BLD AUTO: 2.35 10*3/MM3 (ref 1.7–7)
NEUTROPHILS NFR BLD AUTO: 61.2 % (ref 42.7–76)
PLATELET # BLD AUTO: 246 10*3/MM3 (ref 140–450)
PMV BLD AUTO: 9.2 FL (ref 6–12)
POTASSIUM SERPL-SCNC: 4.5 MMOL/L (ref 3.5–5.2)
PROT SERPL-MCNC: 7 G/DL (ref 6–8.5)
RBC # BLD AUTO: 3.14 10*6/MM3 (ref 3.77–5.28)
SODIUM SERPL-SCNC: 140 MMOL/L (ref 136–145)
WBC NRBC COR # BLD AUTO: 3.84 10*3/MM3 (ref 3.4–10.8)

## 2025-04-29 PROCEDURE — 96375 TX/PRO/DX INJ NEW DRUG ADDON: CPT

## 2025-04-29 PROCEDURE — 25010000002 FAMOTIDINE 10 MG/ML SOLUTION: Performed by: INTERNAL MEDICINE

## 2025-04-29 PROCEDURE — 80053 COMPREHEN METABOLIC PANEL: CPT | Performed by: INTERNAL MEDICINE

## 2025-04-29 PROCEDURE — 96417 CHEMO IV INFUS EACH ADDL SEQ: CPT

## 2025-04-29 PROCEDURE — 25010000002 CARBOPLATIN PER 50 MG: Performed by: INTERNAL MEDICINE

## 2025-04-29 PROCEDURE — 25010000002 DIPHENHYDRAMINE PER 50 MG: Performed by: INTERNAL MEDICINE

## 2025-04-29 PROCEDURE — 25010000002 PACLITAXEL PROTEIN-BOUND PART PER 1 MG: Performed by: INTERNAL MEDICINE

## 2025-04-29 PROCEDURE — 85025 COMPLETE CBC W/AUTO DIFF WBC: CPT | Performed by: INTERNAL MEDICINE

## 2025-04-29 PROCEDURE — 25510000001 IOPAMIDOL PER 1 ML: Performed by: INTERNAL MEDICINE

## 2025-04-29 PROCEDURE — 25810000003 SODIUM CHLORIDE 0.9 % SOLUTION: Performed by: INTERNAL MEDICINE

## 2025-04-29 PROCEDURE — 96413 CHEMO IV INFUSION 1 HR: CPT

## 2025-04-29 PROCEDURE — 25010000002 HEPARIN LOCK FLUSH PER 10 UNITS: Performed by: INTERNAL MEDICINE

## 2025-04-29 PROCEDURE — 71275 CT ANGIOGRAPHY CHEST: CPT

## 2025-04-29 PROCEDURE — 25010000002 PALONOSETRON PER 25 MCG: Performed by: INTERNAL MEDICINE

## 2025-04-29 PROCEDURE — 25010000002 DEXAMETHASONE SODIUM PHOSPHATE 100 MG/10ML SOLUTION: Performed by: INTERNAL MEDICINE

## 2025-04-29 RX ORDER — DIPHENHYDRAMINE HYDROCHLORIDE 50 MG/ML
50 INJECTION, SOLUTION INTRAMUSCULAR; INTRAVENOUS AS NEEDED
Status: DISCONTINUED | OUTPATIENT
Start: 2025-04-29 | End: 2025-04-30 | Stop reason: HOSPADM

## 2025-04-29 RX ORDER — SODIUM CHLORIDE 0.9 % (FLUSH) 0.9 %
20 SYRINGE (ML) INJECTION AS NEEDED
OUTPATIENT
Start: 2025-04-29

## 2025-04-29 RX ORDER — DIPHENHYDRAMINE HYDROCHLORIDE 50 MG/ML
25 INJECTION, SOLUTION INTRAMUSCULAR; INTRAVENOUS ONCE
Status: COMPLETED | OUTPATIENT
Start: 2025-04-29 | End: 2025-04-29

## 2025-04-29 RX ORDER — FAMOTIDINE 10 MG/ML
20 INJECTION, SOLUTION INTRAVENOUS AS NEEDED
Status: DISCONTINUED | OUTPATIENT
Start: 2025-04-29 | End: 2025-04-30 | Stop reason: HOSPADM

## 2025-04-29 RX ORDER — HEPARIN SODIUM (PORCINE) LOCK FLUSH IV SOLN 100 UNIT/ML 100 UNIT/ML
500 SOLUTION INTRAVENOUS AS NEEDED
OUTPATIENT
Start: 2025-04-29

## 2025-04-29 RX ORDER — PACLITAXEL 100 MG/20ML
125 INJECTION, POWDER, LYOPHILIZED, FOR SUSPENSION INTRAVENOUS ONCE
Status: COMPLETED | OUTPATIENT
Start: 2025-04-29 | End: 2025-04-29

## 2025-04-29 RX ORDER — HYDROCORTISONE SODIUM SUCCINATE 100 MG/2ML
100 INJECTION INTRAMUSCULAR; INTRAVENOUS AS NEEDED
Status: DISCONTINUED | OUTPATIENT
Start: 2025-04-29 | End: 2025-04-30 | Stop reason: HOSPADM

## 2025-04-29 RX ORDER — HEPARIN SODIUM (PORCINE) LOCK FLUSH IV SOLN 100 UNIT/ML 100 UNIT/ML
500 SOLUTION INTRAVENOUS AS NEEDED
Status: DISCONTINUED | OUTPATIENT
Start: 2025-04-29 | End: 2025-04-30 | Stop reason: HOSPADM

## 2025-04-29 RX ORDER — SODIUM CHLORIDE 9 MG/ML
20 INJECTION, SOLUTION INTRAVENOUS ONCE
Status: COMPLETED | OUTPATIENT
Start: 2025-04-29 | End: 2025-04-29

## 2025-04-29 RX ORDER — PALONOSETRON 0.05 MG/ML
0.25 INJECTION, SOLUTION INTRAVENOUS ONCE
Status: COMPLETED | OUTPATIENT
Start: 2025-04-29 | End: 2025-04-29

## 2025-04-29 RX ORDER — DOXYCYCLINE HYCLATE 100 MG
100 TABLET ORAL 2 TIMES DAILY
Qty: 14 TABLET | Refills: 0 | Status: SHIPPED | OUTPATIENT
Start: 2025-04-29 | End: 2025-05-29

## 2025-04-29 RX ORDER — IOPAMIDOL 755 MG/ML
100 INJECTION, SOLUTION INTRAVASCULAR
Status: COMPLETED | OUTPATIENT
Start: 2025-04-29 | End: 2025-04-29

## 2025-04-29 RX ORDER — FAMOTIDINE 10 MG/ML
20 INJECTION, SOLUTION INTRAVENOUS ONCE
Status: COMPLETED | OUTPATIENT
Start: 2025-04-29 | End: 2025-04-29

## 2025-04-29 RX ORDER — SODIUM CHLORIDE 0.9 % (FLUSH) 0.9 %
10 SYRINGE (ML) INJECTION AS NEEDED
OUTPATIENT
Start: 2025-04-29

## 2025-04-29 RX ORDER — HEPARIN SODIUM (PORCINE) LOCK FLUSH IV SOLN 100 UNIT/ML 100 UNIT/ML
300 SOLUTION INTRAVENOUS ONCE
OUTPATIENT
Start: 2025-04-29

## 2025-04-29 RX ADMIN — PALONOSETRON HYDROCHLORIDE 0.25 MG: 0.25 INJECTION INTRAVENOUS at 12:04

## 2025-04-29 RX ADMIN — IOPAMIDOL 75 ML: 755 INJECTION, SOLUTION INTRAVENOUS at 11:24

## 2025-04-29 RX ADMIN — SODIUM CHLORIDE 20 ML/HR: 9 INJECTION, SOLUTION INTRAVENOUS at 12:04

## 2025-04-29 RX ADMIN — PACLITAXEL 255 MG: 100 INJECTION, POWDER, LYOPHILIZED, FOR SUSPENSION INTRAVENOUS at 12:58

## 2025-04-29 RX ADMIN — FAMOTIDINE 20 MG: 10 INJECTION, SOLUTION INTRAVENOUS at 12:05

## 2025-04-29 RX ADMIN — DEXAMETHASONE SODIUM PHOSPHATE 12 MG: 10 INJECTION, SOLUTION INTRAMUSCULAR; INTRAVENOUS at 12:05

## 2025-04-29 RX ADMIN — HEPARIN 500 UNITS: 100 SYRINGE at 14:30

## 2025-04-29 RX ADMIN — CARBOPLATIN 230 MG: 10 INJECTION INTRAVENOUS at 13:50

## 2025-04-29 RX ADMIN — DIPHENHYDRAMINE HYDROCHLORIDE 25 MG: 50 INJECTION INTRAMUSCULAR; INTRAVENOUS at 12:21

## 2025-04-29 NOTE — TELEPHONE ENCOUNTER
Advised patient while in infusion per Dr. Baker that she has a small amount of atelectasis on the left on CT performed today and that the antibiotic MD sent to her pharmacy will help but she should call this office for any worsening cough.  Patient verbalized understanding.  LILLIE Montes in OP Infusion advised per Dr. Baker that okay to treat patient today as ordered and scheduled.

## 2025-04-29 NOTE — RESEARCH
Patient Name:  Brooke Mendez  YOB: 1985  Patient Age:  39 y.o.  Patient's Sex:  female    Date of Service:  04/29/2025    Provider:  ELVIS Baker MD                     RESEARCH NOTE                  Protocol --ICE COMPRESS: Randomized Trial of Limb Cryocompression Versus Continuous Compression Versus Low Cyclic Compression for the Prevention of Taxane-Induced Peripheral Neuropathy   NCT #[id number]     Subject ID: [id number]  ARM 2:  Continuous Compression       Participant here for C6 of taxane therapy.   Planned Taxol/Cb qwk x 12 with Keytruda q3wks. However, Keytruda was discontinued r/t rash. Taxol was changed to Abraxane due to reaction as well.  Pt states rash continues to be resolved.  Port in place. ?Device used on all extremities except RA which pt refused today.   Upon assessment, there were no signs of redness, open wounds or break in skin integrity prior to start of device intervention.   Participant tolerated device intervention without any adjustments. ?No AEs observed.      Next appt planned for 5/6/25 for C7 of taxane treatment.     Thank you.     DEVON Velazquez, BSN, RN, RNC
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
## 2025-05-06 ENCOUNTER — RESEARCH ENCOUNTER (OUTPATIENT)
Dept: OTHER | Facility: OTHER | Age: 40
End: 2025-05-06
Payer: COMMERCIAL

## 2025-05-06 ENCOUNTER — OFFICE VISIT (OUTPATIENT)
Dept: ONCOLOGY | Facility: CLINIC | Age: 40
End: 2025-05-06
Payer: COMMERCIAL

## 2025-05-06 ENCOUNTER — HOSPITAL ENCOUNTER (OUTPATIENT)
Dept: ONCOLOGY | Facility: HOSPITAL | Age: 40
Discharge: HOME | End: 2025-05-06
Payer: COMMERCIAL

## 2025-05-06 VITALS
BODY MASS INDEX: 30.66 KG/M2 | HEART RATE: 119 BPM | OXYGEN SATURATION: 97 % | DIASTOLIC BLOOD PRESSURE: 73 MMHG | SYSTOLIC BLOOD PRESSURE: 119 MMHG | TEMPERATURE: 97.5 F | HEIGHT: 69 IN | WEIGHT: 207 LBS

## 2025-05-06 DIAGNOSIS — C50.912 MALIGNANT NEOPLASM OF LEFT BREAST IN FEMALE, ESTROGEN RECEPTOR NEGATIVE, UNSPECIFIED SITE OF BREAST: Primary | ICD-10-CM

## 2025-05-06 DIAGNOSIS — Z17.1 MALIGNANT NEOPLASM OF LEFT BREAST IN FEMALE, ESTROGEN RECEPTOR NEGATIVE, UNSPECIFIED SITE OF BREAST: Primary | ICD-10-CM

## 2025-05-06 LAB
ALBUMIN SERPL-MCNC: 3.8 G/DL (ref 3.5–5.2)
ALBUMIN/GLOB SERPL: 1.4 G/DL
ALP SERPL-CCNC: 75 U/L (ref 39–117)
ALT SERPL W P-5'-P-CCNC: 59 U/L (ref 1–33)
ANION GAP SERPL CALCULATED.3IONS-SCNC: 12 MMOL/L (ref 5–15)
AST SERPL-CCNC: 38 U/L (ref 1–32)
BASOPHILS # BLD AUTO: 0.03 10*3/MM3 (ref 0–0.2)
BASOPHILS NFR BLD AUTO: 0.8 % (ref 0–1.5)
BILIRUB SERPL-MCNC: 0.3 MG/DL (ref 0–1.2)
BUN SERPL-MCNC: 16 MG/DL (ref 6–20)
BUN/CREAT SERPL: 25.4 (ref 7–25)
CALCIUM SPEC-SCNC: 9.3 MG/DL (ref 8.6–10.5)
CHLORIDE SERPL-SCNC: 105 MMOL/L (ref 98–107)
CO2 SERPL-SCNC: 23 MMOL/L (ref 22–29)
CREAT SERPL-MCNC: 0.63 MG/DL (ref 0.57–1)
DEPRECATED RDW RBC AUTO: 50.1 FL (ref 37–54)
EGFRCR SERPLBLD CKD-EPI 2021: 115.2 ML/MIN/1.73
EOSINOPHIL # BLD AUTO: 0.02 10*3/MM3 (ref 0–0.4)
EOSINOPHIL NFR BLD AUTO: 0.5 % (ref 0.3–6.2)
ERYTHROCYTE [DISTWIDTH] IN BLOOD BY AUTOMATED COUNT: 15.2 % (ref 12.3–15.4)
GLOBULIN UR ELPH-MCNC: 2.7 GM/DL
GLUCOSE SERPL-MCNC: 110 MG/DL (ref 65–99)
HCT VFR BLD AUTO: 27.7 % (ref 34–46.6)
HGB BLD-MCNC: 9.5 G/DL (ref 12–15.9)
IMM GRANULOCYTES # BLD AUTO: 0.31 10*3/MM3 (ref 0–0.05)
IMM GRANULOCYTES NFR BLD AUTO: 7.9 % (ref 0–0.5)
LYMPHOCYTES # BLD AUTO: 1.23 10*3/MM3 (ref 0.7–3.1)
LYMPHOCYTES NFR BLD AUTO: 31.5 % (ref 19.6–45.3)
MCH RBC QN AUTO: 32 PG (ref 26.6–33)
MCHC RBC AUTO-ENTMCNC: 34.3 G/DL (ref 31.5–35.7)
MCV RBC AUTO: 93.3 FL (ref 79–97)
MONOCYTES # BLD AUTO: 0.37 10*3/MM3 (ref 0.1–0.9)
MONOCYTES NFR BLD AUTO: 9.5 % (ref 5–12)
NEUTROPHILS NFR BLD AUTO: 1.94 10*3/MM3 (ref 1.7–7)
NEUTROPHILS NFR BLD AUTO: 49.8 % (ref 42.7–76)
PLATELET # BLD AUTO: 315 10*3/MM3 (ref 140–450)
PMV BLD AUTO: 9.1 FL (ref 6–12)
POTASSIUM SERPL-SCNC: 3.9 MMOL/L (ref 3.5–5.2)
PROT SERPL-MCNC: 6.5 G/DL (ref 6–8.5)
RBC # BLD AUTO: 2.97 10*6/MM3 (ref 3.77–5.28)
SODIUM SERPL-SCNC: 140 MMOL/L (ref 136–145)
T4 FREE SERPL-MCNC: 3.13 NG/DL (ref 0.92–1.68)
TSH SERPL DL<=0.05 MIU/L-ACNC: 0.1 UIU/ML (ref 0.27–4.2)
WBC NRBC COR # BLD AUTO: 3.9 10*3/MM3 (ref 3.4–10.8)

## 2025-05-06 PROCEDURE — 25010000002 PEMBROLIZUMAB 100 MG/4ML SOLUTION 4 ML VIAL: Performed by: INTERNAL MEDICINE

## 2025-05-06 PROCEDURE — 99214 OFFICE O/P EST MOD 30 MIN: CPT | Performed by: INTERNAL MEDICINE

## 2025-05-06 PROCEDURE — 25010000002 PACLITAXEL PROTEIN-BOUND PART PER 1 MG: Performed by: INTERNAL MEDICINE

## 2025-05-06 PROCEDURE — 96375 TX/PRO/DX INJ NEW DRUG ADDON: CPT

## 2025-05-06 PROCEDURE — 25010000002 DEXAMETHASONE SODIUM PHOSPHATE 100 MG/10ML SOLUTION: Performed by: INTERNAL MEDICINE

## 2025-05-06 PROCEDURE — 80050 GENERAL HEALTH PANEL: CPT | Performed by: INTERNAL MEDICINE

## 2025-05-06 PROCEDURE — 25010000002 CARBOPLATIN PER 50 MG: Performed by: INTERNAL MEDICINE

## 2025-05-06 PROCEDURE — 96413 CHEMO IV INFUSION 1 HR: CPT

## 2025-05-06 PROCEDURE — 25010000002 DIPHENHYDRAMINE PER 50 MG: Performed by: INTERNAL MEDICINE

## 2025-05-06 PROCEDURE — 25810000003 SODIUM CHLORIDE 0.9 % SOLUTION: Performed by: INTERNAL MEDICINE

## 2025-05-06 PROCEDURE — 25010000002 HEPARIN LOCK FLUSH PER 10 UNITS: Performed by: INTERNAL MEDICINE

## 2025-05-06 PROCEDURE — 84439 ASSAY OF FREE THYROXINE: CPT | Performed by: INTERNAL MEDICINE

## 2025-05-06 PROCEDURE — 25010000002 PALONOSETRON PER 25 MCG: Performed by: INTERNAL MEDICINE

## 2025-05-06 PROCEDURE — 25010000002 FAMOTIDINE 10 MG/ML SOLUTION: Performed by: INTERNAL MEDICINE

## 2025-05-06 PROCEDURE — 96417 CHEMO IV INFUS EACH ADDL SEQ: CPT

## 2025-05-06 RX ORDER — HYDROCORTISONE SODIUM SUCCINATE 100 MG/2ML
100 INJECTION INTRAMUSCULAR; INTRAVENOUS AS NEEDED
Status: CANCELLED | OUTPATIENT
Start: 2025-05-06

## 2025-05-06 RX ORDER — FAMOTIDINE 10 MG/ML
20 INJECTION, SOLUTION INTRAVENOUS AS NEEDED
Status: CANCELLED | OUTPATIENT
Start: 2025-05-06

## 2025-05-06 RX ORDER — HEPARIN SODIUM (PORCINE) LOCK FLUSH IV SOLN 100 UNIT/ML 100 UNIT/ML
500 SOLUTION INTRAVENOUS AS NEEDED
OUTPATIENT
Start: 2025-05-06

## 2025-05-06 RX ORDER — HEPARIN SODIUM (PORCINE) LOCK FLUSH IV SOLN 100 UNIT/ML 100 UNIT/ML
300 SOLUTION INTRAVENOUS ONCE
OUTPATIENT
Start: 2025-05-06

## 2025-05-06 RX ORDER — DIPHENHYDRAMINE HYDROCHLORIDE 50 MG/ML
50 INJECTION, SOLUTION INTRAMUSCULAR; INTRAVENOUS AS NEEDED
Status: DISCONTINUED | OUTPATIENT
Start: 2025-05-06 | End: 2025-05-07 | Stop reason: HOSPADM

## 2025-05-06 RX ORDER — SODIUM CHLORIDE 9 MG/ML
20 INJECTION, SOLUTION INTRAVENOUS ONCE
Status: CANCELLED | OUTPATIENT
Start: 2025-05-06

## 2025-05-06 RX ORDER — HYDROCORTISONE SODIUM SUCCINATE 100 MG/2ML
100 INJECTION INTRAMUSCULAR; INTRAVENOUS AS NEEDED
Status: DISCONTINUED | OUTPATIENT
Start: 2025-05-06 | End: 2025-05-07 | Stop reason: HOSPADM

## 2025-05-06 RX ORDER — FAMOTIDINE 10 MG/ML
20 INJECTION, SOLUTION INTRAVENOUS AS NEEDED
Status: DISCONTINUED | OUTPATIENT
Start: 2025-05-06 | End: 2025-05-07 | Stop reason: HOSPADM

## 2025-05-06 RX ORDER — HYDROCORTISONE SODIUM SUCCINATE 100 MG/2ML
100 INJECTION INTRAMUSCULAR; INTRAVENOUS AS NEEDED
OUTPATIENT
Start: 2025-05-13

## 2025-05-06 RX ORDER — PACLITAXEL 100 MG/20ML
125 INJECTION, POWDER, LYOPHILIZED, FOR SUSPENSION INTRAVENOUS ONCE
Status: COMPLETED | OUTPATIENT
Start: 2025-05-06 | End: 2025-05-06

## 2025-05-06 RX ORDER — MONTELUKAST SODIUM 10 MG/1
10 TABLET ORAL NIGHTLY
Qty: 30 TABLET | Refills: 1 | Status: SHIPPED | OUTPATIENT
Start: 2025-05-06

## 2025-05-06 RX ORDER — PALONOSETRON 0.05 MG/ML
0.25 INJECTION, SOLUTION INTRAVENOUS ONCE
Status: CANCELLED | OUTPATIENT
Start: 2025-05-06 | End: 2025-05-06

## 2025-05-06 RX ORDER — DIPHENHYDRAMINE HYDROCHLORIDE 50 MG/ML
50 INJECTION, SOLUTION INTRAMUSCULAR; INTRAVENOUS AS NEEDED
OUTPATIENT
Start: 2025-05-13

## 2025-05-06 RX ORDER — PACLITAXEL 100 MG/20ML
125 INJECTION, POWDER, LYOPHILIZED, FOR SUSPENSION INTRAVENOUS ONCE
Status: CANCELLED
Start: 2025-05-06

## 2025-05-06 RX ORDER — DIPHENHYDRAMINE HYDROCHLORIDE 50 MG/ML
50 INJECTION, SOLUTION INTRAMUSCULAR; INTRAVENOUS AS NEEDED
OUTPATIENT
Start: 2025-05-20

## 2025-05-06 RX ORDER — HYDROCORTISONE SODIUM SUCCINATE 100 MG/2ML
100 INJECTION INTRAMUSCULAR; INTRAVENOUS AS NEEDED
OUTPATIENT
Start: 2025-05-20

## 2025-05-06 RX ORDER — FAMOTIDINE 10 MG/ML
20 INJECTION, SOLUTION INTRAVENOUS AS NEEDED
OUTPATIENT
Start: 2025-05-13

## 2025-05-06 RX ORDER — SODIUM CHLORIDE 0.9 % (FLUSH) 0.9 %
10 SYRINGE (ML) INJECTION AS NEEDED
OUTPATIENT
Start: 2025-05-06

## 2025-05-06 RX ORDER — PACLITAXEL 100 MG/20ML
125 INJECTION, POWDER, LYOPHILIZED, FOR SUSPENSION INTRAVENOUS ONCE
Start: 2025-05-13

## 2025-05-06 RX ORDER — FAMOTIDINE 10 MG/ML
20 INJECTION, SOLUTION INTRAVENOUS ONCE
OUTPATIENT
Start: 2025-05-20

## 2025-05-06 RX ORDER — FAMOTIDINE 10 MG/ML
20 INJECTION, SOLUTION INTRAVENOUS AS NEEDED
OUTPATIENT
Start: 2025-05-20

## 2025-05-06 RX ORDER — DIPHENHYDRAMINE HYDROCHLORIDE 50 MG/ML
25 INJECTION, SOLUTION INTRAMUSCULAR; INTRAVENOUS ONCE
Status: COMPLETED | OUTPATIENT
Start: 2025-05-06 | End: 2025-05-06

## 2025-05-06 RX ORDER — FAMOTIDINE 10 MG/ML
20 INJECTION, SOLUTION INTRAVENOUS ONCE
Status: CANCELLED | OUTPATIENT
Start: 2025-05-06 | End: 2025-05-06

## 2025-05-06 RX ORDER — DIPHENHYDRAMINE HYDROCHLORIDE 50 MG/ML
50 INJECTION, SOLUTION INTRAMUSCULAR; INTRAVENOUS AS NEEDED
Status: CANCELLED | OUTPATIENT
Start: 2025-05-06

## 2025-05-06 RX ORDER — SODIUM CHLORIDE 0.9 % (FLUSH) 0.9 %
20 SYRINGE (ML) INJECTION AS NEEDED
OUTPATIENT
Start: 2025-05-06

## 2025-05-06 RX ORDER — PALONOSETRON 0.05 MG/ML
0.25 INJECTION, SOLUTION INTRAVENOUS ONCE
Status: COMPLETED | OUTPATIENT
Start: 2025-05-06 | End: 2025-05-06

## 2025-05-06 RX ORDER — SODIUM CHLORIDE 9 MG/ML
20 INJECTION, SOLUTION INTRAVENOUS ONCE
OUTPATIENT
Start: 2025-05-20

## 2025-05-06 RX ORDER — PACLITAXEL 100 MG/20ML
125 INJECTION, POWDER, LYOPHILIZED, FOR SUSPENSION INTRAVENOUS ONCE
Start: 2025-05-20

## 2025-05-06 RX ORDER — FAMOTIDINE 10 MG/ML
20 INJECTION, SOLUTION INTRAVENOUS ONCE
OUTPATIENT
Start: 2025-05-13

## 2025-05-06 RX ORDER — HEPARIN SODIUM (PORCINE) LOCK FLUSH IV SOLN 100 UNIT/ML 100 UNIT/ML
500 SOLUTION INTRAVENOUS AS NEEDED
Status: DISCONTINUED | OUTPATIENT
Start: 2025-05-06 | End: 2025-05-07 | Stop reason: HOSPADM

## 2025-05-06 RX ORDER — SODIUM CHLORIDE 9 MG/ML
20 INJECTION, SOLUTION INTRAVENOUS ONCE
Status: COMPLETED | OUTPATIENT
Start: 2025-05-06 | End: 2025-05-06

## 2025-05-06 RX ORDER — FAMOTIDINE 10 MG/ML
20 INJECTION, SOLUTION INTRAVENOUS ONCE
Status: COMPLETED | OUTPATIENT
Start: 2025-05-06 | End: 2025-05-06

## 2025-05-06 RX ORDER — PALONOSETRON 0.05 MG/ML
0.25 INJECTION, SOLUTION INTRAVENOUS ONCE
OUTPATIENT
Start: 2025-05-13

## 2025-05-06 RX ORDER — SODIUM CHLORIDE 9 MG/ML
20 INJECTION, SOLUTION INTRAVENOUS ONCE
OUTPATIENT
Start: 2025-05-13

## 2025-05-06 RX ORDER — PALONOSETRON 0.05 MG/ML
0.25 INJECTION, SOLUTION INTRAVENOUS ONCE
OUTPATIENT
Start: 2025-05-20

## 2025-05-06 RX ORDER — PREDNISONE 10 MG/1
80 TABLET ORAL DAILY
Qty: 56 TABLET | Refills: 0 | Status: SHIPPED | OUTPATIENT
Start: 2025-05-06 | End: 2025-05-13

## 2025-05-06 RX ADMIN — SODIUM CHLORIDE 200 MG: 9 INJECTION, SOLUTION INTRAVENOUS at 09:39

## 2025-05-06 RX ADMIN — FAMOTIDINE 20 MG: 10 INJECTION, SOLUTION INTRAVENOUS at 09:04

## 2025-05-06 RX ADMIN — SODIUM CHLORIDE 20 ML/HR: 9 INJECTION, SOLUTION INTRAVENOUS at 09:02

## 2025-05-06 RX ADMIN — CARBOPLATIN 230 MG: 10 INJECTION INTRAVENOUS at 11:08

## 2025-05-06 RX ADMIN — PACLITAXEL 255 MG: 100 INJECTION, POWDER, LYOPHILIZED, FOR SUSPENSION INTRAVENOUS at 10:28

## 2025-05-06 RX ADMIN — DIPHENHYDRAMINE HYDROCHLORIDE 25 MG: 50 INJECTION INTRAMUSCULAR; INTRAVENOUS at 09:06

## 2025-05-06 RX ADMIN — DEXAMETHASONE SODIUM PHOSPHATE 12 MG: 10 INJECTION, SOLUTION INTRAMUSCULAR; INTRAVENOUS at 09:09

## 2025-05-06 RX ADMIN — HEPARIN 500 UNITS: 100 SYRINGE at 11:42

## 2025-05-06 RX ADMIN — PALONOSETRON HYDROCHLORIDE 0.25 MG: 0.25 INJECTION INTRAVENOUS at 09:02

## 2025-05-06 NOTE — RESEARCH
Patient Name:  Brooke Mendez  YOB: 1985  Patient Age:  39 y.o.  Patient's Sex:  female    Date of Service:  05/06/2025    Provider:  ELVIS Baker MD                     RESEARCH NOTE                  Protocol --ICE COMPRESS: Randomized Trial of Limb Cryocompression Versus Continuous Compression Versus Low Cyclic Compression for the Prevention of Taxane-Induced Peripheral Neuropathy   NCT #[id number]     Subject ID: [id number]  ARM 2:  Continuous Compression       Participant here for C7 of taxane therapy.   Planned Abraxane/Cb qwk x 12 with Keytruda q3wks. However, Keytruda was added back to plan today. Taxol was changed to Abraxane due to reaction several cycles ago.  Pt states rash continues to be resolved. Port in place. ?Device used on all extremities except RA which pt refused today.   Upon assessment, there were no signs of redness, open wounds or break in skin integrity prior to start of device intervention.   Participant tolerated device intervention without any adjustments. ?No AEs observed.      Next appt planned for 5/6/25 for C8 of taxane treatment.     Thank you.     DEVON Velazquez, BSN, RN, RNC
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
## 2025-05-07 ENCOUNTER — TRANSCRIBE ORDERS (OUTPATIENT)
Dept: PHYSICAL THERAPY | Facility: HOSPITAL | Age: 40
End: 2025-05-07
Payer: COMMERCIAL

## 2025-05-07 ENCOUNTER — HOSPITAL ENCOUNTER (OUTPATIENT)
Dept: PHYSICAL THERAPY | Facility: HOSPITAL | Age: 40
Setting detail: THERAPIES SERIES
Discharge: HOME | End: 2025-05-07
Payer: COMMERCIAL

## 2025-05-07 ENCOUNTER — TELEPHONE (OUTPATIENT)
Dept: ONCOLOGY | Facility: CLINIC | Age: 40
End: 2025-05-07
Payer: COMMERCIAL

## 2025-05-07 ENCOUNTER — PATIENT OUTREACH (OUTPATIENT)
Dept: OTHER | Facility: HOSPITAL | Age: 40
End: 2025-05-07
Payer: COMMERCIAL

## 2025-05-07 DIAGNOSIS — C50.912 MALIGNANT NEOPLASM OF LEFT FEMALE BREAST, UNSPECIFIED ESTROGEN RECEPTOR STATUS, UNSPECIFIED SITE OF BREAST: Primary | ICD-10-CM

## 2025-05-07 NOTE — TELEPHONE ENCOUNTER
PATIENT CALLED AND STATED THEY RESTARTED KEYTRUDA YESTERDAY AND TODAY HER FACE AND CHEST IS REALLY RED. SNEEZING A LOT. BLOOD PRESSURE AND HEART RATE IS ELEVATED.   HAS AN APPOINTMENT WITH DR FERRARA THIS AFTERNOON.

## 2025-05-07 NOTE — TELEPHONE ENCOUNTER
"Spoke with patient at 10:19.  She stated that she does not have the redness on her whole body as before, it is only on her chest and face.  She stated it is not pruritic.  She stated she took her metoprolol 50 mg this am at 06:00 and at 10:00 BP was 169/101 heart rate 115 bpm in right arm and she rechecked it and it was 147/96 heart rate 114 bpm in the left arm.  She stated she has an uncomfortable area below her chest in her \"xiphoid process area,\" but stated it is not pain.  Patient stated she has not taken Benadryl PO or topical or any other OTC medication.  Patient stated she was prescribed Propranolol 10 mg for anxiety and wondered if she should take it.  Patient stated this hasn't happened with abraxane either. Dr. Baker notified and per MD patient advised that she should not take propranolol. Advised patient per MD that she should begin using the prescribed prednisone if the redness progresses to total body and that she can use topical OTC steroids, pepcid and claritin/benadryl and patient should follow up tomorrow.  Patient verbalized understanding. Patient advised per MD that if the redness is completely gone, she is feeling better, she can cancel tomorrow but she should call and speak with this RN before. Patient verbalized understanding.  "

## 2025-05-08 ENCOUNTER — DOCUMENTATION (OUTPATIENT)
Dept: OTHER | Facility: HOSPITAL | Age: 40
End: 2025-05-08
Payer: COMMERCIAL

## 2025-05-08 DIAGNOSIS — C50.912 MALIGNANT NEOPLASM OF LEFT BREAST IN FEMALE, ESTROGEN RECEPTOR NEGATIVE, UNSPECIFIED SITE OF BREAST: Primary | ICD-10-CM

## 2025-05-08 DIAGNOSIS — Z17.1 MALIGNANT NEOPLASM OF LEFT BREAST IN FEMALE, ESTROGEN RECEPTOR NEGATIVE, UNSPECIFIED SITE OF BREAST: Primary | ICD-10-CM

## 2025-05-08 PROCEDURE — 93702 BIS XTRACELL FLUID ANALYSIS: CPT

## 2025-05-08 PROCEDURE — 97162 PT EVAL MOD COMPLEX 30 MIN: CPT

## 2025-05-08 NOTE — SIGNIFICANT NOTE
Met with patient and her friend at her consultation with Dr. FERRARA. Pt is currently receiving neoadjuvant chemo for TNBC and has had a rough time with it, but has a positive attitude. Pt is interested in bilat mastecomy with recon, which will need to be delayed. Pt will f/u with Dr. FERRARA when she is close to finishing her treatment. NN provided breast cancer treatment handbook and resources. Bioimpedence eval completed by ROBERT Ramos PT.     05/08/25 0911   Nurse Navigation   Current Status Active   Type of Visit New patient   Location of Visit [facility]   Visit Diagnosis Breast - malignant;Breast - neoadjuvant   Referral Source Health professional - outpatient   Treatments Surgery;Chemotherapy   Surgery - First Consult Appointment 05/07/25   Barriers to Care Emotional   Practical Needs Nurse Navigator Referral   Emotional Needs emotional suppport/coping strategies   Intervention Tasks Performed Education;Symptom management;Community resources;Supportive services referral   Supportive services referral Bioimpedance/Lymphedema   Time Navigated Today (Min) 30   Total Time Navigated (Min) 70   Acuity Rating   Time spent with patient 2- 11 - 45 minutes   Multimodality treatment coordination and education 1- Uncomplicated guidance - brief call for appt assistance or brief follow up call   Caregiver support 1- Family/significant other support available   Distress score 2- 4-7   Coordination of care  - appts 1- Single appt assistance   Appt compliance 1- Compliant   ECOG/Karnofsky Score 1- ECOG -Less than or equal to 1,  Karnofsky 90 or greater   PHQ 9 score  1- <10 clinical   Referrals to support services 2- One   Acuity score 12   Acuity level Medium acuity

## 2025-05-08 NOTE — THERAPY DISCHARGE NOTE
Outpatient Physical Therapy Lymphedema Initial Evaluation & Discharge  [facility]     Patient Name: Brooke Mendez  : 1985  MRN: [id number]  Today's Date: 2025      Visit Date: 2025    Visit Dx:    ICD-10-CM ICD-9-CM   1. Malignant neoplasm of left female breast, unspecified estrogen receptor status, unspecified site of breast  C50.912 174.9       Patient Active Problem List   Diagnosis    Malignant neoplasm of left breast in female, estrogen receptor negative    Rash    On antineoplastic chemotherapy and adjuvant immunotherapy regimen    Essential hypertension    Thyroid disease        Past Medical History:   Diagnosis Date    Breast cancer     Disease of thyroid gland     Hypertension         Past Surgical History:   Procedure Laterality Date     SECTION      TONSILLECTOMY          Patient History       Row Name 25 1620             History    Brief Description of Current Complaint BrCA post surgical evaluation  -CA         Pain     Pain at Present 0  -CA         Services    Prior Rehab/Home Health Experiences No  -CA      Are you currently receiving Home Health services No  -CA      Do you plan to receive Home Health services in the near future No  -CA         Daily Activities    Primary Language English  -CA      Are you able to read Yes  -CA      Are you able to write Yes  -CA      How does patient learn best? Pictures/Video;Demonstration;Reading;Listening  -CA      Pt Participated in POC and Goals Yes  -CA         Safety    Are you being hurt, hit, or frightened by anyone at home or in your life? No  -CA      Are you being neglected by a caregiver No  -CA                User Key  (r) = Recorded By, (t) = Taken By, (c) = Cosigned By      Initials Name Provider Type    Lizzeth Mojica PT Physical Therapist                     Lymphedema       Row Name 25 1623             Subjective Pain    Able to rate subjective pain? yes  -CA      Pre-Treatment Pain Level 0  -CA       Post-Treatment Pain Level 0  -CA         Subjective    Subjective Comments Pt has been dx with triple negative L sided breast CA. She is currently undergoing chemotherapy and is working with Dr. FERRARA to decide on a surgical plan. Lymphnode resection is expected. She notes fatigue from active treatment. 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 She notes a long history of neck and upper back pain.  -CA         Lymphedema Assessment    Lymphedema Classification LUE:;at risk/stage 0  -CA      Lymphedema Surgery Comments surgery is TBD  -CA         Posture/Observations    Alignment Options Forward head;Rounded shoulders  -CA      Forward Head Mild  -CA      Rounded Shoulders Moderate;Bilateral:  -CA         General ROM    RT Upper Ext Rt Shoulder ABduction;Rt Shoulder Flexion;Rt Shoulder External Rotation;Rt Shoulder Internal Rotation  -CA      LT Upper Ext Lt Shoulder Flexion;Lt Shoulder External Rotation;Lt Shoulder Internal Rotation;Lt Shoulder ABduction  -CA      GENERAL ROM COMMENTS WFL wrist/hand  -CA         Right Upper Ext    Rt Shoulder Abduction AROM WFL  -CA      Rt Shoulder Flexion AROM WFL  -CA      Rt Shoulder External Rotation AROM WFL  -CA      Rt Shoulder Internal Rotation AROM WFL  -CA         Left Upper Ext    Lt Shoulder Abduction AROM WFL  -CA      Lt Shoulder Flexion AROM WFL  -CA      Lt Shoulder External Rotation AROM WFL  -CA      Lt Shoulder Internal Rotation AROM WFL  -CA         MMT (Manual Muscle Testing)    Rt Upper Ext Rt Shoulder Flexion;Rt Shoulder ABduction;Rt Shoulder Internal Rotation;Rt Shoulder External Rotation  -CA      Lt Upper Ext Lt Shoulder Flexion;Lt Shoulder ABduction;Lt Shoulder Internal Rotation;Lt Shoulder External Rotation  -CA         MMT Right Upper Ext    Rt Shoulder Flexion MMT, Gross Movement (4/5) good  -CA      Rt Shoulder ABduction MMT, Gross Movement (4/5) good  -CA      Rt Shoulder Internal Rotation MMT, Gross Movement (4/5) good  -CA      Rt Shoulder  External Rotation MMT, Gross Movement (4/5) good  -CA         MMT Left Upper Ext    Lt Shoulder Flexion MMT, Gross Movement (4/5) good  -CA      Lt Shoulder ABduction MMT, Gross Movement (4/5) good  -CA      Lt Shoulder Internal Rotation MMT, Gross Movement (4/5) good  -CA      Lt Shoulder External Rotation MMT, Gross Movement (4/5) good  -CA         Hand  Strength     Strength Affected Side Bilateral  -CA          Strength Right    Right  Test 1 55  -CA       Strength Average Right 55  -CA          Strength Left    Left  Test 1 55  -CA       Strength Average Left 55  -CA         Lymphedema Edema Assessment    Edema Assessment Comment No edema present at this time.  -CA         Skin Changes/Observations    Location/Assessment Upper Extremity  -CA      Upper Extremity Conditions bilateral:;normal;intact  -CA      Upper Extremity Color/Pigment bilateral:;normal  -CA         Lymphedema Sensation    Lymphedema Sensation Reports RUE:;LUE:  -CA      Lymphedema Sensation Tests light touch  -CA      Lymphedema Light Touch RUE:;LUE:;WNL  -CA         L-Dex Bioimpedence Screening    L-Dex Measurement Extremity LUE  -CA      L-Dex Patient Position Standing  -CA      L-Dex UE Dominate Side Right  -CA      L-Dex UE At Risk Side Left  -CA      L-Dex UE Pre Surgical Value Yes  -CA      L-Dex UE Baseline Score 1.9  -CA      L-Dex UE Comment The patient had SOZO measurement which I reviewed today. The score is in normal limits, see scanned to EMR. Bioimpedance spectroscopy helps identify the onset of lymphedema in an arm or leg before patients experience noticeable swelling. Research has shown that the early detection of lymphedema using L-Dex combined with treatment can reduce progression to chronic lymphedema by 95% in breast cancer patients. Whenever possible, patients are tested for baseline L-Dex score before cancer treatment begins and then are reassessed during regular follow-up visits using the  SOZO device. Otherwise, this can be started postoperatively and continued during regular follow-up visits. If the patient’s L-Dex score increases above normal levels, that is a sign that lymphedema is developing and a referral is made to occupational or physical therapy for further evaluation and early compression treatment. Lymphedema assessment with the SOZO L-Dex score is recommended to be done every 3 months for the first 3 years and then every 6 months for years 4 and 5 followed by annually afterwards.  -CA      Skeletal Muscle Mass (%) 23.6 %  -CA      Fat Mass (%) 40.3 %  -CA      Hy-dex -4.1  -CA                User Key  (r) = Recorded By, (t) = Taken By, (c) = Cosigned By      Initials Name Provider Type    Lizzeth Mojica, PT Physical Therapist                  Therapy Education  Education Details: Pt educated on prehab evaluation assessments including bioimpedance. Pt was provided an HEP detailing information including lymphedema, risk reduction, and post op exercise. Briefly reviewed the [facility] packet at pt request. Provided contact information for pt to call if she has addition questions. Significant discussion on lymphedema risk reduction. Advised on the ACSM guidelines for fatigue management using aerobic exercise.  Given: HEP, Symptoms/condition management, Posture/body mechanics  Program: New  How Provided: Verbal, Written  Provided to: Patient (and family)  Level of Understanding: Verbalized     OP Exercises       Row Name 05/07/25 5500             Subjective    Subjective Comments Pt has been dx with triple negative L sided breast CA. She is currently undergoing chemotherapy and is working with Dr. FERRARA to decide on a surgical plan. Lymphnode resection is expected. She notes fatigue from active treatment. 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 She notes a long history of neck and upper back pain.  -CA         Subjective Pain    Able to rate subjective pain? yes  -CA       Pre-Treatment Pain Level 0  -CA      Post-Treatment Pain Level 0  -CA         Exercise 1    Exercise Name 1 Elbow flexion/extension  -CA      Additional Comments 3-4x/day, HEP level 1- post op day 1 to day 7  -CA         Exercise 2    Exercise Name 2 Fist/wrist circles  -CA      Reps 2 x10  -CA      Additional Comments 3-4x/day, HEP level 1- post op day 1 to day 7  -CA         Exercise 3    Exercise Name 3 Neck Flexion/extension/rotation/lateral flexion  -CA      Reps 3 x10  -CA      Additional Comments 3-4x/day, HEP level 1- post op day 1 to day 7  -CA         Exercise 4    Exercise Name 4 horizontal abduction with hands behind neck  -CA      Reps 4 x10  -CA      Time 4 5 seconds  -CA      Additional Comments 3-4x/day, HEP level 2- post op day 7 to day 14  -CA         Exercise 5    Exercise Name 5 lumbar trunk rotration  -CA      Reps 5 x10  -CA      Time 5 5 seconds  -CA      Additional Comments 3-4x/day, HEP level 2- post op day 7 to day 14  -CA         Exercise 6    Exercise Name 6 shoulder rolls back  -CA      Reps 6 x10  -CA      Time 6 5 seconds  -CA      Additional Comments 3-4x/day, HEP level 2- post op day 7 to day 14  -CA         Exercise 7    Exercise Name 7 horizontal abduction/adduction with elbows extended  -CA      Reps 7 x10  -CA      Time 7 5  -CA      Additional Comments 3-4x/day, HEP level 2- post op day 7 to day 14  -CA         Exercise 8    Exercise Name 8 wall walks for shoulder flexion  -CA      Reps 8 x10  -CA      Additional Comments 3-4x/day, HEP level 3- post op day 14 until motion is returned  -CA         Exercise 9    Exercise Name 9 IR with hands behind back  -CA      Reps 9 x10  -CA      Additional Comments 3-4x/day, HEP level 3- post op day 14 until motion is returned  -CA         Exercise 10    Exercise Name 10 Horizontal abduction with hands behind head  -CA      Reps 10 x10  -CA      Additional Comments 3-4x/day, HEP level 3- post op day 14 until motion is returned  -CA          Exercise 11    Exercise Name 11 PNF D1 shoulder flexion  -CA      Reps 11 x10  -CA      Additional Comments 3-4x/day, HEP level 3- post op day 14 until motion is returned  -CA         Exercise 12    Exercise Name 12 Trunk stretch with shoulder abduction  -CA      Reps 12 x10  -CA      Time 12 5 seconds  -CA      Additional Comments 3-4x/day, HEP level 3- post op day 14 until motion is returned  -CA                User Key  (r) = Recorded By, (t) = Taken By, (c) = Cosigned By      Initials Name Provider Type    Lizzeth Mojica, PT Physical Therapist                     PT OP Goals       Row Name 05/07/25 1620          Long Term Goals    LTG Date to Achieve 05/07/25  -CA     LTG 1 Pt demonstrates awareness of post-operative movement restrictions and HEP to facilitate lymphatic regeneration and reduce the risk of seroma formation, axillary web syndrome, and lymphedema while ensuring shoulder joint mobility.  -CA     LTG 1 Progress Met  -CA     LTG 2 Pt demonstrates understanding of post-operative basic lymphedema precautions  -CA     LTG 2 Progress Met  -CA        Time Calculation    PT Goal Re-Cert Due Date 05/07/25  -CA               User Key  (r) = Recorded By, (t) = Taken By, (c) = Cosigned By      Initials Name Provider Type    Lizzeth Mojica, PT Physical Therapist                     PT Assessment/Plan       Row Name 05/07/25 1620          PT Assessment    Assessment Comments This patient presents to PT pre-operatively for planned BrCA surgery scheduled in a couple weeks. Baseline ROM, postural, and bioimpedance measurements were taken today to be compared to measurements retaken 3-4 weeks post surgery. At that time, any reduced movement, decline in function, or postural issues will be addressed with skilled care and new goals will be established.  Personal risk factors for lymphedema post-operatively for the L upper extremity and trunk quadrant were also assessed today and basic lymphedema  precautions were discussed. A more detailed discussion regarding personal lymphedema risk factors can take place post-operatively once the number of lymph nodes removed and the plan for further medical care is known.  -CA     Please refer to paper survey for additional self-reported information Yes  -CA     Rehab Potential Good  -CA     Patient/caregiver participated in establishment of treatment plan and goals Yes  -CA     Patient would benefit from skilled therapy intervention Yes  -CA        PT Plan    PT Frequency One time visit  -CA     Planned CPT's? PT EVAL MOD COMPLELITY: 82328;PT BIS XTRACELL FLUID ANALYSIS: 20789  -CA     PT Plan Comments This patient may return to PT 3-4 weeks post operatively for re-evaluation measurements to be compared to measurements taken today, at her pre-operative evaluation. In addition, she can be examined for possible post-BrCA surgery sequelae such as axillary web syndrome, scar adhesions, edema, worsened posture, scapular winging, pain, and reduced ROM and function. At that time, a future plan and goals will be established and skilled care continued if indicated. Currently, she has been provided with information before BrCA surgery in order to facilitate recovery and reduce the risk of post-operative sequelae.  -CA               User Key  (r) = Recorded By, (t) = Taken By, (c) = Cosigned By      Initials Name Provider Type    Lizzeth Mojica, PT Physical Therapist                     Outcome Measure Options: Quick DASH, Timed Up and Go (TUG)  Quick DASH  Open a tight or new jar.: Mild Difficulty  Do heavy household chores (e.g., wash walls, wash floors): Mild Difficulty  Carry a shopping bag or briefcase: No Difficulty  Wash your back: Mild Difficulty  Use a knife to cut food: No Difficulty  Recreational activities in which you take some force or impact through your arm, should or hand (e.g. golf, hammering, tennis, etc.): Mild Difficulty  During the past week, to what  extent has your arm, shoulder, or hand problem interfered with your normal social activites with family, friends, neighbors or groups?: Not at all  During the past week, were you limited in your work or other regular daily activities as a result of your arm, shoulder or hand problem?: Not limited at all  Arm, Shoulder, or hand pain: None  During the past week, how much difficulty have you had sleeping because of the pain in your arm, shoulder or hand?: No difficulty  Number of Questions Answered: 10  Quick DASH Score: 10  Timed Up and Go (TUG)  TUG Test 1: 9.56 seconds      Time Calculation:   Start Time: 1620  Stop Time: 1650  Time Calculation (min): 30 min  Untimed Charges  PT Eval/Re-eval Minutes: 30  Total Minutes  Untimed Charges Total Minutes: 30   Total Minutes: 30   Time calculation does not account for 15 minutes documentation time    Therapy Charges for Today       Code Description Service Date Service Provider Modifiers Qty    87097191680 HC PT EVAL MOD COMPLEXITY 3 5/8/2025 Lizzeth Ramos, PT GP 1    63584266404 HC PT BIS XTRACELL FLUID ANALYSIS 5/8/2025 Lizzeth Ramos, PT  1            PT G-Codes  Outcome Measure Options: Quick DASH, Timed Up and Go (TUG)  Quick DASH Score: 10  TUG Test 1: 9.56 seconds            Lizzeth Ramos PT  5/8/2025

## 2025-05-08 NOTE — PROGRESS NOTES
Distress Screening Follow-up    Name: Brooke Mendez    : 1985    Diagnosis: Breast cancer    Location of Distress Screening: Breast Surgery     Distress Level: 6 (From AJ visit on ) (2025 11:00 AM)      Physical Concerns:  Sleep: Y  Substance abuse: N  Fatigue: Y  Tobacco use: N  Memory or concentration: Y  Sexual health: N  Changes in eating: N  Loss or change of physical abilities: N  Pain: Y      Emotional Concerns:  Worry or anxiety: Y  Sadness or depression: Y  Loss of interest or enjoyment: Y  Grief or loss: Y  Fear: Y  Loneliness: Y  Anger: Y  Changes in appearance: Y  Feelings of worthlessness or being a burden: Y      Social Concerns:  Relationship with spouse or partner: N  Relationship with children: Y  Relationship with family members: Y  Relationship with friends or coworkers: Y  Communication with health care team: N  Ability to have children: N      Practical Concerns:  Taking care of myself: Y  Taking care of others: Y  Work: Y  School: N  Housing/Utilities: Y  Finances: Y  Insurance: Y  Transportation: N  : N  Having enough food: Y  Access to medicine: Y  Treatment decisions: Y      Spiritual Concerns:  Sense of meaning or purpose: Y  Changes in zohaib or beliefs: N  Death, dying or afterlife: Y  Conflict between beliefs and cancer treatments: N  Relationship with the sacred: N  Ritual or dietary needs: N       Interventions:   Emotional Needs: emotional suppport/coping strategies  Physical Needs: wigs/caps/cooling cap  Practical Needs: Nurse Navigator Referral      Comments:   N/A     Comments:   SEBASTIAN contacted pt to follow up on Distress Screen from recent visit. SEBASTIAN provided introductions and explained nature of the call. Pt communicated she is doing okay at this time, and denied any needs. SW provided education on role and resources available, and provided contact information should needs arise. PT thanked SEBASTIAN for the call and was agreeable to reach out as  needed. SW will be available ongoing.

## 2025-05-08 NOTE — TELEPHONE ENCOUNTER
Patient called and stated the redness has completely resolved and she did not start the prednisone.  She stated her vitals are a lot better as well, within her normal range.  Patient stated she is only nauseated this morning, denied vomiting.  Patient stated she is only taking zofran.  Advised patient to alternate the compazine and zofran at the prescribed intervals and to call with any dizziness/lightheadedness, headache, weakness.  Patient verbalized understanding and denied any of those symptoms.  Patient stated she will alternate zofran and compazine and contact this office it is does not help.  Offered for patient to keep appointment today for nausea control and patient declined.  Message sent to scheduled and Dr. Baker notified.

## 2025-05-13 ENCOUNTER — APPOINTMENT (OUTPATIENT)
Dept: ONCOLOGY | Facility: HOSPITAL | Age: 40
End: 2025-05-13
Payer: COMMERCIAL

## 2025-05-13 ENCOUNTER — HOSPITAL ENCOUNTER (OUTPATIENT)
Dept: ONCOLOGY | Facility: HOSPITAL | Age: 40
Discharge: HOME | End: 2025-05-13
Admitting: INTERNAL MEDICINE
Payer: COMMERCIAL

## 2025-05-13 ENCOUNTER — OFFICE VISIT (OUTPATIENT)
Dept: ONCOLOGY | Facility: CLINIC | Age: 40
End: 2025-05-13
Payer: COMMERCIAL

## 2025-05-13 ENCOUNTER — RESEARCH ENCOUNTER (OUTPATIENT)
Dept: OTHER | Facility: OTHER | Age: 40
End: 2025-05-13
Payer: COMMERCIAL

## 2025-05-13 VITALS
BODY MASS INDEX: 30.35 KG/M2 | TEMPERATURE: 98.3 F | SYSTOLIC BLOOD PRESSURE: 115 MMHG | RESPIRATION RATE: 18 BRPM | HEART RATE: 99 BPM | WEIGHT: 204.9 LBS | DIASTOLIC BLOOD PRESSURE: 80 MMHG | OXYGEN SATURATION: 98 % | HEIGHT: 69 IN

## 2025-05-13 DIAGNOSIS — C50.912 MALIGNANT NEOPLASM OF LEFT BREAST IN FEMALE, ESTROGEN RECEPTOR NEGATIVE, UNSPECIFIED SITE OF BREAST: ICD-10-CM

## 2025-05-13 DIAGNOSIS — C50.912 MALIGNANT NEOPLASM OF LEFT BREAST IN FEMALE, ESTROGEN RECEPTOR NEGATIVE, UNSPECIFIED SITE OF BREAST: Primary | ICD-10-CM

## 2025-05-13 DIAGNOSIS — Z17.1 MALIGNANT NEOPLASM OF LEFT BREAST IN FEMALE, ESTROGEN RECEPTOR NEGATIVE, UNSPECIFIED SITE OF BREAST: Primary | ICD-10-CM

## 2025-05-13 DIAGNOSIS — Z17.1 MALIGNANT NEOPLASM OF LEFT BREAST IN FEMALE, ESTROGEN RECEPTOR NEGATIVE, UNSPECIFIED SITE OF BREAST: ICD-10-CM

## 2025-05-13 LAB
ALBUMIN SERPL-MCNC: 3.8 G/DL (ref 3.5–5.2)
ALBUMIN/GLOB SERPL: 1.2 G/DL
ALP SERPL-CCNC: 76 U/L (ref 39–117)
ALT SERPL W P-5'-P-CCNC: 138 U/L (ref 1–33)
ANION GAP SERPL CALCULATED.3IONS-SCNC: 12 MMOL/L (ref 5–15)
AST SERPL-CCNC: 68 U/L (ref 1–32)
BASOPHILS # BLD AUTO: 0.03 10*3/MM3 (ref 0–0.2)
BASOPHILS NFR BLD AUTO: 0.8 % (ref 0–1.5)
BILIRUB SERPL-MCNC: 0.4 MG/DL (ref 0–1.2)
BUN SERPL-MCNC: 17 MG/DL (ref 6–20)
BUN/CREAT SERPL: 21.8 (ref 7–25)
CALCIUM SPEC-SCNC: 9.6 MG/DL (ref 8.6–10.5)
CHLORIDE SERPL-SCNC: 104 MMOL/L (ref 98–107)
CO2 SERPL-SCNC: 23 MMOL/L (ref 22–29)
CREAT SERPL-MCNC: 0.78 MG/DL (ref 0.57–1)
DEPRECATED RDW RBC AUTO: 51.2 FL (ref 37–54)
EGFRCR SERPLBLD CKD-EPI 2021: 98.6 ML/MIN/1.73
EOSINOPHIL # BLD AUTO: 0.01 10*3/MM3 (ref 0–0.4)
EOSINOPHIL NFR BLD AUTO: 0.3 % (ref 0.3–6.2)
ERYTHROCYTE [DISTWIDTH] IN BLOOD BY AUTOMATED COUNT: 15.4 % (ref 12.3–15.4)
GLOBULIN UR ELPH-MCNC: 3.2 GM/DL
GLUCOSE SERPL-MCNC: 122 MG/DL (ref 65–99)
HCT VFR BLD AUTO: 23.3 % (ref 34–46.6)
HGB BLD-MCNC: 8.1 G/DL (ref 12–15.9)
IMM GRANULOCYTES # BLD AUTO: 0.22 10*3/MM3 (ref 0–0.05)
IMM GRANULOCYTES NFR BLD AUTO: 5.7 % (ref 0–0.5)
LYMPHOCYTES # BLD AUTO: 1.26 10*3/MM3 (ref 0.7–3.1)
LYMPHOCYTES NFR BLD AUTO: 32.5 % (ref 19.6–45.3)
MCH RBC QN AUTO: 32.3 PG (ref 26.6–33)
MCHC RBC AUTO-ENTMCNC: 34.8 G/DL (ref 31.5–35.7)
MCV RBC AUTO: 92.8 FL (ref 79–97)
MONOCYTES # BLD AUTO: 0.51 10*3/MM3 (ref 0.1–0.9)
MONOCYTES NFR BLD AUTO: 13.1 % (ref 5–12)
NEUTROPHILS NFR BLD AUTO: 1.85 10*3/MM3 (ref 1.7–7)
NEUTROPHILS NFR BLD AUTO: 47.6 % (ref 42.7–76)
PLATELET # BLD AUTO: 275 10*3/MM3 (ref 140–450)
PMV BLD AUTO: 9.2 FL (ref 6–12)
POTASSIUM SERPL-SCNC: 4.4 MMOL/L (ref 3.5–5.2)
PROT SERPL-MCNC: 7 G/DL (ref 6–8.5)
RBC # BLD AUTO: 2.51 10*6/MM3 (ref 3.77–5.28)
SODIUM SERPL-SCNC: 139 MMOL/L (ref 136–145)
WBC NRBC COR # BLD AUTO: 3.88 10*3/MM3 (ref 3.4–10.8)

## 2025-05-13 PROCEDURE — 25010000002 PACLITAXEL PROTEIN-BOUND PART PER 1 MG: Performed by: INTERNAL MEDICINE

## 2025-05-13 PROCEDURE — 25010000002 DIPHENHYDRAMINE PER 50 MG: Performed by: INTERNAL MEDICINE

## 2025-05-13 PROCEDURE — 96375 TX/PRO/DX INJ NEW DRUG ADDON: CPT

## 2025-05-13 PROCEDURE — 80053 COMPREHEN METABOLIC PANEL: CPT | Performed by: INTERNAL MEDICINE

## 2025-05-13 PROCEDURE — 25010000002 HEPARIN LOCK FLUSH PER 10 UNITS: Performed by: INTERNAL MEDICINE

## 2025-05-13 PROCEDURE — 25010000002 PALONOSETRON PER 25 MCG: Performed by: INTERNAL MEDICINE

## 2025-05-13 PROCEDURE — 99214 OFFICE O/P EST MOD 30 MIN: CPT | Performed by: NURSE PRACTITIONER

## 2025-05-13 PROCEDURE — 25010000002 DEXAMETHASONE SODIUM PHOSPHATE 100 MG/10ML SOLUTION: Performed by: INTERNAL MEDICINE

## 2025-05-13 PROCEDURE — 96417 CHEMO IV INFUS EACH ADDL SEQ: CPT

## 2025-05-13 PROCEDURE — 85025 COMPLETE CBC W/AUTO DIFF WBC: CPT | Performed by: INTERNAL MEDICINE

## 2025-05-13 PROCEDURE — 96413 CHEMO IV INFUSION 1 HR: CPT

## 2025-05-13 PROCEDURE — 25010000002 CARBOPLATIN PER 50 MG: Performed by: INTERNAL MEDICINE

## 2025-05-13 PROCEDURE — 25810000003 SODIUM CHLORIDE 0.9 % SOLUTION: Performed by: INTERNAL MEDICINE

## 2025-05-13 PROCEDURE — 25010000002 FAMOTIDINE 10 MG/ML SOLUTION: Performed by: INTERNAL MEDICINE

## 2025-05-13 RX ORDER — SODIUM CHLORIDE 9 MG/ML
20 INJECTION, SOLUTION INTRAVENOUS ONCE
Status: COMPLETED | OUTPATIENT
Start: 2025-05-13 | End: 2025-05-13

## 2025-05-13 RX ORDER — PACLITAXEL 100 MG/20ML
125 INJECTION, POWDER, LYOPHILIZED, FOR SUSPENSION INTRAVENOUS ONCE
Status: COMPLETED | OUTPATIENT
Start: 2025-05-13 | End: 2025-05-13

## 2025-05-13 RX ORDER — HEPARIN SODIUM (PORCINE) LOCK FLUSH IV SOLN 100 UNIT/ML 100 UNIT/ML
500 SOLUTION INTRAVENOUS AS NEEDED
Status: DISCONTINUED | OUTPATIENT
Start: 2025-05-13 | End: 2025-05-14 | Stop reason: HOSPADM

## 2025-05-13 RX ORDER — TRAMADOL HYDROCHLORIDE 50 MG/1
50 TABLET ORAL EVERY 6 HOURS PRN
Qty: 120 TABLET | Refills: 0 | Status: SHIPPED | OUTPATIENT
Start: 2025-05-13

## 2025-05-13 RX ORDER — HEPARIN SODIUM (PORCINE) LOCK FLUSH IV SOLN 100 UNIT/ML 100 UNIT/ML
500 SOLUTION INTRAVENOUS AS NEEDED
OUTPATIENT
Start: 2025-05-13

## 2025-05-13 RX ORDER — DIPHENHYDRAMINE HYDROCHLORIDE 50 MG/ML
25 INJECTION, SOLUTION INTRAMUSCULAR; INTRAVENOUS ONCE
Status: COMPLETED | OUTPATIENT
Start: 2025-05-13 | End: 2025-05-13

## 2025-05-13 RX ORDER — HEPARIN SODIUM (PORCINE) LOCK FLUSH IV SOLN 100 UNIT/ML 100 UNIT/ML
300 SOLUTION INTRAVENOUS ONCE
OUTPATIENT
Start: 2025-05-13

## 2025-05-13 RX ORDER — SODIUM CHLORIDE 0.9 % (FLUSH) 0.9 %
20 SYRINGE (ML) INJECTION AS NEEDED
OUTPATIENT
Start: 2025-05-13

## 2025-05-13 RX ORDER — FAMOTIDINE 10 MG/ML
20 INJECTION, SOLUTION INTRAVENOUS ONCE
Status: COMPLETED | OUTPATIENT
Start: 2025-05-13 | End: 2025-05-13

## 2025-05-13 RX ORDER — PALONOSETRON 0.05 MG/ML
0.25 INJECTION, SOLUTION INTRAVENOUS ONCE
Status: COMPLETED | OUTPATIENT
Start: 2025-05-13 | End: 2025-05-13

## 2025-05-13 RX ORDER — SODIUM CHLORIDE 0.9 % (FLUSH) 0.9 %
10 SYRINGE (ML) INJECTION AS NEEDED
OUTPATIENT
Start: 2025-05-13

## 2025-05-13 RX ADMIN — CARBOPLATIN 230 MG: 10 INJECTION INTRAVENOUS at 13:50

## 2025-05-13 RX ADMIN — PACLITAXEL 255 MG: 100 INJECTION, POWDER, LYOPHILIZED, FOR SUSPENSION INTRAVENOUS at 13:10

## 2025-05-13 RX ADMIN — SODIUM CHLORIDE 20 ML/HR: 9 INJECTION, SOLUTION INTRAVENOUS at 12:17

## 2025-05-13 RX ADMIN — DEXAMETHASONE SODIUM PHOSPHATE 12 MG: 10 INJECTION, SOLUTION INTRAMUSCULAR; INTRAVENOUS at 12:25

## 2025-05-13 RX ADMIN — DIPHENHYDRAMINE HYDROCHLORIDE 25 MG: 50 INJECTION INTRAMUSCULAR; INTRAVENOUS at 12:25

## 2025-05-13 RX ADMIN — PALONOSETRON HYDROCHLORIDE 0.25 MG: 0.25 INJECTION INTRAVENOUS at 12:19

## 2025-05-13 RX ADMIN — HEPARIN 500 UNITS: 100 SYRINGE at 14:23

## 2025-05-13 RX ADMIN — FAMOTIDINE 20 MG: 10 INJECTION, SOLUTION INTRAVENOUS at 12:25
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
## 2025-05-13 NOTE — RESEARCH
[facility]  CONSULTING IN BLOOD DISORDERS & CANCER  Tho Solis, Chevy, Louie, Oscar, Atul,  Nellie, Maria Esther, Kellie, Star Segal & Francis  [address], [address]  [place], [place] [address]  Phone: [phone]  Fax: [phone]      Patient Name:  Brooke Mendez  YOB: 1985  Patient Age:  39 y.o.  Patient's Sex:  female    Date of Service:  05/13/2025    Provider:  ELVIS Baker MD                     RESEARCH NOTE                  Protocol --ICE COMPRESS: Randomized Trial of Limb Cryocompression Versus Continuous Compression Versus Low Cyclic Compression for the Prevention of Taxane-Induced Peripheral Neuropathy   NCT #[id number]     Subject ID: [id number]  ARM 2:  Continuous Compression       Participant here for C8 of taxane therapy.   Planned Abraxane/Cb qwk x 12 with Keytruda q3wks. Taxol was changed to Abraxane due to reaction several cycles ago.  Pt states rash has returned to limbs but is minor.Grade 1.  Steroid burst on hand PRN for intolerable breakout. Port in place. ?  Device used on all extremities except LA which pt refused today.   Upon assessment, there were no signs of redness, open wounds or break in skin integrity prior to start of device intervention.   Participant tolerated device intervention without any adjustments. ?No AEs observed.      Next appt planned for 5/20/25 for C9 of taxane treatment.     Thank you.     DEVON Velazquez, BSN, RN, RNC

## 2025-05-14 ENCOUNTER — TELEPHONE (OUTPATIENT)
Dept: ONCOLOGY | Facility: CLINIC | Age: 40
End: 2025-05-14
Payer: COMMERCIAL

## 2025-05-14 DIAGNOSIS — C50.912 MALIGNANT NEOPLASM OF LEFT BREAST IN FEMALE, ESTROGEN RECEPTOR NEGATIVE, UNSPECIFIED SITE OF BREAST: Primary | ICD-10-CM

## 2025-05-14 DIAGNOSIS — Z17.1 MALIGNANT NEOPLASM OF LEFT BREAST IN FEMALE, ESTROGEN RECEPTOR NEGATIVE, UNSPECIFIED SITE OF BREAST: Primary | ICD-10-CM

## 2025-05-14 NOTE — TELEPHONE ENCOUNTER
Patient called she wants a call back to discuss her thyroid levels, and has a question about the chemo treatments she missed due to being inpatient. Wants to actually know which cycle she is on, and will those missed have to be made up? Please call.

## 2025-05-15 NOTE — TELEPHONE ENCOUNTER
Late entry: spoke with patient on 5/14/25 at 11:36.  She stated she is wondering when thyroid levels will be drawn again as they were abnormal last time.  Advised her RN will discuss with MD and let her know.  Patient verbalized understanding.  Spoke with MD on 4/24/25 and she stated labs should be in her treatment plan for every 6 weeks.  Ensured that they are.  Asked MD again on 5/15/25 on what her advise is for the current levels.  Called patient back to advise per MD that they are at a level now that we will just continue to monitor but if she develops heart palpitations, rapid heart rate, she should let us know.  Plan to repeat labs in 2 weeks.  No answer received.  Left  requesting return call.

## 2025-05-16 RX ORDER — TRIAMCINOLONE ACETONIDE 1 MG/G
1 OINTMENT TOPICAL 2 TIMES DAILY
Qty: 30 G | Refills: 2 | Status: SHIPPED | OUTPATIENT
Start: 2025-05-16

## 2025-05-16 RX ORDER — METOPROLOL SUCCINATE 50 MG/1
75 TABLET, EXTENDED RELEASE ORAL DAILY
Start: 2025-05-16

## 2025-05-16 NOTE — PROGRESS NOTES
Discussed symptoms with patient by phone.  Gradual worsening dyspnea on exertion over the course of the last several weeks.  She has had more anemia with treatment.  No sudden onset worsening shortness of breath.  She has a papular rash on her forearms and legs, scattered, with no blistering, exfoliation, palmar plantar involvement, or increased oral lesions.  Will start topical hydrocortisone and escalate to triamcinolone if not well-tolerated.    Recent labs showed hyperthyroidism.  She has a history of hypothyroidism and is now s/p Keytruda  For now we will stop her Synthroid.  Her heart rate is remaining greater than 100, so we will increase her metoprolol from her current dose of 50 to 75 mg.  Follow-up on Tuesday.

## 2025-05-20 ENCOUNTER — TELEPHONE (OUTPATIENT)
Dept: ONCOLOGY | Facility: CLINIC | Age: 40
End: 2025-05-20

## 2025-05-20 ENCOUNTER — OFFICE VISIT (OUTPATIENT)
Dept: ONCOLOGY | Facility: CLINIC | Age: 40
End: 2025-05-20
Payer: COMMERCIAL

## 2025-05-20 ENCOUNTER — RESEARCH ENCOUNTER (OUTPATIENT)
Dept: OTHER | Facility: OTHER | Age: 40
End: 2025-05-20
Payer: COMMERCIAL

## 2025-05-20 ENCOUNTER — HOSPITAL ENCOUNTER (OUTPATIENT)
Dept: ONCOLOGY | Facility: HOSPITAL | Age: 40
Discharge: HOME | End: 2025-05-20
Admitting: INTERNAL MEDICINE
Payer: COMMERCIAL

## 2025-05-20 ENCOUNTER — HOSPITAL ENCOUNTER (OUTPATIENT)
Dept: ONCOLOGY | Facility: HOSPITAL | Age: 40
End: 2025-05-20
Payer: COMMERCIAL

## 2025-05-20 VITALS
HEART RATE: 92 BPM | RESPIRATION RATE: 20 BRPM | OXYGEN SATURATION: 99 % | HEIGHT: 69 IN | TEMPERATURE: 97.6 F | SYSTOLIC BLOOD PRESSURE: 130 MMHG | DIASTOLIC BLOOD PRESSURE: 80 MMHG | BODY MASS INDEX: 30.66 KG/M2 | WEIGHT: 207 LBS

## 2025-05-20 DIAGNOSIS — Z17.1 MALIGNANT NEOPLASM OF LEFT BREAST IN FEMALE, ESTROGEN RECEPTOR NEGATIVE, UNSPECIFIED SITE OF BREAST: Primary | ICD-10-CM

## 2025-05-20 DIAGNOSIS — C50.912 MALIGNANT NEOPLASM OF LEFT BREAST IN FEMALE, ESTROGEN RECEPTOR NEGATIVE, UNSPECIFIED SITE OF BREAST: Primary | ICD-10-CM

## 2025-05-20 LAB
ALBUMIN SERPL-MCNC: 4 G/DL (ref 3.5–5.2)
ALBUMIN/GLOB SERPL: 1.7 G/DL
ALP SERPL-CCNC: 64 U/L (ref 39–117)
ALT SERPL W P-5'-P-CCNC: 81 U/L (ref 1–33)
ANION GAP SERPL CALCULATED.3IONS-SCNC: 10 MMOL/L (ref 5–15)
AST SERPL-CCNC: 50 U/L (ref 1–32)
BASOPHILS # BLD AUTO: 0.03 10*3/MM3 (ref 0–0.2)
BASOPHILS NFR BLD AUTO: 0.7 % (ref 0–1.5)
BILIRUB SERPL-MCNC: 0.2 MG/DL (ref 0–1.2)
BUN SERPL-MCNC: 13 MG/DL (ref 6–20)
BUN/CREAT SERPL: 23.6 (ref 7–25)
CALCIUM SPEC-SCNC: 9.1 MG/DL (ref 8.6–10.5)
CHLORIDE SERPL-SCNC: 107 MMOL/L (ref 98–107)
CO2 SERPL-SCNC: 23 MMOL/L (ref 22–29)
CORTIS AM PEAK SERPL-MCNC: 10.35 MCG/DL
CREAT SERPL-MCNC: 0.55 MG/DL (ref 0.57–1)
DEPRECATED RDW RBC AUTO: 53.8 FL (ref 37–54)
EGFRCR SERPLBLD CKD-EPI 2021: 119 ML/MIN/1.73
EOSINOPHIL # BLD AUTO: 0.02 10*3/MM3 (ref 0–0.4)
EOSINOPHIL NFR BLD AUTO: 0.5 % (ref 0.3–6.2)
ERYTHROCYTE [DISTWIDTH] IN BLOOD BY AUTOMATED COUNT: 15.9 % (ref 12.3–15.4)
GLOBULIN UR ELPH-MCNC: 2.4 GM/DL
GLUCOSE SERPL-MCNC: 109 MG/DL (ref 65–99)
HCT VFR BLD AUTO: 25.2 % (ref 34–46.6)
HGB BLD-MCNC: 8.6 G/DL (ref 12–15.9)
IMM GRANULOCYTES # BLD AUTO: 0.3 10*3/MM3 (ref 0–0.05)
IMM GRANULOCYTES NFR BLD AUTO: 7.2 % (ref 0–0.5)
LYMPHOCYTES # BLD AUTO: 1.5 10*3/MM3 (ref 0.7–3.1)
LYMPHOCYTES NFR BLD AUTO: 36.2 % (ref 19.6–45.3)
MCH RBC QN AUTO: 32.2 PG (ref 26.6–33)
MCHC RBC AUTO-ENTMCNC: 34.1 G/DL (ref 31.5–35.7)
MCV RBC AUTO: 94.4 FL (ref 79–97)
MONOCYTES # BLD AUTO: 0.33 10*3/MM3 (ref 0.1–0.9)
MONOCYTES NFR BLD AUTO: 8 % (ref 5–12)
NEUTROPHILS NFR BLD AUTO: 1.96 10*3/MM3 (ref 1.7–7)
NEUTROPHILS NFR BLD AUTO: 47.4 % (ref 42.7–76)
PLATELET # BLD AUTO: 237 10*3/MM3 (ref 140–450)
PMV BLD AUTO: 9.8 FL (ref 6–12)
POTASSIUM SERPL-SCNC: 4 MMOL/L (ref 3.5–5.2)
PROT SERPL-MCNC: 6.4 G/DL (ref 6–8.5)
RBC # BLD AUTO: 2.67 10*6/MM3 (ref 3.77–5.28)
SODIUM SERPL-SCNC: 140 MMOL/L (ref 136–145)
T4 FREE SERPL-MCNC: 0.83 NG/DL (ref 0.92–1.68)
TSH SERPL DL<=0.05 MIU/L-ACNC: 0.42 UIU/ML (ref 0.27–4.2)
WBC NRBC COR # BLD AUTO: 4.14 10*3/MM3 (ref 3.4–10.8)

## 2025-05-20 PROCEDURE — 25010000002 FAMOTIDINE 10 MG/ML SOLUTION: Performed by: INTERNAL MEDICINE

## 2025-05-20 PROCEDURE — 96413 CHEMO IV INFUSION 1 HR: CPT

## 2025-05-20 PROCEDURE — 25010000002 DEXAMETHASONE SODIUM PHOSPHATE 100 MG/10ML SOLUTION: Performed by: INTERNAL MEDICINE

## 2025-05-20 PROCEDURE — 84439 ASSAY OF FREE THYROXINE: CPT | Performed by: INTERNAL MEDICINE

## 2025-05-20 PROCEDURE — 25010000002 HEPARIN LOCK FLUSH PER 10 UNITS: Performed by: INTERNAL MEDICINE

## 2025-05-20 PROCEDURE — 80050 GENERAL HEALTH PANEL: CPT | Performed by: INTERNAL MEDICINE

## 2025-05-20 PROCEDURE — 82533 TOTAL CORTISOL: CPT | Performed by: INTERNAL MEDICINE

## 2025-05-20 PROCEDURE — 25010000002 PALONOSETRON PER 25 MCG: Performed by: INTERNAL MEDICINE

## 2025-05-20 PROCEDURE — 25810000003 SODIUM CHLORIDE 0.9 % SOLUTION: Performed by: INTERNAL MEDICINE

## 2025-05-20 PROCEDURE — 25010000002 CARBOPLATIN PER 50 MG: Performed by: INTERNAL MEDICINE

## 2025-05-20 PROCEDURE — 96417 CHEMO IV INFUS EACH ADDL SEQ: CPT

## 2025-05-20 PROCEDURE — 96375 TX/PRO/DX INJ NEW DRUG ADDON: CPT

## 2025-05-20 PROCEDURE — 25010000002 PACLITAXEL PROTEIN-BOUND PART PER 1 MG: Performed by: INTERNAL MEDICINE

## 2025-05-20 PROCEDURE — 25010000002 DIPHENHYDRAMINE PER 50 MG: Performed by: INTERNAL MEDICINE

## 2025-05-20 RX ORDER — PACLITAXEL 100 MG/20ML
125 INJECTION, POWDER, LYOPHILIZED, FOR SUSPENSION INTRAVENOUS ONCE
Status: COMPLETED | OUTPATIENT
Start: 2025-05-20 | End: 2025-05-20

## 2025-05-20 RX ORDER — HEPARIN SODIUM (PORCINE) LOCK FLUSH IV SOLN 100 UNIT/ML 100 UNIT/ML
300 SOLUTION INTRAVENOUS ONCE
OUTPATIENT
Start: 2025-05-20

## 2025-05-20 RX ORDER — FAMOTIDINE 10 MG/ML
20 INJECTION, SOLUTION INTRAVENOUS AS NEEDED
Status: DISCONTINUED | OUTPATIENT
Start: 2025-05-20 | End: 2025-05-21 | Stop reason: HOSPADM

## 2025-05-20 RX ORDER — SODIUM CHLORIDE 0.9 % (FLUSH) 0.9 %
20 SYRINGE (ML) INJECTION AS NEEDED
OUTPATIENT
Start: 2025-05-20

## 2025-05-20 RX ORDER — HYDROCORTISONE SODIUM SUCCINATE 100 MG/2ML
100 INJECTION INTRAMUSCULAR; INTRAVENOUS AS NEEDED
Status: DISCONTINUED | OUTPATIENT
Start: 2025-05-20 | End: 2025-05-21 | Stop reason: HOSPADM

## 2025-05-20 RX ORDER — FAMOTIDINE 10 MG/ML
20 INJECTION, SOLUTION INTRAVENOUS ONCE
Status: COMPLETED | OUTPATIENT
Start: 2025-05-20 | End: 2025-05-20

## 2025-05-20 RX ORDER — SODIUM CHLORIDE 9 MG/ML
20 INJECTION, SOLUTION INTRAVENOUS ONCE
Status: COMPLETED | OUTPATIENT
Start: 2025-05-20 | End: 2025-05-20

## 2025-05-20 RX ORDER — HEPARIN SODIUM (PORCINE) LOCK FLUSH IV SOLN 100 UNIT/ML 100 UNIT/ML
500 SOLUTION INTRAVENOUS AS NEEDED
Status: DISCONTINUED | OUTPATIENT
Start: 2025-05-20 | End: 2025-05-21 | Stop reason: HOSPADM

## 2025-05-20 RX ORDER — SODIUM CHLORIDE 0.9 % (FLUSH) 0.9 %
10 SYRINGE (ML) INJECTION AS NEEDED
OUTPATIENT
Start: 2025-05-20

## 2025-05-20 RX ORDER — HEPARIN SODIUM (PORCINE) LOCK FLUSH IV SOLN 100 UNIT/ML 100 UNIT/ML
500 SOLUTION INTRAVENOUS AS NEEDED
OUTPATIENT
Start: 2025-05-20

## 2025-05-20 RX ORDER — DIPHENHYDRAMINE HYDROCHLORIDE 50 MG/ML
25 INJECTION, SOLUTION INTRAMUSCULAR; INTRAVENOUS ONCE
Status: COMPLETED | OUTPATIENT
Start: 2025-05-20 | End: 2025-05-20

## 2025-05-20 RX ORDER — DIPHENHYDRAMINE HYDROCHLORIDE 50 MG/ML
50 INJECTION, SOLUTION INTRAMUSCULAR; INTRAVENOUS AS NEEDED
Status: DISCONTINUED | OUTPATIENT
Start: 2025-05-20 | End: 2025-05-21 | Stop reason: HOSPADM

## 2025-05-20 RX ORDER — PALONOSETRON 0.05 MG/ML
0.25 INJECTION, SOLUTION INTRAVENOUS ONCE
Status: COMPLETED | OUTPATIENT
Start: 2025-05-20 | End: 2025-05-20

## 2025-05-20 RX ADMIN — DEXAMETHASONE SODIUM PHOSPHATE 12 MG: 10 INJECTION, SOLUTION INTRAMUSCULAR; INTRAVENOUS at 09:54

## 2025-05-20 RX ADMIN — PACLITAXEL 255 MG: 100 INJECTION, POWDER, LYOPHILIZED, FOR SUSPENSION INTRAVENOUS at 10:46

## 2025-05-20 RX ADMIN — DIPHENHYDRAMINE HYDROCHLORIDE 25 MG: 50 INJECTION INTRAMUSCULAR; INTRAVENOUS at 09:51

## 2025-05-20 RX ADMIN — FAMOTIDINE 20 MG: 10 INJECTION, SOLUTION INTRAVENOUS at 09:45

## 2025-05-20 RX ADMIN — SODIUM CHLORIDE 20 ML/HR: 9 INJECTION, SOLUTION INTRAVENOUS at 09:55

## 2025-05-20 RX ADMIN — HEPARIN 500 UNITS: 100 SYRINGE at 12:12

## 2025-05-20 RX ADMIN — PALONOSETRON HYDROCHLORIDE 0.25 MG: 0.25 INJECTION INTRAVENOUS at 09:48

## 2025-05-20 RX ADMIN — CARBOPLATIN 230 MG: 10 INJECTION INTRAVENOUS at 11:32

## 2025-05-20 NOTE — TELEPHONE ENCOUNTER
"Caller: Brooke Mendez \"Lopez\"    Relationship to patient: Self    Best call back number:   Telephone Information:   Mobile [phone]         Chief complaint:   PT IS NEEDING TO SCHED A FOLLOW UP WITH LAB AND INFUSION ON 5/27. USUALLY HAS FOLLOW UP WITH PROVIDER IS NOT SHOWING SCHEDULED.           "

## 2025-05-20 NOTE — RESEARCH
Patient Name:  Brooke Mendez  YOB: 1985  Patient Age:  39 y.o.  Patient's Sex:  female    Date of Service:  05/20/2025    Provider:  ELVIS Baker MD                     RESEARCH NOTE                  Protocol --ICE COMPRESS: Randomized Trial of Limb Cryocompression Versus Continuous Compression Versus Low Cyclic Compression for the Prevention of Taxane-Induced Peripheral Neuropathy   NCT #[id number]     Subject ID: [id number]  ARM 2:  Continuous Compression       Participant here for C9 of taxane therapy.     Planned Abraxane/Cb qwk x 12 with Keytruda q3wks. Taxol was changed to Abraxane due to reaction several cycles ago.  Pt states rash has returned to limbs but is minor.Grade 1.  Steroid burst on hand PRN for intolerable breakout. Port in place. ?  Device used on all extremities except LA which pt refused today.     Upon assessment, there were no signs of redness, open wounds or break in skin integrity prior to start of device intervention.   Participant tolerated device intervention without any adjustments. ?No AEs observed.      Next appt planned for 5/20/25 for C10 of taxane treatment.     Thank you.     DEVON Velazquez, BSN, RN, RNC
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
## 2025-05-20 NOTE — TELEPHONE ENCOUNTER
RETURNED CALL TO PATIENT AND LEFT VOICEMAIL FOR PATIENT TO CALL BACK AND CONFIRM APPTS. PLEASE ADVISE.

## 2025-05-21 ENCOUNTER — TELEPHONE (OUTPATIENT)
Dept: ONCOLOGY | Facility: CLINIC | Age: 40
End: 2025-05-21
Payer: COMMERCIAL

## 2025-05-21 NOTE — TELEPHONE ENCOUNTER
Patient called to ask about her appts for 5/27. I let her know that it is okay that labs are after the provider visit and it was scheduled that way due to the holiday and the infusion schedule. Advised that patient can have labs drawn in our office prior to appt via PIV if she wishes to have results for provider visit. Also advised that she can speak with nurse after appointments if she has questions regarding labs that could not be discussed that morning. Pt verbalized understanding.

## 2025-05-22 DIAGNOSIS — C50.912 MALIGNANT NEOPLASM OF LEFT BREAST IN FEMALE, ESTROGEN RECEPTOR NEGATIVE, UNSPECIFIED SITE OF BREAST: Primary | ICD-10-CM

## 2025-05-22 DIAGNOSIS — Z17.1 MALIGNANT NEOPLASM OF LEFT BREAST IN FEMALE, ESTROGEN RECEPTOR NEGATIVE, UNSPECIFIED SITE OF BREAST: Primary | ICD-10-CM

## 2025-05-27 ENCOUNTER — HOSPITAL ENCOUNTER (OUTPATIENT)
Dept: ONCOLOGY | Facility: HOSPITAL | Age: 40
Discharge: HOME | End: 2025-05-27
Admitting: INTERNAL MEDICINE
Payer: COMMERCIAL

## 2025-05-27 ENCOUNTER — OFFICE VISIT (OUTPATIENT)
Dept: ONCOLOGY | Facility: CLINIC | Age: 40
End: 2025-05-27
Payer: COMMERCIAL

## 2025-05-27 ENCOUNTER — RESEARCH ENCOUNTER (OUTPATIENT)
Dept: OTHER | Facility: OTHER | Age: 40
End: 2025-05-27
Payer: COMMERCIAL

## 2025-05-27 ENCOUNTER — APPOINTMENT (OUTPATIENT)
Dept: ONCOLOGY | Facility: HOSPITAL | Age: 40
End: 2025-05-27
Payer: COMMERCIAL

## 2025-05-27 VITALS
SYSTOLIC BLOOD PRESSURE: 117 MMHG | RESPIRATION RATE: 16 BRPM | BODY MASS INDEX: 31.04 KG/M2 | OXYGEN SATURATION: 97 % | DIASTOLIC BLOOD PRESSURE: 80 MMHG | TEMPERATURE: 96.8 F | HEART RATE: 90 BPM | WEIGHT: 209.6 LBS | HEIGHT: 69 IN

## 2025-05-27 DIAGNOSIS — C50.912 MALIGNANT NEOPLASM OF LEFT BREAST IN FEMALE, ESTROGEN RECEPTOR NEGATIVE, UNSPECIFIED SITE OF BREAST: Primary | ICD-10-CM

## 2025-05-27 DIAGNOSIS — Z17.1 MALIGNANT NEOPLASM OF LEFT BREAST IN FEMALE, ESTROGEN RECEPTOR NEGATIVE, UNSPECIFIED SITE OF BREAST: Primary | ICD-10-CM

## 2025-05-27 LAB
ALBUMIN SERPL-MCNC: 3.8 G/DL (ref 3.5–5.2)
ALBUMIN/GLOB SERPL: 1.5 G/DL
ALP SERPL-CCNC: 62 U/L (ref 39–117)
ALT SERPL W P-5'-P-CCNC: 70 U/L (ref 1–33)
ANION GAP SERPL CALCULATED.3IONS-SCNC: 12 MMOL/L (ref 5–15)
AST SERPL-CCNC: 37 U/L (ref 1–32)
BASOPHILS # BLD AUTO: 0.04 10*3/MM3 (ref 0–0.2)
BASOPHILS NFR BLD AUTO: 1 % (ref 0–1.5)
BILIRUB SERPL-MCNC: 0.3 MG/DL (ref 0–1.2)
BUN SERPL-MCNC: 13 MG/DL (ref 6–20)
BUN/CREAT SERPL: 17.1 (ref 7–25)
CALCIUM SPEC-SCNC: 9.1 MG/DL (ref 8.6–10.5)
CHLORIDE SERPL-SCNC: 102 MMOL/L (ref 98–107)
CO2 SERPL-SCNC: 22 MMOL/L (ref 22–29)
CREAT SERPL-MCNC: 0.76 MG/DL (ref 0.57–1)
DEPRECATED RDW RBC AUTO: 56.9 FL (ref 37–54)
EGFRCR SERPLBLD CKD-EPI 2021: 101.7 ML/MIN/1.73
EOSINOPHIL # BLD AUTO: 0.01 10*3/MM3 (ref 0–0.4)
EOSINOPHIL NFR BLD AUTO: 0.2 % (ref 0.3–6.2)
ERYTHROCYTE [DISTWIDTH] IN BLOOD BY AUTOMATED COUNT: 17.1 % (ref 12.3–15.4)
GLOBULIN UR ELPH-MCNC: 2.6 GM/DL
GLUCOSE SERPL-MCNC: 112 MG/DL (ref 65–99)
HCT VFR BLD AUTO: 25.7 % (ref 34–46.6)
HGB BLD-MCNC: 8.9 G/DL (ref 12–15.9)
IMM GRANULOCYTES # BLD AUTO: 0.3 10*3/MM3 (ref 0–0.05)
IMM GRANULOCYTES NFR BLD AUTO: 7.2 % (ref 0–0.5)
LYMPHOCYTES # BLD AUTO: 1.26 10*3/MM3 (ref 0.7–3.1)
LYMPHOCYTES NFR BLD AUTO: 30.3 % (ref 19.6–45.3)
MCH RBC QN AUTO: 32.7 PG (ref 26.6–33)
MCHC RBC AUTO-ENTMCNC: 34.6 G/DL (ref 31.5–35.7)
MCV RBC AUTO: 94.5 FL (ref 79–97)
MONOCYTES # BLD AUTO: 0.32 10*3/MM3 (ref 0.1–0.9)
MONOCYTES NFR BLD AUTO: 7.7 % (ref 5–12)
NEUTROPHILS NFR BLD AUTO: 2.23 10*3/MM3 (ref 1.7–7)
NEUTROPHILS NFR BLD AUTO: 53.6 % (ref 42.7–76)
PLATELET # BLD AUTO: 270 10*3/MM3 (ref 140–450)
PMV BLD AUTO: 9.9 FL (ref 6–12)
POTASSIUM SERPL-SCNC: 4.1 MMOL/L (ref 3.5–5.2)
PROT SERPL-MCNC: 6.4 G/DL (ref 6–8.5)
RBC # BLD AUTO: 2.72 10*6/MM3 (ref 3.77–5.28)
SODIUM SERPL-SCNC: 136 MMOL/L (ref 136–145)
WBC NRBC COR # BLD AUTO: 4.16 10*3/MM3 (ref 3.4–10.8)

## 2025-05-27 PROCEDURE — 25010000002 PALONOSETRON PER 25 MCG: Performed by: NURSE PRACTITIONER

## 2025-05-27 PROCEDURE — 96417 CHEMO IV INFUS EACH ADDL SEQ: CPT

## 2025-05-27 PROCEDURE — 85025 COMPLETE CBC W/AUTO DIFF WBC: CPT | Performed by: INTERNAL MEDICINE

## 2025-05-27 PROCEDURE — 25010000002 DIPHENHYDRAMINE PER 50 MG: Performed by: NURSE PRACTITIONER

## 2025-05-27 PROCEDURE — 96413 CHEMO IV INFUSION 1 HR: CPT

## 2025-05-27 PROCEDURE — 96375 TX/PRO/DX INJ NEW DRUG ADDON: CPT

## 2025-05-27 PROCEDURE — 99214 OFFICE O/P EST MOD 30 MIN: CPT | Performed by: NURSE PRACTITIONER

## 2025-05-27 PROCEDURE — 80053 COMPREHEN METABOLIC PANEL: CPT | Performed by: INTERNAL MEDICINE

## 2025-05-27 PROCEDURE — 25010000002 HEPARIN LOCK FLUSH PER 10 UNITS: Performed by: INTERNAL MEDICINE

## 2025-05-27 PROCEDURE — 25010000002 PACLITAXEL PROTEIN-BOUND PART PER 1 MG: Performed by: NURSE PRACTITIONER

## 2025-05-27 PROCEDURE — 25010000002 PEMBROLIZUMAB 100 MG/4ML SOLUTION 4 ML VIAL: Performed by: NURSE PRACTITIONER

## 2025-05-27 PROCEDURE — 25810000003 SODIUM CHLORIDE 0.9 % SOLUTION: Performed by: NURSE PRACTITIONER

## 2025-05-27 PROCEDURE — 25010000002 FAMOTIDINE 10 MG/ML SOLUTION: Performed by: NURSE PRACTITIONER

## 2025-05-27 PROCEDURE — 25010000002 DEXAMETHASONE SODIUM PHOSPHATE 100 MG/10ML SOLUTION: Performed by: NURSE PRACTITIONER

## 2025-05-27 PROCEDURE — 25010000002 CARBOPLATIN PER 50 MG: Performed by: NURSE PRACTITIONER

## 2025-05-27 RX ORDER — FAMOTIDINE 10 MG/ML
20 INJECTION, SOLUTION INTRAVENOUS ONCE
Status: COMPLETED | OUTPATIENT
Start: 2025-05-27 | End: 2025-05-27

## 2025-05-27 RX ORDER — PALONOSETRON 0.05 MG/ML
0.25 INJECTION, SOLUTION INTRAVENOUS ONCE
Status: CANCELLED | OUTPATIENT
Start: 2025-05-27 | End: 2025-05-27

## 2025-05-27 RX ORDER — HYDROCORTISONE SODIUM SUCCINATE 100 MG/2ML
100 INJECTION INTRAMUSCULAR; INTRAVENOUS AS NEEDED
OUTPATIENT
Start: 2025-06-10

## 2025-05-27 RX ORDER — FAMOTIDINE 10 MG/ML
20 INJECTION, SOLUTION INTRAVENOUS AS NEEDED
Status: DISCONTINUED | OUTPATIENT
Start: 2025-05-27 | End: 2025-05-28 | Stop reason: HOSPADM

## 2025-05-27 RX ORDER — SODIUM CHLORIDE 0.9 % (FLUSH) 0.9 %
20 SYRINGE (ML) INJECTION AS NEEDED
OUTPATIENT
Start: 2025-05-27

## 2025-05-27 RX ORDER — HEPARIN SODIUM (PORCINE) LOCK FLUSH IV SOLN 100 UNIT/ML 100 UNIT/ML
500 SOLUTION INTRAVENOUS AS NEEDED
Status: DISCONTINUED | OUTPATIENT
Start: 2025-05-27 | End: 2025-05-28 | Stop reason: HOSPADM

## 2025-05-27 RX ORDER — SODIUM CHLORIDE 9 MG/ML
20 INJECTION, SOLUTION INTRAVENOUS ONCE
Status: CANCELLED | OUTPATIENT
Start: 2025-05-27

## 2025-05-27 RX ORDER — PALONOSETRON 0.05 MG/ML
0.25 INJECTION, SOLUTION INTRAVENOUS ONCE
Status: COMPLETED | OUTPATIENT
Start: 2025-05-27 | End: 2025-05-27

## 2025-05-27 RX ORDER — SODIUM CHLORIDE 9 MG/ML
20 INJECTION, SOLUTION INTRAVENOUS ONCE
OUTPATIENT
Start: 2025-06-03

## 2025-05-27 RX ORDER — DIPHENHYDRAMINE HYDROCHLORIDE 50 MG/ML
25 INJECTION, SOLUTION INTRAMUSCULAR; INTRAVENOUS ONCE
Status: COMPLETED | OUTPATIENT
Start: 2025-05-27 | End: 2025-05-27

## 2025-05-27 RX ORDER — FAMOTIDINE 10 MG/ML
20 INJECTION, SOLUTION INTRAVENOUS AS NEEDED
Status: CANCELLED | OUTPATIENT
Start: 2025-05-27

## 2025-05-27 RX ORDER — DIPHENHYDRAMINE HYDROCHLORIDE 50 MG/ML
50 INJECTION, SOLUTION INTRAMUSCULAR; INTRAVENOUS AS NEEDED
Status: DISCONTINUED | OUTPATIENT
Start: 2025-05-27 | End: 2025-05-28 | Stop reason: HOSPADM

## 2025-05-27 RX ORDER — ZOLPIDEM TARTRATE 10 MG/1
10 TABLET ORAL NIGHTLY PRN
COMMUNITY

## 2025-05-27 RX ORDER — HYDROCORTISONE SODIUM SUCCINATE 100 MG/2ML
100 INJECTION INTRAMUSCULAR; INTRAVENOUS AS NEEDED
OUTPATIENT
Start: 2025-06-03

## 2025-05-27 RX ORDER — FAMOTIDINE 10 MG/ML
20 INJECTION, SOLUTION INTRAVENOUS AS NEEDED
OUTPATIENT
Start: 2025-06-10

## 2025-05-27 RX ORDER — DIPHENHYDRAMINE HYDROCHLORIDE 50 MG/ML
50 INJECTION, SOLUTION INTRAMUSCULAR; INTRAVENOUS AS NEEDED
Status: CANCELLED | OUTPATIENT
Start: 2025-05-27

## 2025-05-27 RX ORDER — HEPARIN SODIUM (PORCINE) LOCK FLUSH IV SOLN 100 UNIT/ML 100 UNIT/ML
500 SOLUTION INTRAVENOUS AS NEEDED
OUTPATIENT
Start: 2025-05-27

## 2025-05-27 RX ORDER — PALONOSETRON 0.05 MG/ML
0.25 INJECTION, SOLUTION INTRAVENOUS ONCE
OUTPATIENT
Start: 2025-06-03

## 2025-05-27 RX ORDER — PACLITAXEL 100 MG/20ML
125 INJECTION, POWDER, LYOPHILIZED, FOR SUSPENSION INTRAVENOUS ONCE
Status: COMPLETED | OUTPATIENT
Start: 2025-05-27 | End: 2025-05-27

## 2025-05-27 RX ORDER — SODIUM CHLORIDE 0.9 % (FLUSH) 0.9 %
10 SYRINGE (ML) INJECTION AS NEEDED
OUTPATIENT
Start: 2025-05-27

## 2025-05-27 RX ORDER — DIPHENHYDRAMINE HYDROCHLORIDE 50 MG/ML
50 INJECTION, SOLUTION INTRAMUSCULAR; INTRAVENOUS AS NEEDED
OUTPATIENT
Start: 2025-06-10

## 2025-05-27 RX ORDER — PALONOSETRON 0.05 MG/ML
0.25 INJECTION, SOLUTION INTRAVENOUS ONCE
OUTPATIENT
Start: 2025-06-10

## 2025-05-27 RX ORDER — FAMOTIDINE 10 MG/ML
20 INJECTION, SOLUTION INTRAVENOUS ONCE
OUTPATIENT
Start: 2025-06-03

## 2025-05-27 RX ORDER — DIPHENHYDRAMINE HYDROCHLORIDE 50 MG/ML
50 INJECTION, SOLUTION INTRAMUSCULAR; INTRAVENOUS AS NEEDED
OUTPATIENT
Start: 2025-06-03

## 2025-05-27 RX ORDER — FAMOTIDINE 10 MG/ML
20 INJECTION, SOLUTION INTRAVENOUS ONCE
Status: CANCELLED | OUTPATIENT
Start: 2025-05-27 | End: 2025-05-27

## 2025-05-27 RX ORDER — HYDROCORTISONE SODIUM SUCCINATE 100 MG/2ML
100 INJECTION INTRAMUSCULAR; INTRAVENOUS AS NEEDED
Status: DISCONTINUED | OUTPATIENT
Start: 2025-05-27 | End: 2025-05-28 | Stop reason: HOSPADM

## 2025-05-27 RX ORDER — PACLITAXEL 100 MG/20ML
125 INJECTION, POWDER, LYOPHILIZED, FOR SUSPENSION INTRAVENOUS ONCE
Start: 2025-06-10

## 2025-05-27 RX ORDER — SODIUM CHLORIDE 9 MG/ML
20 INJECTION, SOLUTION INTRAVENOUS ONCE
OUTPATIENT
Start: 2025-06-10

## 2025-05-27 RX ORDER — PACLITAXEL 100 MG/20ML
125 INJECTION, POWDER, LYOPHILIZED, FOR SUSPENSION INTRAVENOUS ONCE
Start: 2025-06-03

## 2025-05-27 RX ORDER — FAMOTIDINE 10 MG/ML
20 INJECTION, SOLUTION INTRAVENOUS AS NEEDED
OUTPATIENT
Start: 2025-06-03

## 2025-05-27 RX ORDER — HYDROCORTISONE SODIUM SUCCINATE 100 MG/2ML
100 INJECTION INTRAMUSCULAR; INTRAVENOUS AS NEEDED
Status: CANCELLED | OUTPATIENT
Start: 2025-05-27

## 2025-05-27 RX ORDER — HEPARIN SODIUM (PORCINE) LOCK FLUSH IV SOLN 100 UNIT/ML 100 UNIT/ML
300 SOLUTION INTRAVENOUS ONCE
OUTPATIENT
Start: 2025-05-27

## 2025-05-27 RX ORDER — PACLITAXEL 100 MG/20ML
125 INJECTION, POWDER, LYOPHILIZED, FOR SUSPENSION INTRAVENOUS ONCE
Status: CANCELLED
Start: 2025-05-27

## 2025-05-27 RX ORDER — FAMOTIDINE 10 MG/ML
20 INJECTION, SOLUTION INTRAVENOUS ONCE
OUTPATIENT
Start: 2025-06-10

## 2025-05-27 RX ORDER — SODIUM CHLORIDE 9 MG/ML
20 INJECTION, SOLUTION INTRAVENOUS ONCE
Status: COMPLETED | OUTPATIENT
Start: 2025-05-27 | End: 2025-05-27

## 2025-05-27 RX ADMIN — PACLITAXEL 255 MG: 100 INJECTION, POWDER, LYOPHILIZED, FOR SUSPENSION INTRAVENOUS at 11:33

## 2025-05-27 RX ADMIN — PALONOSETRON HYDROCHLORIDE 0.25 MG: 0.25 INJECTION INTRAVENOUS at 10:25

## 2025-05-27 RX ADMIN — CARBOPLATIN 230 MG: 10 INJECTION INTRAVENOUS at 12:23

## 2025-05-27 RX ADMIN — DEXAMETHASONE SODIUM PHOSPHATE 12 MG: 10 INJECTION, SOLUTION INTRAMUSCULAR; INTRAVENOUS at 10:28

## 2025-05-27 RX ADMIN — FAMOTIDINE 20 MG: 10 INJECTION, SOLUTION INTRAVENOUS at 10:22

## 2025-05-27 RX ADMIN — SODIUM CHLORIDE 200 MG: 9 INJECTION, SOLUTION INTRAVENOUS at 10:49

## 2025-05-27 RX ADMIN — DIPHENHYDRAMINE HYDROCHLORIDE 25 MG: 50 INJECTION INTRAMUSCULAR; INTRAVENOUS at 10:26

## 2025-05-27 RX ADMIN — HEPARIN 500 UNITS: 100 SYRINGE at 13:04

## 2025-05-27 RX ADMIN — SODIUM CHLORIDE 20 ML/HR: 9 INJECTION, SOLUTION INTRAVENOUS at 10:28
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
## 2025-05-27 NOTE — RESEARCH
Patient Name:  Brooke Mendez  YOB: 1985  Patient Age:  39 y.o.  Patient's Sex:  female    Date of Service:  05/27/2025    Provider:  ELVIS Baker MD                     RESEARCH NOTE                  Protocol --ICE COMPRESS: Randomized Trial of Limb Cryocompression Versus Continuous Compression Versus Low Cyclic Compression for the Prevention of Taxane-Induced Peripheral Neuropathy   NCT #[id number]     Subject ID: [id number]  ARM 2:  Continuous Compression       Participant here for C10 of taxane therapy.      Planned Abraxane/Cb qwk x 12 with Keytruda q3wks. Taxol was changed to Abraxane due to reaction several cycles ago.  Pt states rash has returned to limbs but is minor. Grade 1.  Steroid burst on hand PRN for intolerable breakout. Port in place. ?  Device used on all extremities except arms bilaterally, which pt refused today.      Upon assessment, there were no signs of redness, open wounds or break in skin integrity prior to start of device intervention.   Participant tolerated device intervention without any adjustments. ?No AEs observed.      Next appt planned for 6/3/25 for C11 of taxane treatment.     Thank you.     DEVON Velazquez, BSN, RN, RNC

## 2025-05-28 ENCOUNTER — TELEPHONE (OUTPATIENT)
Dept: ONCOLOGY | Facility: CLINIC | Age: 40
End: 2025-05-28

## 2025-05-28 NOTE — TELEPHONE ENCOUNTER
"  Caller: Brooke Mendez \"Lopez\"    Relationship: Self    Best call back number: [phone]    What is the best time to reach you: ANYTIME    Who are you requesting to speak with (clinical staff, provider,  specific staff member): CLINICAL    What was the call regarding: PT REQUESTING A CALL BACK IN REGARDS TO HER DISABILITY PAPERWORK    REQUESTING A CALL BACK TODAY STATING HER EMPLOYER WILL BE  HER FROM HER JOB.          "

## 2025-06-03 ENCOUNTER — HOSPITAL ENCOUNTER (OUTPATIENT)
Dept: ONCOLOGY | Facility: HOSPITAL | Age: 40
Discharge: HOME | End: 2025-06-03
Admitting: INTERNAL MEDICINE
Payer: COMMERCIAL

## 2025-06-03 ENCOUNTER — RESEARCH ENCOUNTER (OUTPATIENT)
Dept: OTHER | Facility: OTHER | Age: 40
End: 2025-06-03
Payer: COMMERCIAL

## 2025-06-03 ENCOUNTER — OFFICE VISIT (OUTPATIENT)
Dept: ONCOLOGY | Facility: CLINIC | Age: 40
End: 2025-06-03
Payer: COMMERCIAL

## 2025-06-03 ENCOUNTER — DOCUMENTATION (OUTPATIENT)
Dept: OTHER | Facility: HOSPITAL | Age: 40
End: 2025-06-03
Payer: COMMERCIAL

## 2025-06-03 ENCOUNTER — APPOINTMENT (OUTPATIENT)
Dept: ONCOLOGY | Facility: HOSPITAL | Age: 40
End: 2025-06-03
Payer: COMMERCIAL

## 2025-06-03 VITALS
OXYGEN SATURATION: 96 % | WEIGHT: 210 LBS | HEIGHT: 69 IN | SYSTOLIC BLOOD PRESSURE: 125 MMHG | DIASTOLIC BLOOD PRESSURE: 87 MMHG | BODY MASS INDEX: 31.1 KG/M2 | TEMPERATURE: 97.3 F | HEART RATE: 102 BPM

## 2025-06-03 DIAGNOSIS — Z17.1 MALIGNANT NEOPLASM OF LEFT BREAST IN FEMALE, ESTROGEN RECEPTOR NEGATIVE, UNSPECIFIED SITE OF BREAST: Primary | ICD-10-CM

## 2025-06-03 DIAGNOSIS — C50.912 MALIGNANT NEOPLASM OF LEFT BREAST IN FEMALE, ESTROGEN RECEPTOR NEGATIVE, UNSPECIFIED SITE OF BREAST: Primary | ICD-10-CM

## 2025-06-03 LAB
ALBUMIN SERPL-MCNC: 4.1 G/DL (ref 3.5–5.2)
ALBUMIN/GLOB SERPL: 1.6 G/DL
ALP SERPL-CCNC: 58 U/L (ref 39–117)
ALT SERPL W P-5'-P-CCNC: 70 U/L (ref 1–33)
ANION GAP SERPL CALCULATED.3IONS-SCNC: 11 MMOL/L (ref 5–15)
AST SERPL-CCNC: 38 U/L (ref 1–32)
BASOPHILS # BLD AUTO: 0.05 10*3/MM3 (ref 0–0.2)
BASOPHILS NFR BLD AUTO: 1.2 % (ref 0–1.5)
BILIRUB SERPL-MCNC: 0.3 MG/DL (ref 0–1.2)
BUN SERPL-MCNC: 14.5 MG/DL (ref 6–20)
BUN/CREAT SERPL: 16.7 (ref 7–25)
CALCIUM SPEC-SCNC: 9.1 MG/DL (ref 8.6–10.5)
CHLORIDE SERPL-SCNC: 100 MMOL/L (ref 98–107)
CO2 SERPL-SCNC: 25 MMOL/L (ref 22–29)
CREAT SERPL-MCNC: 0.87 MG/DL (ref 0.57–1)
DEPRECATED RDW RBC AUTO: 62.1 FL (ref 37–54)
EGFRCR SERPLBLD CKD-EPI 2021: 86.5 ML/MIN/1.73
EOSINOPHIL # BLD AUTO: 0.02 10*3/MM3 (ref 0–0.4)
EOSINOPHIL NFR BLD AUTO: 0.5 % (ref 0.3–6.2)
ERYTHROCYTE [DISTWIDTH] IN BLOOD BY AUTOMATED COUNT: 18.2 % (ref 12.3–15.4)
GLOBULIN UR ELPH-MCNC: 2.5 GM/DL
GLUCOSE SERPL-MCNC: 128 MG/DL (ref 65–99)
HCT VFR BLD AUTO: 27.4 % (ref 34–46.6)
HGB BLD-MCNC: 9.5 G/DL (ref 12–15.9)
IMM GRANULOCYTES # BLD AUTO: 0.23 10*3/MM3 (ref 0–0.05)
IMM GRANULOCYTES NFR BLD AUTO: 5.6 % (ref 0–0.5)
LYMPHOCYTES # BLD AUTO: 1.54 10*3/MM3 (ref 0.7–3.1)
LYMPHOCYTES NFR BLD AUTO: 37.6 % (ref 19.6–45.3)
MCH RBC QN AUTO: 33.7 PG (ref 26.6–33)
MCHC RBC AUTO-ENTMCNC: 34.7 G/DL (ref 31.5–35.7)
MCV RBC AUTO: 97.2 FL (ref 79–97)
MONOCYTES # BLD AUTO: 0.21 10*3/MM3 (ref 0.1–0.9)
MONOCYTES NFR BLD AUTO: 5.1 % (ref 5–12)
NEUTROPHILS NFR BLD AUTO: 2.05 10*3/MM3 (ref 1.7–7)
NEUTROPHILS NFR BLD AUTO: 50 % (ref 42.7–76)
PLATELET # BLD AUTO: 324 10*3/MM3 (ref 140–450)
PMV BLD AUTO: 9.5 FL (ref 6–12)
POTASSIUM SERPL-SCNC: 3.6 MMOL/L (ref 3.5–5.2)
PROT SERPL-MCNC: 6.6 G/DL (ref 6–8.5)
RBC # BLD AUTO: 2.82 10*6/MM3 (ref 3.77–5.28)
SODIUM SERPL-SCNC: 136 MMOL/L (ref 136–145)
WBC NRBC COR # BLD AUTO: 4.1 10*3/MM3 (ref 3.4–10.8)

## 2025-06-03 PROCEDURE — 25010000002 DEXAMETHASONE SODIUM PHOSPHATE 100 MG/10ML SOLUTION: Performed by: NURSE PRACTITIONER

## 2025-06-03 PROCEDURE — 96417 CHEMO IV INFUS EACH ADDL SEQ: CPT

## 2025-06-03 PROCEDURE — 25010000002 FAMOTIDINE 10 MG/ML SOLUTION: Performed by: NURSE PRACTITIONER

## 2025-06-03 PROCEDURE — 25010000002 HEPARIN LOCK FLUSH PER 10 UNITS: Performed by: INTERNAL MEDICINE

## 2025-06-03 PROCEDURE — 99214 OFFICE O/P EST MOD 30 MIN: CPT | Performed by: INTERNAL MEDICINE

## 2025-06-03 PROCEDURE — 25810000003 SODIUM CHLORIDE 0.9 % SOLUTION: Performed by: NURSE PRACTITIONER

## 2025-06-03 PROCEDURE — 84439 ASSAY OF FREE THYROXINE: CPT | Performed by: INTERNAL MEDICINE

## 2025-06-03 PROCEDURE — 25010000002 CARBOPLATIN PER 50 MG: Performed by: NURSE PRACTITIONER

## 2025-06-03 PROCEDURE — 96376 TX/PRO/DX INJ SAME DRUG ADON: CPT

## 2025-06-03 PROCEDURE — 25010000002 DIPHENHYDRAMINE PER 50 MG: Performed by: NURSE PRACTITIONER

## 2025-06-03 PROCEDURE — 96413 CHEMO IV INFUSION 1 HR: CPT

## 2025-06-03 PROCEDURE — 85025 COMPLETE CBC W/AUTO DIFF WBC: CPT | Performed by: NURSE PRACTITIONER

## 2025-06-03 PROCEDURE — 96375 TX/PRO/DX INJ NEW DRUG ADDON: CPT

## 2025-06-03 PROCEDURE — 25010000002 PACLITAXEL PROTEIN-BOUND PART PER 1 MG: Performed by: NURSE PRACTITIONER

## 2025-06-03 PROCEDURE — 25010000002 PALONOSETRON PER 25 MCG: Performed by: NURSE PRACTITIONER

## 2025-06-03 PROCEDURE — 80050 GENERAL HEALTH PANEL: CPT | Performed by: NURSE PRACTITIONER

## 2025-06-03 RX ORDER — SODIUM CHLORIDE 9 MG/ML
20 INJECTION, SOLUTION INTRAVENOUS ONCE
Status: COMPLETED | OUTPATIENT
Start: 2025-06-03 | End: 2025-06-03

## 2025-06-03 RX ORDER — SODIUM CHLORIDE 0.9 % (FLUSH) 0.9 %
20 SYRINGE (ML) INJECTION AS NEEDED
Status: CANCELLED | OUTPATIENT
Start: 2025-06-03

## 2025-06-03 RX ORDER — HEPARIN SODIUM (PORCINE) LOCK FLUSH IV SOLN 100 UNIT/ML 100 UNIT/ML
500 SOLUTION INTRAVENOUS AS NEEDED
Status: CANCELLED | OUTPATIENT
Start: 2025-06-03

## 2025-06-03 RX ORDER — FAMOTIDINE 10 MG/ML
20 INJECTION, SOLUTION INTRAVENOUS ONCE
Status: COMPLETED | OUTPATIENT
Start: 2025-06-03 | End: 2025-06-03

## 2025-06-03 RX ORDER — PACLITAXEL 100 MG/20ML
125 INJECTION, POWDER, LYOPHILIZED, FOR SUSPENSION INTRAVENOUS ONCE
Status: COMPLETED | OUTPATIENT
Start: 2025-06-03 | End: 2025-06-03

## 2025-06-03 RX ORDER — HEPARIN SODIUM (PORCINE) LOCK FLUSH IV SOLN 100 UNIT/ML 100 UNIT/ML
500 SOLUTION INTRAVENOUS AS NEEDED
Status: DISCONTINUED | OUTPATIENT
Start: 2025-06-03 | End: 2025-06-04 | Stop reason: HOSPADM

## 2025-06-03 RX ORDER — HEPARIN SODIUM (PORCINE) LOCK FLUSH IV SOLN 100 UNIT/ML 100 UNIT/ML
300 SOLUTION INTRAVENOUS ONCE
Status: CANCELLED | OUTPATIENT
Start: 2025-06-03

## 2025-06-03 RX ORDER — SODIUM CHLORIDE 0.9 % (FLUSH) 0.9 %
10 SYRINGE (ML) INJECTION AS NEEDED
Status: CANCELLED | OUTPATIENT
Start: 2025-06-03

## 2025-06-03 RX ORDER — PALONOSETRON 0.05 MG/ML
0.25 INJECTION, SOLUTION INTRAVENOUS ONCE
Status: COMPLETED | OUTPATIENT
Start: 2025-06-03 | End: 2025-06-03

## 2025-06-03 RX ADMIN — CARBOPLATIN 230 MG: 10 INJECTION INTRAVENOUS at 11:02

## 2025-06-03 RX ADMIN — HEPARIN 500 UNITS: 100 SYRINGE at 11:50

## 2025-06-03 RX ADMIN — PACLITAXEL 255 MG: 100 INJECTION, POWDER, LYOPHILIZED, FOR SUSPENSION INTRAVENOUS at 10:15

## 2025-06-03 RX ADMIN — DEXAMETHASONE SODIUM PHOSPHATE 12 MG: 10 INJECTION, SOLUTION INTRAMUSCULAR; INTRAVENOUS at 09:20

## 2025-06-03 RX ADMIN — FAMOTIDINE 20 MG: 10 INJECTION, SOLUTION INTRAVENOUS at 09:17

## 2025-06-03 RX ADMIN — SODIUM CHLORIDE 20 ML/HR: 9 INJECTION, SOLUTION INTRAVENOUS at 09:18

## 2025-06-03 RX ADMIN — DIPHENHYDRAMINE HYDROCHLORIDE 25 MG: 50 INJECTION INTRAMUSCULAR; INTRAVENOUS at 09:20

## 2025-06-03 RX ADMIN — PALONOSETRON HYDROCHLORIDE 0.25 MG: 0.25 INJECTION INTRAVENOUS at 09:14
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
## 2025-06-03 NOTE — RESEARCH
Patient Name:  Brooke Mendez  YOB: 1985  Patient Age:  39 y.o.  Patient's Sex:  female    Date of Service:  06/03/2025    Provider:  ELVIS Baker MD                     RESEARCH NOTE                  Protocol --ICE COMPRESS: Randomized Trial of Limb Cryocompression Versus Continuous Compression Versus Low Cyclic Compression for the Prevention of Taxane-Induced Peripheral Neuropathy   NCT #[id number]     Subject ID: [id number]  ARM 2:  Continuous Compression       Participant here for C11 of taxane therapy.      Planned Abraxane/Cb qwk x 12 with Keytruda q3wks. Taxol was changed to Abraxane due to reaction several cycles ago.  Pt states rash is constant on limbs bilaterally but is minor. Grade 1.  Steroid burst on hand PRN for intolerable breakout. Port in place. ?  Device used on all extremities except arms bilaterally, which pt refused again today.      Upon assessment, there were no signs of redness, open wounds or break in skin integrity prior to start of device intervention.   Participant tolerated device intervention without any adjustments. ?No AEs observed.      Next appt planned for 6/10/25 for C12 of taxane treatment and device intervention.     Thank you.     DEVON Velazquez, BSN, RN, RNC

## 2025-06-04 LAB
T4 FREE SERPL-MCNC: 0.17 NG/DL (ref 0.92–1.68)
TSH SERPL DL<=0.05 MIU/L-ACNC: 173 UIU/ML (ref 0.27–4.2)

## 2025-06-06 RX ORDER — LEVOTHYROXINE SODIUM 25 UG/1
25 TABLET ORAL
Qty: 30 TABLET | Refills: 2 | Status: SHIPPED | OUTPATIENT
Start: 2025-06-06

## 2025-06-09 ENCOUNTER — APPOINTMENT (OUTPATIENT)
Dept: ONCOLOGY | Facility: HOSPITAL | Age: 40
End: 2025-06-09
Payer: COMMERCIAL

## 2025-06-09 ENCOUNTER — OFFICE VISIT (OUTPATIENT)
Dept: ONCOLOGY | Facility: CLINIC | Age: 40
End: 2025-06-09
Payer: COMMERCIAL

## 2025-06-09 ENCOUNTER — HOSPITAL ENCOUNTER (OUTPATIENT)
Dept: ONCOLOGY | Facility: HOSPITAL | Age: 40
Discharge: HOME | End: 2025-06-09
Payer: COMMERCIAL

## 2025-06-09 ENCOUNTER — RESEARCH ENCOUNTER (OUTPATIENT)
Dept: OTHER | Facility: OTHER | Age: 40
End: 2025-06-09
Payer: COMMERCIAL

## 2025-06-09 VITALS
RESPIRATION RATE: 18 BRPM | TEMPERATURE: 98 F | BODY MASS INDEX: 31.7 KG/M2 | DIASTOLIC BLOOD PRESSURE: 82 MMHG | OXYGEN SATURATION: 97 % | SYSTOLIC BLOOD PRESSURE: 120 MMHG | HEART RATE: 88 BPM | HEIGHT: 69 IN | WEIGHT: 214 LBS

## 2025-06-09 DIAGNOSIS — Z17.1 MALIGNANT NEOPLASM OF LEFT BREAST IN FEMALE, ESTROGEN RECEPTOR NEGATIVE, UNSPECIFIED SITE OF BREAST: Primary | ICD-10-CM

## 2025-06-09 DIAGNOSIS — C50.912 MALIGNANT NEOPLASM OF LEFT BREAST IN FEMALE, ESTROGEN RECEPTOR NEGATIVE, UNSPECIFIED SITE OF BREAST: Primary | ICD-10-CM

## 2025-06-09 LAB
ALBUMIN SERPL-MCNC: 4.1 G/DL (ref 3.5–5.2)
ALBUMIN/GLOB SERPL: 1.8 G/DL
ALP SERPL-CCNC: 58 U/L (ref 39–117)
ALT SERPL W P-5'-P-CCNC: 66 U/L (ref 1–33)
ANION GAP SERPL CALCULATED.3IONS-SCNC: 11 MMOL/L (ref 5–15)
AST SERPL-CCNC: 44 U/L (ref 1–32)
BASOPHILS # BLD AUTO: 0.04 10*3/MM3 (ref 0–0.2)
BASOPHILS NFR BLD AUTO: 1.2 % (ref 0–1.5)
BILIRUB SERPL-MCNC: 0.4 MG/DL (ref 0–1.2)
BUN SERPL-MCNC: 11.8 MG/DL (ref 6–20)
BUN/CREAT SERPL: 12.8 (ref 7–25)
CALCIUM SPEC-SCNC: 8.8 MG/DL (ref 8.6–10.5)
CHLORIDE SERPL-SCNC: 103 MMOL/L (ref 98–107)
CO2 SERPL-SCNC: 24 MMOL/L (ref 22–29)
CREAT SERPL-MCNC: 0.92 MG/DL (ref 0.57–1)
DEPRECATED RDW RBC AUTO: 67 FL (ref 37–54)
EGFRCR SERPLBLD CKD-EPI 2021: 80.9 ML/MIN/1.73
EOSINOPHIL # BLD AUTO: 0.03 10*3/MM3 (ref 0–0.4)
EOSINOPHIL NFR BLD AUTO: 0.9 % (ref 0.3–6.2)
ERYTHROCYTE [DISTWIDTH] IN BLOOD BY AUTOMATED COUNT: 18.8 % (ref 12.3–15.4)
GLOBULIN UR ELPH-MCNC: 2.3 GM/DL
GLUCOSE SERPL-MCNC: 124 MG/DL (ref 65–99)
HCT VFR BLD AUTO: 28.7 % (ref 34–46.6)
HGB BLD-MCNC: 9.7 G/DL (ref 12–15.9)
IMM GRANULOCYTES # BLD AUTO: 0.05 10*3/MM3 (ref 0–0.05)
IMM GRANULOCYTES NFR BLD AUTO: 1.5 % (ref 0–0.5)
LYMPHOCYTES # BLD AUTO: 1.39 10*3/MM3 (ref 0.7–3.1)
LYMPHOCYTES NFR BLD AUTO: 41.2 % (ref 19.6–45.3)
MCH RBC QN AUTO: 33.6 PG (ref 26.6–33)
MCHC RBC AUTO-ENTMCNC: 33.8 G/DL (ref 31.5–35.7)
MCV RBC AUTO: 99.3 FL (ref 79–97)
MONOCYTES # BLD AUTO: 0.1 10*3/MM3 (ref 0.1–0.9)
MONOCYTES NFR BLD AUTO: 3 % (ref 5–12)
NEUTROPHILS NFR BLD AUTO: 1.76 10*3/MM3 (ref 1.7–7)
NEUTROPHILS NFR BLD AUTO: 52.2 % (ref 42.7–76)
PLATELET # BLD AUTO: 311 10*3/MM3 (ref 140–450)
PMV BLD AUTO: 10.2 FL (ref 6–12)
POTASSIUM SERPL-SCNC: 4 MMOL/L (ref 3.5–5.2)
PROT SERPL-MCNC: 6.4 G/DL (ref 6–8.5)
RBC # BLD AUTO: 2.89 10*6/MM3 (ref 3.77–5.28)
SODIUM SERPL-SCNC: 138 MMOL/L (ref 136–145)
WBC NRBC COR # BLD AUTO: 3.37 10*3/MM3 (ref 3.4–10.8)

## 2025-06-09 PROCEDURE — 85025 COMPLETE CBC W/AUTO DIFF WBC: CPT | Performed by: NURSE PRACTITIONER

## 2025-06-09 PROCEDURE — 96413 CHEMO IV INFUSION 1 HR: CPT

## 2025-06-09 PROCEDURE — 25010000002 PACLITAXEL PROTEIN-BOUND PART PER 1 MG: Performed by: NURSE PRACTITIONER

## 2025-06-09 PROCEDURE — 25010000002 DIPHENHYDRAMINE PER 50 MG: Performed by: NURSE PRACTITIONER

## 2025-06-09 PROCEDURE — 25010000002 FAMOTIDINE 10 MG/ML SOLUTION: Performed by: NURSE PRACTITIONER

## 2025-06-09 PROCEDURE — 99215 OFFICE O/P EST HI 40 MIN: CPT | Performed by: NURSE PRACTITIONER

## 2025-06-09 PROCEDURE — 25010000002 HEPARIN LOCK FLUSH PER 10 UNITS: Performed by: INTERNAL MEDICINE

## 2025-06-09 PROCEDURE — 96375 TX/PRO/DX INJ NEW DRUG ADDON: CPT

## 2025-06-09 PROCEDURE — 25010000002 DEXAMETHASONE SODIUM PHOSPHATE 100 MG/10ML SOLUTION: Performed by: NURSE PRACTITIONER

## 2025-06-09 PROCEDURE — 25010000002 PALONOSETRON PER 25 MCG: Performed by: NURSE PRACTITIONER

## 2025-06-09 PROCEDURE — 25810000003 SODIUM CHLORIDE 0.9 % SOLUTION: Performed by: NURSE PRACTITIONER

## 2025-06-09 PROCEDURE — 96417 CHEMO IV INFUS EACH ADDL SEQ: CPT

## 2025-06-09 PROCEDURE — 25010000002 CARBOPLATIN PER 50 MG: Performed by: NURSE PRACTITIONER

## 2025-06-09 PROCEDURE — 80053 COMPREHEN METABOLIC PANEL: CPT | Performed by: NURSE PRACTITIONER

## 2025-06-09 RX ORDER — SODIUM CHLORIDE 0.9 % (FLUSH) 0.9 %
10 SYRINGE (ML) INJECTION AS NEEDED
OUTPATIENT
Start: 2025-06-09

## 2025-06-09 RX ORDER — PALONOSETRON 0.05 MG/ML
0.25 INJECTION, SOLUTION INTRAVENOUS ONCE
Status: COMPLETED | OUTPATIENT
Start: 2025-06-09 | End: 2025-06-09

## 2025-06-09 RX ORDER — SODIUM CHLORIDE 9 MG/ML
20 INJECTION, SOLUTION INTRAVENOUS ONCE
Status: COMPLETED | OUTPATIENT
Start: 2025-06-09 | End: 2025-06-09

## 2025-06-09 RX ORDER — HEPARIN SODIUM (PORCINE) LOCK FLUSH IV SOLN 100 UNIT/ML 100 UNIT/ML
500 SOLUTION INTRAVENOUS AS NEEDED
OUTPATIENT
Start: 2025-06-09

## 2025-06-09 RX ORDER — SODIUM CHLORIDE 0.9 % (FLUSH) 0.9 %
20 SYRINGE (ML) INJECTION AS NEEDED
Status: CANCELLED | OUTPATIENT
Start: 2025-06-09

## 2025-06-09 RX ORDER — HEPARIN SODIUM (PORCINE) LOCK FLUSH IV SOLN 100 UNIT/ML 100 UNIT/ML
300 SOLUTION INTRAVENOUS ONCE
Status: CANCELLED | OUTPATIENT
Start: 2025-06-09

## 2025-06-09 RX ORDER — MELOXICAM 15 MG/1
15 TABLET ORAL DAILY
COMMUNITY
Start: 2025-06-06

## 2025-06-09 RX ORDER — HEPARIN SODIUM (PORCINE) LOCK FLUSH IV SOLN 100 UNIT/ML 100 UNIT/ML
500 SOLUTION INTRAVENOUS AS NEEDED
Status: DISCONTINUED | OUTPATIENT
Start: 2025-06-09 | End: 2025-06-10 | Stop reason: HOSPADM

## 2025-06-09 RX ORDER — DIPHENHYDRAMINE HYDROCHLORIDE 50 MG/ML
50 INJECTION, SOLUTION INTRAMUSCULAR; INTRAVENOUS AS NEEDED
Status: DISCONTINUED | OUTPATIENT
Start: 2025-06-09 | End: 2025-06-10 | Stop reason: HOSPADM

## 2025-06-09 RX ORDER — PACLITAXEL 100 MG/20ML
200 INJECTION, POWDER, LYOPHILIZED, FOR SUSPENSION INTRAVENOUS ONCE
Status: COMPLETED | OUTPATIENT
Start: 2025-06-09 | End: 2025-06-09

## 2025-06-09 RX ORDER — FAMOTIDINE 10 MG/ML
20 INJECTION, SOLUTION INTRAVENOUS ONCE
Status: COMPLETED | OUTPATIENT
Start: 2025-06-09 | End: 2025-06-09

## 2025-06-09 RX ORDER — HEPARIN SODIUM (PORCINE) LOCK FLUSH IV SOLN 100 UNIT/ML 100 UNIT/ML
300 SOLUTION INTRAVENOUS ONCE
Status: DISCONTINUED | OUTPATIENT
Start: 2025-06-09 | End: 2025-06-10 | Stop reason: HOSPADM

## 2025-06-09 RX ORDER — PACLITAXEL 100 MG/20ML
100 INJECTION, POWDER, LYOPHILIZED, FOR SUSPENSION INTRAVENOUS ONCE
Status: CANCELLED
Start: 2025-06-10

## 2025-06-09 RX ORDER — HEPARIN SODIUM (PORCINE) LOCK FLUSH IV SOLN 100 UNIT/ML 100 UNIT/ML
300 SOLUTION INTRAVENOUS ONCE
OUTPATIENT
Start: 2025-06-09

## 2025-06-09 RX ORDER — FAMOTIDINE 10 MG/ML
20 INJECTION, SOLUTION INTRAVENOUS AS NEEDED
Status: DISCONTINUED | OUTPATIENT
Start: 2025-06-09 | End: 2025-06-10 | Stop reason: HOSPADM

## 2025-06-09 RX ORDER — HYDROCORTISONE SODIUM SUCCINATE 100 MG/2ML
100 INJECTION INTRAMUSCULAR; INTRAVENOUS AS NEEDED
Status: DISCONTINUED | OUTPATIENT
Start: 2025-06-09 | End: 2025-06-10 | Stop reason: HOSPADM

## 2025-06-09 RX ORDER — SODIUM CHLORIDE 0.9 % (FLUSH) 0.9 %
10 SYRINGE (ML) INJECTION AS NEEDED
Status: CANCELLED | OUTPATIENT
Start: 2025-06-09

## 2025-06-09 RX ORDER — SODIUM CHLORIDE 0.9 % (FLUSH) 0.9 %
20 SYRINGE (ML) INJECTION AS NEEDED
OUTPATIENT
Start: 2025-06-09

## 2025-06-09 RX ADMIN — DIPHENHYDRAMINE HYDROCHLORIDE 25 MG: 50 INJECTION INTRAMUSCULAR; INTRAVENOUS at 09:46

## 2025-06-09 RX ADMIN — HEPARIN 500 UNITS: 100 SYRINGE at 12:14

## 2025-06-09 RX ADMIN — FAMOTIDINE 20 MG: 10 INJECTION, SOLUTION INTRAVENOUS at 09:41

## 2025-06-09 RX ADMIN — PACLITAXEL 200 MG: 100 INJECTION, POWDER, LYOPHILIZED, FOR SUSPENSION INTRAVENOUS at 10:37

## 2025-06-09 RX ADMIN — PALONOSETRON HYDROCHLORIDE 0.25 MG: 0.25 INJECTION INTRAVENOUS at 09:40

## 2025-06-09 RX ADMIN — DEXAMETHASONE SODIUM PHOSPHATE 12 MG: 10 INJECTION, SOLUTION INTRAMUSCULAR; INTRAVENOUS at 09:46

## 2025-06-09 RX ADMIN — SODIUM CHLORIDE 20 ML/HR: 9 INJECTION, SOLUTION INTRAVENOUS at 09:42

## 2025-06-09 RX ADMIN — CARBOPLATIN 220 MG: 600 INJECTION, SOLUTION INTRAVENOUS at 11:31
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
## 2025-06-09 NOTE — RESEARCH
Patient Name:  Brooke Mendez  YOB: 1985  Patient Age:  39 y.o.  Patient's Sex:  female    Date of Service:  06/09/2025    Provider:  ELVIS Baker MD                     RESEARCH NOTE                Protocol --ICE COMPRESS: Randomized Trial of Limb Cryocompression Versus Continuous Compression Versus Low Cyclic Compression for the Prevention of Taxane-Induced Peripheral Neuropathy   NCT #[id number]     Subject ID:  [id number]  ARM 2:  Continuous Compression     Participant here for C12 of taxane therapy.      Planned Abraxane/Cb qwk x 12 with Keytruda q3wks. Taxol was changed to Abraxane due to reaction several cycles ago.  Pt states rash is constant on limbs bilaterally but is minor. Grade 1.  Steroid burst on hand PRN for intolerable breakout. Port in place. ?  Device used on all extremities except arms bilaterally, which pt refused again today.      Upon assessment, there were no signs of redness, open wounds or break in skin integrity prior to start of device intervention.   Participant tolerated device intervention without any adjustments. ?No AEs observed.     Pt states grade 1 bilateral peripheral neuropathy in feet and hands. Abraxane and Carboplatin dose reduced today.     Next research appt planned for 24wk assessment.     Thank you.     DEVON Velazquez, BSN, RN, RNC

## 2025-06-10 DIAGNOSIS — Z17.1 MALIGNANT NEOPLASM OF LEFT BREAST IN FEMALE, ESTROGEN RECEPTOR NEGATIVE, UNSPECIFIED SITE OF BREAST: ICD-10-CM

## 2025-06-10 DIAGNOSIS — C50.912 MALIGNANT NEOPLASM OF LEFT BREAST IN FEMALE, ESTROGEN RECEPTOR NEGATIVE, UNSPECIFIED SITE OF BREAST: ICD-10-CM

## 2025-06-10 RX ORDER — TRAMADOL HYDROCHLORIDE 50 MG/1
50 TABLET ORAL EVERY 6 HOURS PRN
Qty: 120 TABLET | Refills: 0 | Status: SHIPPED | OUTPATIENT
Start: 2025-06-10

## 2025-06-10 NOTE — TELEPHONE ENCOUNTER
"    Caller: Andrea Brooke MASON \"Lopez\"    Relationship: Self    Best call back number: [phone]     Requested Prescriptions:   Requested Prescriptions     Pending Prescriptions Disp Refills    traMADol (ULTRAM) 50 MG tablet 120 tablet 0     Sig: Take 1 tablet by mouth Every 6 (Six) Hours As Needed for Moderate Pain.        Pharmacy where request should be sent: [facility] [address] [phone] [facility] [phone] FX     Last office visit with prescribing clinician: 6/9/2025   Last telemedicine visit with prescribing clinician: Visit date not found   Next office visit with prescribing clinician: Visit date not found     Additional details provided by patient: PT SAYS THIS WAS GOING TO BE CALLED IN YESTERDAY BUT PHARMACY DOES NOT HAVE THE RX YET.  PLEASE CALL PT WHEN RX IS SENT OVER.     Does the patient have less than a 3 day supply:  [x] Yes  [] No    Would you like a call back once the refill request has been completed: [x] Yes [] No    If the office needs to give you a call back, can they leave a voicemail: [x] Yes [] No    Robert Becker Rep   06/10/25 12:51 EDT           "

## 2025-06-16 ENCOUNTER — TELEPHONE (OUTPATIENT)
Dept: ONCOLOGY | Facility: CLINIC | Age: 40
End: 2025-06-16
Payer: COMMERCIAL

## 2025-06-16 NOTE — TELEPHONE ENCOUNTER
Patient stated rash started yesterday on her back and that it was burning. She stated it has since spread to her arms and stomach.  She stated it is raised and pruritic.  Patient denied any SOA.  She stated she took a Benadryl tablet this morning and it did not help.  Patient stated she has not used anything topically.  Dr. Baker notified and per MD, patient offered follow up today or that she can try prescription topical medication prescribed by this office first.  Patient verbalized understanding and declined follow up today and stated she will try the prescribed topical cream first and keep follow up that is scheduled tomorrow.

## 2025-06-16 NOTE — TELEPHONE ENCOUNTER
Patient called she has a rash on her legs, arms, and stomach. Wants to know what she can do or take for this? Please call.

## 2025-06-17 ENCOUNTER — APPOINTMENT (OUTPATIENT)
Dept: ONCOLOGY | Facility: HOSPITAL | Age: 40
End: 2025-06-17
Payer: COMMERCIAL

## 2025-06-17 ENCOUNTER — OFFICE VISIT (OUTPATIENT)
Dept: ONCOLOGY | Facility: CLINIC | Age: 40
End: 2025-06-17
Payer: COMMERCIAL

## 2025-06-17 ENCOUNTER — HOSPITAL ENCOUNTER (OUTPATIENT)
Dept: ONCOLOGY | Facility: HOSPITAL | Age: 40
Discharge: HOME | End: 2025-06-17
Admitting: INTERNAL MEDICINE
Payer: COMMERCIAL

## 2025-06-17 VITALS
HEART RATE: 106 BPM | OXYGEN SATURATION: 97 % | HEIGHT: 69 IN | DIASTOLIC BLOOD PRESSURE: 84 MMHG | WEIGHT: 217 LBS | TEMPERATURE: 97.1 F | BODY MASS INDEX: 32.14 KG/M2 | SYSTOLIC BLOOD PRESSURE: 127 MMHG

## 2025-06-17 DIAGNOSIS — C50.912 MALIGNANT NEOPLASM OF LEFT BREAST IN FEMALE, ESTROGEN RECEPTOR NEGATIVE, UNSPECIFIED SITE OF BREAST: Primary | ICD-10-CM

## 2025-06-17 DIAGNOSIS — Z17.1 MALIGNANT NEOPLASM OF LEFT BREAST IN FEMALE, ESTROGEN RECEPTOR NEGATIVE, UNSPECIFIED SITE OF BREAST: Primary | ICD-10-CM

## 2025-06-17 LAB
ALBUMIN SERPL-MCNC: 4.1 G/DL (ref 3.5–5.2)
ALBUMIN/GLOB SERPL: 1.8 G/DL
ALP SERPL-CCNC: 57 U/L (ref 39–117)
ALT SERPL W P-5'-P-CCNC: 45 U/L (ref 1–33)
ANION GAP SERPL CALCULATED.3IONS-SCNC: 12 MMOL/L (ref 5–15)
AST SERPL-CCNC: 32 U/L (ref 1–32)
B-HCG UR QL: NEGATIVE
BASOPHILS # BLD AUTO: 0.04 10*3/MM3 (ref 0–0.2)
BASOPHILS NFR BLD AUTO: 0.9 % (ref 0–1.5)
BILIRUB SERPL-MCNC: 0.3 MG/DL (ref 0–1.2)
BUN SERPL-MCNC: 16 MG/DL (ref 6–20)
BUN/CREAT SERPL: 15.4 (ref 7–25)
CALCIUM SPEC-SCNC: 8.9 MG/DL (ref 8.6–10.5)
CHLORIDE SERPL-SCNC: 102 MMOL/L (ref 98–107)
CO2 SERPL-SCNC: 23 MMOL/L (ref 22–29)
CORTIS SERPL-MCNC: 16.47 MCG/DL
CREAT SERPL-MCNC: 1.04 MG/DL (ref 0.57–1)
DEPRECATED RDW RBC AUTO: 73.1 FL (ref 37–54)
EGFRCR SERPLBLD CKD-EPI 2021: 69.8 ML/MIN/1.73
EOSINOPHIL # BLD AUTO: 0.05 10*3/MM3 (ref 0–0.4)
EOSINOPHIL NFR BLD AUTO: 1.1 % (ref 0.3–6.2)
ERYTHROCYTE [DISTWIDTH] IN BLOOD BY AUTOMATED COUNT: 20 % (ref 12.3–15.4)
GLOBULIN UR ELPH-MCNC: 2.3 GM/DL
GLUCOSE SERPL-MCNC: 100 MG/DL (ref 65–99)
HCT VFR BLD AUTO: 28.3 % (ref 34–46.6)
HGB BLD-MCNC: 9.4 G/DL (ref 12–15.9)
IMM GRANULOCYTES # BLD AUTO: 0.51 10*3/MM3 (ref 0–0.05)
IMM GRANULOCYTES NFR BLD AUTO: 11.5 % (ref 0–0.5)
LYMPHOCYTES # BLD AUTO: 1.47 10*3/MM3 (ref 0.7–3.1)
LYMPHOCYTES NFR BLD AUTO: 33.2 % (ref 19.6–45.3)
MCH RBC QN AUTO: 34.3 PG (ref 26.6–33)
MCHC RBC AUTO-ENTMCNC: 33.2 G/DL (ref 31.5–35.7)
MCV RBC AUTO: 103.3 FL (ref 79–97)
MONOCYTES # BLD AUTO: 0.35 10*3/MM3 (ref 0.1–0.9)
MONOCYTES NFR BLD AUTO: 7.9 % (ref 5–12)
NEUTROPHILS NFR BLD AUTO: 2.01 10*3/MM3 (ref 1.7–7)
NEUTROPHILS NFR BLD AUTO: 45.4 % (ref 42.7–76)
PLATELET # BLD AUTO: 261 10*3/MM3 (ref 140–450)
PMV BLD AUTO: 10.1 FL (ref 6–12)
POTASSIUM SERPL-SCNC: 4.2 MMOL/L (ref 3.5–5.2)
PROT SERPL-MCNC: 6.4 G/DL (ref 6–8.5)
RBC # BLD AUTO: 2.74 10*6/MM3 (ref 3.77–5.28)
SODIUM SERPL-SCNC: 137 MMOL/L (ref 136–145)
T4 FREE SERPL-MCNC: 0.12 NG/DL (ref 0.92–1.68)
TSH SERPL DL<=0.05 MIU/L-ACNC: 251.4 UIU/ML (ref 0.27–4.2)
WBC NRBC COR # BLD AUTO: 4.43 10*3/MM3 (ref 3.4–10.8)

## 2025-06-17 PROCEDURE — 84439 ASSAY OF FREE THYROXINE: CPT | Performed by: INTERNAL MEDICINE

## 2025-06-17 PROCEDURE — 96361 HYDRATE IV INFUSION ADD-ON: CPT

## 2025-06-17 PROCEDURE — 25010000002 DEXAMETHASONE SODIUM PHOSPHATE 100 MG/10ML SOLUTION: Performed by: INTERNAL MEDICINE

## 2025-06-17 PROCEDURE — 25010000002 HEPARIN LOCK FLUSH PER 10 UNITS: Performed by: INTERNAL MEDICINE

## 2025-06-17 PROCEDURE — 99214 OFFICE O/P EST MOD 30 MIN: CPT | Performed by: INTERNAL MEDICINE

## 2025-06-17 PROCEDURE — 25010000002 DIPHENHYDRAMINE PER 50 MG: Performed by: INTERNAL MEDICINE

## 2025-06-17 PROCEDURE — 81025 URINE PREGNANCY TEST: CPT | Performed by: INTERNAL MEDICINE

## 2025-06-17 PROCEDURE — 96375 TX/PRO/DX INJ NEW DRUG ADDON: CPT

## 2025-06-17 PROCEDURE — 25810000003 SODIUM CHLORIDE 0.9 % SOLUTION: Performed by: INTERNAL MEDICINE

## 2025-06-17 PROCEDURE — 96374 THER/PROPH/DIAG INJ IV PUSH: CPT

## 2025-06-17 PROCEDURE — 82533 TOTAL CORTISOL: CPT | Performed by: INTERNAL MEDICINE

## 2025-06-17 PROCEDURE — 80050 GENERAL HEALTH PANEL: CPT | Performed by: INTERNAL MEDICINE

## 2025-06-17 RX ORDER — HEPARIN SODIUM (PORCINE) LOCK FLUSH IV SOLN 100 UNIT/ML 100 UNIT/ML
500 SOLUTION INTRAVENOUS AS NEEDED
Status: DISCONTINUED | OUTPATIENT
Start: 2025-06-17 | End: 2025-06-18 | Stop reason: HOSPADM

## 2025-06-17 RX ORDER — HEPARIN SODIUM (PORCINE) LOCK FLUSH IV SOLN 100 UNIT/ML 100 UNIT/ML
300 SOLUTION INTRAVENOUS ONCE
OUTPATIENT
Start: 2025-06-17

## 2025-06-17 RX ORDER — PREDNISONE 10 MG/1
TABLET ORAL
Qty: 112 TABLET | Refills: 0 | Status: SHIPPED | OUTPATIENT
Start: 2025-06-17 | End: 2025-07-15

## 2025-06-17 RX ORDER — SODIUM CHLORIDE 0.9 % (FLUSH) 0.9 %
10 SYRINGE (ML) INJECTION AS NEEDED
OUTPATIENT
Start: 2025-06-17

## 2025-06-17 RX ORDER — SODIUM CHLORIDE 0.9 % (FLUSH) 0.9 %
20 SYRINGE (ML) INJECTION AS NEEDED
OUTPATIENT
Start: 2025-06-17

## 2025-06-17 RX ORDER — HEPARIN SODIUM (PORCINE) LOCK FLUSH IV SOLN 100 UNIT/ML 100 UNIT/ML
500 SOLUTION INTRAVENOUS AS NEEDED
OUTPATIENT
Start: 2025-06-17

## 2025-06-17 RX ADMIN — HEPARIN 500 UNITS: 100 SYRINGE at 11:39

## 2025-06-17 RX ADMIN — DEXAMETHASONE SODIUM PHOSPHATE 20 MG: 10 INJECTION, SOLUTION INTRAMUSCULAR; INTRAVENOUS at 09:54

## 2025-06-17 RX ADMIN — SODIUM CHLORIDE 1000 ML: 9 INJECTION, SOLUTION INTRAVENOUS at 09:35

## 2025-06-17 RX ADMIN — DIPHENHYDRAMINE HYDROCHLORIDE 25 MG: 50 INJECTION INTRAMUSCULAR; INTRAVENOUS at 09:37
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
## 2025-06-20 ENCOUNTER — HOSPITAL ENCOUNTER (EMERGENCY)
Facility: HOSPITAL | Age: 40
Discharge: HOME | End: 2025-06-20
Attending: EMERGENCY MEDICINE
Payer: COMMERCIAL

## 2025-06-20 ENCOUNTER — TELEPHONE (OUTPATIENT)
Dept: ONCOLOGY | Facility: CLINIC | Age: 40
End: 2025-06-20
Payer: COMMERCIAL

## 2025-06-20 ENCOUNTER — APPOINTMENT (OUTPATIENT)
Dept: GENERAL RADIOLOGY | Facility: HOSPITAL | Age: 40
End: 2025-06-20
Payer: COMMERCIAL

## 2025-06-20 ENCOUNTER — RESULTS FOLLOW-UP (OUTPATIENT)
Age: 40
End: 2025-06-20
Payer: COMMERCIAL

## 2025-06-20 VITALS
SYSTOLIC BLOOD PRESSURE: 126 MMHG | HEIGHT: 69 IN | WEIGHT: 215 LBS | BODY MASS INDEX: 31.84 KG/M2 | TEMPERATURE: 97.6 F | OXYGEN SATURATION: 95 % | RESPIRATION RATE: 18 BRPM | HEART RATE: 66 BPM | DIASTOLIC BLOOD PRESSURE: 83 MMHG

## 2025-06-20 DIAGNOSIS — C50.919 MALIGNANT NEOPLASM OF FEMALE BREAST, UNSPECIFIED ESTROGEN RECEPTOR STATUS, UNSPECIFIED LATERALITY, UNSPECIFIED SITE OF BREAST: ICD-10-CM

## 2025-06-20 DIAGNOSIS — E03.9 HYPOTHYROIDISM, UNSPECIFIED TYPE: ICD-10-CM

## 2025-06-20 DIAGNOSIS — N39.0 URINARY TRACT INFECTION IN FEMALE: ICD-10-CM

## 2025-06-20 DIAGNOSIS — H55.00 NYSTAGMUS: Primary | ICD-10-CM

## 2025-06-20 DIAGNOSIS — Z17.1 MALIGNANT NEOPLASM OF LEFT BREAST IN FEMALE, ESTROGEN RECEPTOR NEGATIVE, UNSPECIFIED SITE OF BREAST: Primary | ICD-10-CM

## 2025-06-20 DIAGNOSIS — R42 DIZZINESS: ICD-10-CM

## 2025-06-20 DIAGNOSIS — C50.912 MALIGNANT NEOPLASM OF LEFT BREAST IN FEMALE, ESTROGEN RECEPTOR NEGATIVE, UNSPECIFIED SITE OF BREAST: Primary | ICD-10-CM

## 2025-06-20 DIAGNOSIS — Z02.89 REFERRED BY HEALTH CARE PROFESSIONAL: ICD-10-CM

## 2025-06-20 LAB
ABO GROUP BLD: NORMAL
ALBUMIN SERPL-MCNC: 4.6 G/DL (ref 3.5–5.2)
ALBUMIN/GLOB SERPL: 2 G/DL
ALP SERPL-CCNC: 61 U/L (ref 39–117)
ALT SERPL W P-5'-P-CCNC: 84 U/L (ref 1–33)
ANION GAP SERPL CALCULATED.3IONS-SCNC: 16.9 MMOL/L (ref 5–15)
ANISOCYTOSIS BLD QL: ABNORMAL
APTT PPP: 26.5 SECONDS (ref 60–90)
AST SERPL-CCNC: 49 U/L (ref 1–32)
BACTERIA UR QL AUTO: ABNORMAL /HPF
BASOPHILS # BLD MANUAL: 0.21 10*3/MM3 (ref 0–0.2)
BASOPHILS NFR BLD MANUAL: 2 % (ref 0–1.5)
BILIRUB SERPL-MCNC: 0.5 MG/DL (ref 0–1.2)
BILIRUB UR QL STRIP: NEGATIVE
BLD GP AB SCN SERPL QL: NEGATIVE
BUN SERPL-MCNC: 17.5 MG/DL (ref 6–20)
BUN/CREAT SERPL: 16.2 (ref 7–25)
CALCIUM SPEC-SCNC: 9.6 MG/DL (ref 8.6–10.5)
CHLORIDE SERPL-SCNC: 103 MMOL/L (ref 98–107)
CLARITY UR: ABNORMAL
CO2 SERPL-SCNC: 20.1 MMOL/L (ref 22–29)
COLOR UR: YELLOW
CREAT SERPL-MCNC: 1.08 MG/DL (ref 0.57–1)
CRP SERPL-MCNC: <0.3 MG/DL (ref 0–0.5)
D-LACTATE SERPL-SCNC: 1.4 MMOL/L (ref 0.5–2)
D-LACTATE SERPL-SCNC: 2.3 MMOL/L (ref 0.5–2)
DACRYOCYTES BLD QL SMEAR: ABNORMAL
DEPRECATED RDW RBC AUTO: 81 FL (ref 37–54)
EGFRCR SERPLBLD CKD-EPI 2021: 66.7 ML/MIN/1.73
EOSINOPHIL # BLD MANUAL: 0 10*3/MM3 (ref 0–0.4)
EOSINOPHIL NFR BLD MANUAL: 0 % (ref 0.3–6.2)
ERYTHROCYTE [DISTWIDTH] IN BLOOD BY AUTOMATED COUNT: 21.9 % (ref 12.3–15.4)
ERYTHROCYTE [SEDIMENTATION RATE] IN BLOOD: 7 MM/HR (ref 0–20)
GEN 5 1HR TROPONIN T REFLEX: <6 NG/L
GLOBULIN UR ELPH-MCNC: 2.3 GM/DL
GLUCOSE SERPL-MCNC: 76 MG/DL (ref 65–99)
GLUCOSE UR STRIP-MCNC: NEGATIVE MG/DL
HCT VFR BLD AUTO: 29.7 % (ref 34–46.6)
HGB BLD-MCNC: 9.8 G/DL (ref 12–15.9)
HGB UR QL STRIP.AUTO: NEGATIVE
HOLD SPECIMEN: NORMAL
HYALINE CASTS UR QL AUTO: ABNORMAL /LPF
INR PPP: 0.98 (ref 0.89–1.12)
KETONES UR QL STRIP: ABNORMAL
LEUKOCYTE ESTERASE UR QL STRIP.AUTO: ABNORMAL
LYMPHOCYTES # BLD MANUAL: 4.31 10*3/MM3 (ref 0.7–3.1)
LYMPHOCYTES NFR BLD MANUAL: 8 % (ref 5–12)
MAGNESIUM SERPL-MCNC: 1.6 MG/DL (ref 1.6–2.6)
MCH RBC QN AUTO: 34 PG (ref 26.6–33)
MCHC RBC AUTO-ENTMCNC: 33 G/DL (ref 31.5–35.7)
MCV RBC AUTO: 103.1 FL (ref 79–97)
METAMYELOCYTES NFR BLD MANUAL: 1 % (ref 0–0)
MONOCYTES # BLD: 0.82 10*3/MM3 (ref 0.1–0.9)
MYELOCYTES NFR BLD MANUAL: 1 % (ref 0–0)
NEUTROPHILS # BLD AUTO: 4.72 10*3/MM3 (ref 1.7–7)
NEUTROPHILS NFR BLD MANUAL: 43 % (ref 42.7–76)
NEUTS BAND NFR BLD MANUAL: 3 % (ref 0–5)
NITRITE UR QL STRIP: NEGATIVE
NT-PROBNP SERPL-MCNC: <36 PG/ML (ref 0–450)
PH UR STRIP.AUTO: 6 [PH] (ref 5–8)
PHOSPHATE SERPL-MCNC: 3.9 MG/DL (ref 2.5–4.5)
PLAT MORPH BLD: NORMAL
PLATELET # BLD AUTO: 247 10*3/MM3 (ref 140–450)
PMV BLD AUTO: 9.9 FL (ref 6–12)
POLYCHROMASIA BLD QL SMEAR: ABNORMAL
POTASSIUM SERPL-SCNC: 3.8 MMOL/L (ref 3.5–5.2)
PROCALCITONIN SERPL-MCNC: 0.09 NG/ML (ref 0–0.25)
PROT SERPL-MCNC: 6.9 G/DL (ref 6–8.5)
PROT UR QL STRIP: ABNORMAL
PROTHROMBIN TIME: 13.5 SECONDS (ref 12.2–15.3)
QT INTERVAL: 364 MS
QTC INTERVAL: 387 MS
RBC # BLD AUTO: 2.88 10*6/MM3 (ref 3.77–5.28)
RBC # UR STRIP: ABNORMAL /HPF
REF LAB TEST METHOD: ABNORMAL
RH BLD: POSITIVE
SODIUM SERPL-SCNC: 140 MMOL/L (ref 136–145)
SP GR UR STRIP: 1.02 (ref 1–1.03)
SQUAMOUS #/AREA URNS HPF: ABNORMAL /HPF
T&S EXPIRATION DATE: NORMAL
T4 FREE SERPL-MCNC: 0.16 NG/DL (ref 0.92–1.68)
TROPONIN T NUMERIC DELTA: NORMAL
TROPONIN T SERPL HS-MCNC: 7 NG/L
TSH SERPL DL<=0.05 MIU/L-ACNC: 299 UIU/ML (ref 0.27–4.2)
UROBILINOGEN UR QL STRIP: ABNORMAL
VARIANT LYMPHS NFR BLD MANUAL: 42 % (ref 19.6–45.3)
WBC # UR STRIP: ABNORMAL /HPF
WBC MORPH BLD: NORMAL
WBC NRBC COR # BLD AUTO: 10.25 10*3/MM3 (ref 3.4–10.8)
WHOLE BLOOD HOLD COAG: NORMAL
WHOLE BLOOD HOLD SPECIMEN: NORMAL

## 2025-06-20 PROCEDURE — 86850 RBC ANTIBODY SCREEN: CPT | Performed by: EMERGENCY MEDICINE

## 2025-06-20 PROCEDURE — 84145 PROCALCITONIN (PCT): CPT | Performed by: EMERGENCY MEDICINE

## 2025-06-20 PROCEDURE — 83735 ASSAY OF MAGNESIUM: CPT | Performed by: EMERGENCY MEDICINE

## 2025-06-20 PROCEDURE — 85652 RBC SED RATE AUTOMATED: CPT | Performed by: EMERGENCY MEDICINE

## 2025-06-20 PROCEDURE — 86900 BLOOD TYPING SEROLOGIC ABO: CPT | Performed by: EMERGENCY MEDICINE

## 2025-06-20 PROCEDURE — 85007 BL SMEAR W/DIFF WBC COUNT: CPT | Performed by: EMERGENCY MEDICINE

## 2025-06-20 PROCEDURE — 25810000003 SODIUM CHLORIDE 0.9 % SOLUTION: Performed by: EMERGENCY MEDICINE

## 2025-06-20 PROCEDURE — 84484 ASSAY OF TROPONIN QUANT: CPT | Performed by: EMERGENCY MEDICINE

## 2025-06-20 PROCEDURE — 83605 ASSAY OF LACTIC ACID: CPT | Performed by: EMERGENCY MEDICINE

## 2025-06-20 PROCEDURE — 36415 COLL VENOUS BLD VENIPUNCTURE: CPT

## 2025-06-20 PROCEDURE — 86901 BLOOD TYPING SEROLOGIC RH(D): CPT | Performed by: EMERGENCY MEDICINE

## 2025-06-20 PROCEDURE — 84439 ASSAY OF FREE THYROXINE: CPT | Performed by: EMERGENCY MEDICINE

## 2025-06-20 PROCEDURE — 86901 BLOOD TYPING SEROLOGIC RH(D): CPT

## 2025-06-20 PROCEDURE — 84100 ASSAY OF PHOSPHORUS: CPT | Performed by: EMERGENCY MEDICINE

## 2025-06-20 PROCEDURE — 25010000002 CEFTRIAXONE PER 250 MG: Performed by: EMERGENCY MEDICINE

## 2025-06-20 PROCEDURE — 99284 EMERGENCY DEPT VISIT MOD MDM: CPT

## 2025-06-20 PROCEDURE — 71045 X-RAY EXAM CHEST 1 VIEW: CPT

## 2025-06-20 PROCEDURE — 83880 ASSAY OF NATRIURETIC PEPTIDE: CPT | Performed by: EMERGENCY MEDICINE

## 2025-06-20 PROCEDURE — 85730 THROMBOPLASTIN TIME PARTIAL: CPT | Performed by: EMERGENCY MEDICINE

## 2025-06-20 PROCEDURE — 93005 ELECTROCARDIOGRAM TRACING: CPT | Performed by: EMERGENCY MEDICINE

## 2025-06-20 PROCEDURE — 86900 BLOOD TYPING SEROLOGIC ABO: CPT

## 2025-06-20 PROCEDURE — 96365 THER/PROPH/DIAG IV INF INIT: CPT

## 2025-06-20 PROCEDURE — 86140 C-REACTIVE PROTEIN: CPT | Performed by: EMERGENCY MEDICINE

## 2025-06-20 PROCEDURE — 80050 GENERAL HEALTH PANEL: CPT | Performed by: EMERGENCY MEDICINE

## 2025-06-20 PROCEDURE — 81001 URINALYSIS AUTO W/SCOPE: CPT | Performed by: EMERGENCY MEDICINE

## 2025-06-20 PROCEDURE — 85610 PROTHROMBIN TIME: CPT | Performed by: EMERGENCY MEDICINE

## 2025-06-20 RX ORDER — LEVOTHYROXINE SODIUM 100 UG/1
200 TABLET ORAL ONCE
Status: COMPLETED | OUTPATIENT
Start: 2025-06-20 | End: 2025-06-20

## 2025-06-20 RX ORDER — SODIUM CHLORIDE 0.9 % (FLUSH) 0.9 %
10 SYRINGE (ML) INJECTION AS NEEDED
Status: DISCONTINUED | OUTPATIENT
Start: 2025-06-20 | End: 2025-06-20 | Stop reason: HOSPADM

## 2025-06-20 RX ORDER — CEFPODOXIME PROXETIL 200 MG/1
400 TABLET, FILM COATED ORAL EVERY 12 HOURS
Qty: 10 TABLET | Refills: 0 | Status: SHIPPED | OUTPATIENT
Start: 2025-06-20

## 2025-06-20 RX ORDER — LEVOTHYROXINE SODIUM 100 UG/1
100 TABLET ORAL DAILY
Qty: 30 TABLET | Refills: 0 | Status: SHIPPED | OUTPATIENT
Start: 2025-06-20

## 2025-06-20 RX ORDER — LEVOTHYROXINE SODIUM 50 UG/1
50 TABLET ORAL DAILY
Qty: 30 TABLET | Refills: 5 | Status: SHIPPED | OUTPATIENT
Start: 2025-06-20 | End: 2025-06-20

## 2025-06-20 RX ADMIN — LEVOTHYROXINE SODIUM 200 MCG: 0.1 TABLET ORAL at 20:17

## 2025-06-20 RX ADMIN — SODIUM CHLORIDE 500 ML: 9 INJECTION, SOLUTION INTRAVENOUS at 20:17

## 2025-06-20 RX ADMIN — CEFTRIAXONE 2000 MG: 2 INJECTION, POWDER, FOR SOLUTION INTRAMUSCULAR; INTRAVENOUS at 20:16

## 2025-06-20 NOTE — Clinical Note
[facility] EMERGENCY DEPARTMENT  [address]  [facility] [address]  Phone: [phone]    Brooke Mendez was seen and treated in our emergency department on 6/20/2025.  She may return to work on 06/23/2025.         Thank you for choosing [facility].    Leno Deshpande MD

## 2025-06-20 NOTE — TELEPHONE ENCOUNTER
Patient would like to speak with nurse because she has been experiencing a lot of dizziness and she isn't sure why.     Please call her back at [phone]

## 2025-06-20 NOTE — TELEPHONE ENCOUNTER
"Patient stated she began getting dizzy today and her \"eyes are bouncing back and forth.\"  She stated her feet and ankles are swelling, she had pain in her left calf, she has a large bruise on posterior left ankle area and a bruise on both heels.  She denied SOA, fever.  Rash is only slightly improved.  Dr. Baker notified and patient advised to have someone take her to the nearest ER or all 911, do not drive.  She verbalized understanding and agreed.  "

## 2025-06-20 NOTE — ED PROVIDER NOTES
"Subjective   History of Present Illness  Patient is an unfortunate 39-year-old female presented to the emergency department with history of metastatic breast cancer stating she has had a 2-day history of intermittent episodes of nystagmus and dizziness.  Patient also reports she has had some intermittent rash as well as some areas of ecchymoses that been popping up as well.  Patient is currently undergoing chemotherapy and is followed by Dr. Baker with oncology.  Patient denies any chest pain or shortness of breath.  No fever or chills.  No vomiting or diarrhea.  She does report she was recently advised that her TSH was \"through the roof\".    History provided by:  Patient and medical records      Review of Systems    Past Medical History:   Diagnosis Date    Breast cancer     Disease of thyroid gland     Hypertension        Allergies   Allergen Reactions    Erythromycin Other (See Comments)    Pholcodine Other (See Comments)    Penicillins Rash     Childhood        Past Surgical History:   Procedure Laterality Date     SECTION      TONSILLECTOMY         Family History   Problem Relation Age of Onset    Hypertension Mother     Diabetes Mother     Heart disease Father        Social History     Socioeconomic History    Marital status: Single   Tobacco Use    Smoking status: Former     Types: Cigarettes    Smokeless tobacco: Never   Vaping Use    Vaping status: Never Used   Substance and Sexual Activity    Alcohol use: Not Currently    Drug use: Defer    Sexual activity: Defer           Objective   Physical Exam  Vitals and nursing note reviewed.   Constitutional:       General: She is not in acute distress.     Appearance: Normal appearance. She is ill-appearing. She is not toxic-appearing.   Eyes:      Pupils: Pupils are equal, round, and reactive to light.   Cardiovascular:      Rate and Rhythm: Normal rate and regular rhythm.      Pulses: Normal pulses.   Pulmonary:      Effort: Pulmonary effort is normal. " No respiratory distress.      Breath sounds: Normal breath sounds.   Abdominal:      Palpations: Abdomen is soft.   Musculoskeletal:         General: Normal range of motion.      Comments: There is ecchymoses noted over the lateral malleolus region.   Skin:     Coloration: Skin is pale.   Neurological:      General: No focal deficit present.      Mental Status: She is alert and oriented to person, place, and time.      Comments: No focal deficits at this time.  Patient is ambulatory without ataxia.  No unilateral deficit.  No facial asymmetry.  Speech is intact.  Patient is alert and oriented.  Stroke scale this time is 0   Psychiatric:         Mood and Affect: Mood normal.         Behavior: Behavior normal.         Procedures           ED Course  ED Course as of 06/21/25 0111   Fri Jun 20, 2025   1633 I reviewed the labs from 3 days ago which do demonstrate significant and progressively worsening hypothyroidism.  Patient also has stable chronic anemia at that time.  Patient had creatinine slowly trending up at that time as well.  I reviewed the communication with the office today.  The patient was referred to the emergency department by the oncology office secondary to the nystagmus, dizziness, and bruising. [RS]   1754 Urinalysis, Microscopic Only - Urine, Clean Catch(!)  Urinalysis with findings concerning for urinary tract infection [RS]   1755 XR Chest 1 View  Personally viewed the single view of the chest.  My interpretation there is no lobar infiltrate visualized [RS]   1829 Hemoglobin(!): 9.8  Stable chronic anemia when compared to multiple prior values [RS]   1938 Free T4(!): 0.16  Hypothyroidism then similar to prior comparative values [RS]   1940 Oncology paged [RS]   1945 I discussed case with Dr. Andrew who recommends increasing the patient's Synthroid and following up in clinic on Monday [RS]   2011 The patient had a significant delay in getting to the room secondary to hospital capacity challenges.   When she got back to the room I updated her on the findings and recommendations from oncology.  Patient voiced understanding and agrees with the plan. [RS]   2011 I have reviewed results, considerations, and diagnosis with the patient and/or their representative. Anticipatory guidance provided. Follow-up plan reviewed. Precautions for acute return for re-evaluations also reviewed. This including potential for worsening of the presenting condition and need for further evaluation, admission, and/or intervention as indicated. Opportunity to as questions provided. I advised them to return for any concerns and stressed the importance of timely follow-up and outpatient services. They verbalized understanding.   [RS]   2011 Note to patient: The 21st Century Cures Act makes medical notes like these available to patients in the interest of transparency. However, be advised this is a medical document. It is intended as peer to peer communication. It is written in medical language and may contain abbreviations or verbiage that are unfamiliar. It may appear blunt or direct. Medical documents are intended to carry relevant information, facts as evident, and the clinical opinion of the physician/NPP.   [RS]      ED Course User Index  [RS] Leno Deshpande MD                                                       Medical Decision Making  Problems Addressed:  Dizziness: complicated acute illness or injury  Hypothyroidism, unspecified type: complicated acute illness or injury  Malignant neoplasm of female breast, unspecified estrogen receptor status, unspecified laterality, unspecified site of breast: complicated acute illness or injury  Nystagmus: complicated acute illness or injury  Referred by health care professional: complicated acute illness or injury  Urinary tract infection in female: complicated acute illness or injury    Amount and/or Complexity of Data Reviewed  External Data Reviewed: labs and notes.  Labs: ordered.  Decision-making details documented in ED Course.  Radiology: ordered. Decision-making details documented in ED Course.  ECG/medicine tests: ordered.  Discussion of management or test interpretation with external provider(s): Oncology    Risk  Prescription drug management.        Final diagnoses:   Nystagmus   Dizziness   Hypothyroidism, unspecified type   Malignant neoplasm of female breast, unspecified estrogen receptor status, unspecified laterality, unspecified site of breast   Urinary tract infection in female   Referred by health care professional       ED Disposition  ED Disposition       ED Disposition   Discharge    Condition   Stable    Comment   --               Sheldon Starkey, DO  [address] [facility] [address]  [phone]    Schedule an appointment as soon as possible for a visit       [facility] EMERGENCY DEPARTMENT  [address] [facility] [address] [phone]    As needed, If symptoms worsen or ANY concerns.    Fanny Baker MD  [address]  [facility] [address]  [phone]    Call in 3 days  As soon as possible.         Medication List        New Prescriptions      cefpodoxime 200 MG tablet  Commonly known as: VANTIN  Take 2 tablets by mouth Every 12 (Twelve) Hours.            Changed      levothyroxine 100 MCG tablet  Commonly known as: SYNTHROID, LEVOTHROID  Take 1 tablet by mouth Daily.  What changed:   medication strength  how much to take               Where to Get Your Medications        These medications were sent to [facility] - [place], [place] - [address] [facility] - [phone]  - [phone]   [address] [facility] [address]      Phone: [phone]   cefpodoxime 200 MG tablet  levothyroxine 100 MCG tablet            Leno Deshpande MD  06/21/25 0111

## 2025-06-21 LAB
ABO GROUP BLD: NORMAL
RH BLD: POSITIVE

## 2025-06-24 ENCOUNTER — OFFICE VISIT (OUTPATIENT)
Dept: ONCOLOGY | Facility: CLINIC | Age: 40
End: 2025-06-24
Payer: COMMERCIAL

## 2025-06-24 ENCOUNTER — HOSPITAL ENCOUNTER (OUTPATIENT)
Dept: ONCOLOGY | Facility: HOSPITAL | Age: 40
End: 2025-06-24
Payer: COMMERCIAL

## 2025-06-24 ENCOUNTER — DOCUMENTATION (OUTPATIENT)
Dept: NUTRITION | Facility: HOSPITAL | Age: 40
End: 2025-06-24
Payer: COMMERCIAL

## 2025-06-24 ENCOUNTER — HOSPITAL ENCOUNTER (OUTPATIENT)
Dept: ONCOLOGY | Facility: HOSPITAL | Age: 40
Discharge: HOME | End: 2025-06-24
Admitting: INTERNAL MEDICINE
Payer: COMMERCIAL

## 2025-06-24 VITALS
DIASTOLIC BLOOD PRESSURE: 87 MMHG | HEART RATE: 82 BPM | WEIGHT: 219 LBS | SYSTOLIC BLOOD PRESSURE: 124 MMHG | TEMPERATURE: 97.5 F | HEIGHT: 69 IN | BODY MASS INDEX: 32.44 KG/M2 | OXYGEN SATURATION: 96 %

## 2025-06-24 DIAGNOSIS — Z17.1 MALIGNANT NEOPLASM OF LEFT BREAST IN FEMALE, ESTROGEN RECEPTOR NEGATIVE, UNSPECIFIED SITE OF BREAST: Primary | ICD-10-CM

## 2025-06-24 DIAGNOSIS — C50.912 MALIGNANT NEOPLASM OF LEFT BREAST IN FEMALE, ESTROGEN RECEPTOR NEGATIVE, UNSPECIFIED SITE OF BREAST: Primary | ICD-10-CM

## 2025-06-24 LAB
ALBUMIN SERPL-MCNC: 4.3 G/DL (ref 3.5–5.2)
ALBUMIN/GLOB SERPL: 1.9 G/DL
ALP SERPL-CCNC: 59 U/L (ref 39–117)
ALT SERPL W P-5'-P-CCNC: 70 U/L (ref 1–33)
ANION GAP SERPL CALCULATED.3IONS-SCNC: 13 MMOL/L (ref 5–15)
AST SERPL-CCNC: 36 U/L (ref 1–32)
B-HCG UR QL: NEGATIVE
BASOPHILS # BLD AUTO: 0.01 10*3/MM3 (ref 0–0.2)
BASOPHILS NFR BLD AUTO: 0.1 % (ref 0–1.5)
BILIRUB SERPL-MCNC: 0.4 MG/DL (ref 0–1.2)
BUN SERPL-MCNC: 19 MG/DL (ref 6–20)
BUN/CREAT SERPL: 24.1 (ref 7–25)
CALCIUM SPEC-SCNC: 8.5 MG/DL (ref 8.6–10.5)
CHLORIDE SERPL-SCNC: 100 MMOL/L (ref 98–107)
CO2 SERPL-SCNC: 22 MMOL/L (ref 22–29)
CORTIS SERPL-MCNC: 1.61 MCG/DL
CREAT SERPL-MCNC: 0.79 MG/DL (ref 0.57–1)
DEPRECATED RDW RBC AUTO: 82.2 FL (ref 37–54)
EGFRCR SERPLBLD CKD-EPI 2021: 97.1 ML/MIN/1.73
EOSINOPHIL # BLD AUTO: 0 10*3/MM3 (ref 0–0.4)
EOSINOPHIL NFR BLD AUTO: 0 % (ref 0.3–6.2)
ERYTHROCYTE [DISTWIDTH] IN BLOOD BY AUTOMATED COUNT: 20.6 % (ref 12.3–15.4)
GLOBULIN UR ELPH-MCNC: 2.3 GM/DL
GLUCOSE SERPL-MCNC: 96 MG/DL (ref 65–99)
HCT VFR BLD AUTO: 29.1 % (ref 34–46.6)
HGB BLD-MCNC: 9.6 G/DL (ref 12–15.9)
IMM GRANULOCYTES # BLD AUTO: 0.85 10*3/MM3 (ref 0–0.05)
IMM GRANULOCYTES NFR BLD AUTO: 8.1 % (ref 0–0.5)
LYMPHOCYTES # BLD AUTO: 2.75 10*3/MM3 (ref 0.7–3.1)
LYMPHOCYTES NFR BLD AUTO: 26.2 % (ref 19.6–45.3)
MCH RBC QN AUTO: 35.4 PG (ref 26.6–33)
MCHC RBC AUTO-ENTMCNC: 33 G/DL (ref 31.5–35.7)
MCV RBC AUTO: 107.4 FL (ref 79–97)
MONOCYTES # BLD AUTO: 1.04 10*3/MM3 (ref 0.1–0.9)
MONOCYTES NFR BLD AUTO: 9.9 % (ref 5–12)
NEUTROPHILS NFR BLD AUTO: 5.83 10*3/MM3 (ref 1.7–7)
NEUTROPHILS NFR BLD AUTO: 55.7 % (ref 42.7–76)
PLATELET # BLD AUTO: 206 10*3/MM3 (ref 140–450)
PMV BLD AUTO: 9.7 FL (ref 6–12)
POTASSIUM SERPL-SCNC: 4 MMOL/L (ref 3.5–5.2)
PROT SERPL-MCNC: 6.6 G/DL (ref 6–8.5)
RBC # BLD AUTO: 2.71 10*6/MM3 (ref 3.77–5.28)
SODIUM SERPL-SCNC: 135 MMOL/L (ref 136–145)
T4 FREE SERPL-MCNC: 0.47 NG/DL (ref 0.92–1.68)
TSH SERPL DL<=0.05 MIU/L-ACNC: 228.5 UIU/ML (ref 0.27–4.2)
WBC NRBC COR # BLD AUTO: 10.48 10*3/MM3 (ref 3.4–10.8)

## 2025-06-24 PROCEDURE — 99215 OFFICE O/P EST HI 40 MIN: CPT | Performed by: INTERNAL MEDICINE

## 2025-06-24 PROCEDURE — 96377 APPLICATON ON-BODY INJECTOR: CPT

## 2025-06-24 PROCEDURE — 81025 URINE PREGNANCY TEST: CPT | Performed by: INTERNAL MEDICINE

## 2025-06-24 PROCEDURE — 25010000002 CYCLOPHOSPHAMIDE 2 GM/10ML SOLUTION 10 ML VIAL: Performed by: INTERNAL MEDICINE

## 2025-06-24 PROCEDURE — 82533 TOTAL CORTISOL: CPT | Performed by: INTERNAL MEDICINE

## 2025-06-24 PROCEDURE — 84439 ASSAY OF FREE THYROXINE: CPT | Performed by: INTERNAL MEDICINE

## 2025-06-24 PROCEDURE — 25010000002 PALONOSETRON PER 25 MCG: Performed by: INTERNAL MEDICINE

## 2025-06-24 PROCEDURE — 25810000003 SODIUM CHLORIDE 0.9 % SOLUTION: Performed by: INTERNAL MEDICINE

## 2025-06-24 PROCEDURE — 96375 TX/PRO/DX INJ NEW DRUG ADDON: CPT

## 2025-06-24 PROCEDURE — 96367 TX/PROPH/DG ADDL SEQ IV INF: CPT

## 2025-06-24 PROCEDURE — 25810000003 SODIUM CHLORIDE 0.9 % SOLUTION 250 ML FLEX CONT: Performed by: INTERNAL MEDICINE

## 2025-06-24 PROCEDURE — 86376 MICROSOMAL ANTIBODY EACH: CPT | Performed by: INTERNAL MEDICINE

## 2025-06-24 PROCEDURE — 25010000002 DEXAMETHASONE SODIUM PHOSPHATE 100 MG/10ML SOLUTION: Performed by: INTERNAL MEDICINE

## 2025-06-24 PROCEDURE — 96411 CHEMO IV PUSH ADDL DRUG: CPT

## 2025-06-24 PROCEDURE — 25010000002 PEGFILGRASTIM 6 MG/0.6ML PREFILLED SYRINGE KIT: Performed by: INTERNAL MEDICINE

## 2025-06-24 PROCEDURE — 25010000002 HEPARIN LOCK FLUSH PER 10 UNITS: Performed by: INTERNAL MEDICINE

## 2025-06-24 PROCEDURE — 80050 GENERAL HEALTH PANEL: CPT | Performed by: INTERNAL MEDICINE

## 2025-06-24 PROCEDURE — 25010000002 DOXORUBICIN PER 10 MG: Performed by: INTERNAL MEDICINE

## 2025-06-24 PROCEDURE — 25010000002 FOSAPREPITANT PER 1 MG: Performed by: INTERNAL MEDICINE

## 2025-06-24 PROCEDURE — 96413 CHEMO IV INFUSION 1 HR: CPT

## 2025-06-24 RX ORDER — SODIUM CHLORIDE 9 MG/ML
20 INJECTION, SOLUTION INTRAVENOUS ONCE
Status: CANCELLED | OUTPATIENT
Start: 2025-06-25

## 2025-06-24 RX ORDER — SODIUM CHLORIDE 9 MG/ML
20 INJECTION, SOLUTION INTRAVENOUS ONCE
Status: COMPLETED | OUTPATIENT
Start: 2025-06-24 | End: 2025-06-24

## 2025-06-24 RX ORDER — DOXORUBICIN HYDROCHLORIDE 2 MG/ML
48 INJECTION, SOLUTION INTRAVENOUS ONCE
Status: CANCELLED | OUTPATIENT
Start: 2025-06-25

## 2025-06-24 RX ORDER — PALONOSETRON 0.05 MG/ML
0.25 INJECTION, SOLUTION INTRAVENOUS ONCE
Status: COMPLETED | OUTPATIENT
Start: 2025-06-24 | End: 2025-06-24

## 2025-06-24 RX ORDER — PALONOSETRON 0.05 MG/ML
0.25 INJECTION, SOLUTION INTRAVENOUS ONCE
Status: CANCELLED | OUTPATIENT
Start: 2025-06-25

## 2025-06-24 RX ORDER — HEPARIN SODIUM (PORCINE) LOCK FLUSH IV SOLN 100 UNIT/ML 100 UNIT/ML
500 SOLUTION INTRAVENOUS AS NEEDED
Status: DISCONTINUED | OUTPATIENT
Start: 2025-06-24 | End: 2025-06-25 | Stop reason: HOSPADM

## 2025-06-24 RX ORDER — HEPARIN SODIUM (PORCINE) LOCK FLUSH IV SOLN 100 UNIT/ML 100 UNIT/ML
500 SOLUTION INTRAVENOUS AS NEEDED
OUTPATIENT
Start: 2025-06-24

## 2025-06-24 RX ORDER — DOXORUBICIN HYDROCHLORIDE 2 MG/ML
48 INJECTION, SOLUTION INTRAVENOUS ONCE
Status: COMPLETED | OUTPATIENT
Start: 2025-06-24 | End: 2025-06-24

## 2025-06-24 RX ORDER — SODIUM CHLORIDE 0.9 % (FLUSH) 0.9 %
10 SYRINGE (ML) INJECTION AS NEEDED
OUTPATIENT
Start: 2025-06-24

## 2025-06-24 RX ORDER — SODIUM CHLORIDE 0.9 % (FLUSH) 0.9 %
20 SYRINGE (ML) INJECTION AS NEEDED
OUTPATIENT
Start: 2025-06-24

## 2025-06-24 RX ORDER — HEPARIN SODIUM (PORCINE) LOCK FLUSH IV SOLN 100 UNIT/ML 100 UNIT/ML
300 SOLUTION INTRAVENOUS ONCE
OUTPATIENT
Start: 2025-06-24

## 2025-06-24 RX ADMIN — DEXAMETHASONE SODIUM PHOSPHATE 12 MG: 10 INJECTION, SOLUTION INTRAMUSCULAR; INTRAVENOUS at 12:16

## 2025-06-24 RX ADMIN — SODIUM CHLORIDE 20 ML/HR: 9 INJECTION, SOLUTION INTRAVENOUS at 12:11

## 2025-06-24 RX ADMIN — PALONOSETRON HYDROCHLORIDE 0.25 MG: 0.25 INJECTION INTRAVENOUS at 12:15

## 2025-06-24 RX ADMIN — DOXORUBICIN HYDROCHLORIDE 49 MG: 2 INJECTION, SOLUTION INTRAVENOUS at 13:40

## 2025-06-24 RX ADMIN — PEGFILGRASTIM 6 MG: KIT SUBCUTANEOUS at 14:30

## 2025-06-24 RX ADMIN — HEPARIN 500 UNITS: 100 SYRINGE at 14:30

## 2025-06-24 RX ADMIN — CYCLOPHOSPHAMIDE 1220 MG: 2 INJECTION INTRAVENOUS at 13:53

## 2025-06-24 RX ADMIN — FOSAPREPITANT 150 MG: 150 INJECTION, POWDER, LYOPHILIZED, FOR SOLUTION INTRAVENOUS at 12:17

## 2025-06-24 NOTE — PROGRESS NOTES
Onc Nutrition    Patient: Brooke Mendez  YOB: 1985    Diagnosis: Left breast cancer, triple negative with multiple lymph nodes positive Stage IIIC (cT2, cN2, cM0, G3, ER-, CT-, HER2-)      Neoadjuvant Chemotherapy:  Part 1:  OP BREAST Pembrolizumab 200 mg / PACLitaxel / CARBOplatin AUC=1.5 (Weekly) IV - every 21 days for 4 cycles (start date 3/13/25 & completed 6/9/25)  -pembrolizumab 200 mg IV on day 1 - d/c'd after 1st dose d/t intolerance  -paclitaxel 80 mg/m2 IV on days 1, 8, and 15 - changed to abraxane on D15C1 d/t reaction to paclitaxel   -carboplatin AUC 1.5 IV on days 1, 8, and 15     Part 2:  OP BREAST Pembrolizumab 200 mg / DOXOrubicin / Cyclophosphamide (start date 6/24/25)   -pembrolizumab 200 mg IV on day 1 - being omitted due to intolerance   -doxorubicin 60 mg/m2 IV push on day 1  -cyclophosphamide 600 mg/m2 IV on day 1  -pegfilgrastim (Neulasta OnPro) 6 mg subQ on day 1     Weight - 219# / ~23# weight gain since starting treatment      Nutrition follow up with patient during her infusion appointment.  Patient is starting her second part of her neoadjuvant chemo treatments today.  Patient remains on steroids due to rash from keytruda.  Patient states her appetite has been increased with the steroids on endorses weight gain.  She denies significant issues with nausea and taste changes.  She states bowels have been more on the constipated side and she is currently taking colace 3 capsules daily.    Reviewed the importance of good nutritional intake throughout treatment.  Advised her to be consuming smaller portions of food at one time but to eat more often throughout the day to aid with potential nausea management.  Offered tips to aid with constipation management including adequate hydration and suggested she incorporate Miralax as needed.  Discussed the importance of protein, reviewed high protein foods, and recommended she include protein with each meal / snack.  Also recommended she  increase her water intake to at least 64 ounces daily to aid with adequate hydration.     Answered her questions and she voiced understanding of information discussed.  Encouraged to call RD with questions.  Will follow up as indicated.     Nikki Bergman RD  06/24/25
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
## 2025-06-25 ENCOUNTER — TELEPHONE (OUTPATIENT)
Dept: ONCOLOGY | Facility: CLINIC | Age: 40
End: 2025-06-25

## 2025-06-25 ENCOUNTER — DOCUMENTATION (OUTPATIENT)
Dept: OTHER | Facility: HOSPITAL | Age: 40
End: 2025-06-25
Payer: COMMERCIAL

## 2025-06-25 LAB — THYROPEROXIDASE AB SERPL-ACNC: 86 IU/ML (ref 0–34)

## 2025-06-25 NOTE — PROGRESS NOTES
Distress Screening Follow-up    Name: Brooke Mendez    : 1985    Diagnosis: Malignant neoplasm of left breast in female, estrogen receptor negative     Location of Distress Screening: Infusion    Distress Level: 4 (2025 11:15 AM)      Physical Concerns:  Sleep: N  Substance abuse: N  Fatigue: Y  Tobacco use: N  Memory or concentration: Y  Sexual health: N  Changes in eating: Y  Loss or change of physical abilities: Y  Pain: Y      Emotional Concerns:  Worry or anxiety: Y  Sadness or depression: Y  Loss of interest or enjoyment: Y  Grief or loss: N  Fear: Y  Loneliness: Y  Anger: Y  Changes in appearance: Y  Feelings of worthlessness or being a burden: N      Social Concerns:  Relationship with spouse or partner: N  Relationship with children: N  Relationship with family members: Y  Relationship with friends or coworkers: Y  Communication with health care team: N  Ability to have children: N      Practical Concerns:  Taking care of myself: Y  Taking care of others: N  Work: N  School: N  Housing/Utilities: Y  Finances: N  Insurance: N  Transportation: N  : N  Having enough food: N  Access to medicine: N  Treatment decisions: N      Spiritual Concerns:  Sense of meaning or purpose: N  Changes in zohaib or beliefs: N  Death, dying or afterlife: N  Conflict between beliefs and cancer treatments: N  Relationship with the sacred: N  Ritual or dietary needs: N       Interventions:  Financial Needs: financial counselor (Provided overview of application and mailed Financial Assistance application to pt residence)       Comments:  SW contacted pt to follow up on Distress Screen from recent visit. SW provided introductions and explained nature of the call. Pt communicated she is doing okay at this time, and denied any needs. SW inquired about emotional concerns and financial concerns indicated on pt's recent Distress Screen. Pt communicated her employer combined her FMLA and STD and she is  waiting for her SSDI to begin, and filled with uncertainty about her finances and what her expenses will look like on a limited income. SW provided support and normalized pt's concerns. SEBASTIAN provided pt with an overview of [facility] Financial Assistance, which she is over income for currently, but will qualify for some assistance once her income transitions to SSDI. SEBASTIAN offered to mail an application to pt, and pt agreed to this plan. Pt also explained that her Doctor has changed her mental health medications, so her anxiety and depression are being addressed through therapy and she is waiting for her new medications and dosages to build up, but denied needing any additional emotional support at this time. SEBASTIAN provided education on role and resources available, and provided contact information should needs arise. PT thanked SEBASTIAN for the call and was agreeable to reach out as needed. SEBASTIAN will be available ongoing.

## 2025-06-25 NOTE — TELEPHONE ENCOUNTER
"  Caller: Brooke Mendez \"Lopez\"    Relationship: Self    Best call back number: [phone]      What was the call regarding: PATIENT WANTED TO SPEAK TO WHO HANDLES THE DISABILITY FORMS     "

## 2025-06-26 ENCOUNTER — TELEPHONE (OUTPATIENT)
Dept: ONCOLOGY | Facility: CLINIC | Age: 40
End: 2025-06-26
Payer: COMMERCIAL

## 2025-06-26 NOTE — TELEPHONE ENCOUNTER
Patient stated she is taking 2 Ultram at a time, PO BID and it is not helping.  Dr. Baker notified and per MD, offered patient Flexeril.  Patient declined.  Advised her per MD that if she changes her mind and wants to try it, to contact this office.  Patient verbalized understanding.

## 2025-06-26 NOTE — TELEPHONE ENCOUNTER
Claritin not helping with bone pain.  Pain at a 7 on a 0/10 scale. Has been taking Claritin for 2 days.  Also, advised that if nauseated, she can alternate the Zofran and Compazine if nauseated and if it doesn't help, let this office know.  Advised patient that RN will ask Dr. Baker about bone pain and Claritin and return call to her.  Patient verbalized understanding.

## 2025-06-27 ENCOUNTER — TELEPHONE (OUTPATIENT)
Dept: ONCOLOGY | Facility: CLINIC | Age: 40
End: 2025-06-27
Payer: COMMERCIAL

## 2025-06-27 LAB
QT INTERVAL: 364 MS
QTC INTERVAL: 387 MS

## 2025-06-27 RX ORDER — CYCLOBENZAPRINE HCL 10 MG
10 TABLET ORAL 3 TIMES DAILY PRN
Qty: 90 TABLET | Refills: 0 | Status: SHIPPED | OUTPATIENT
Start: 2025-06-27

## 2025-06-27 NOTE — TELEPHONE ENCOUNTER
Patient stated she doesn't have any pain anywhere except bilateral knees.  She stated she is taking claritin 10 mg daily, has taken a dose of Tylenol, Tramadol 100 mg PO BID and is taking Gabapentin.  She stated it is hard to stand and she needed help getting off of the toilet today.  Dr. Baker notified and patient advised that she can take ibuprofen 600 mg at a time up to three times per day for a couple of days only and that Flexeril will be sent to her pharmacy.  Advised her to reach out to this office if it doesn't help. Patient verbalized understanding and agreed.

## 2025-06-27 NOTE — TELEPHONE ENCOUNTER
Patient left a voicemail she took the Neulasta and the pain in her knees is unbearable she said nothing is helping with this pain. Please call.

## 2025-06-28 ENCOUNTER — TELEPHONE (OUTPATIENT)
Dept: ONCOLOGY | Facility: CLINIC | Age: 40
End: 2025-06-28
Payer: COMMERCIAL

## 2025-06-28 DIAGNOSIS — Z17.1 MALIGNANT NEOPLASM OF LEFT BREAST IN FEMALE, ESTROGEN RECEPTOR NEGATIVE, UNSPECIFIED SITE OF BREAST: Primary | ICD-10-CM

## 2025-06-28 DIAGNOSIS — C50.912 MALIGNANT NEOPLASM OF LEFT BREAST IN FEMALE, ESTROGEN RECEPTOR NEGATIVE, UNSPECIFIED SITE OF BREAST: Primary | ICD-10-CM

## 2025-06-28 DIAGNOSIS — G89.3 CANCER RELATED PAIN: ICD-10-CM

## 2025-06-28 RX ORDER — OXYCODONE HYDROCHLORIDE 5 MG/1
5 TABLET ORAL EVERY 6 HOURS PRN
Qty: 30 TABLET | Refills: 0 | Status: SHIPPED | OUTPATIENT
Start: 2025-06-28 | End: 2025-06-28

## 2025-06-28 RX ORDER — OXYCODONE HYDROCHLORIDE 5 MG/1
5 TABLET ORAL EVERY 6 HOURS PRN
Qty: 30 TABLET | Refills: 0 | Status: SHIPPED | OUTPATIENT
Start: 2025-06-28

## 2025-06-28 NOTE — TELEPHONE ENCOUNTER
Called [facility] to check on her. Pain is still worsening despite medication changes made yesterday. Describes deep aching pain worse in bilateral thighs/knees. Pain kept her from sleep last night.  No si/sx of infection    Called in oxycodone (abnormal LFT so will avoid hydrocodone) to take in place of tramadol. Continue other supportive medications as prescribed.

## 2025-07-07 ENCOUNTER — TELEPHONE (OUTPATIENT)
Dept: ONCOLOGY | Facility: CLINIC | Age: 40
End: 2025-07-07
Payer: COMMERCIAL

## 2025-07-07 ENCOUNTER — TELEPHONE (OUTPATIENT)
Dept: ONCOLOGY | Facility: CLINIC | Age: 40
End: 2025-07-07

## 2025-07-07 DIAGNOSIS — C50.912 MALIGNANT NEOPLASM OF LEFT BREAST IN FEMALE, ESTROGEN RECEPTOR NEGATIVE, UNSPECIFIED SITE OF BREAST: Primary | ICD-10-CM

## 2025-07-07 DIAGNOSIS — Z17.1 MALIGNANT NEOPLASM OF LEFT BREAST IN FEMALE, ESTROGEN RECEPTOR NEGATIVE, UNSPECIFIED SITE OF BREAST: Primary | ICD-10-CM

## 2025-07-07 NOTE — TELEPHONE ENCOUNTER
Spoke with patient at 15:35.  She stated that she has pain and edema to her right arm, the same side as her port.  She stated it started yesterday and hasn't worsened but has not improved either.   She denied any erythema or warmth to the area.  Patient stated that she had some chest pain over the weekend but it didn't last long.   Patient denied any SOA.  Dr. Baker notified of the above and the question about the change in her EKG.  Patient contacted back and advised per MD that she can either go to her local ER for evaluation now or this office can try and set up a doppler tomorrow to check for blood clot.  Patient verbalized understanding and stated she will go to the local ER now for evaluation.

## 2025-07-07 NOTE — TELEPHONE ENCOUNTER
PT CALLED HAVING PAIN AND SWELLING IN ARM ON SIDE OF PORT ALSO HAD QUESTIONS ABOUT EKG REPORT AND MEDS    English

## 2025-07-08 DIAGNOSIS — G89.3 CANCER RELATED PAIN: ICD-10-CM

## 2025-07-08 DIAGNOSIS — C50.912 MALIGNANT NEOPLASM OF LEFT BREAST IN FEMALE, ESTROGEN RECEPTOR NEGATIVE, UNSPECIFIED SITE OF BREAST: ICD-10-CM

## 2025-07-08 DIAGNOSIS — Z17.1 MALIGNANT NEOPLASM OF LEFT BREAST IN FEMALE, ESTROGEN RECEPTOR NEGATIVE, UNSPECIFIED SITE OF BREAST: ICD-10-CM

## 2025-07-08 RX ORDER — OXYCODONE HYDROCHLORIDE 5 MG/1
5 TABLET ORAL EVERY 6 HOURS PRN
Qty: 30 TABLET | Refills: 0 | Status: SHIPPED | OUTPATIENT
Start: 2025-07-08

## 2025-07-08 NOTE — TELEPHONE ENCOUNTER
Last Office Visit - This Dept  6/24/2025 Fanny Baker MD  Next appt 7/15/25  Last refill 6.28.25 30 tablets 0 refills

## 2025-07-15 ENCOUNTER — APPOINTMENT (OUTPATIENT)
Dept: ONCOLOGY | Facility: HOSPITAL | Age: 40
End: 2025-07-15
Payer: COMMERCIAL

## 2025-07-15 ENCOUNTER — HOSPITAL ENCOUNTER (OUTPATIENT)
Dept: CARDIOLOGY | Facility: HOSPITAL | Age: 40
Discharge: HOME | End: 2025-07-15
Payer: COMMERCIAL

## 2025-07-15 ENCOUNTER — OFFICE VISIT (OUTPATIENT)
Dept: ONCOLOGY | Facility: CLINIC | Age: 40
End: 2025-07-15
Payer: COMMERCIAL

## 2025-07-15 ENCOUNTER — HOSPITAL ENCOUNTER (OUTPATIENT)
Dept: ONCOLOGY | Facility: HOSPITAL | Age: 40
Discharge: HOME | End: 2025-07-15
Payer: COMMERCIAL

## 2025-07-15 ENCOUNTER — TELEPHONE (OUTPATIENT)
Dept: ONCOLOGY | Facility: CLINIC | Age: 40
End: 2025-07-15

## 2025-07-15 VITALS
OXYGEN SATURATION: 95 % | WEIGHT: 226 LBS | RESPIRATION RATE: 18 BRPM | TEMPERATURE: 100.4 F | SYSTOLIC BLOOD PRESSURE: 124 MMHG | BODY MASS INDEX: 33.47 KG/M2 | HEIGHT: 69 IN | DIASTOLIC BLOOD PRESSURE: 92 MMHG | HEART RATE: 111 BPM

## 2025-07-15 DIAGNOSIS — M79.89 LEG SWELLING: ICD-10-CM

## 2025-07-15 DIAGNOSIS — C50.912 MALIGNANT NEOPLASM OF LEFT BREAST IN FEMALE, ESTROGEN RECEPTOR NEGATIVE, UNSPECIFIED SITE OF BREAST: ICD-10-CM

## 2025-07-15 DIAGNOSIS — Z17.1 MALIGNANT NEOPLASM OF LEFT BREAST IN FEMALE, ESTROGEN RECEPTOR NEGATIVE, UNSPECIFIED SITE OF BREAST: Primary | ICD-10-CM

## 2025-07-15 DIAGNOSIS — R50.9 FEVER, UNSPECIFIED FEVER CAUSE: ICD-10-CM

## 2025-07-15 DIAGNOSIS — C50.912 MALIGNANT NEOPLASM OF LEFT BREAST IN FEMALE, ESTROGEN RECEPTOR NEGATIVE, UNSPECIFIED SITE OF BREAST: Primary | ICD-10-CM

## 2025-07-15 DIAGNOSIS — Z17.1 MALIGNANT NEOPLASM OF LEFT BREAST IN FEMALE, ESTROGEN RECEPTOR NEGATIVE, UNSPECIFIED SITE OF BREAST: ICD-10-CM

## 2025-07-15 DIAGNOSIS — R06.02 SHORTNESS OF BREATH: ICD-10-CM

## 2025-07-15 DIAGNOSIS — G89.3 CANCER RELATED PAIN: ICD-10-CM

## 2025-07-15 LAB
ALBUMIN SERPL-MCNC: 4.2 G/DL (ref 3.5–5.2)
ALBUMIN/GLOB SERPL: 1.6 G/DL
ALP SERPL-CCNC: 65 U/L (ref 39–117)
ALT SERPL W P-5'-P-CCNC: 84 U/L (ref 1–33)
ANION GAP SERPL CALCULATED.3IONS-SCNC: 15.8 MMOL/L (ref 5–15)
AST SERPL-CCNC: 42 U/L (ref 1–32)
BACTERIA UR QL AUTO: ABNORMAL /HPF
BASOPHILS # BLD AUTO: 0.05 10*3/MM3 (ref 0–0.2)
BASOPHILS NFR BLD AUTO: 0.4 % (ref 0–1.5)
BH CV UPPER VENOUS LEFT SUBCLAVIAN AUGMENT: NORMAL
BH CV UPPER VENOUS LEFT SUBCLAVIAN PHASIC: NORMAL
BH CV UPPER VENOUS LEFT SUBCLAVIAN SPONT: NORMAL
BH CV UPPER VENOUS RIGHT AXILLARY AUGMENT: NORMAL
BH CV UPPER VENOUS RIGHT AXILLARY COMPRESS: NORMAL
BH CV UPPER VENOUS RIGHT AXILLARY PHASIC: NORMAL
BH CV UPPER VENOUS RIGHT AXILLARY SPONT: NORMAL
BH CV UPPER VENOUS RIGHT BASILIC FOREARM COMPRESS: NORMAL
BH CV UPPER VENOUS RIGHT BASILIC UPPER AUGMENT: NORMAL
BH CV UPPER VENOUS RIGHT BASILIC UPPER COLOR: 1
BH CV UPPER VENOUS RIGHT BASILIC UPPER COMPRESS: NORMAL
BH CV UPPER VENOUS RIGHT BASILIC UPPER PHASIC: NORMAL
BH CV UPPER VENOUS RIGHT BASILIC UPPER SPONT: NORMAL
BH CV UPPER VENOUS RIGHT BASILIC UPPER THROMBUS: NORMAL
BH CV UPPER VENOUS RIGHT BRACHIAL COMPRESS: NORMAL
BH CV UPPER VENOUS RIGHT CEPHALIC FOREARM COMPRESS: NORMAL
BH CV UPPER VENOUS RIGHT CEPHALIC UPPER COMPRESS: NORMAL
BH CV UPPER VENOUS RIGHT INTERNAL JUGULAR AUGMENT: NORMAL
BH CV UPPER VENOUS RIGHT INTERNAL JUGULAR COMPRESS: NORMAL
BH CV UPPER VENOUS RIGHT INTERNAL JUGULAR PHASIC: NORMAL
BH CV UPPER VENOUS RIGHT INTERNAL JUGULAR SPONT: NORMAL
BH CV UPPER VENOUS RIGHT RADIAL COMPRESS: NORMAL
BH CV UPPER VENOUS RIGHT SUBCLAVIAN AUGMENT: NORMAL
BH CV UPPER VENOUS RIGHT SUBCLAVIAN COMPRESS: NORMAL
BH CV UPPER VENOUS RIGHT SUBCLAVIAN PHASIC: NORMAL
BH CV UPPER VENOUS RIGHT SUBCLAVIAN SPONT: NORMAL
BH CV UPPER VENOUS RIGHT ULNAR COMPRESS: NORMAL
BILIRUB SERPL-MCNC: 0.3 MG/DL (ref 0–1.2)
BILIRUB UR QL STRIP: NEGATIVE
BUN SERPL-MCNC: 24.4 MG/DL (ref 6–20)
BUN/CREAT SERPL: 28.4 (ref 7–25)
CALCIUM SPEC-SCNC: 9.1 MG/DL (ref 8.6–10.5)
CHLORIDE SERPL-SCNC: 101 MMOL/L (ref 98–107)
CLARITY UR: CLEAR
CO2 SERPL-SCNC: 22.2 MMOL/L (ref 22–29)
COLOR UR: YELLOW
CREAT SERPL-MCNC: 0.86 MG/DL (ref 0.57–1)
DEPRECATED RDW RBC AUTO: 77.2 FL (ref 37–54)
EGFRCR SERPLBLD CKD-EPI 2021: 87.2 ML/MIN/1.73
EOSINOPHIL # BLD AUTO: 0 10*3/MM3 (ref 0–0.4)
EOSINOPHIL NFR BLD AUTO: 0 % (ref 0.3–6.2)
ERYTHROCYTE [DISTWIDTH] IN BLOOD BY AUTOMATED COUNT: 18.7 % (ref 12.3–15.4)
GLOBULIN UR ELPH-MCNC: 2.6 GM/DL
GLUCOSE SERPL-MCNC: 83 MG/DL (ref 65–99)
GLUCOSE UR STRIP-MCNC: NEGATIVE MG/DL
HCT VFR BLD AUTO: 31 % (ref 34–46.6)
HGB BLD-MCNC: 10.1 G/DL (ref 12–15.9)
HGB UR QL STRIP.AUTO: NEGATIVE
HYALINE CASTS UR QL AUTO: ABNORMAL /LPF
IMM GRANULOCYTES # BLD AUTO: 1.55 10*3/MM3 (ref 0–0.05)
IMM GRANULOCYTES NFR BLD AUTO: 13 % (ref 0–0.5)
KETONES UR QL STRIP: ABNORMAL
LEUKOCYTE ESTERASE UR QL STRIP.AUTO: ABNORMAL
LYMPHOCYTES # BLD AUTO: 2.24 10*3/MM3 (ref 0.7–3.1)
LYMPHOCYTES NFR BLD AUTO: 18.8 % (ref 19.6–45.3)
MCH RBC QN AUTO: 36.9 PG (ref 26.6–33)
MCHC RBC AUTO-ENTMCNC: 32.6 G/DL (ref 31.5–35.7)
MCV RBC AUTO: 113.1 FL (ref 79–97)
MONOCYTES # BLD AUTO: 1.22 10*3/MM3 (ref 0.1–0.9)
MONOCYTES NFR BLD AUTO: 10.2 % (ref 5–12)
NEUTROPHILS NFR BLD AUTO: 57.6 % (ref 42.7–76)
NEUTROPHILS NFR BLD AUTO: 6.87 10*3/MM3 (ref 1.7–7)
NITRITE UR QL STRIP: NEGATIVE
PH UR STRIP.AUTO: 6 [PH] (ref 5–8)
PLATELET # BLD AUTO: 304 10*3/MM3 (ref 140–450)
PMV BLD AUTO: 9.9 FL (ref 6–12)
POTASSIUM SERPL-SCNC: 4.1 MMOL/L (ref 3.5–5.2)
PROT SERPL-MCNC: 6.8 G/DL (ref 6–8.5)
PROT UR QL STRIP: NEGATIVE
RBC # BLD AUTO: 2.74 10*6/MM3 (ref 3.77–5.28)
RBC # UR STRIP: ABNORMAL /HPF
REF LAB TEST METHOD: ABNORMAL
SODIUM SERPL-SCNC: 139 MMOL/L (ref 136–145)
SP GR UR STRIP: 1.02 (ref 1–1.03)
SQUAMOUS #/AREA URNS HPF: ABNORMAL /HPF
T4 FREE SERPL-MCNC: 0.79 NG/DL (ref 0.92–1.68)
TSH SERPL DL<=0.05 MIU/L-ACNC: 97.8 UIU/ML (ref 0.27–4.2)
UROBILINOGEN UR QL STRIP: ABNORMAL
WBC # UR STRIP: ABNORMAL /HPF
WBC NRBC COR # BLD AUTO: 11.93 10*3/MM3 (ref 3.4–10.8)

## 2025-07-15 PROCEDURE — 25010000002 HEPARIN LOCK FLUSH PER 10 UNITS: Performed by: INTERNAL MEDICINE

## 2025-07-15 PROCEDURE — 84439 ASSAY OF FREE THYROXINE: CPT | Performed by: INTERNAL MEDICINE

## 2025-07-15 PROCEDURE — 87040 BLOOD CULTURE FOR BACTERIA: CPT | Performed by: INTERNAL MEDICINE

## 2025-07-15 PROCEDURE — 80050 GENERAL HEALTH PANEL: CPT | Performed by: INTERNAL MEDICINE

## 2025-07-15 PROCEDURE — 36591 DRAW BLOOD OFF VENOUS DEVICE: CPT

## 2025-07-15 PROCEDURE — 93971 EXTREMITY STUDY: CPT

## 2025-07-15 PROCEDURE — 81001 URINALYSIS AUTO W/SCOPE: CPT | Performed by: INTERNAL MEDICINE

## 2025-07-15 RX ORDER — HEPARIN SODIUM (PORCINE) LOCK FLUSH IV SOLN 100 UNIT/ML 100 UNIT/ML
300 SOLUTION INTRAVENOUS ONCE
OUTPATIENT
Start: 2025-07-15

## 2025-07-15 RX ORDER — BUPROPION HYDROCHLORIDE 150 MG/1
150 TABLET ORAL EVERY MORNING
COMMUNITY
Start: 2025-06-28

## 2025-07-15 RX ORDER — SODIUM CHLORIDE 0.9 % (FLUSH) 0.9 %
20 SYRINGE (ML) INJECTION AS NEEDED
OUTPATIENT
Start: 2025-07-15

## 2025-07-15 RX ORDER — SODIUM CHLORIDE 0.9 % (FLUSH) 0.9 %
10 SYRINGE (ML) INJECTION AS NEEDED
OUTPATIENT
Start: 2025-07-15

## 2025-07-15 RX ORDER — FUROSEMIDE 20 MG/1
20 TABLET ORAL DAILY
Qty: 30 TABLET | Refills: 0 | Status: SHIPPED | OUTPATIENT
Start: 2025-07-15

## 2025-07-15 RX ORDER — HEPARIN SODIUM (PORCINE) LOCK FLUSH IV SOLN 100 UNIT/ML 100 UNIT/ML
500 SOLUTION INTRAVENOUS AS NEEDED
OUTPATIENT
Start: 2025-07-15

## 2025-07-15 RX ORDER — HEPARIN SODIUM (PORCINE) LOCK FLUSH IV SOLN 100 UNIT/ML 100 UNIT/ML
500 SOLUTION INTRAVENOUS AS NEEDED
Status: DISCONTINUED | OUTPATIENT
Start: 2025-07-15 | End: 2025-07-16 | Stop reason: HOSPADM

## 2025-07-15 RX ORDER — LEVOTHYROXINE SODIUM 100 UG/1
100 TABLET ORAL DAILY
Qty: 30 TABLET | Refills: 0 | Status: SHIPPED | OUTPATIENT
Start: 2025-07-15

## 2025-07-15 RX ORDER — SODIUM CHLORIDE 0.9 % (FLUSH) 0.9 %
10 SYRINGE (ML) INJECTION AS NEEDED
Status: DISCONTINUED | OUTPATIENT
Start: 2025-07-15 | End: 2025-07-16 | Stop reason: HOSPADM

## 2025-07-15 RX ORDER — OXYCODONE HYDROCHLORIDE 5 MG/1
5 TABLET ORAL EVERY 6 HOURS PRN
Qty: 60 TABLET | Refills: 0 | Status: SHIPPED | OUTPATIENT
Start: 2025-07-15

## 2025-07-15 RX ADMIN — HEPARIN 500 UNITS: 100 SYRINGE at 12:43

## 2025-07-15 RX ADMIN — Medication 10 ML: at 12:43

## 2025-07-15 NOTE — TELEPHONE ENCOUNTER
Advised patient per Dr. Baker: She has a superficial thrombus in their basilic vein in the upper arm and Dr. Baker sent in Eliquis for her to start today.  Per staff at [facility], there is a $0 copay, she can  today.  Patient verbalized understanding.
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
## 2025-07-16 DIAGNOSIS — C50.912 MALIGNANT NEOPLASM OF LEFT BREAST IN FEMALE, ESTROGEN RECEPTOR NEGATIVE, UNSPECIFIED SITE OF BREAST: Primary | ICD-10-CM

## 2025-07-16 DIAGNOSIS — Z17.1 MALIGNANT NEOPLASM OF LEFT BREAST IN FEMALE, ESTROGEN RECEPTOR NEGATIVE, UNSPECIFIED SITE OF BREAST: Primary | ICD-10-CM

## 2025-07-17 ENCOUNTER — HOSPITAL ENCOUNTER (OUTPATIENT)
Dept: MRI IMAGING | Facility: HOSPITAL | Age: 40
Discharge: HOME | End: 2025-07-17
Payer: COMMERCIAL

## 2025-07-17 ENCOUNTER — RESULTS FOLLOW-UP (OUTPATIENT)
Dept: ONCOLOGY | Facility: CLINIC | Age: 40
End: 2025-07-17
Payer: COMMERCIAL

## 2025-07-17 ENCOUNTER — HOSPITAL ENCOUNTER (OUTPATIENT)
Dept: CARDIOLOGY | Facility: HOSPITAL | Age: 40
Discharge: HOME | End: 2025-07-17
Payer: COMMERCIAL

## 2025-07-17 DIAGNOSIS — C50.912 MALIGNANT NEOPLASM OF LEFT BREAST IN FEMALE, ESTROGEN RECEPTOR NEGATIVE, UNSPECIFIED SITE OF BREAST: ICD-10-CM

## 2025-07-17 DIAGNOSIS — R06.02 SHORTNESS OF BREATH: ICD-10-CM

## 2025-07-17 DIAGNOSIS — M79.89 LEG SWELLING: ICD-10-CM

## 2025-07-17 DIAGNOSIS — Z17.1 MALIGNANT NEOPLASM OF LEFT BREAST IN FEMALE, ESTROGEN RECEPTOR NEGATIVE, UNSPECIFIED SITE OF BREAST: ICD-10-CM

## 2025-07-17 LAB
AORTIC DIMENSIONLESS INDEX: 0.76 (DI)
AV MEAN PRESS GRAD SYS DOP V1V2: 3 MMHG
AV VMAX SYS DOP: 119 CM/SEC
BH CV ECHO LEFT VENTRICLE GLOBAL LONGITUDINAL STRAIN: -17.6 %
BH CV ECHO MEAS - AO MAX PG: 5.7 MMHG
BH CV ECHO MEAS - AO ROOT DIAM: 2.1 CM
BH CV ECHO MEAS - AO V2 VTI: 17.6 CM
BH CV ECHO MEAS - AVA(I,D): 2.37 CM2
BH CV ECHO MEAS - EDV(CUBED): 54.9 ML
BH CV ECHO MEAS - EDV(MOD-SP2): 74.7 ML
BH CV ECHO MEAS - EDV(MOD-SP4): 96.2 ML
BH CV ECHO MEAS - EF(MOD-SP2): 56.1 %
BH CV ECHO MEAS - EF(MOD-SP4): 58.6 %
BH CV ECHO MEAS - ESV(CUBED): 15.6 ML
BH CV ECHO MEAS - ESV(MOD-SP2): 32.8 ML
BH CV ECHO MEAS - ESV(MOD-SP4): 39.8 ML
BH CV ECHO MEAS - FS: 34.2 %
BH CV ECHO MEAS - IVS/LVPW: 1.1 CM
BH CV ECHO MEAS - IVSD: 1.1 CM
BH CV ECHO MEAS - LA DIMENSION: 3.1 CM
BH CV ECHO MEAS - LAT PEAK E' VEL: 10.8 CM/SEC
BH CV ECHO MEAS - LV DIASTOLIC VOL/BSA (35-75): 44.2 CM2
BH CV ECHO MEAS - LV MASS(C)D: 125.8 GRAMS
BH CV ECHO MEAS - LV MAX PG: 3.7 MMHG
BH CV ECHO MEAS - LV MEAN PG: 2 MMHG
BH CV ECHO MEAS - LV SYSTOLIC VOL/BSA (12-30): 18.3 CM2
BH CV ECHO MEAS - LV V1 MAX: 95.9 CM/SEC
BH CV ECHO MEAS - LV V1 VTI: 13.3 CM
BH CV ECHO MEAS - LVIDD: 3.8 CM
BH CV ECHO MEAS - LVIDS: 2.5 CM
BH CV ECHO MEAS - LVOT AREA: 3.1 CM2
BH CV ECHO MEAS - LVOT DIAM: 2 CM
BH CV ECHO MEAS - LVPWD: 1 CM
BH CV ECHO MEAS - MED PEAK E' VEL: 10.8 CM/SEC
BH CV ECHO MEAS - MV A MAX VEL: 81.2 CM/SEC
BH CV ECHO MEAS - MV DEC SLOPE: 766 CM/SEC2
BH CV ECHO MEAS - MV DEC TIME: 0.11 SEC
BH CV ECHO MEAS - MV E MAX VEL: 61.3 CM/SEC
BH CV ECHO MEAS - MV E/A: 0.75
BH CV ECHO MEAS - MV MAX PG: 3.6 MMHG
BH CV ECHO MEAS - MV MEAN PG: 2 MMHG
BH CV ECHO MEAS - MV P1/2T: 27.5 MSEC
BH CV ECHO MEAS - MV V2 VTI: 11.9 CM
BH CV ECHO MEAS - MVA(P1/2T): 8 CM2
BH CV ECHO MEAS - MVA(VTI): 3.5 CM2
BH CV ECHO MEAS - PA ACC TIME: 0.1 SEC
BH CV ECHO MEAS - SV(LVOT): 41.8 ML
BH CV ECHO MEAS - SV(MOD-SP2): 41.9 ML
BH CV ECHO MEAS - SV(MOD-SP4): 56.4 ML
BH CV ECHO MEAS - SVI(LVOT): 19.2 ML/M2
BH CV ECHO MEAS - SVI(MOD-SP2): 19.3 ML/M2
BH CV ECHO MEAS - SVI(MOD-SP4): 25.9 ML/M2
BH CV ECHO MEAS - TAPSE (>1.6): 1.66 CM
BH CV ECHO MEASUREMENTS AVERAGE E/E' RATIO: 5.68
BH CV XLRA - RV BASE: 2.7 CM
BH CV XLRA - RV LENGTH: 7.1 CM
BH CV XLRA - RV MID: 2.2 CM
BH CV XLRA - TDI S': 10.2 CM/SEC
LEFT ATRIUM VOLUME INDEX: 14.6 ML/M2
LV EF BIPLANE MOD: 60.5 %

## 2025-07-17 PROCEDURE — 93306 TTE W/DOPPLER COMPLETE: CPT

## 2025-07-17 PROCEDURE — 72158 MRI LUMBAR SPINE W/O & W/DYE: CPT

## 2025-07-17 PROCEDURE — 25510000002 GADOBENATE DIMEGLUMINE 529 MG/ML SOLUTION: Performed by: INTERNAL MEDICINE

## 2025-07-17 PROCEDURE — 93356 MYOCRD STRAIN IMG SPCKL TRCK: CPT

## 2025-07-17 PROCEDURE — A9577 INJ MULTIHANCE: HCPCS | Performed by: INTERNAL MEDICINE

## 2025-07-17 RX ADMIN — GADOBENATE DIMEGLUMINE 20 ML: 529 INJECTION, SOLUTION INTRAVENOUS at 10:57

## 2025-07-20 LAB
BACTERIA SPEC AEROBE CULT: NORMAL
BACTERIA SPEC AEROBE CULT: NORMAL

## 2025-07-22 ENCOUNTER — OFFICE VISIT (OUTPATIENT)
Dept: ONCOLOGY | Facility: CLINIC | Age: 40
End: 2025-07-22
Payer: COMMERCIAL

## 2025-07-22 ENCOUNTER — APPOINTMENT (OUTPATIENT)
Dept: ONCOLOGY | Facility: HOSPITAL | Age: 40
End: 2025-07-22
Payer: COMMERCIAL

## 2025-07-22 ENCOUNTER — HOSPITAL ENCOUNTER (OUTPATIENT)
Dept: ONCOLOGY | Facility: HOSPITAL | Age: 40
Discharge: HOME | End: 2025-07-22
Admitting: INTERNAL MEDICINE
Payer: COMMERCIAL

## 2025-07-22 VITALS
DIASTOLIC BLOOD PRESSURE: 74 MMHG | TEMPERATURE: 97.7 F | HEIGHT: 69 IN | OXYGEN SATURATION: 97 % | WEIGHT: 232 LBS | BODY MASS INDEX: 34.36 KG/M2 | SYSTOLIC BLOOD PRESSURE: 113 MMHG | HEART RATE: 138 BPM

## 2025-07-22 DIAGNOSIS — Z17.1 MALIGNANT NEOPLASM OF LEFT BREAST IN FEMALE, ESTROGEN RECEPTOR NEGATIVE, UNSPECIFIED SITE OF BREAST: Primary | ICD-10-CM

## 2025-07-22 DIAGNOSIS — R59.9 SWOLLEN LYMPH NODES: ICD-10-CM

## 2025-07-22 DIAGNOSIS — C50.912 MALIGNANT NEOPLASM OF LEFT BREAST IN FEMALE, ESTROGEN RECEPTOR NEGATIVE, UNSPECIFIED SITE OF BREAST: Primary | ICD-10-CM

## 2025-07-22 DIAGNOSIS — R59.9 SWOLLEN LYMPH NODES: Primary | ICD-10-CM

## 2025-07-22 LAB
ALBUMIN SERPL-MCNC: 4 G/DL (ref 3.5–5.2)
ALBUMIN/GLOB SERPL: 1.6 G/DL
ALP SERPL-CCNC: 74 U/L (ref 39–117)
ALT SERPL W P-5'-P-CCNC: 60 U/L (ref 1–33)
ANION GAP SERPL CALCULATED.3IONS-SCNC: 14 MMOL/L (ref 5–15)
AST SERPL-CCNC: 27 U/L (ref 1–32)
B PARAPERT DNA SPEC QL NAA+PROBE: NOT DETECTED
B PERT DNA SPEC QL NAA+PROBE: NOT DETECTED
BASOPHILS # BLD AUTO: 0.05 10*3/MM3 (ref 0–0.2)
BASOPHILS NFR BLD AUTO: 0.6 % (ref 0–1.5)
BILIRUB SERPL-MCNC: 0.3 MG/DL (ref 0–1.2)
BUN SERPL-MCNC: 20.5 MG/DL (ref 6–20)
BUN/CREAT SERPL: 23 (ref 7–25)
C PNEUM DNA NPH QL NAA+NON-PROBE: NOT DETECTED
CALCIUM SPEC-SCNC: 9.1 MG/DL (ref 8.6–10.5)
CHLORIDE SERPL-SCNC: 100 MMOL/L (ref 98–107)
CO2 SERPL-SCNC: 25 MMOL/L (ref 22–29)
CREAT SERPL-MCNC: 0.89 MG/DL (ref 0.57–1)
DEPRECATED RDW RBC AUTO: 68.9 FL (ref 37–54)
EGFRCR SERPLBLD CKD-EPI 2021: 83.7 ML/MIN/1.73
EOSINOPHIL # BLD AUTO: 0.01 10*3/MM3 (ref 0–0.4)
EOSINOPHIL NFR BLD AUTO: 0.1 % (ref 0.3–6.2)
ERYTHROCYTE [DISTWIDTH] IN BLOOD BY AUTOMATED COUNT: 16.4 % (ref 12.3–15.4)
FLUAV SUBTYP SPEC NAA+PROBE: NOT DETECTED
FLUBV RNA NPH QL NAA+NON-PROBE: NOT DETECTED
GLOBULIN UR ELPH-MCNC: 2.5 GM/DL
GLUCOSE SERPL-MCNC: 126 MG/DL (ref 65–99)
HADV DNA SPEC NAA+PROBE: NOT DETECTED
HCOV 229E RNA SPEC QL NAA+PROBE: NOT DETECTED
HCOV HKU1 RNA SPEC QL NAA+PROBE: NOT DETECTED
HCOV NL63 RNA SPEC QL NAA+PROBE: NOT DETECTED
HCOV OC43 RNA SPEC QL NAA+PROBE: NOT DETECTED
HCT VFR BLD AUTO: 31.3 % (ref 34–46.6)
HGB BLD-MCNC: 10.1 G/DL (ref 12–15.9)
HMPV RNA NPH QL NAA+NON-PROBE: NOT DETECTED
HPIV1 RNA ISLT QL NAA+PROBE: NOT DETECTED
HPIV2 RNA SPEC QL NAA+PROBE: NOT DETECTED
HPIV3 RNA NPH QL NAA+PROBE: NOT DETECTED
HPIV4 P GENE NPH QL NAA+PROBE: NOT DETECTED
IMM GRANULOCYTES # BLD AUTO: 0.37 10*3/MM3 (ref 0–0.05)
IMM GRANULOCYTES NFR BLD AUTO: 4.1 % (ref 0–0.5)
LYMPHOCYTES # BLD AUTO: 1.47 10*3/MM3 (ref 0.7–3.1)
LYMPHOCYTES NFR BLD AUTO: 16.2 % (ref 19.6–45.3)
M PNEUMO IGG SER IA-ACNC: NOT DETECTED
MCH RBC QN AUTO: 36.2 PG (ref 26.6–33)
MCHC RBC AUTO-ENTMCNC: 32.3 G/DL (ref 31.5–35.7)
MCV RBC AUTO: 112.2 FL (ref 79–97)
MONOCYTES # BLD AUTO: 0.77 10*3/MM3 (ref 0.1–0.9)
MONOCYTES NFR BLD AUTO: 8.5 % (ref 5–12)
NEUTROPHILS NFR BLD AUTO: 6.42 10*3/MM3 (ref 1.7–7)
NEUTROPHILS NFR BLD AUTO: 70.5 % (ref 42.7–76)
PLATELET # BLD AUTO: 288 10*3/MM3 (ref 140–450)
PMV BLD AUTO: 10.1 FL (ref 6–12)
POTASSIUM SERPL-SCNC: 4.2 MMOL/L (ref 3.5–5.2)
PROT SERPL-MCNC: 6.5 G/DL (ref 6–8.5)
RBC # BLD AUTO: 2.79 10*6/MM3 (ref 3.77–5.28)
RHINOVIRUS RNA SPEC NAA+PROBE: NOT DETECTED
RSV RNA NPH QL NAA+NON-PROBE: NOT DETECTED
S PYO AG THROAT QL: NEGATIVE
SARS-COV-2 RNA RESP QL NAA+PROBE: NOT DETECTED
SODIUM SERPL-SCNC: 139 MMOL/L (ref 136–145)
T4 FREE SERPL-MCNC: 0.63 NG/DL (ref 0.92–1.68)
TSH SERPL DL<=0.05 MIU/L-ACNC: 150.9 UIU/ML (ref 0.27–4.2)
WBC NRBC COR # BLD AUTO: 9.09 10*3/MM3 (ref 3.4–10.8)

## 2025-07-22 PROCEDURE — 25010000002 CYCLOPHOSPHAMIDE 2 GM/10ML SOLUTION 10 ML VIAL: Performed by: INTERNAL MEDICINE

## 2025-07-22 PROCEDURE — 87081 CULTURE SCREEN ONLY: CPT | Performed by: INTERNAL MEDICINE

## 2025-07-22 PROCEDURE — 84439 ASSAY OF FREE THYROXINE: CPT | Performed by: INTERNAL MEDICINE

## 2025-07-22 PROCEDURE — 0202U NFCT DS 22 TRGT SARS-COV-2: CPT | Performed by: INTERNAL MEDICINE

## 2025-07-22 PROCEDURE — 80050 GENERAL HEALTH PANEL: CPT | Performed by: INTERNAL MEDICINE

## 2025-07-22 PROCEDURE — 96413 CHEMO IV INFUSION 1 HR: CPT

## 2025-07-22 PROCEDURE — 25010000002 DEXAMETHASONE SODIUM PHOSPHATE 100 MG/10ML SOLUTION: Performed by: INTERNAL MEDICINE

## 2025-07-22 PROCEDURE — 96375 TX/PRO/DX INJ NEW DRUG ADDON: CPT

## 2025-07-22 PROCEDURE — 99214 OFFICE O/P EST MOD 30 MIN: CPT | Performed by: INTERNAL MEDICINE

## 2025-07-22 PROCEDURE — 96377 APPLICATON ON-BODY INJECTOR: CPT

## 2025-07-22 PROCEDURE — 25010000002 HEPARIN LOCK FLUSH PER 10 UNITS: Performed by: INTERNAL MEDICINE

## 2025-07-22 PROCEDURE — 25010000002 PEGFILGRASTIM 6 MG/0.6ML PREFILLED SYRINGE KIT: Performed by: INTERNAL MEDICINE

## 2025-07-22 PROCEDURE — 25810000003 SODIUM CHLORIDE 0.9 % SOLUTION 250 ML FLEX CONT: Performed by: INTERNAL MEDICINE

## 2025-07-22 PROCEDURE — 25010000002 DOXORUBICIN PER 10 MG: Performed by: INTERNAL MEDICINE

## 2025-07-22 PROCEDURE — 25810000003 SODIUM CHLORIDE 0.9 % SOLUTION: Performed by: INTERNAL MEDICINE

## 2025-07-22 PROCEDURE — 96367 TX/PROPH/DG ADDL SEQ IV INF: CPT

## 2025-07-22 PROCEDURE — 25010000002 FOSAPREPITANT PER 1 MG: Performed by: INTERNAL MEDICINE

## 2025-07-22 PROCEDURE — 96411 CHEMO IV PUSH ADDL DRUG: CPT

## 2025-07-22 PROCEDURE — 25010000002 PALONOSETRON PER 25 MCG: Performed by: INTERNAL MEDICINE

## 2025-07-22 PROCEDURE — 87880 STREP A ASSAY W/OPTIC: CPT | Performed by: INTERNAL MEDICINE

## 2025-07-22 RX ORDER — HEPARIN SODIUM (PORCINE) LOCK FLUSH IV SOLN 100 UNIT/ML 100 UNIT/ML
500 SOLUTION INTRAVENOUS AS NEEDED
OUTPATIENT
Start: 2025-07-22

## 2025-07-22 RX ORDER — DOXORUBICIN HYDROCHLORIDE 2 MG/ML
60 INJECTION, SOLUTION INTRAVENOUS ONCE
Status: CANCELLED | OUTPATIENT
Start: 2025-07-22

## 2025-07-22 RX ORDER — SODIUM CHLORIDE 0.9 % (FLUSH) 0.9 %
20 SYRINGE (ML) INJECTION AS NEEDED
OUTPATIENT
Start: 2025-07-22

## 2025-07-22 RX ORDER — HEPARIN SODIUM (PORCINE) LOCK FLUSH IV SOLN 100 UNIT/ML 100 UNIT/ML
500 SOLUTION INTRAVENOUS AS NEEDED
Status: DISCONTINUED | OUTPATIENT
Start: 2025-07-22 | End: 2025-07-23 | Stop reason: HOSPADM

## 2025-07-22 RX ORDER — LEVOTHYROXINE SODIUM 112 UG/1
112 TABLET ORAL
Qty: 30 TABLET | Refills: 2 | Status: SHIPPED | OUTPATIENT
Start: 2025-07-22

## 2025-07-22 RX ORDER — DOXORUBICIN HYDROCHLORIDE 2 MG/ML
60 INJECTION, SOLUTION INTRAVENOUS ONCE
Status: COMPLETED | OUTPATIENT
Start: 2025-07-22 | End: 2025-07-22

## 2025-07-22 RX ORDER — SODIUM CHLORIDE 0.9 % (FLUSH) 0.9 %
10 SYRINGE (ML) INJECTION AS NEEDED
OUTPATIENT
Start: 2025-07-22

## 2025-07-22 RX ORDER — HEPARIN SODIUM (PORCINE) LOCK FLUSH IV SOLN 100 UNIT/ML 100 UNIT/ML
300 SOLUTION INTRAVENOUS ONCE
OUTPATIENT
Start: 2025-07-22

## 2025-07-22 RX ORDER — SODIUM CHLORIDE 9 MG/ML
20 INJECTION, SOLUTION INTRAVENOUS ONCE
Status: COMPLETED | OUTPATIENT
Start: 2025-07-22 | End: 2025-07-22

## 2025-07-22 RX ORDER — SODIUM CHLORIDE 9 MG/ML
20 INJECTION, SOLUTION INTRAVENOUS ONCE
Status: CANCELLED | OUTPATIENT
Start: 2025-07-22

## 2025-07-22 RX ORDER — PALONOSETRON 0.05 MG/ML
0.25 INJECTION, SOLUTION INTRAVENOUS ONCE
Status: CANCELLED | OUTPATIENT
Start: 2025-07-22

## 2025-07-22 RX ORDER — PALONOSETRON 0.05 MG/ML
0.25 INJECTION, SOLUTION INTRAVENOUS ONCE
Status: COMPLETED | OUTPATIENT
Start: 2025-07-22 | End: 2025-07-22

## 2025-07-22 RX ADMIN — SODIUM CHLORIDE 150 MG: 9 INJECTION, SOLUTION INTRAVENOUS at 11:37

## 2025-07-22 RX ADMIN — HEPARIN 500 UNITS: 100 SYRINGE at 13:28

## 2025-07-22 RX ADMIN — DEXAMETHASONE SODIUM PHOSPHATE 12 MG: 10 INJECTION, SOLUTION INTRAMUSCULAR; INTRAVENOUS at 11:37

## 2025-07-22 RX ADMIN — PEGFILGRASTIM 6 MG: KIT SUBCUTANEOUS at 13:29

## 2025-07-22 RX ADMIN — DOXORUBICIN HYDROCHLORIDE 61 MG: 2 INJECTION, SOLUTION INTRAVENOUS at 12:43

## 2025-07-22 RX ADMIN — SODIUM CHLORIDE 20 ML/HR: 9 INJECTION, SOLUTION INTRAVENOUS at 11:36

## 2025-07-22 RX ADMIN — SODIUM CHLORIDE 1220 MG: 9 INJECTION, SOLUTION INTRAVENOUS at 12:51

## 2025-07-22 RX ADMIN — PALONOSETRON HYDROCHLORIDE 0.25 MG: 0.25 INJECTION, SOLUTION INTRAVENOUS at 11:34
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
## 2025-07-23 ENCOUNTER — TELEPHONE (OUTPATIENT)
Dept: ONCOLOGY | Facility: CLINIC | Age: 40
End: 2025-07-23
Payer: COMMERCIAL

## 2025-07-23 RX ORDER — PREDNISONE 20 MG/1
20 TABLET ORAL DAILY
Qty: 3 TABLET | Refills: 0 | Status: SHIPPED | OUTPATIENT
Start: 2025-07-23 | End: 2025-07-26

## 2025-07-23 NOTE — TELEPHONE ENCOUNTER
"Spoke with patient at 15:44.  She stated symptoms began at about 15:30 today.  She denied having any SOA, chest pain/tightness/pressure. She stated her throat feels normal.  She denied angioedema or rash/pruritic areas.  Patient stated there is \"heat coming off of the area.\"  My chart and picture sent in by patient noted.  Patient stated she doesn't think she has any prednisone at home.  Patient requested refill of Valtrex.  Dr. Baker notified and per MD, patient contacted back and advised to take prescribed prednisone starting today and if no improvement in the next couple of days, she needs to notify this office/be seen by a provider.  Patient verbalized understanding and agreed with plan.  "

## 2025-07-24 LAB — BACTERIA SPEC AEROBE CULT: NORMAL

## 2025-07-24 RX ORDER — VALACYCLOVIR HYDROCHLORIDE 500 MG/1
500 TABLET, FILM COATED ORAL DAILY
Qty: 30 TABLET | Refills: 0 | Status: SHIPPED | OUTPATIENT
Start: 2025-07-24 | End: 2025-07-28 | Stop reason: SDUPTHER

## 2025-07-28 DIAGNOSIS — Z17.1 MALIGNANT NEOPLASM OF LEFT BREAST IN FEMALE, ESTROGEN RECEPTOR NEGATIVE, UNSPECIFIED SITE OF BREAST: ICD-10-CM

## 2025-07-28 DIAGNOSIS — C50.912 MALIGNANT NEOPLASM OF LEFT BREAST IN FEMALE, ESTROGEN RECEPTOR NEGATIVE, UNSPECIFIED SITE OF BREAST: ICD-10-CM

## 2025-07-28 DIAGNOSIS — G89.3 CANCER RELATED PAIN: ICD-10-CM

## 2025-07-28 RX ORDER — VALACYCLOVIR HYDROCHLORIDE 500 MG/1
500 TABLET, FILM COATED ORAL DAILY
Qty: 30 TABLET | Refills: 0 | Status: SHIPPED | OUTPATIENT
Start: 2025-07-28

## 2025-07-28 RX ORDER — OXYCODONE HYDROCHLORIDE 5 MG/1
5 TABLET ORAL EVERY 6 HOURS PRN
Qty: 60 TABLET | Refills: 0 | Status: SHIPPED | OUTPATIENT
Start: 2025-07-28

## 2025-07-28 NOTE — TELEPHONE ENCOUNTER
Last Office Visit - This Dept  7/22/2025 Fanny Baker MD  Next appt 8/12/25  oxycodone  Last refill 7/15/25 30 tablets 0 refills  Valtrex  Last refill 7/24/25 30 tablets 0 refills

## 2025-07-29 ENCOUNTER — APPOINTMENT (OUTPATIENT)
Dept: CT IMAGING | Facility: HOSPITAL | Age: 40
End: 2025-07-29
Payer: COMMERCIAL

## 2025-07-29 ENCOUNTER — HOSPITAL ENCOUNTER (EMERGENCY)
Facility: HOSPITAL | Age: 40
Discharge: HOME | End: 2025-07-29
Attending: EMERGENCY MEDICINE | Admitting: EMERGENCY MEDICINE
Payer: COMMERCIAL

## 2025-07-29 ENCOUNTER — TELEPHONE (OUTPATIENT)
Dept: ONCOLOGY | Facility: CLINIC | Age: 40
End: 2025-07-29
Payer: COMMERCIAL

## 2025-07-29 VITALS
OXYGEN SATURATION: 95 % | RESPIRATION RATE: 18 BRPM | HEART RATE: 100 BPM | SYSTOLIC BLOOD PRESSURE: 127 MMHG | HEIGHT: 69 IN | BODY MASS INDEX: 34.07 KG/M2 | DIASTOLIC BLOOD PRESSURE: 81 MMHG | TEMPERATURE: 98.1 F | WEIGHT: 230 LBS

## 2025-07-29 DIAGNOSIS — R00.0 SINUS TACHYCARDIA: ICD-10-CM

## 2025-07-29 DIAGNOSIS — C50.912 MALIGNANT NEOPLASM OF LEFT FEMALE BREAST, UNSPECIFIED ESTROGEN RECEPTOR STATUS, UNSPECIFIED SITE OF BREAST: ICD-10-CM

## 2025-07-29 DIAGNOSIS — T45.1X5A CHEMOTHERAPY-INDUCED NEUTROPENIA: ICD-10-CM

## 2025-07-29 DIAGNOSIS — R07.9 CHEST PAIN, UNSPECIFIED TYPE: Primary | ICD-10-CM

## 2025-07-29 DIAGNOSIS — D70.1 CHEMOTHERAPY-INDUCED NEUTROPENIA: ICD-10-CM

## 2025-07-29 LAB
ALBUMIN SERPL-MCNC: 4.1 G/DL (ref 3.5–5.2)
ALBUMIN/GLOB SERPL: 1.5 G/DL
ALP SERPL-CCNC: 91 U/L (ref 39–117)
ALT SERPL W P-5'-P-CCNC: 35 U/L (ref 1–33)
ANION GAP SERPL CALCULATED.3IONS-SCNC: 13 MMOL/L (ref 5–15)
APTT PPP: 21.4 SECONDS (ref 60–90)
AST SERPL-CCNC: 25 U/L (ref 1–32)
B PARAPERT DNA SPEC QL NAA+PROBE: NOT DETECTED
B PERT DNA SPEC QL NAA+PROBE: NOT DETECTED
BASOPHILS # BLD MANUAL: 0.03 10*3/MM3 (ref 0–0.2)
BASOPHILS NFR BLD MANUAL: 3 % (ref 0–1.5)
BILIRUB SERPL-MCNC: 0.5 MG/DL (ref 0–1.2)
BUN SERPL-MCNC: 12.7 MG/DL (ref 6–20)
BUN/CREAT SERPL: 15.5 (ref 7–25)
C PNEUM DNA NPH QL NAA+NON-PROBE: NOT DETECTED
CALCIUM SPEC-SCNC: 9.3 MG/DL (ref 8.6–10.5)
CHLORIDE SERPL-SCNC: 97 MMOL/L (ref 98–107)
CO2 SERPL-SCNC: 25 MMOL/L (ref 22–29)
CREAT SERPL-MCNC: 0.82 MG/DL (ref 0.57–1)
D-LACTATE SERPL-SCNC: 2.2 MMOL/L (ref 0.5–2)
DACRYOCYTES BLD QL SMEAR: ABNORMAL
DEPRECATED RDW RBC AUTO: 58.2 FL (ref 37–54)
EGFRCR SERPLBLD CKD-EPI 2021: 92.3 ML/MIN/1.73
ELLIPTOCYTES BLD QL SMEAR: ABNORMAL
EOSINOPHIL # BLD MANUAL: 0 10*3/MM3 (ref 0–0.4)
EOSINOPHIL NFR BLD MANUAL: 0 % (ref 0.3–6.2)
ERYTHROCYTE [DISTWIDTH] IN BLOOD BY AUTOMATED COUNT: 14.6 % (ref 12.3–15.4)
FLUAV SUBTYP SPEC NAA+PROBE: NOT DETECTED
FLUBV RNA NPH QL NAA+NON-PROBE: NOT DETECTED
GEN 5 1HR TROPONIN T REFLEX: 7 NG/L
GLOBULIN UR ELPH-MCNC: 2.8 GM/DL
GLUCOSE SERPL-MCNC: 99 MG/DL (ref 65–99)
HADV DNA SPEC NAA+PROBE: NOT DETECTED
HCOV 229E RNA SPEC QL NAA+PROBE: NOT DETECTED
HCOV HKU1 RNA SPEC QL NAA+PROBE: NOT DETECTED
HCOV NL63 RNA SPEC QL NAA+PROBE: NOT DETECTED
HCOV OC43 RNA SPEC QL NAA+PROBE: NOT DETECTED
HCT VFR BLD AUTO: 29.1 % (ref 34–46.6)
HGB BLD-MCNC: 9.7 G/DL (ref 12–15.9)
HMPV RNA NPH QL NAA+NON-PROBE: NOT DETECTED
HOLD SPECIMEN: NORMAL
HPIV1 RNA ISLT QL NAA+PROBE: NOT DETECTED
HPIV2 RNA SPEC QL NAA+PROBE: NOT DETECTED
HPIV3 RNA NPH QL NAA+PROBE: NOT DETECTED
HPIV4 P GENE NPH QL NAA+PROBE: NOT DETECTED
INR PPP: 1.02 (ref 0.89–1.12)
LIPASE SERPL-CCNC: 14 U/L (ref 13–60)
LYMPHOCYTES # BLD MANUAL: 0.75 10*3/MM3 (ref 0.7–3.1)
LYMPHOCYTES NFR BLD MANUAL: 7 % (ref 5–12)
M PNEUMO IGG SER IA-ACNC: NOT DETECTED
MCH RBC QN AUTO: 36.1 PG (ref 26.6–33)
MCHC RBC AUTO-ENTMCNC: 33.3 G/DL (ref 31.5–35.7)
MCV RBC AUTO: 108.2 FL (ref 79–97)
MONOCYTES # BLD: 0.06 10*3/MM3 (ref 0.1–0.9)
NEUTROPHILS # BLD AUTO: 0.07 10*3/MM3 (ref 1.7–7)
NEUTROPHILS NFR BLD MANUAL: 5 % (ref 42.7–76)
NEUTS BAND NFR BLD MANUAL: 3 % (ref 0–5)
NT-PROBNP SERPL-MCNC: <36 PG/ML (ref 0–450)
PLAT MORPH BLD: NORMAL
PLATELET # BLD AUTO: 157 10*3/MM3 (ref 140–450)
PMV BLD AUTO: 11.1 FL (ref 6–12)
POTASSIUM SERPL-SCNC: 4 MMOL/L (ref 3.5–5.2)
PROCALCITONIN SERPL-MCNC: 0.19 NG/ML (ref 0–0.25)
PROT SERPL-MCNC: 6.9 G/DL (ref 6–8.5)
PROTHROMBIN TIME: 14 SECONDS (ref 12.2–15.3)
RBC # BLD AUTO: 2.69 10*6/MM3 (ref 3.77–5.28)
RHINOVIRUS RNA SPEC NAA+PROBE: NOT DETECTED
RSV RNA NPH QL NAA+NON-PROBE: NOT DETECTED
SARS-COV-2 RNA RESP QL NAA+PROBE: NOT DETECTED
SODIUM SERPL-SCNC: 135 MMOL/L (ref 136–145)
STOMATOCYTES BLD QL SMEAR: ABNORMAL
TROPONIN T NUMERIC DELTA: 0 NG/L
TROPONIN T SERPL HS-MCNC: 7 NG/L
UFH PPP CHRO-ACNC: >1.1 IU/ML (ref 0.3–0.7)
VARIANT LYMPHS NFR BLD MANUAL: 2 % (ref 0–5)
VARIANT LYMPHS NFR BLD MANUAL: 80 % (ref 19.6–45.3)
WBC MORPH BLD: NORMAL
WBC NRBC COR # BLD AUTO: 0.92 10*3/MM3 (ref 3.4–10.8)
WHOLE BLOOD HOLD SPECIMEN: NORMAL

## 2025-07-29 PROCEDURE — 99285 EMERGENCY DEPT VISIT HI MDM: CPT

## 2025-07-29 PROCEDURE — 83880 ASSAY OF NATRIURETIC PEPTIDE: CPT | Performed by: EMERGENCY MEDICINE

## 2025-07-29 PROCEDURE — 85520 HEPARIN ASSAY: CPT | Performed by: EMERGENCY MEDICINE

## 2025-07-29 PROCEDURE — 87040 BLOOD CULTURE FOR BACTERIA: CPT | Performed by: EMERGENCY MEDICINE

## 2025-07-29 PROCEDURE — 80053 COMPREHEN METABOLIC PANEL: CPT | Performed by: EMERGENCY MEDICINE

## 2025-07-29 PROCEDURE — 85610 PROTHROMBIN TIME: CPT | Performed by: EMERGENCY MEDICINE

## 2025-07-29 PROCEDURE — 84484 ASSAY OF TROPONIN QUANT: CPT | Performed by: EMERGENCY MEDICINE

## 2025-07-29 PROCEDURE — 85025 COMPLETE CBC W/AUTO DIFF WBC: CPT | Performed by: EMERGENCY MEDICINE

## 2025-07-29 PROCEDURE — 83605 ASSAY OF LACTIC ACID: CPT | Performed by: EMERGENCY MEDICINE

## 2025-07-29 PROCEDURE — 83690 ASSAY OF LIPASE: CPT | Performed by: EMERGENCY MEDICINE

## 2025-07-29 PROCEDURE — 85007 BL SMEAR W/DIFF WBC COUNT: CPT | Performed by: EMERGENCY MEDICINE

## 2025-07-29 PROCEDURE — 87154 CUL TYP ID BLD PTHGN 6+ TRGT: CPT | Performed by: EMERGENCY MEDICINE

## 2025-07-29 PROCEDURE — 84145 PROCALCITONIN (PCT): CPT | Performed by: EMERGENCY MEDICINE

## 2025-07-29 PROCEDURE — 71275 CT ANGIOGRAPHY CHEST: CPT

## 2025-07-29 PROCEDURE — 0202U NFCT DS 22 TRGT SARS-COV-2: CPT | Performed by: EMERGENCY MEDICINE

## 2025-07-29 PROCEDURE — 36415 COLL VENOUS BLD VENIPUNCTURE: CPT

## 2025-07-29 PROCEDURE — 25510000001 IOPAMIDOL PER 1 ML: Performed by: EMERGENCY MEDICINE

## 2025-07-29 PROCEDURE — 85730 THROMBOPLASTIN TIME PARTIAL: CPT | Performed by: EMERGENCY MEDICINE

## 2025-07-29 PROCEDURE — 93005 ELECTROCARDIOGRAM TRACING: CPT | Performed by: EMERGENCY MEDICINE

## 2025-07-29 RX ORDER — IOPAMIDOL 755 MG/ML
75 INJECTION, SOLUTION INTRAVASCULAR
Status: COMPLETED | OUTPATIENT
Start: 2025-07-29 | End: 2025-07-29

## 2025-07-29 RX ORDER — ASPIRIN 81 MG/1
324 TABLET, CHEWABLE ORAL ONCE
Status: COMPLETED | OUTPATIENT
Start: 2025-07-29 | End: 2025-07-29

## 2025-07-29 RX ORDER — SODIUM CHLORIDE 0.9 % (FLUSH) 0.9 %
10 SYRINGE (ML) INJECTION AS NEEDED
Status: DISCONTINUED | OUTPATIENT
Start: 2025-07-29 | End: 2025-07-29 | Stop reason: HOSPADM

## 2025-07-29 RX ADMIN — ASPIRIN 324 MG: 81 TABLET, CHEWABLE ORAL at 18:00

## 2025-07-29 RX ADMIN — IOPAMIDOL 75 ML: 755 INJECTION, SOLUTION INTRAVENOUS at 18:51

## 2025-07-29 NOTE — TELEPHONE ENCOUNTER
"Patient stated she began having generalized edema (worse in bilateral ankle) since starting steroid last week.  She stated edema has slightly improved.  Patient stated her SOA is worse and that \"ambulating is hard\"  due to SOA.  She stated it has been worse over the last week but today is slightly better but still there.  Patient stated her resting heart rate began being elevated last night at 106 bpm but today has been 120-122 bpm.  Dr. Baker notified and per MD, patient advised that she should go to the ER with the new/acute symptoms for evaluation and that she should follow up in office at a later date to ensure symptoms are improving.  Appointment with KELLY Tracy made for this week.  Patient notified.  Patient verbalized understanding of advise given.  "

## 2025-07-29 NOTE — TELEPHONE ENCOUNTER
Patient left a voicemail her resting heart rate is 117,118, still having shortness of breath and swelling. Please call.

## 2025-07-29 NOTE — ED PROVIDER NOTES
Subjective   History of Present Illness    Pt presents for elevated heart rate.  She was directed to the ED by her oncologist.      She says about two days ago she started having pain and swelling in her legs.  That has actually improved.  Then she noticed elevated heart rate as well, but she says it was also high at a recent office visit.  She has had dyspnea for the last few days, worse with exertion.  She has had chest pain since yesterday.  It is right sided pain that is aching and intermittent.  It is not worse with inspiration.    History provided by:  Patient      Review of Systems    Past Medical History:   Diagnosis Date    Breast cancer     Disease of thyroid gland     Hypertension        Allergies   Allergen Reactions    Erythromycin Other (See Comments)    Pholcodine Other (See Comments)    Penicillins Rash     Childhood        Past Surgical History:   Procedure Laterality Date     SECTION      TONSILLECTOMY         Family History   Problem Relation Age of Onset    Hypertension Mother     Diabetes Mother     Heart disease Father        Social History     Socioeconomic History    Marital status: Single   Tobacco Use    Smoking status: Former     Types: Cigarettes    Smokeless tobacco: Never   Vaping Use    Vaping status: Never Used   Substance and Sexual Activity    Alcohol use: Not Currently    Drug use: Defer    Sexual activity: Defer           Objective   Physical Exam  Vitals and nursing note reviewed.   Constitutional:       General: She is not in acute distress.     Appearance: Normal appearance. She is not ill-appearing.   HENT:      Head: Normocephalic and atraumatic.      Mouth/Throat:      Mouth: Mucous membranes are moist.   Eyes:      General: No scleral icterus.        Right eye: No discharge.         Left eye: No discharge.      Conjunctiva/sclera: Conjunctivae normal.   Cardiovascular:      Rate and Rhythm: Regular rhythm. Tachycardia present.      Heart sounds: No murmur  heard.  Pulmonary:      Effort: Pulmonary effort is normal. No respiratory distress.      Breath sounds: Normal breath sounds. No wheezing.   Abdominal:      General: Bowel sounds are normal. There is no distension.      Palpations: Abdomen is soft.      Tenderness: There is no abdominal tenderness. There is no guarding or rebound.   Musculoskeletal:         General: No swelling. Normal range of motion.      Cervical back: Normal range of motion and neck supple.      Right lower leg: Edema present.      Left lower leg: Edema present.      Comments: Mild BLE edema. No calf tenderness.   Skin:     General: Skin is warm and dry.      Findings: No rash.   Neurological:      General: No focal deficit present.      Mental Status: She is alert and oriented to person, place, and time. Mental status is at baseline.   Psychiatric:         Mood and Affect: Mood normal.         Behavior: Behavior normal.         Thought Content: Thought content normal.         Procedures           ED Course    Due to ED saturation patient was seen initially in hospitals area and there may be delays in test performance or medication administration related to resource availability.    I reviewed outside records.  Oncology note 7/22/25 for stage IIIc left breast cancer, notes she had R arm pain and she was sent for a duplex that showed a basilic vein thrombus and she was started on Eliquis.    EKG read by me sinus tachycardia 104 bpm normal axis.      CTA chest read by me no PE or acute process.    Labs are notable for neutropenia.  Cultures obtained and sepsis markers ordered and they are all normal.  She is afebrile.    HR has normalized during ED stay.    Talked with patient regarding her test results.  No clear cause of symptoms found on workup, no sign of life threatening process.  We discussed outpatient workup.  Discussed neutropenia, recommend masking and isolation.  She did get Neulasta last week with chemo.  Recommend following up with her  oncology office, she has appt in 2 days.  I have sent an Epic message to her oncologist as well.                  HEART Score: 1                                      Medical Decision Making  Problems Addressed:  Chemotherapy-induced neutropenia: complicated acute illness or injury  Chest pain, unspecified type: complicated acute illness or injury  Malignant neoplasm of left female breast, unspecified estrogen receptor status, unspecified site of breast: chronic illness or injury with exacerbation, progression, or side effects of treatment that poses a threat to life or bodily functions  Sinus tachycardia: complicated acute illness or injury with systemic symptoms    Amount and/or Complexity of Data Reviewed  Labs: ordered. Decision-making details documented in ED Course.  Radiology: ordered and independent interpretation performed. Decision-making details documented in ED Course.  ECG/medicine tests: ordered and independent interpretation performed. Decision-making details documented in ED Course.    Risk  OTC drugs.  Prescription drug management.  Decision regarding hospitalization.        Final diagnoses:   Chest pain, unspecified type   Sinus tachycardia   Chemotherapy-induced neutropenia   Malignant neoplasm of left female breast, unspecified estrogen receptor status, unspecified site of breast       ED Disposition  ED Disposition       ED Disposition   Discharge    Condition   Stable    Comment   --               Sheldon Starkey, DO  [address] [facility] [address]  [phone]          Elsy Rizzo, APRN  [address]  [facility] [address]  [phone]    In 2 days  As scheduled         Medication List      No changes were made to your prescriptions during this visit.            Jefferson Stewart MD  07/29/25 3033

## 2025-07-30 ENCOUNTER — APPOINTMENT (OUTPATIENT)
Dept: GENERAL RADIOLOGY | Facility: HOSPITAL | Age: 40
End: 2025-07-30
Payer: COMMERCIAL

## 2025-07-30 ENCOUNTER — HOSPITAL ENCOUNTER (INPATIENT)
Facility: HOSPITAL | Age: 40
LOS: 3 days | Discharge: HOME | End: 2025-08-02
Attending: EMERGENCY MEDICINE | Admitting: INTERNAL MEDICINE
Payer: COMMERCIAL

## 2025-07-30 ENCOUNTER — TELEPHONE (OUTPATIENT)
Dept: ONCOLOGY | Facility: CLINIC | Age: 40
End: 2025-07-30
Payer: COMMERCIAL

## 2025-07-30 DIAGNOSIS — R00.0 TACHYCARDIA: Primary | ICD-10-CM

## 2025-07-30 DIAGNOSIS — D72.819 LEUKOPENIA, UNSPECIFIED TYPE: ICD-10-CM

## 2025-07-30 DIAGNOSIS — E87.70 HYPERVOLEMIA, UNSPECIFIED HYPERVOLEMIA TYPE: ICD-10-CM

## 2025-07-30 DIAGNOSIS — R78.81 BACTEREMIA: ICD-10-CM

## 2025-07-30 DIAGNOSIS — E87.20 LACTIC ACIDEMIA: ICD-10-CM

## 2025-07-30 PROBLEM — A41.9 SEPSIS: Status: ACTIVE | Noted: 2025-07-30

## 2025-07-30 LAB
ALBUMIN SERPL-MCNC: 4.3 G/DL (ref 3.5–5.2)
ALBUMIN/GLOB SERPL: 1.4 G/DL
ALP SERPL-CCNC: 90 U/L (ref 39–117)
ALT SERPL W P-5'-P-CCNC: 38 U/L (ref 1–33)
ANION GAP SERPL CALCULATED.3IONS-SCNC: 15.1 MMOL/L (ref 5–15)
AST SERPL-CCNC: 25 U/L (ref 1–32)
BACTERIA BLD CULT: NORMAL
BASOPHILS # BLD MANUAL: 0 10*3/MM3 (ref 0–0.2)
BASOPHILS NFR BLD MANUAL: 0 % (ref 0–1.5)
BILIRUB SERPL-MCNC: 0.4 MG/DL (ref 0–1.2)
BOTTLE TYPE: NORMAL
BUN SERPL-MCNC: 12.4 MG/DL (ref 6–20)
BUN/CREAT SERPL: 12.5 (ref 7–25)
CALCIUM SPEC-SCNC: 9.5 MG/DL (ref 8.6–10.5)
CHLORIDE SERPL-SCNC: 99 MMOL/L (ref 98–107)
CO2 SERPL-SCNC: 23.9 MMOL/L (ref 22–29)
CREAT SERPL-MCNC: 0.99 MG/DL (ref 0.57–1)
D-LACTATE SERPL-SCNC: 1.6 MMOL/L (ref 0.5–2)
D-LACTATE SERPL-SCNC: 3.9 MMOL/L (ref 0.5–2)
DEPRECATED RDW RBC AUTO: 57.2 FL (ref 37–54)
EGFRCR SERPLBLD CKD-EPI 2021: 73.6 ML/MIN/1.73
EOSINOPHIL # BLD MANUAL: 0 10*3/MM3 (ref 0–0.4)
EOSINOPHIL NFR BLD MANUAL: 0 % (ref 0.3–6.2)
ERYTHROCYTE [DISTWIDTH] IN BLOOD BY AUTOMATED COUNT: 14.7 % (ref 12.3–15.4)
GLOBULIN UR ELPH-MCNC: 3 GM/DL
GLUCOSE SERPL-MCNC: 124 MG/DL (ref 65–99)
HCT VFR BLD AUTO: 29 % (ref 34–46.6)
HGB BLD-MCNC: 9.8 G/DL (ref 12–15.9)
LARGE PLATELETS: ABNORMAL
LYMPHOCYTES # BLD MANUAL: 0.63 10*3/MM3 (ref 0.7–3.1)
LYMPHOCYTES NFR BLD MANUAL: 1 % (ref 5–12)
MCH RBC QN AUTO: 35.9 PG (ref 26.6–33)
MCHC RBC AUTO-ENTMCNC: 33.8 G/DL (ref 31.5–35.7)
MCV RBC AUTO: 106.2 FL (ref 79–97)
METAMYELOCYTES NFR BLD MANUAL: 1 % (ref 0–0)
MONOCYTES # BLD: 0.02 10*3/MM3 (ref 0.1–0.9)
MRSA DNA SPEC QL NAA+PROBE: NEGATIVE
MYELOCYTES NFR BLD MANUAL: 3 % (ref 0–0)
NEUTROPHILS # BLD AUTO: 1.58 10*3/MM3 (ref 1.7–7)
NEUTROPHILS NFR BLD MANUAL: 57 % (ref 42.7–76)
NEUTS BAND NFR BLD MANUAL: 11 % (ref 0–5)
NRBC SPEC MANUAL: 1 /100 WBC (ref 0–0.2)
PLATELET # BLD AUTO: 127 10*3/MM3 (ref 140–450)
PMV BLD AUTO: 11.4 FL (ref 6–12)
POTASSIUM SERPL-SCNC: 3.6 MMOL/L (ref 3.5–5.2)
PROCALCITONIN SERPL-MCNC: 0.19 NG/ML (ref 0–0.25)
PROT SERPL-MCNC: 7.3 G/DL (ref 6–8.5)
RBC # BLD AUTO: 2.73 10*6/MM3 (ref 3.77–5.28)
RBC MORPH BLD: NORMAL
SMALL PLATELETS BLD QL SMEAR: ABNORMAL
SODIUM SERPL-SCNC: 138 MMOL/L (ref 136–145)
VARIANT LYMPHS NFR BLD MANUAL: 2 % (ref 0–5)
VARIANT LYMPHS NFR BLD MANUAL: 25 % (ref 19.6–45.3)
WBC MORPH BLD: NORMAL
WBC NRBC COR # BLD AUTO: 2.32 10*3/MM3 (ref 3.4–10.8)

## 2025-07-30 PROCEDURE — 84145 PROCALCITONIN (PCT): CPT | Performed by: EMERGENCY MEDICINE

## 2025-07-30 PROCEDURE — 85025 COMPLETE CBC W/AUTO DIFF WBC: CPT | Performed by: EMERGENCY MEDICINE

## 2025-07-30 PROCEDURE — 99222 1ST HOSP IP/OBS MODERATE 55: CPT | Performed by: INTERNAL MEDICINE

## 2025-07-30 PROCEDURE — 25810000003 LACTATED RINGERS PER 1000 ML: Performed by: INTERNAL MEDICINE

## 2025-07-30 PROCEDURE — 25010000002 CEFEPIME PER 500 MG: Performed by: EMERGENCY MEDICINE

## 2025-07-30 PROCEDURE — G0378 HOSPITAL OBSERVATION PER HR: HCPCS

## 2025-07-30 PROCEDURE — 99285 EMERGENCY DEPT VISIT HI MDM: CPT

## 2025-07-30 PROCEDURE — 83605 ASSAY OF LACTIC ACID: CPT | Performed by: EMERGENCY MEDICINE

## 2025-07-30 PROCEDURE — 36415 COLL VENOUS BLD VENIPUNCTURE: CPT

## 2025-07-30 PROCEDURE — 87641 MR-STAPH DNA AMP PROBE: CPT

## 2025-07-30 PROCEDURE — 25810000003 SEPSIS FLUID NS 0.9 % SOLUTION: Performed by: EMERGENCY MEDICINE

## 2025-07-30 PROCEDURE — 71045 X-RAY EXAM CHEST 1 VIEW: CPT

## 2025-07-30 PROCEDURE — 87040 BLOOD CULTURE FOR BACTERIA: CPT | Performed by: EMERGENCY MEDICINE

## 2025-07-30 PROCEDURE — 80053 COMPREHEN METABOLIC PANEL: CPT | Performed by: EMERGENCY MEDICINE

## 2025-07-30 PROCEDURE — 85007 BL SMEAR W/DIFF WBC COUNT: CPT | Performed by: EMERGENCY MEDICINE

## 2025-07-30 RX ORDER — SODIUM CHLORIDE, SODIUM LACTATE, POTASSIUM CHLORIDE, CALCIUM CHLORIDE 600; 310; 30; 20 MG/100ML; MG/100ML; MG/100ML; MG/100ML
100 INJECTION, SOLUTION INTRAVENOUS CONTINUOUS
Status: ACTIVE | OUTPATIENT
Start: 2025-07-30 | End: 2025-07-31

## 2025-07-30 RX ORDER — ACETAMINOPHEN 160 MG/5ML
650 SOLUTION ORAL EVERY 4 HOURS PRN
Status: DISCONTINUED | OUTPATIENT
Start: 2025-07-30 | End: 2025-08-02 | Stop reason: HOSPADM

## 2025-07-30 RX ORDER — ONDANSETRON 4 MG/1
4 TABLET, ORALLY DISINTEGRATING ORAL EVERY 6 HOURS PRN
Status: DISCONTINUED | OUTPATIENT
Start: 2025-07-30 | End: 2025-08-02 | Stop reason: HOSPADM

## 2025-07-30 RX ORDER — LAMOTRIGINE 100 MG/1
200 TABLET ORAL DAILY
Status: DISCONTINUED | OUTPATIENT
Start: 2025-07-31 | End: 2025-08-02 | Stop reason: HOSPADM

## 2025-07-30 RX ORDER — METOPROLOL SUCCINATE 50 MG/1
50 TABLET, EXTENDED RELEASE ORAL DAILY
Status: DISCONTINUED | OUTPATIENT
Start: 2025-07-31 | End: 2025-08-02 | Stop reason: HOSPADM

## 2025-07-30 RX ORDER — BISACODYL 5 MG/1
5 TABLET, DELAYED RELEASE ORAL DAILY PRN
Status: DISCONTINUED | OUTPATIENT
Start: 2025-07-30 | End: 2025-08-02 | Stop reason: HOSPADM

## 2025-07-30 RX ORDER — AMOXICILLIN 250 MG
2 CAPSULE ORAL 2 TIMES DAILY PRN
Status: DISCONTINUED | OUTPATIENT
Start: 2025-07-30 | End: 2025-08-02 | Stop reason: HOSPADM

## 2025-07-30 RX ORDER — VANCOMYCIN 2 GRAM/500 ML IN 0.9 % SODIUM CHLORIDE INTRAVENOUS
20 ONCE
Status: COMPLETED | OUTPATIENT
Start: 2025-07-30 | End: 2025-07-30

## 2025-07-30 RX ORDER — GABAPENTIN 400 MG/1
800 CAPSULE ORAL EVERY 8 HOURS SCHEDULED
Status: DISCONTINUED | OUTPATIENT
Start: 2025-07-30 | End: 2025-08-02 | Stop reason: HOSPADM

## 2025-07-30 RX ORDER — ONDANSETRON 2 MG/ML
4 INJECTION INTRAMUSCULAR; INTRAVENOUS EVERY 6 HOURS PRN
Status: DISCONTINUED | OUTPATIENT
Start: 2025-07-30 | End: 2025-08-02 | Stop reason: HOSPADM

## 2025-07-30 RX ORDER — CYCLOBENZAPRINE HCL 10 MG
10 TABLET ORAL 3 TIMES DAILY PRN
Status: DISCONTINUED | OUTPATIENT
Start: 2025-07-30 | End: 2025-08-02 | Stop reason: HOSPADM

## 2025-07-30 RX ORDER — POTASSIUM CHLORIDE 1500 MG/1
40 TABLET, EXTENDED RELEASE ORAL EVERY 4 HOURS
Status: COMPLETED | OUTPATIENT
Start: 2025-07-30 | End: 2025-07-31

## 2025-07-30 RX ORDER — SODIUM CHLORIDE 9 MG/ML
40 INJECTION, SOLUTION INTRAVENOUS AS NEEDED
Status: DISCONTINUED | OUTPATIENT
Start: 2025-07-30 | End: 2025-08-02 | Stop reason: HOSPADM

## 2025-07-30 RX ORDER — OXYCODONE HYDROCHLORIDE 5 MG/1
5 TABLET ORAL EVERY 6 HOURS PRN
Status: DISCONTINUED | OUTPATIENT
Start: 2025-07-30 | End: 2025-08-02 | Stop reason: HOSPADM

## 2025-07-30 RX ORDER — ZOLPIDEM TARTRATE 5 MG/1
10 TABLET ORAL NIGHTLY PRN
Status: DISCONTINUED | OUTPATIENT
Start: 2025-07-30 | End: 2025-08-02 | Stop reason: HOSPADM

## 2025-07-30 RX ORDER — ACETAMINOPHEN 650 MG/1
650 SUPPOSITORY RECTAL EVERY 4 HOURS PRN
Status: DISCONTINUED | OUTPATIENT
Start: 2025-07-30 | End: 2025-08-02 | Stop reason: HOSPADM

## 2025-07-30 RX ORDER — SODIUM CHLORIDE 0.9 % (FLUSH) 0.9 %
10 SYRINGE (ML) INJECTION AS NEEDED
Status: DISCONTINUED | OUTPATIENT
Start: 2025-07-30 | End: 2025-07-31

## 2025-07-30 RX ORDER — BISACODYL 10 MG
10 SUPPOSITORY, RECTAL RECTAL DAILY PRN
Status: DISCONTINUED | OUTPATIENT
Start: 2025-07-30 | End: 2025-08-02 | Stop reason: HOSPADM

## 2025-07-30 RX ORDER — POLYETHYLENE GLYCOL 3350 17 G/17G
17 POWDER, FOR SOLUTION ORAL DAILY PRN
Status: DISCONTINUED | OUTPATIENT
Start: 2025-07-30 | End: 2025-08-02 | Stop reason: HOSPADM

## 2025-07-30 RX ORDER — FUROSEMIDE 20 MG/1
20 TABLET ORAL DAILY
Status: DISCONTINUED | OUTPATIENT
Start: 2025-07-31 | End: 2025-08-01

## 2025-07-30 RX ORDER — PANTOPRAZOLE SODIUM 40 MG/1
40 TABLET, DELAYED RELEASE ORAL
Status: DISCONTINUED | OUTPATIENT
Start: 2025-07-30 | End: 2025-07-31

## 2025-07-30 RX ORDER — SODIUM CHLORIDE 0.9 % (FLUSH) 0.9 %
10 SYRINGE (ML) INJECTION AS NEEDED
Status: DISCONTINUED | OUTPATIENT
Start: 2025-07-30 | End: 2025-08-02 | Stop reason: HOSPADM

## 2025-07-30 RX ORDER — ACETAMINOPHEN 325 MG/1
650 TABLET ORAL EVERY 4 HOURS PRN
Status: DISCONTINUED | OUTPATIENT
Start: 2025-07-30 | End: 2025-08-02 | Stop reason: HOSPADM

## 2025-07-30 RX ORDER — SODIUM CHLORIDE 0.9 % (FLUSH) 0.9 %
10 SYRINGE (ML) INJECTION EVERY 12 HOURS SCHEDULED
Status: DISCONTINUED | OUTPATIENT
Start: 2025-07-30 | End: 2025-08-02 | Stop reason: HOSPADM

## 2025-07-30 RX ORDER — LEVOTHYROXINE SODIUM 112 UG/1
112 TABLET ORAL
Status: DISCONTINUED | OUTPATIENT
Start: 2025-07-31 | End: 2025-08-02 | Stop reason: HOSPADM

## 2025-07-30 RX ADMIN — SODIUM CHLORIDE 2000 ML: 9 INJECTION, SOLUTION INTRAVENOUS at 16:22

## 2025-07-30 RX ADMIN — PANTOPRAZOLE SODIUM 40 MG: 40 TABLET, DELAYED RELEASE ORAL at 21:41

## 2025-07-30 RX ADMIN — CEFEPIME 2000 MG: 2 INJECTION, POWDER, FOR SOLUTION INTRAVENOUS at 16:52

## 2025-07-30 RX ADMIN — APIXABAN 5 MG: 5 TABLET, FILM COATED ORAL at 21:40

## 2025-07-30 RX ADMIN — OXYCODONE 5 MG: 5 TABLET ORAL at 21:46

## 2025-07-30 RX ADMIN — POTASSIUM CHLORIDE 40 MEQ: 1500 TABLET, EXTENDED RELEASE ORAL at 21:41

## 2025-07-30 RX ADMIN — Medication 2000 MG: at 17:58

## 2025-07-30 RX ADMIN — Medication 10 ML: at 20:36

## 2025-07-30 RX ADMIN — SENNOSIDES, DOCUSATE SODIUM 2 TABLET: 50; 8.6 TABLET, FILM COATED ORAL at 21:41

## 2025-07-30 RX ADMIN — SODIUM CHLORIDE, POTASSIUM CHLORIDE, SODIUM LACTATE AND CALCIUM CHLORIDE 100 ML/HR: 600; 310; 30; 20 INJECTION, SOLUTION INTRAVENOUS at 20:35

## 2025-07-30 RX ADMIN — GABAPENTIN 800 MG: 400 CAPSULE ORAL at 21:40

## 2025-07-30 RX ADMIN — ZOLPIDEM TARTRATE 10 MG: 5 TABLET ORAL at 21:46

## 2025-07-30 NOTE — H&P
[facility]  HISTORY AND PHYSICAL    Patient Name: Brooke Mendez  : 1985  MRN: [id number]  Primary Care Physician: Sheldon Starkey DO  Date of admission: 2025      Subjective   Subjective     Chief Complaint: + blood cultures    HPI:  Brooke Mendez is a 40 y.o. female currently receiving chemotherapy w/ Dr. Jackson called to come back to hospital due to + blood cultures. She came to ED  feeling poorly with tachycardia. Rec'd IVF and improved and was d/c home. Called to come back today after blood culture returned +. She says she feels better today than she did yesterday and has no HA, fever, cough, SOA, abd pain, n/v/d, dysuria. Port without redness/drainage.      Personal History     Past Medical History:   Diagnosis Date    Breast cancer     Disease of thyroid gland     Hypertension          Oncology Problem List:  Malignant neoplasm of left breast in female, estrogen receptor   negative (2025; Status: Active)    Oncology/Hematology History   Malignant neoplasm of left breast in female, estrogen receptor negative   3/5/2025 Initial Diagnosis    Malignant neoplasm of left breast in female, estrogen receptor negative     3/13/2025 - 2025 Chemotherapy    OP BREAST Pembrolizumab 200 mg / PACLitaxel / CARBOplatin AUC=1.5 (Weekly)     3/13/2025 Cancer Staged    Staging form: Breast, AJCC 8th Edition  - Clinical: Stage IIIC (cT2, cN2, cM0, G3, ER-, AR-, HER2-) - Signed by Fanny Baker MD on 3/13/2025     3/13/2025 -  Chemotherapy    OP CENTRAL VENOUS ACCESS DEVICE Access, Care, and Maintenance (CVAD)     3/20/2025 -  Chemotherapy    OP SUPPORTIVE HYDRATION + ANTIEMETICS     2025 -  Chemotherapy    OP BREAST Pembrolizumab 200 mg / DOXOrubicin / Cyclophosphamide        Past Surgical History:   Procedure Laterality Date     SECTION      TONSILLECTOMY         Family History: family history includes Diabetes in her mother;  Heart disease in her father; Hypertension in her mother.     Social History:  reports that she has quit smoking. Her smoking use included cigarettes. She has never used smokeless tobacco. She reports that she does not currently use alcohol. Drug use questions deferred to the physician.  Social History     Social History Narrative    Not on file       Medications:  Available home medication information reviewed.  Diphenhydramine-Aluminum-Magnesium-Simethicone-Lidocaine-Nystatin, LORazepam, apixaban, cefpodoxime, clonazePAM, cyclobenzaprine, famotidine, furosemide, gabapentin, lamoTRIgine, levothyroxine, lidocaine-prilocaine, meloxicam, metoprolol succinate XL, montelukast, nystatin, nystatin susp + lidocaine viscous, omeprazole, ondansetron, ondansetron ODT, oxyCODONE, prochlorperazine, triamcinolone, valACYclovir, and zolpidem    Allergies   Allergen Reactions    Erythromycin Other (See Comments)    Pholcodine Other (See Comments)    Penicillins Rash     Childhood        Objective   Objective     Vital Signs:   Temp:  [98.4 °F (36.9 °C)] 98.4 °F (36.9 °C)  Heart Rate:  [] 96  Resp:  [16] 16  BP: (114-121)/(72-84) 121/72       Physical Exam   With patient's consent, physical exam was conducted via visual telemedicine encounter due to patient's current isolation requirements in the interest of PPE conservation.    Constitutional: No acute distress, awake, alert, nontoxic, normal body habitus  HENT: NCAT, MMM, no conjunctival injection  Respiratory: Good effort, nonlabored respirations   Cardiovascular:  tele with NSR  Musculoskeletal: No edema, normal muscle tone and mass for age  Psychiatric: Appropriate affect, good insight and judgement, cooperative  Neurologic: Oriented x 3, movements symmetric BUE and BLE, speech clear and fluent  Skin: No visible rashes, no jaundice seen on exposed skin through window     Result Review:  I have personally reviewed the results from the time of this admission to 7/30/2025  18:59 EDT and agree with these findings:  []  Laboratory list / accordion  []  Microbiology  []  Radiology  []  EKG/Telemetry   []  Cardiology/Vascular   []  Pathology  []  Old records  []  Other:  Most notable findings include:       LAB RESULTS:      Lab 07/30/25  1603 07/29/25  1758   WBC 2.32* 0.92*   HEMOGLOBIN 9.8* 9.7*   HEMATOCRIT 29.0* 29.1*   PLATELETS 127* 157   NEUTROS ABS 1.58* 0.07*   EOS ABS 0.00 0.00   .2* 108.2*   PROCALCITONIN 0.19 0.19   LACTATE 3.9* 2.2*   PROTIME  --  14.0   INR  --  1.02   APTT  --  21.4*         Lab 07/30/25  1603 07/29/25  1758   SODIUM 138 135*   POTASSIUM 3.6 4.0   CHLORIDE 99 97*   CO2 23.9 25.0   ANION GAP 15.1* 13.0   BUN 12.4 12.7   CREATININE 0.99 0.82   EGFR 73.6 92.3   GLUCOSE 124* 99   CALCIUM 9.5 9.3         Lab 07/30/25  1603 07/29/25  1758   TOTAL PROTEIN 7.3 6.9   ALBUMIN 4.3 4.1   GLOBULIN 3.0 2.8   ALT (SGPT) 38* 35*   AST (SGOT) 25 25   BILIRUBIN 0.4 0.5   ALK PHOS 90 91   LIPASE  --  14         Lab 07/29/25 2022 07/29/25  1758   PROBNP  --  <36.0   HSTROP T 7 7                 UA          3/21/2025    15:55 6/20/2025    17:30 7/15/2025    12:13   Urinalysis   Squamous Epithelial Cells, UA 3-6  7-12  0-2    Specific Gravity, UA 1.026  1.022  1.023    Ketones, UA Trace  Trace  Trace    Blood, UA Negative  Negative  Negative    Leukocytes, UA Trace  Moderate (2+)  Trace    Nitrite, UA Negative  Negative  Negative    RBC, UA 0-2  0-2  0-2    WBC, UA 6-10  Too Numerous to Count  3-5    Bacteria, UA None Seen  2+  Trace        Microbiology Results (last 10 days)       Procedure Component Value - Date/Time    Blood Culture - Blood, Arm, Left [[id number]]  (Abnormal) Collected: 07/29/25 2016    Lab Status: Preliminary result Specimen: Blood from Arm, Left Updated: 07/30/25 1306     Blood Culture Abnormal Stain     Gram Stain Aerobic Bottle Gram variable bacilli    Blood Culture ID, PCR - Blood, Arm, Left [[id number]] Collected: 07/29/25 2016    Lab Status:  Final result Specimen: Blood from Arm, Left Updated: 07/30/25 1307     BCID, PCR Negative by BCID PCR. Culture to Follow.     BOTTLE TYPE Aerobic Bottle    COVID PRE-OP / PRE-PROCEDURE SCREENING ORDER (NO ISOLATION) - Swab, Nasal Cavity [[id number]]  (Normal) Collected: 07/29/25 1935    Lab Status: Final result Specimen: Swab from Nasal Cavity Updated: 07/29/25 2046    Narrative:      The following orders were created for panel order COVID PRE-OP / PRE-PROCEDURE SCREENING ORDER (NO ISOLATION) - Swab, Nasal Cavity.  Procedure                               Abnormality         Status                     ---------                               -----------         ------                     Respiratory Panel PCR w/...[[id number]]  Normal              Final result                 Please view results for these tests on the individual orders.    Respiratory Panel PCR w/COVID-19(SARS-CoV-2) EFREN/JENNIFER/MICHELLE/PAD/COR/CASSY In-House, NP Swab in UTM/VTM, 2 HR TAT - Swab, Nasopharynx [[id number]]  (Normal) Collected: 07/29/25 1935    Lab Status: Final result Specimen: Swab from Nasopharynx Updated: 07/29/25 2046     ADENOVIRUS, PCR Not Detected     Coronavirus 229E Not Detected     Coronavirus HKU1 Not Detected     Coronavirus NL63 Not Detected     Coronavirus OC43 Not Detected     COVID19 Not Detected     Human Metapneumovirus Not Detected     Human Rhinovirus/Enterovirus Not Detected     Influenza A PCR Not Detected     Influenza B PCR Not Detected     Parainfluenza Virus 1 Not Detected     Parainfluenza Virus 2 Not Detected     Parainfluenza Virus 3 Not Detected     Parainfluenza Virus 4 Not Detected     RSV, PCR Not Detected     Bordetella pertussis pcr Not Detected     Bordetella parapertussis PCR Not Detected     Chlamydophila pneumoniae PCR Not Detected     Mycoplasma pneumo by PCR Not Detected    Narrative:      In the setting of a positive respiratory panel with a viral infection PLUS a negative procalcitonin without other  underlying concern for bacterial infection, consider observing off antibiotics or discontinuation of antibiotics and continue supportive care. If the respiratory panel is positive for atypical bacterial infection (Bordetella pertussis, Chlamydophila pneumoniae, or Mycoplasma pneumoniae), consider antibiotic de-escalation to target atypical bacterial infection.    Respiratory Panel PCR w/COVID-19(SARS-CoV-2) EFREN/JENNIFER/MICHELLE/PAD/COR/CASSY In-House, NP Swab in UTM/VTM, 2 HR TAT - Swab, Nasopharynx [[id number]]  (Normal) Collected: 07/22/25 0910    Lab Status: Final result Specimen: Swab from Nasopharynx Updated: 07/22/25 1042     ADENOVIRUS, PCR Not Detected     Coronavirus 229E Not Detected     Coronavirus HKU1 Not Detected     Coronavirus NL63 Not Detected     Coronavirus OC43 Not Detected     COVID19 Not Detected     Human Metapneumovirus Not Detected     Human Rhinovirus/Enterovirus Not Detected     Influenza A PCR Not Detected     Influenza B PCR Not Detected     Parainfluenza Virus 1 Not Detected     Parainfluenza Virus 2 Not Detected     Parainfluenza Virus 3 Not Detected     Parainfluenza Virus 4 Not Detected     RSV, PCR Not Detected     Bordetella pertussis pcr Not Detected     Bordetella parapertussis PCR Not Detected     Chlamydophila pneumoniae PCR Not Detected     Mycoplasma pneumo by PCR Not Detected    Narrative:      In the setting of a positive respiratory panel with a viral infection PLUS a negative procalcitonin without other underlying concern for bacterial infection, consider observing off antibiotics or discontinuation of antibiotics and continue supportive care. If the respiratory panel is positive for atypical bacterial infection (Bordetella pertussis, Chlamydophila pneumoniae, or Mycoplasma pneumoniae), consider antibiotic de-escalation to target atypical bacterial infection.    Rapid Strep A Screen - Swab, Throat [[id number]]  (Normal) Collected: 07/22/25 0910    Lab Status: Final result Specimen:  Swab from Throat Updated: 07/22/25 0925     Strep A Ag Negative    Narrative:      Test performed by Direct Antigen Testing.    Beta Strep Culture, Throat - Swab, Throat [[id number]]  (Normal) Collected: 07/22/25 0910    Lab Status: Final result Specimen: Swab from Throat Updated: 07/24/25 1112     Throat Culture, Beta Strep No Beta Hemolytic Streptococcus Isolated at 2 days    Narrative:      Group A Strep incidence is low in adults. Positive culture for Beta hemolytic Streptococcus species can reflect colonization and not true infection. Please correlate clinically.              XR Chest 1 View  Result Date: 7/30/2025  XR CHEST 1 VW Date of Exam: 7/30/2025 3:22 PM EDT Indication: short of breath Comparison: None available. Findings: Cardiomediastinal silhouette is unremarkable.  No airspace disease, pneumothorax, nor pleural effusion. Subsegmental atelectasis or scarring at lung bases. Right chest wall port catheter with the tip in the SVC. No acute osseous abnormality identified.     Impression: Impression: Bibasilar atelectasis or scarring. No other significant cardiopulmonary abnormality. Electronically Signed: Hansa Reyna MD  7/30/2025 3:43 PM EDT  Workstation ID: [id number]    CT Angiogram Chest Pulmonary Embolism  Result Date: 7/29/2025  CT ANGIOGRAM CHEST PULMONARY EMBOLISM Date of Exam: 7/29/2025 6:50 PM EDT Indication: Pulmonary Embolism - right sided chest pain, metastatic breast cancer, known superificial clots. Comparison: None available. Technique: Axial CT images were obtained of the chest after the uneventful intravenous administration of 85 cc [id number]-[id number] utilizing pulmonary embolism protocol.  In addition, a 3-D volume rendered image was created for interpretation.  Reconstructed coronal and sagittal images were also obtained. Automated exposure control and iterative construction methods were used. Findings: Pulmonary arteries:Adequate opacification of the pulmonary arteries. No evidence of acute  pulmonary embolism. Aorta: Unremarkable. Lungs and Pleura: Mild linear scarring/atelectasis noted within the lung bases. Otherwise lungs are clear. The central airways are patent. No pleural effusion or pneumothorax. Mediastinum/Maria Dolores: No lymphadenopathy. No suspicious mass. Heart: Normal in size. No pericardial effusion. Normal RV/LV ratio. Soft Tissue: Unremarkable. Upper Abdomen: Moderate to severe hepatic steatosis. Small nonobstructing stones noted within the left kidney measuring up to 0.7 cm. Otherwise unremarkable Bones: No acute osseous abnormality.     Impression: Impression: 1.No evidence of pulmonary embolism. 2.No acute findings in the chest. 3.Moderate to severe hepatic steatosis. 4.Nonobstructing left renal stones. Electronically Signed: Huseyin Alston DO  7/29/2025 7:22 PM EDT  Workstation ID: [id number]      Results for orders placed during the hospital encounter of 07/17/25    Adult Transthoracic Echo Complete W/ Cont if Necessary Per Protocol 07/17/2025  4:37 PM    Interpretation Summary    Left ventricular systolic function is normal. Left ventricular ejection fraction appears to be 56 - 60%.    Left ventricular wall thickness is consistent with borderline concentric hypertrophy.    Global longitudinal LV strain (GLS) = -17.6%      Assessment & Plan   Assessment & Plan       Malignant neoplasm of left breast in female, estrogen receptor negative    On antineoplastic chemotherapy and adjuvant immunotherapy regimen    Essential hypertension    Thyroid disease    Positive blood culture    Gram variable + blood cultures  Immunosuppressed on chemotherapy  Pancytopenia  Elevated lactate  -Unclear significance of cultures w/ neg BCID - repeat cultures are pending. Will continue vanc and cefepime for now.   -Consult ID and oncology  -Continue IVF overnight - majority of her issues may be dehydration related as she has been taking lasix to deal with peripheral edema and may actually be intravascularly  dry.    RUE Superficial venous thrombosis  -Diagnosed recently as outpt - continue eliquis.    Hypothyroidism    VTE Prophylaxis:  Pharmacologic VTE prophylaxis orders are signed & held.      CODE STATUS:    Code Status and Medical Interventions: CPR (Attempt to Resuscitate); Full Support   Ordered at: 07/30/25 1857     Code Status (Patient has no pulse and is not breathing):    CPR (Attempt to Resuscitate)     Medical Interventions (Patient has pulse or is breathing):    Full Support       Expected Discharge   Expected discharge date/ time has not been documented.     Shilpa Zapien II, DO  07/30/25

## 2025-07-30 NOTE — TELEPHONE ENCOUNTER
Received a call from patient stating she was advised to come back to the ER today for admission due to her blood culture results and WBC.  Dr. Baker notified and per MD, patient contacted back and advised Oncology will be consulted if needed while she is in patient. Asked her to keep this office updated and call if she needs anything, appointment tomorrow will be cancelled.  Patient verbalized understanding.

## 2025-07-30 NOTE — ED NOTES
Brooke Mendez    Nursing Report ED to Floor:  Mental status: alert and oriented x4  Ambulatory status: adlib  Oxygen Therapy:  room air  Cardiac Rhythm: sinus tach  Admitted from: ed/home  Safety Concerns:  fall risk  Precautions: neutropenic  Social Issues:   ED Room #:  31    ED Nurse Phone Extension - [phone] or may call [phone].      HPI:   Chief Complaint   Patient presents with    Positive Cultures       Past Medical History:  Past Medical History:   Diagnosis Date    Breast cancer     Disease of thyroid gland     Hypertension         Past Surgical History:  Past Surgical History:   Procedure Laterality Date     SECTION      TONSILLECTOMY          Admitting Doctor:   Shilpa Zapien II, DO    Consulting Provider(s):  Consults       No orders found from 2025 to 2025.             Admitting Diagnosis:   The primary encounter diagnosis was Tachycardia. Diagnoses of Bacteremia, Lactic acidemia, and Leukopenia, unspecified type were also pertinent to this visit.    Most Recent Vitals:   Vitals:    25 1631 25 1700 25 1730 25 1800   BP:  121/72     BP Location:       Patient Position:       Pulse: 112 105 102 100   Resp:       Temp:       TempSrc:       SpO2:   99% 97%   Weight:       Height:           Active LDAs/IV Access:   Lines, Drains & Airways       Active LDAs       Name Placement date Placement time Site Days    Peripheral IV 25 1604 20 G Anterior;Left Forearm 25  1604  Forearm  less than 1                    Labs (abnormal labs have a star):   Labs Reviewed   COMPREHENSIVE METABOLIC PANEL - Abnormal; Notable for the following components:       Result Value    Glucose 124 (*)     ALT (SGPT) 38 (*)     Anion Gap 15.1 (*)     All other components within normal limits    Narrative:     GFR Categories in Chronic Kidney Disease (CKD)              GFR Category          GFR (mL/min/1.73)    Interpretation  G1                    90 or greater        Normal or high  (1)  G2                    60-89                Mild decrease (1)  G3a                   45-59                Mild to moderate decrease  G3b                   30-44                Moderate to severe decrease  G4                    15-29                Severe decrease  G5                    14 or less           Kidney failure    (1)In the absence of evidence of kidney disease, neither GFR category G1 or G2 fulfill the criteria for CKD.    eGFR calculation 2021 CKD-EPI creatinine equation, which does not include race as a factor   LACTIC ACID, PLASMA - Abnormal; Notable for the following components:    Lactate 3.9 (*)     All other components within normal limits   CBC WITH AUTO DIFFERENTIAL - Abnormal; Notable for the following components:    WBC 2.32 (*)     RBC 2.73 (*)     Hemoglobin 9.8 (*)     Hematocrit 29.0 (*)     .2 (*)     MCH 35.9 (*)     RDW-SD 57.2 (*)     Platelets 127 (*)     All other components within normal limits   MANUAL DIFFERENTIAL - Abnormal; Notable for the following components:    Monocyte % 1.0 (*)     Eosinophil % 0.0 (*)     Bands %  11.0 (*)     Metamyelocyte % 1.0 (*)     Myelocyte % 3.0 (*)     Neutrophils Absolute 1.58 (*)     Lymphocytes Absolute 0.63 (*)     Monocytes Absolute 0.02 (*)     nRBC 1.0 (*)     All other components within normal limits   PROCALCITONIN - Normal    Narrative:     As a Marker for Sepsis (Non-Neonates):    1. <0.5 ng/mL represents a low risk of severe sepsis and/or septic shock.  2. >2 ng/mL represents a high risk of severe sepsis and/or septic shock.    As a Marker for Lower Respiratory Tract Infections that require antibiotic therapy:    PCT on Admission    Antibiotic Therapy       6-12 Hrs later    >0.5                Strongly Recommended  >0.25 - <0.5        Recommended   0.1 - 0.25          Discouraged              Remeasure/reassess PCT  <0.1                Strongly Discouraged     Remeasure/reassess PCT    As 28 day mortality risk marker:  "\"Change in Procalcitonin Result\" (>80% or <=80%) if Day 0 (or Day 1) and Day 4 values are available. Refer to http://[email or web address]-pct-calculator.com    Change in PCT <=80%  A decrease of PCT levels below or equal to 80% defines a positive change in PCT test result representing a higher risk for 28-day all-cause mortality of patients diagnosed with severe sepsis for septic shock.    Change in PCT >80%  A decrease of PCT levels of more than 80% defines a negative change in PCT result representing a lower risk for 28-day all-cause mortality of patients diagnosed with severe sepsis or septic shock.      BLOOD CULTURE   BLOOD CULTURE   MRSA SCREEN, PCR   LACTIC ACID, REFLEX   CBC AND DIFFERENTIAL    Narrative:     The following orders were created for panel order CBC & Differential.  Procedure                               Abnormality         Status                     ---------                               -----------         ------                     CBC Auto Differential[[id number]]        Abnormal            Final result               Scan Slide[[id number]]                                                                    Please view results for these tests on the individual orders.       Meds Given in ED:   Medications   sodium chloride 0.9 % flush 10 mL (has no administration in time range)   vancomycin IVPB 2000 mg in 0.9% Sodium Chloride 500 mL (2,000 mg Intravenous New Bag 7/30/25 1758)   sepsis fluid NS 0.9 % bolus 2,000 mL (2,000 mL Intravenous New Bag 7/30/25 1622)   cefepime 2000 mg IVPB in 100 mL NS (MBP) (0 mg Intravenous Stopped 7/30/25 1804)           Last NIH score:                                                          Dysphagia screening results:        [place] Coma Scale:  No data recorded     CIWA:        Restraint Type:            Isolation Status:  No active isolations        "

## 2025-07-30 NOTE — ED PROVIDER NOTES
[place]    EMERGENCY DEPARTMENT ENCOUNTER      Pt Name: Brooke Mendez  MRN: [id number]  YOB: 1985  Date of evaluation: 2025  Provider: Gab Euceda MD    CHIEF COMPLAINT       Chief Complaint   Patient presents with    Positive Cultures         HISTORY OF PRESENT ILLNESS   Brooke Mendez is a 40 y.o. female who presents to the emergency department for evaluation of abnormal test from the emergency room yesterday.  Patient had blood cultures drawn as part of the diagnostic workup and today they returned positive and she was called and told to come back to the ER.  Patient states that since she is left the hospital she feels a little bit worse, she states she just feels yucky.  She has some shortness of breath and some allover swelling.  She has not had any objective fevers at home.  She has had a cough and her mucus/sputum is getting thicker.  She does not have any rash or skin changes, no headache, no abdominal pain, diarrhea, urinary symptoms.    REVIEW OF SYSTEMS     ROS:  A chief complaint appropriate review of systems was completed and is negative except as noted in the HPI.      PAST MEDICAL HISTORY     Past Medical History:   Diagnosis Date    Breast cancer     Disease of thyroid gland     Hypertension          SURGICAL HISTORY       Past Surgical History:   Procedure Laterality Date     SECTION      TONSILLECTOMY           CURRENT MEDICATIONS       Current Facility-Administered Medications:     [COMPLETED] Insert Peripheral IV, , , Once **AND** sodium chloride 0.9 % flush 10 mL, 10 mL, Intravenous, PRN, Gab Euceda MD    vancomycin IVPB 2000 mg in 0.9% Sodium Chloride 500 mL, 20 mg/kg, Intravenous, Once, Gab Euceda MD, Last Rate: 250 mL/hr at 25 1758, 2,000 mg at 25 1758    Current Outpatient Medications:     apixaban (ELIQUIS) 5 MG tablet tablet, Take 1 tablet by mouth 2 (Two) Times a Day., Disp: 60 tablet, Rfl: 5    cefpodoxime (VANTIN) 200 MG tablet,  Take 2 tablets by mouth Every 12 (Twelve) Hours., Disp: 10 tablet, Rfl: 0    clonazePAM (KlonoPIN) 0.5 MG tablet, Take 1 tablet by mouth 3 (Three) Times a Day As Needed., Disp: , Rfl:     cyclobenzaprine (FLEXERIL) 10 MG tablet, Take 1 tablet by mouth 3 (Three) Times a Day As Needed for Muscle Spasms., Disp: 90 tablet, Rfl: 0    Diphenhydramine-Aluminum-Magnesium-Simethicone-Lidocaine-Nystatin, , Disp: , Rfl:     famotidine (PEPCID) 20 MG tablet, TAKE ONE TABLET BY MOUTH TWO TIMES A DAY, Disp: 60 tablet, Rfl: 1    furosemide (LASIX) 20 MG tablet, Take 1 tablet by mouth Daily. PRN, Disp: 30 tablet, Rfl: 0    gabapentin (NEURONTIN) 400 MG capsule, Take 2 capsules by mouth 4 (Four) Times a Day., Disp: , Rfl:     lamoTRIgine (LaMICtal) 100 MG tablet, Take 3 tablets by mouth Daily., Disp: , Rfl:     levothyroxine (Synthroid) 112 MCG tablet, Take 1 tablet by mouth Every Morning., Disp: 30 tablet, Rfl: 2    levothyroxine (SYNTHROID, LEVOTHROID) 100 MCG tablet, Take 1 tablet by mouth Daily., Disp: 30 tablet, Rfl: 0    lidocaine-prilocaine (EMLA) 2.5-2.5 % cream, Apply 1 Application topically to the appropriate area as directed As Needed (45-60 minutes prior to port access.  Cover with saran/plastic wrap.)., Disp: 30 g, Rfl: 3    LORazepam (ATIVAN) 1 MG tablet, , Disp: , Rfl:     meloxicam (MOBIC) 15 MG tablet, Take 1 tablet by mouth Daily., Disp: , Rfl:     metoprolol succinate XL (TOPROL-XL) 50 MG 24 hr tablet, Take 1.5 tablets by mouth Daily., Disp: , Rfl:     montelukast (Singulair) 10 MG tablet, Take 1 tablet by mouth Every Night., Disp: 30 tablet, Rfl: 1    nystatin (MYCOSTATIN) 100,000 unit/mL suspension, , Disp: , Rfl:     nystatin susp + lidocaine viscous (MAGIC MOUTHWASH) oral suspension, 5-10 ml swish and spit or swallow QID prn, Disp: 240 mL, Rfl: 3    omeprazole (priLOSEC) 40 MG capsule, Take 1 capsule by mouth Daily Before Supper., Disp: 30 capsule, Rfl: 5    ondansetron (ZOFRAN) 8 MG tablet, Take 1 tablet by  mouth 3 (Three) Times a Day As Needed for Nausea or Vomiting., Disp: 30 tablet, Rfl: 3    ondansetron ODT (ZOFRAN-ODT) 8 MG disintegrating tablet, Take 1 tablet by mouth Every 8 (Eight) Hours As Needed for Nausea or Vomiting for up to 60 doses., Disp: 60 tablet, Rfl: 5    oxyCODONE (ROXICODONE) 5 MG immediate release tablet, Take 1 tablet by mouth Every 6 (Six) Hours As Needed for Moderate Pain., Disp: 60 tablet, Rfl: 0    prochlorperazine (COMPAZINE) 10 MG tablet, Take 0.5-1 tablets by mouth Every 6 (Six) Hours As Needed for Nausea or Vomiting., Disp: 30 tablet, Rfl: 2    triamcinolone (KENALOG) 0.1 % ointment, Apply 1 Application topically to the appropriate area as directed 2 (Two) Times a Day., Disp: 30 g, Rfl: 2    valACYclovir (VALTREX) 500 MG tablet, Take 1 tablet by mouth Daily., Disp: 30 tablet, Rfl: 0    zolpidem (Ambien) 10 MG tablet, Take 1 tablet by mouth At Night As Needed for Sleep., Disp: , Rfl:     ALLERGIES     Erythromycin, Pholcodine, and Penicillins    FAMILY HISTORY       Family History   Problem Relation Age of Onset    Hypertension Mother     Diabetes Mother     Heart disease Father           SOCIAL HISTORY       Social History     Socioeconomic History    Marital status: Single   Tobacco Use    Smoking status: Former     Types: Cigarettes    Smokeless tobacco: Never   Vaping Use    Vaping status: Never Used   Substance and Sexual Activity    Alcohol use: Not Currently    Drug use: Defer    Sexual activity: Defer         PHYSICAL EXAM    (up to 7 for level 4, 8 or more for level 5)     Vitals:    07/30/25 1631 07/30/25 1700 07/30/25 1730 07/30/25 1800   BP:  121/72     BP Location:       Patient Position:       Pulse: 112 105 102 100   Resp:       Temp:       TempSrc:       SpO2:   99% 97%   Weight:       Height:           Physical Exam  Constitutional:       General: She is not in acute distress.  HENT:      Head: Normocephalic and atraumatic.   Eyes:      Conjunctiva/sclera: Conjunctivae  normal.      Pupils: Pupils are equal, round, and reactive to light.   Cardiovascular:      Rate and Rhythm: Regular rhythm. Tachycardia present.      Pulses: Normal pulses.      Heart sounds: No murmur heard.     No gallop.   Pulmonary:      Effort: Pulmonary effort is normal. No respiratory distress.      Breath sounds: No wheezing or rales.   Abdominal:      General: Abdomen is flat. There is no distension.      Tenderness: There is no abdominal tenderness.   Musculoskeletal:         General: No swelling or deformity. Normal range of motion.   Skin:     General: Skin is warm and dry.      Capillary Refill: Capillary refill takes less than 2 seconds.   Neurological:      General: No focal deficit present.      Mental Status: She is alert and oriented to person, place, and time.   Psychiatric:         Mood and Affect: Mood normal.         Behavior: Behavior normal.            DIAGNOSTIC RESULTS     EKG: All EKGs are interpreted by the Emergency Department Physician who either signs or Co-signs this chart in the absence of a cardiologist.    No orders to display         RADIOLOGY:   [x] Radiologist's Report Reviewed:  XR Chest 1 View   Final Result   Impression:   Bibasilar atelectasis or scarring. No other significant cardiopulmonary abnormality.         Electronically Signed: Hansa Reyna MD     7/30/2025 3:43 PM EDT     Workstation ID: [id number]          I ordered and independently reviewed the above noted radiographic studies.        LABS:  I independently interpreted all laboratory studies conducted during this ED visit.  The results of these studies can be seen below and my independent interpretation in the ED course      EMERGENCY DEPARTMENT COURSE and DIFFERENTIAL DIAGNOSIS/MDM:   Vitals:  AS OF 18:26 EDT    BP - 121/72  HR - 100  TEMP - 98.4 °F (36.9 °C) (Oral)  O2 SATS - 97%        Discussion below represents my analysis of pertinent findings related to patient's condition, differential diagnosis,  treatment plan and final disposition.      Differential diagnosis:  The differential diagnosis associated with the patient's presentation includes: Pneumonia, bronchitis, viral respiratory infection, bacteremia, neutropenic fever      Independent interpretations (ECG/rhythm strip/X-ray/US/CT scan): See ED course      Additional sources:  Discussed/obtained information from independent historians:   [] Spouse:   [] Parent:   [] Friend:   [] EMS:   [] Other:    External record review:  7/29/2025 reviewed most recent outpatient provider note, seen in the ER for elevated heart rate.  She was called back today due to positive blood cultures on one of her blood culture sites.      Patient's care impacted by:   [] Diabetes   [] Hypertension   [] Coronary Artery Disease   [x] Cancer   [] Other:     Care significantly affected by Social Determinants of Health (housing and economic circumstances, unemployment)    [] Yes     [x] No   If yes, Patient's care significantly limited by  Social Determinants of Health including:    [] Inadequate housing    [] Low income    [] Alcoholism and drug addiction in family    [] Problems related to primary support group    [] Unemployment    [] Problems related to employment    [] Other Social Determinants of Health:     I considered prescription management with:    [] Pain medication:   [] Antiviral:   [] Antibiotic:   [] Other:    Additional orders considered but not ordered:  The following testing was considered but ultimately not selected:     ED Course:    ED Course as of 07/30/25 1826 Wed Jul 30, 2025 1824 Laboratory workup independently interpreted by myself demonstrates leukopenia, anemia, thrombocytopenia, lactic acidemia [KB]   1825 Chest x-ray independently interpreted by myself demonstrates no lobar pneumonia.  Radiologist notes bibasilar scarring or atelectasis [KB]      ED Course User Index  [KB] Gab Euceda MD         Diagnostic lab and imaging studies were conducted.   IV fluids and broad-spectrum antibiotics were initiated.  Sensitive source for infection is not identified in the emergency room today.  Could be contaminant however given her immunocompromise state, her tachycardia on presentation, her trending lactic acidemia from yesterday we will admit for further evaluation and treatment.    I did consult with the patient's oncologist and an infectious disease specialist while patient was in the ER and they agree with plan.  Hospital medicine consulted.      PROCEDURES:  Procedures    CRITICAL CARE TIME        CONSULTS   Hospital medicine  Infectious disease  Hematology oncology    FINAL IMPRESSION      1. Tachycardia    2. Bacteremia    3. Lactic acidemia    4. Leukopenia, unspecified type          DISPOSITION/PLAN     ED Disposition       ED Disposition   Decision to Admit    Condition   --    Comment   Level of Care: Telemetry [5]   Diagnosis: Positive blood culture [[id number]]   Is patient appropriate for Inpatient Observation Unit?: No [0]                   Comment: Please note this report has been produced using speech recognition software.      Gab Euceda MD  Attending Emergency Physician    Recent Results (from the past 24 hours)   ECG 12 Lead Chest Pain    Collection Time: 07/29/25  7:29 PM   Result Value Ref Range    QT Interval 350 ms    QTC Interval 446 ms   Respiratory Panel PCR w/COVID-19(SARS-CoV-2) EFREN/JENNIFER/MICHELLE/PAD/COR/CASSY In-House, NP Swab in UTM/VTM, 2 HR TAT - Swab, Nasopharynx    Collection Time: 07/29/25  7:35 PM    Specimen: Nasopharynx; Swab   Result Value Ref Range    ADENOVIRUS, PCR Not Detected Not Detected    Coronavirus 229E Not Detected Not Detected    Coronavirus HKU1 Not Detected Not Detected    Coronavirus NL63 Not Detected Not Detected    Coronavirus OC43 Not Detected Not Detected    COVID19 Not Detected Not Detected - Ref. Range    Human Metapneumovirus Not Detected Not Detected    Human Rhinovirus/Enterovirus Not Detected Not Detected     Influenza A PCR Not Detected Not Detected    Influenza B PCR Not Detected Not Detected    Parainfluenza Virus 1 Not Detected Not Detected    Parainfluenza Virus 2 Not Detected Not Detected    Parainfluenza Virus 3 Not Detected Not Detected    Parainfluenza Virus 4 Not Detected Not Detected    RSV, PCR Not Detected Not Detected    Bordetella pertussis pcr Not Detected Not Detected    Bordetella parapertussis PCR Not Detected Not Detected    Chlamydophila pneumoniae PCR Not Detected Not Detected    Mycoplasma pneumo by PCR Not Detected Not Detected   Blood Culture - Blood, Arm, Left    Collection Time: 07/29/25  8:16 PM    Specimen: Arm, Left; Blood   Result Value Ref Range    Blood Culture Abnormal Stain (C)     Gram Stain Aerobic Bottle Gram variable bacilli (C)    Blood Culture ID, PCR - Blood, Arm, Left    Collection Time: 07/29/25  8:16 PM    Specimen: Arm, Left; Blood   Result Value Ref Range    BCID, PCR Negative by BCID PCR. Culture to Follow. Negative by BCID PCR. Culture to Follow.    BOTTLE TYPE Aerobic Bottle    High Sensitivity Troponin T 1Hr    Collection Time: 07/29/25  8:22 PM    Specimen: Blood   Result Value Ref Range    HS Troponin T 7 <14 ng/L    Troponin T Numeric Delta 0 Abnormal if >/=3 ng/L   Comprehensive Metabolic Panel    Collection Time: 07/30/25  4:03 PM    Specimen: Blood   Result Value Ref Range    Glucose 124 (H) 65 - 99 mg/dL    BUN 12.4 6.0 - 20.0 mg/dL    Creatinine 0.99 0.57 - 1.00 mg/dL    Sodium 138 136 - 145 mmol/L    Potassium 3.6 3.5 - 5.2 mmol/L    Chloride 99 98 - 107 mmol/L    CO2 23.9 22.0 - 29.0 mmol/L    Calcium 9.5 8.6 - 10.5 mg/dL    Total Protein 7.3 6.0 - 8.5 g/dL    Albumin 4.3 3.5 - 5.2 g/dL    ALT (SGPT) 38 (H) 1 - 33 U/L    AST (SGOT) 25 1 - 32 U/L    Alkaline Phosphatase 90 39 - 117 U/L    Total Bilirubin 0.4 0.0 - 1.2 mg/dL    Globulin 3.0 gm/dL    A/G Ratio 1.4 g/dL    BUN/Creatinine Ratio 12.5 7.0 - 25.0    Anion Gap 15.1 (H) 5.0 - 15.0 mmol/L    eGFR 73.6  >60.0 mL/min/1.73   Lactic Acid, Plasma    Collection Time: 07/30/25  4:03 PM    Specimen: Blood   Result Value Ref Range    Lactate 3.9 (C) 0.5 - 2.0 mmol/L   CBC Auto Differential    Collection Time: 07/30/25  4:03 PM    Specimen: Blood   Result Value Ref Range    WBC 2.32 (L) 3.40 - 10.80 10*3/mm3    RBC 2.73 (L) 3.77 - 5.28 10*6/mm3    Hemoglobin 9.8 (L) 12.0 - 15.9 g/dL    Hematocrit 29.0 (L) 34.0 - 46.6 %    .2 (H) 79.0 - 97.0 fL    MCH 35.9 (H) 26.6 - 33.0 pg    MCHC 33.8 31.5 - 35.7 g/dL    RDW 14.7 12.3 - 15.4 %    RDW-SD 57.2 (H) 37.0 - 54.0 fl    MPV 11.4 6.0 - 12.0 fL    Platelets 127 (L) 140 - 450 10*3/mm3   Procalcitonin    Collection Time: 07/30/25  4:03 PM    Specimen: Blood   Result Value Ref Range    Procalcitonin 0.19 0.00 - 0.25 ng/mL   Manual Differential    Collection Time: 07/30/25  4:03 PM    Specimen: Blood   Result Value Ref Range    Neutrophil % 57.0 42.7 - 76.0 %    Lymphocyte % 25.0 19.6 - 45.3 %    Monocyte % 1.0 (L) 5.0 - 12.0 %    Eosinophil % 0.0 (L) 0.3 - 6.2 %    Basophil % 0.0 0.0 - 1.5 %    Bands %  11.0 (H) 0.0 - 5.0 %    Metamyelocyte % 1.0 (H) 0.0 - 0.0 %    Myelocyte % 3.0 (H) 0.0 - 0.0 %    Atypical Lymphocyte % 2.0 0.0 - 5.0 %    Neutrophils Absolute 1.58 (L) 1.70 - 7.00 10*3/mm3    Lymphocytes Absolute 0.63 (L) 0.70 - 3.10 10*3/mm3    Monocytes Absolute 0.02 (L) 0.10 - 0.90 10*3/mm3    Eosinophils Absolute 0.00 0.00 - 0.40 10*3/mm3    Basophils Absolute 0.00 0.00 - 0.20 10*3/mm3    nRBC 1.0 (H) 0.0 - 0.2 /100 WBC    RBC Morphology Normal Normal    WBC Morphology Normal Normal    Platelet Estimate Decreased Normal    Large Platelets Slight/1+ None Seen     Note: In addition to lab results from this visit, the labs listed above may include labs taken at another facility or during a different encounter within the last 24 hours. Please correlate lab times with ED admission and discharge times for further clarification of the services performed during this  visit.                 Gab Euceda MD  07/30/25 9719

## 2025-07-30 NOTE — CONSULTS
HEMATOLOGY/ONCOLOGY INPATIENT CONSULTATION      REFERRING PHYSICIAN: No admitting provider for patient encounter.    PRIMARY CARE PROVIDER: Sheldon Starkey DO    REASON FOR CONSULTATION: Left breast cancer, admission for positive blood cultures      HISTORY OF PRESENT ILLNESS: 40-year-old female with triple negative breast cancer, only undergoing neoadjuvant chemotherapy he who is admitted for positive blood cultures.    She received cycle 2 of AC on 7/22/2025.  Presented to the ER yesterday with generalized malaise, diffuse edema, chest pain, and progressive dyspnea.  She was noted to be persistently tachycardic.  She was neutropenic, but afebrile and was not initiated on antibiotics.  A CT chest was negative for PE.  She returns today after blood cultures returned positive for gram-variable bacilli.    She continues to feel clinically unwell with fatigue, leg edema, shortness of breath.      Allergies   Allergen Reactions    Erythromycin Other (See Comments)    Pholcodine Other (See Comments)    Penicillins Rash     Childhood        Past Medical History:   Diagnosis Date    Breast cancer     Disease of thyroid gland     Hypertension          Current Facility-Administered Medications:     [COMPLETED] Insert Peripheral IV, , , Once **AND** sodium chloride 0.9 % flush 10 mL, 10 mL, Intravenous, PRN, Gab Euceda MD    vancomycin IVPB 2000 mg in 0.9% Sodium Chloride 500 mL, 20 mg/kg, Intravenous, Once, Gab Euceda MD    Current Outpatient Medications:     apixaban (ELIQUIS) 5 MG tablet tablet, Take 1 tablet by mouth 2 (Two) Times a Day., Disp: 60 tablet, Rfl: 5    cefpodoxime (VANTIN) 200 MG tablet, Take 2 tablets by mouth Every 12 (Twelve) Hours., Disp: 10 tablet, Rfl: 0    clonazePAM (KlonoPIN) 0.5 MG tablet, Take 1 tablet by mouth 3 (Three) Times a Day As Needed., Disp: , Rfl:     cyclobenzaprine (FLEXERIL) 10 MG tablet, Take 1 tablet by mouth 3 (Three) Times a Day As Needed for Muscle Spasms., Disp: 90  tablet, Rfl: 0    Diphenhydramine-Aluminum-Magnesium-Simethicone-Lidocaine-Nystatin, , Disp: , Rfl:     famotidine (PEPCID) 20 MG tablet, TAKE ONE TABLET BY MOUTH TWO TIMES A DAY, Disp: 60 tablet, Rfl: 1    furosemide (LASIX) 20 MG tablet, Take 1 tablet by mouth Daily. PRN, Disp: 30 tablet, Rfl: 0    gabapentin (NEURONTIN) 400 MG capsule, Take 2 capsules by mouth 4 (Four) Times a Day., Disp: , Rfl:     lamoTRIgine (LaMICtal) 100 MG tablet, Take 3 tablets by mouth Daily., Disp: , Rfl:     levothyroxine (Synthroid) 112 MCG tablet, Take 1 tablet by mouth Every Morning., Disp: 30 tablet, Rfl: 2    levothyroxine (SYNTHROID, LEVOTHROID) 100 MCG tablet, Take 1 tablet by mouth Daily., Disp: 30 tablet, Rfl: 0    lidocaine-prilocaine (EMLA) 2.5-2.5 % cream, Apply 1 Application topically to the appropriate area as directed As Needed (45-60 minutes prior to port access.  Cover with saran/plastic wrap.)., Disp: 30 g, Rfl: 3    LORazepam (ATIVAN) 1 MG tablet, , Disp: , Rfl:     meloxicam (MOBIC) 15 MG tablet, Take 1 tablet by mouth Daily., Disp: , Rfl:     metoprolol succinate XL (TOPROL-XL) 50 MG 24 hr tablet, Take 1.5 tablets by mouth Daily., Disp: , Rfl:     montelukast (Singulair) 10 MG tablet, Take 1 tablet by mouth Every Night., Disp: 30 tablet, Rfl: 1    nystatin (MYCOSTATIN) 100,000 unit/mL suspension, , Disp: , Rfl:     nystatin susp + lidocaine viscous (MAGIC MOUTHWASH) oral suspension, 5-10 ml swish and spit or swallow QID prn, Disp: 240 mL, Rfl: 3    omeprazole (priLOSEC) 40 MG capsule, Take 1 capsule by mouth Daily Before Supper., Disp: 30 capsule, Rfl: 5    ondansetron (ZOFRAN) 8 MG tablet, Take 1 tablet by mouth 3 (Three) Times a Day As Needed for Nausea or Vomiting., Disp: 30 tablet, Rfl: 3    ondansetron ODT (ZOFRAN-ODT) 8 MG disintegrating tablet, Take 1 tablet by mouth Every 8 (Eight) Hours As Needed for Nausea or Vomiting for up to 60 doses., Disp: 60 tablet, Rfl: 5    oxyCODONE (ROXICODONE) 5 MG immediate  release tablet, Take 1 tablet by mouth Every 6 (Six) Hours As Needed for Moderate Pain., Disp: 60 tablet, Rfl: 0    prochlorperazine (COMPAZINE) 10 MG tablet, Take 0.5-1 tablets by mouth Every 6 (Six) Hours As Needed for Nausea or Vomiting., Disp: 30 tablet, Rfl: 2    triamcinolone (KENALOG) 0.1 % ointment, Apply 1 Application topically to the appropriate area as directed 2 (Two) Times a Day., Disp: 30 g, Rfl: 2    valACYclovir (VALTREX) 500 MG tablet, Take 1 tablet by mouth Daily., Disp: 30 tablet, Rfl: 0    zolpidem (Ambien) 10 MG tablet, Take 1 tablet by mouth At Night As Needed for Sleep., Disp: , Rfl:     Past Surgical History:   Procedure Laterality Date     SECTION      TONSILLECTOMY         Social History     Socioeconomic History    Marital status: Single   Tobacco Use    Smoking status: Former     Types: Cigarettes    Smokeless tobacco: Never   Vaping Use    Vaping status: Never Used   Substance and Sexual Activity    Alcohol use: Not Currently    Drug use: Defer    Sexual activity: Defer       Family History   Problem Relation Age of Onset    Hypertension Mother     Diabetes Mother     Heart disease Father        Oncology/Hematology History   Malignant neoplasm of left breast in female, estrogen receptor negative   3/5/2025 Initial Diagnosis    Malignant neoplasm of left breast in female, estrogen receptor negative     3/13/2025 - 2025 Chemotherapy    OP BREAST Pembrolizumab 200 mg / PACLitaxel / CARBOplatin AUC=1.5 (Weekly)     3/13/2025 Cancer Staged    Staging form: Breast, AJCC 8th Edition  - Clinical: Stage IIIC (cT2, cN2, cM0, G3, ER-, FL-, HER2-) - Signed by Fanny Baker MD on 3/13/2025     3/13/2025 -  Chemotherapy    OP CENTRAL VENOUS ACCESS DEVICE Access, Care, and Maintenance (CVAD)     3/20/2025 -  Chemotherapy    OP SUPPORTIVE HYDRATION + ANTIEMETICS     2025 -  Chemotherapy    OP BREAST Pembrolizumab 200 mg / DOXOrubicin / Cyclophosphamide            REVIEW OF  SYSTEMS:  A 14 point review of systems was performed and is negative except as noted below.    Review of Systems - Oncology      Objective     Vitals:    07/30/25 1630 07/30/25 1631 07/30/25 1700 07/30/25 1730   BP: 117/79  121/72    BP Location:       Patient Position:       Pulse: 113 112 105 102   Resp:       Temp:       TempSrc:       SpO2:    99%   Weight:       Height:                       Temp:  [98.4 °F (36.9 °C)] 98.4 °F (36.9 °C)     Performance Status: 1    Physical Exam    General: well appearing female in no acute distress  HEENT: sclerae anicteric, oropharynx clear  Lymphatics: no cervical, supraclavicular, or axillary adenopathy  Cardiovascular: regular rate and rhythm, no murmurs  Lungs: clear to auscultation bilaterally  Abdomen: soft, nontender, nondistended.  No palpable organomegaly  Extremities: + bilateral lower extremity edema  Skin: no rashes, lesions, bruising, or petechiae      LABS:    Lab Results   Component Value Date    HGB 9.8 (L) 07/30/2025    HCT 29.0 (L) 07/30/2025    .2 (H) 07/30/2025     (L) 07/30/2025    WBC 2.32 (L) 07/30/2025    NEUTROABS 1.58 (L) 07/30/2025    LYMPHSABS 1.47 07/22/2025    MONOSABS 0.77 07/22/2025    EOSABS 0.00 07/30/2025    BASOSABS 0.00 07/30/2025     Lab Results   Component Value Date    GLUCOSE 124 (H) 07/30/2025    BUN 12.4 07/30/2025    CREATININE 0.99 07/30/2025     07/30/2025    K 3.6 07/30/2025    CL 99 07/30/2025    CO2 23.9 07/30/2025    CALCIUM 9.5 07/30/2025    PROTEINTOT 7.3 07/30/2025    ALBUMIN 4.3 07/30/2025    BILITOT 0.4 07/30/2025    ALKPHOS 90 07/30/2025    AST 25 07/30/2025    ALT 38 (H) 07/30/2025         IMAGING    XR Chest 1 View  Result Date: 7/30/2025  XR CHEST 1 VW Date of Exam: 7/30/2025 3:22 PM EDT Indication: short of breath Comparison: None available. Findings: Cardiomediastinal silhouette is unremarkable.  No airspace disease, pneumothorax, nor pleural effusion. Subsegmental atelectasis or scarring at lung  bases. Right chest wall port catheter with the tip in the SVC. No acute osseous abnormality identified.     Impression: Bibasilar atelectasis or scarring. No other significant cardiopulmonary abnormality. Electronically Signed: Hansa Reyna MD  7/30/2025 3:43 PM EDT  Workstation ID: [id number]    CT Angiogram Chest Pulmonary Embolism  Result Date: 7/29/2025  CT ANGIOGRAM CHEST PULMONARY EMBOLISM Date of Exam: 7/29/2025 6:50 PM EDT Indication: Pulmonary Embolism - right sided chest pain, metastatic breast cancer, known superificial clots. Comparison: None available. Technique: Axial CT images were obtained of the chest after the uneventful intravenous administration of 85 cc Isovue-370 utilizing pulmonary embolism protocol.  In addition, a 3-D volume rendered image was created for interpretation.  Reconstructed coronal and sagittal images were also obtained. Automated exposure control and iterative construction methods were used. Findings: Pulmonary arteries:Adequate opacification of the pulmonary arteries. No evidence of acute pulmonary embolism. Aorta: Unremarkable. Lungs and Pleura: Mild linear scarring/atelectasis noted within the lung bases. Otherwise lungs are clear. The central airways are patent. No pleural effusion or pneumothorax. Mediastinum/Maria Dolores: No lymphadenopathy. No suspicious mass. Heart: Normal in size. No pericardial effusion. Normal RV/LV ratio. Soft Tissue: Unremarkable. Upper Abdomen: Moderate to severe hepatic steatosis. Small nonobstructing stones noted within the left kidney measuring up to 0.7 cm. Otherwise unremarkable Bones: No acute osseous abnormality.     Impression: 1.No evidence of pulmonary embolism. 2.No acute findings in the chest. 3.Moderate to severe hepatic steatosis. 4.Nonobstructing left renal stones. Electronically Signed: Huseyin Alston DO  7/29/2025 7:22 PM EDT  Workstation ID: [id number]    Adult Transthoracic Echo Complete W/ Cont if Necessary Per Protocol  Result Date:  7/17/2025    Left ventricular systolic function is normal. Left ventricular ejection fraction appears to be 56 - 60%.   Left ventricular wall thickness is consistent with borderline concentric hypertrophy.   Global longitudinal LV strain (GLS) = -17.6%     MRI Lumbar Spine With & Without Contrast  Result Date: 7/17/2025  MRI LUMBAR SPINE W WO CONTRAST Date of Exam: 7/17/2025 10:14 AM EDT Indication: back pain breast cancer.  Comparison: None available. Technique:  Routine multiplanar/multisequence sequence images of the lumbar spine were obtained before and after the uneventful administration of 20 mL Multihance.  Findings: T1 marrow signal is preserved, without evidence of fracture or suspicious marrow replacing lesion. Alignment is anatomic, without evidence of significant listhesis or subluxation. The conus medullaris and cauda equina nerve roots are satisfactory in appearance. There is no abnormal enhancement. The paraspinal soft tissues demonstrate no acute or suspicious findings. There is no evidence of significant lumbar spondylosis. Intervertebral discs are well-hydrated throughout. The spinal canal and neural foramina are widely patent.     Impression: Normal contrast-enhanced MRI of the lumbar spine. There are no findings specifically concerning for osseous or soft tissue metastasis. There is no evidence of significant lumbar spondylosis. Electronically Signed: Salinas Coley MD  7/17/2025 1:31 PM EDT  Workstation ID: [id number]    Duplex Venous Upper Extremity - Right CAR  Result Date: 7/15/2025    Acute right upper extremity superficial thrombophlebitis noted in the basilic (upper arm).   All other right sided vessels appear normal.       ASSESSMENT/PLAN:  Bacteremia secondary to Gram variable bacilli  Breast cancer status post recent chemotherapy  -I discussed with the ER attending and reviewed her recent notes, imaging, and labs.  She has a history of triple negative breast cancer undergoing neoadjuvant  chemotherapy.  She has had multiple complications and treatment delays.  She is now admitted with bacteremia.  Agree with covering with IV antibiotics and infectious disease consult.  Will need to hold her chemotherapy pending resolution.  She does have a follow-up with surgery next week and in light of her multiple complications, pending her current course, we may elect to proceed with surgery as her neck step rather than completing her final 2 cycles of AC.  Discussed with Brooke and her mom today.  -Oncology service will continue to follow.    Fanny Baker MD    7/30/2025

## 2025-07-31 LAB
BACTERIA SPEC AEROBE CULT: ABNORMAL
BILIRUB UR QL STRIP: NEGATIVE
CLARITY UR: CLEAR
COLOR UR: YELLOW
GLUCOSE UR STRIP-MCNC: NEGATIVE MG/DL
GRAM STN SPEC: ABNORMAL
HGB UR QL STRIP.AUTO: NEGATIVE
ISOLATED FROM: ABNORMAL
KETONES UR QL STRIP: NEGATIVE
LEUKOCYTE ESTERASE UR QL STRIP.AUTO: NEGATIVE
NITRITE UR QL STRIP: NEGATIVE
PH UR STRIP.AUTO: 5.5 [PH] (ref 5–8)
POTASSIUM SERPL-SCNC: 4 MMOL/L (ref 3.5–5.2)
PROT UR QL STRIP: NEGATIVE
SP GR UR STRIP: 1.01 (ref 1–1.03)
UROBILINOGEN UR QL STRIP: NORMAL

## 2025-07-31 PROCEDURE — 99232 SBSQ HOSP IP/OBS MODERATE 35: CPT | Performed by: INTERNAL MEDICINE

## 2025-07-31 PROCEDURE — 25010000002 VANCOMYCIN HCL 1.25 G RECONSTITUTED SOLUTION 1 EACH VIAL

## 2025-07-31 PROCEDURE — 84132 ASSAY OF SERUM POTASSIUM: CPT | Performed by: INTERNAL MEDICINE

## 2025-07-31 PROCEDURE — 25010000002 CEFEPIME PER 500 MG: Performed by: INTERNAL MEDICINE

## 2025-07-31 PROCEDURE — 63710000001 ONDANSETRON ODT 4 MG TABLET DISPERSIBLE: Performed by: INTERNAL MEDICINE

## 2025-07-31 PROCEDURE — 99232 SBSQ HOSP IP/OBS MODERATE 35: CPT | Performed by: NURSE PRACTITIONER

## 2025-07-31 PROCEDURE — 25810000003 SODIUM CHLORIDE 0.9 % SOLUTION 250 ML FLEX CONT

## 2025-07-31 PROCEDURE — 81003 URINALYSIS AUTO W/O SCOPE: CPT | Performed by: INTERNAL MEDICINE

## 2025-07-31 RX ORDER — LORAZEPAM 1 MG/1
1 TABLET ORAL EVERY 6 HOURS PRN
Status: DISCONTINUED | OUTPATIENT
Start: 2025-07-31 | End: 2025-08-02 | Stop reason: HOSPADM

## 2025-07-31 RX ORDER — FUROSEMIDE 20 MG/1
20 TABLET ORAL ONCE
Status: COMPLETED | OUTPATIENT
Start: 2025-07-31 | End: 2025-07-31

## 2025-07-31 RX ORDER — FAMOTIDINE 20 MG/1
20 TABLET, FILM COATED ORAL
Status: DISCONTINUED | OUTPATIENT
Start: 2025-07-31 | End: 2025-08-02 | Stop reason: HOSPADM

## 2025-07-31 RX ORDER — PROMETHAZINE HYDROCHLORIDE 12.5 MG/1
12.5 TABLET ORAL EVERY 6 HOURS PRN
Status: DISCONTINUED | OUTPATIENT
Start: 2025-07-31 | End: 2025-07-31

## 2025-07-31 RX ORDER — PROCHLORPERAZINE MALEATE 10 MG
10 TABLET ORAL EVERY 6 HOURS PRN
Status: DISCONTINUED | OUTPATIENT
Start: 2025-07-31 | End: 2025-08-02 | Stop reason: HOSPADM

## 2025-07-31 RX ADMIN — GABAPENTIN 800 MG: 400 CAPSULE ORAL at 13:16

## 2025-07-31 RX ADMIN — VANCOMYCIN HYDROCHLORIDE 1250 MG: 1.25 INJECTION, POWDER, LYOPHILIZED, FOR SOLUTION INTRAVENOUS at 06:42

## 2025-07-31 RX ADMIN — FUROSEMIDE 20 MG: 20 TABLET ORAL at 18:04

## 2025-07-31 RX ADMIN — CEFEPIME 2000 MG: 2 INJECTION, POWDER, FOR SOLUTION INTRAVENOUS at 09:34

## 2025-07-31 RX ADMIN — OXYCODONE 5 MG: 5 TABLET ORAL at 13:16

## 2025-07-31 RX ADMIN — OXYCODONE 5 MG: 5 TABLET ORAL at 05:30

## 2025-07-31 RX ADMIN — VANCOMYCIN HYDROCHLORIDE 1250 MG: 1.25 INJECTION, POWDER, LYOPHILIZED, FOR SOLUTION INTRAVENOUS at 17:29

## 2025-07-31 RX ADMIN — METOPROLOL SUCCINATE 50 MG: 50 TABLET, EXTENDED RELEASE ORAL at 09:35

## 2025-07-31 RX ADMIN — ONDANSETRON 4 MG: 4 TABLET, ORALLY DISINTEGRATING ORAL at 05:36

## 2025-07-31 RX ADMIN — ZOLPIDEM TARTRATE 10 MG: 5 TABLET ORAL at 21:42

## 2025-07-31 RX ADMIN — APIXABAN 5 MG: 5 TABLET, FILM COATED ORAL at 20:00

## 2025-07-31 RX ADMIN — LAMOTRIGINE 200 MG: 100 TABLET ORAL at 09:35

## 2025-07-31 RX ADMIN — CEFEPIME 2000 MG: 2 INJECTION, POWDER, FOR SOLUTION INTRAVENOUS at 02:01

## 2025-07-31 RX ADMIN — GABAPENTIN 400 MG: 400 CAPSULE ORAL at 20:00

## 2025-07-31 RX ADMIN — Medication 10 ML: at 09:35

## 2025-07-31 RX ADMIN — ACETAMINOPHEN 650 MG: 325 TABLET ORAL at 09:32

## 2025-07-31 RX ADMIN — LORAZEPAM 1 MG: 1 TABLET ORAL at 18:04

## 2025-07-31 RX ADMIN — FAMOTIDINE 20 MG: 20 TABLET, FILM COATED ORAL at 17:28

## 2025-07-31 RX ADMIN — APIXABAN 5 MG: 5 TABLET, FILM COATED ORAL at 09:35

## 2025-07-31 RX ADMIN — OXYCODONE 5 MG: 5 TABLET ORAL at 19:59

## 2025-07-31 RX ADMIN — LEVOTHYROXINE SODIUM 112 MCG: 112 TABLET ORAL at 05:30

## 2025-07-31 RX ADMIN — POTASSIUM CHLORIDE 40 MEQ: 1500 TABLET, EXTENDED RELEASE ORAL at 02:01

## 2025-07-31 RX ADMIN — GABAPENTIN 800 MG: 400 CAPSULE ORAL at 05:30

## 2025-07-31 NOTE — PROGRESS NOTES
HEMATOLOGY/ONCOLOGY PROGRESS NOTE    Subjective      CC: Feels okay    SUBJECTIVE: Breathing stable, currently on supplemental oxygen.  Ongoing generalized edema.  Afebrile.      Past Medical History, Past Surgical History, Social History, Family History have been reviewed and are without significant changes except as mentioned.      Medications:  The current medication list was reviewed in the EMR    ALLERGIES:   Allergies   Allergen Reactions    Erythromycin Other (See Comments)    Pholcodine Other (See Comments)    Penicillins Rash     Childhood        ROS:  A comprehensive 14 point review of systems was performed and was negative except as mentioned.    Objective      Vitals:    07/31/25 0525 07/31/25 0600 07/31/25 0705 07/31/25 1149   BP:   113/68 127/74   BP Location:   Left leg Left leg   Patient Position:   Lying Lying   Pulse: (!) 127 112 114 111   Resp:   16 16   Temp:   97.4 °F (36.3 °C) 98.5 °F (36.9 °C)   TempSrc:   Oral Oral   SpO2: 99% 94% 98% 95%   Weight:       Height:           ECOG: (2) Ambulatory & Capable of Self Care, Unable to Carry Out Work Activity, Up & About Greater Than 50% of Waking Hours    General: well appearing, in no acute distress  Cardiovascular: tachycardia  Lungs: respirations even and unlabored on supplemental oxygen  Extremities: generalized edema  Skin: no rashes, lesions, bruising, or petechiae  Neuro: Alert and oriented x 3. Moves all extremities.    RECENT LABS:    Results from last 7 days   Lab Units 07/30/25  1603 07/29/25  1758   WBC 10*3/mm3 2.32* 0.92*   HEMOGLOBIN g/dL 9.8* 9.7*   PLATELETS 10*3/mm3 127* 157     Results from last 7 days   Lab Units 07/31/25  1119 07/30/25  1603 07/29/25  1758   SODIUM mmol/L  --  138 135*   POTASSIUM mmol/L 4.0 3.6 4.0   CO2 mmol/L  --  23.9 25.0   BUN mg/dL  --  12.4 12.7   CREATININE mg/dL  --  0.99 0.82   GLUCOSE mg/dL  --  124* 99     Results from last 7 days   Lab Units 07/30/25  1603 07/29/25  1758   AST (SGOT) U/L 25 25   ALT  (SGPT) U/L 38* 35*   BILIRUBIN mg/dL 0.4 0.5   ALK PHOS U/L 90 91         XR Chest 1 View  Result Date: 7/30/2025  Impression: Bibasilar atelectasis or scarring. No other significant cardiopulmonary abnormality. Electronically Signed: Hansa Reyna MD  7/30/2025 3:43 PM EDT  Workstation ID: [id number]    CT Angiogram Chest Pulmonary Embolism  Result Date: 7/29/2025  Impression: 1.No evidence of pulmonary embolism. 2.No acute findings in the chest. 3.Moderate to severe hepatic steatosis. 4.Nonobstructing left renal stones. Electronically Signed: Huseyin Alston DO  7/29/2025 7:22 PM EDT  Workstation ID: [id number]      Assessment   ASSESSMENT & PLAN:  Bacteremia secondary to Gram variable bacilli  Breast cancer status post recent chemotherapy    -Labs reviewed from yesterday and stable.  White count 2.32, ANC 1580, hemoglobin 9.8, and platelets 127.  -ID consulted and remains on IV antibiotics.  She is afebrile.  -She has completed 2 cycles of AC.  She has follow up with surgeon next week.  Further treatment pending resolution of symptoms but may decide to forego her final 2 cycles.    -Oncology service will continue to follow.       Elsy Rizzo, APRN    7/31/2025

## 2025-07-31 NOTE — CASE MANAGEMENT/SOCIAL WORK
Discharge Planning Assessment  [facility]     Patient Name: Brooke Mendez  MRN: [id number]  Today's Date: 7/31/2025    Admit Date: 7/30/2025    Plan: home   Discharge Needs Assessment       Row Name 07/31/25 0829       Living Environment    People in Home child(joe), adult    Name(s) of People in Home son    Primary Care Provided by self;parent(s)    Quality of Family Relationships helpful;involved;supportive       Transition Planning    Patient/Family Anticipates Transition to home with family       Discharge Needs Assessment    Readmission Within the Last 30 Days no previous admission in last 30 days    Equipment Currently Used at Home none    Concerns to be Addressed basic needs;discharge planning                   Discharge Plan       Row Name 07/31/25 0830       Plan    Plan home    Patient/Family in Agreement with Plan yes    Plan Comments I met with this patient bedside. She lives with her adult son in [facility]. She is independent with mobility. She needs assistance with all activities of daily living. She does go to OP oncology at [facility] every 3 weeks for chemotherapy. She anticipates returning home after this hospitalization, and her mother can transport. Case management will follow.    Final Discharge Disposition Code 01 - home or self-care                  Continued Care and Services - Admitted Since 7/30/2025    No active coordination exists.          Demographic Summary       Row Name 07/31/25 0829       General Information    General Information Comments I confirmed that Sheldon Starkey is Ms mendez's PCP and she has Caitie BC                   Functional Status       Row Name 07/31/25 0829       Functional Status, IADL    Medications independent    Meal Preparation completely dependent    Housekeeping completely dependent    Laundry completely dependent    Shopping completely dependent                   Psychosocial    No documentation.                  Abuse/Neglect    No documentation.                   Legal    No documentation.                  Substance Abuse    No documentation.                  Patient Forms    No documentation.                     Malia Vogel RN

## 2025-07-31 NOTE — PROGRESS NOTES
[facility] Medicine Services  PROGRESS NOTE    Patient Name: Brooke Mendez  : 1985  MRN: [id number]    Date of Admission: 2025  Primary Care Physician: Sheldon Starkey DO    Subjective   Subjective     CC:  Blood culture +    HPI:  Patient is resting in bed, her family present at bedside. She reports doing well, but has had some intermittent congestion prior to admission. Otherwise feeling good.     ROS  As above      Objective   Objective     Vital Signs:   Temp:  [97.4 °F (36.3 °C)-98.4 °F (36.9 °C)] 97.4 °F (36.3 °C)  Heart Rate:  [] 114  Resp:  [16-18] 16  BP: (113-136)/(63-84) 113/68  Flow (L/min) (Oxygen Therapy):  [2] 2     Physical Exam:  GEN: NAD, resting in bed, awake  HEENT: on room air, atraumatic, normocephalic, + cervial/supraclav ERNESTO, port in place over right chest  NECK: supple, no masses  RESP: on room air, normal effort  CV: on tele, sinus rhythm  PSYCH: normal affect, appropriate  NEURO: awake, alert, no focal deficits noted  MSK: 2+ edema noted all ext   SKIN: no rashes noted       Results Reviewed:  LAB RESULTS:      Lab 25  1840 25  1603 07/29/25  2022 07/29/25  1758   WBC  --  2.32*  --  0.92*   HEMOGLOBIN  --  9.8*  --  9.7*   HEMATOCRIT  --  29.0*  --  29.1*   PLATELETS  --  127*  --  157   NEUTROS ABS  --  1.58*  --  0.07*   EOS ABS  --  0.00  --  0.00   MCV  --  106.2*  --  108.2*   PROCALCITONIN  --  0.19  --  0.19   LACTATE 1.6 3.9*  --  2.2*   PROTIME  --   --   --  14.0   APTT  --   --   --  21.4*   HSTROP T  --   --  7 7         Lab 25  1603 25  1758   SODIUM 138 135*   POTASSIUM 3.6 4.0   CHLORIDE 99 97*   CO2 23.9 25.0   ANION GAP 15.1* 13.0   BUN 12.4 12.7   CREATININE 0.99 0.82   EGFR 73.6 92.3   GLUCOSE 124* 99   CALCIUM 9.5 9.3         Lab 25  1603 25  1758   TOTAL PROTEIN 7.3 6.9   ALBUMIN 4.3 4.1   GLOBULIN 3.0 2.8   ALT (SGPT) 38* 35*   AST (SGOT) 25 25   BILIRUBIN 0.4 0.5   ALK PHOS  90 91   LIPASE  --  14         Lab 07/29/25 2022 07/29/25  1758   PROBNP  --  <36.0   HSTROP T 7 7   PROTIME  --  14.0   INR  --  1.02                 Brief Urine Lab Results  (Last result in the past 365 days)        Color   Clarity   Blood   Leuk Est   Nitrite   Protein   CREAT   Urine HCG        07/15/25 1213 Yellow   Clear   Negative   Trace   Negative   Negative                   Microbiology Results Abnormal       None            XR Chest 1 View  Result Date: 7/30/2025  XR CHEST 1 VW Date of Exam: 7/30/2025 3:22 PM EDT Indication: short of breath Comparison: None available. Findings: Cardiomediastinal silhouette is unremarkable.  No airspace disease, pneumothorax, nor pleural effusion. Subsegmental atelectasis or scarring at lung bases. Right chest wall port catheter with the tip in the SVC. No acute osseous abnormality identified.     Impression: Impression: Bibasilar atelectasis or scarring. No other significant cardiopulmonary abnormality. Electronically Signed: Hansa Reyna MD  7/30/2025 3:43 PM EDT  Workstation ID: [id number]    CT Angiogram Chest Pulmonary Embolism  Result Date: 7/29/2025  CT ANGIOGRAM CHEST PULMONARY EMBOLISM Date of Exam: 7/29/2025 6:50 PM EDT Indication: Pulmonary Embolism - right sided chest pain, metastatic breast cancer, known superificial clots. Comparison: None available. Technique: Axial CT images were obtained of the chest after the uneventful intravenous administration of 85 cc Isovue-370 utilizing pulmonary embolism protocol.  In addition, a 3-D volume rendered image was created for interpretation.  Reconstructed coronal and sagittal images were also obtained. Automated exposure control and iterative construction methods were used. Findings: Pulmonary arteries:Adequate opacification of the pulmonary arteries. No evidence of acute pulmonary embolism. Aorta: Unremarkable. Lungs and Pleura: Mild linear scarring/atelectasis noted within the lung bases. Otherwise lungs are clear.  The central airways are patent. No pleural effusion or pneumothorax. Mediastinum/Maria Dolores: No lymphadenopathy. No suspicious mass. Heart: Normal in size. No pericardial effusion. Normal RV/LV ratio. Soft Tissue: Unremarkable. Upper Abdomen: Moderate to severe hepatic steatosis. Small nonobstructing stones noted within the left kidney measuring up to 0.7 cm. Otherwise unremarkable Bones: No acute osseous abnormality.     Impression: Impression: 1.No evidence of pulmonary embolism. 2.No acute findings in the chest. 3.Moderate to severe hepatic steatosis. 4.Nonobstructing left renal stones. Electronically Signed: Huseyin Alston DO  7/29/2025 7:22 PM EDT  Workstation ID: [id number]      Results for orders placed during the hospital encounter of 07/17/25    Adult Transthoracic Echo Complete W/ Cont if Necessary Per Protocol 07/17/2025  4:37 PM    Interpretation Summary    Left ventricular systolic function is normal. Left ventricular ejection fraction appears to be 56 - 60%.    Left ventricular wall thickness is consistent with borderline concentric hypertrophy.    Global longitudinal LV strain (GLS) = -17.6%      Current medications:  Scheduled Meds:apixaban, 5 mg, Oral, BID  cefepime, 2,000 mg, Intravenous, Q8H  [Held by provider] furosemide, 20 mg, Oral, Daily  gabapentin, 800 mg, Oral, Q8H  lamoTRIgine, 200 mg, Oral, Daily  levothyroxine, 112 mcg, Oral, Q AM  metoprolol succinate XL, 50 mg, Oral, Daily  pantoprazole, 40 mg, Oral, Q AM  sodium chloride, 10 mL, Intravenous, Q12H  vancomycin, 1,250 mg, Intravenous, Q12H      Continuous Infusions:Pharmacy to dose vancomycin,       PRN Meds:.  acetaminophen **OR** acetaminophen **OR** acetaminophen    senna-docusate sodium **AND** polyethylene glycol **AND** bisacodyl **AND** bisacodyl    Calcium Replacement - Follow Nurse / BPA Driven Protocol    cyclobenzaprine    Magnesium Standard Dose Replacement - Follow Nurse / BPA Driven Protocol    ondansetron ODT **OR**  ondansetron    oxyCODONE    Pharmacy to dose vancomycin    Phosphorus Replacement - Follow Nurse / BPA Driven Protocol    Potassium Replacement - Follow Nurse / BPA Driven Protocol    [COMPLETED] Insert Peripheral IV **AND** sodium chloride    sodium chloride    sodium chloride    zolpidem    Assessment & Plan   Assessment & Plan     Active Hospital Problems    Diagnosis  POA    **Positive blood culture [R78.81]  Yes    Essential hypertension [I10]  Yes    On antineoplastic chemotherapy and adjuvant immunotherapy regimen [Z79.69]  Not Applicable    Thyroid disease [E07.9]  Yes    Malignant neoplasm of left breast in female, estrogen receptor negative [C50.912, Z17.1]  Not Applicable      Resolved Hospital Problems   No resolved problems to display.        Brief Hospital Course to date:  Brooke Mendez is a 40 y.o. female currently receiving chemotherapy w/ Dr. Jackson called to come back to hospital due to + blood cultures. She came to ED 7/29 feeling poorly with tachycardia. Rec'd IVF and improved and was d/c home. Called to come back today after blood culture returned +. She says she feels better today than she did yesterday and has no HA, fever, cough, SOA, abd pain, n/v/d, dysuria. Port without redness/drainage     Gram variable + blood cultures  Immunosuppressed on chemotherapy  Pancytopenia  Elevated lactate- resolved   -Unclear significance of cultures w/ neg BCID , culture itself with bacillus specius- repeat cultures are pending. Will continue vanc and cefepime for now.   -ID/oncology following-appreciate   -Rec'd IVF overnight with normalization of lactate. Suspect she may have been intravascularly dry due to taking lasix for peripheral edema      RUE Superficial venous thrombosis  -Diagnosed recently as outpt - continue eliquis    Expected Discharge Location and Transportation: pending ID recs   Expected Discharge   Expected discharge date/ time has not been documented.     VTE  Prophylaxis:  Pharmacologic VTE prophylaxis orders are present.         AM-PAC 6 Clicks Score (PT): 24 (07/30/25 2026)    CODE STATUS:   Code Status and Medical Interventions: CPR (Attempt to Resuscitate); Full Support   Ordered at: 07/30/25 1857     Code Status (Patient has no pulse and is not breathing):    CPR (Attempt to Resuscitate)     Medical Interventions (Patient has pulse or is breathing):    Full Support       Katty Maher MD  07/31/25

## 2025-07-31 NOTE — PLAN OF CARE
Problem: Sepsis/Septic Shock  Goal: Optimal Coping  Outcome: Progressing  Goal: Absence of Bleeding  Outcome: Progressing  Goal: Blood Glucose Level Within Target Range  Outcome: Progressing  Goal: Absence of Infection Signs and Symptoms  Outcome: Progressing  Intervention: Initiate Sepsis Management  Recent Flowsheet Documentation  Taken 7/31/2025 0600 by Linda Anderson RN  Infection Prevention:   environmental surveillance performed   hand hygiene promoted   personal protective equipment utilized   rest/sleep promoted  Taken 7/31/2025 0400 by Linda Anderson RN  Infection Prevention:   environmental surveillance performed   hand hygiene promoted   personal protective equipment utilized   rest/sleep promoted  Taken 7/31/2025 0200 by Linda Anderson RN  Infection Prevention:   environmental surveillance performed   hand hygiene promoted   personal protective equipment utilized   rest/sleep promoted  Taken 7/31/2025 0000 by Linda Anderson RN  Infection Prevention:   environmental surveillance performed   hand hygiene promoted   personal protective equipment utilized   rest/sleep promoted  Taken 7/30/2025 2200 by Linda Anderson RN  Infection Prevention:   environmental surveillance performed   hand hygiene promoted   personal protective equipment utilized   rest/sleep promoted  Taken 7/30/2025 2027 by Linda Anderson RN  Infection Prevention:   environmental surveillance performed   hand hygiene promoted   personal protective equipment utilized   rest/sleep promoted  Intervention: Promote Recovery  Recent Flowsheet Documentation  Taken 7/31/2025 0600 by Linda Anderson RN  Activity Management: activity encouraged  Taken 7/31/2025 0400 by Linda Anderson RN  Activity Management: activity encouraged  Taken 7/31/2025 0200 by Linda Anderson RN  Activity Management: activity encouraged  Taken 7/31/2025 0000 by Linda Anderson RN  Activity Management: activity encouraged  Taken  7/30/2025 2200 by Linda Anderson, RN  Activity Management:   activity encouraged   ambulated to bathroom  Taken 7/30/2025 2027 by Linda Anderson, RN  Activity Management: activity encouraged  Goal: Optimal Nutrition Delivery  Outcome: Progressing     Problem: Adult Inpatient Plan of Care  Goal: Plan of Care Review  Outcome: Progressing  Flowsheets (Taken 7/31/2025 0621)  Progress: no change  Outcome Evaluation: VSS on RA, required 2L with sleep. + sepsis screen. IVF and abx infused as ordered. On eliquis for known RUE DVT. Hem/Onc consult, ID consult. Repeat blood cultures pending.  Plan of Care Reviewed With: patient  Goal: Patient-Specific Goal (Individualized)  Outcome: Progressing  Goal: Absence of Hospital-Acquired Illness or Injury  Outcome: Progressing  Intervention: Identify and Manage Fall Risk  Recent Flowsheet Documentation  Taken 7/31/2025 0600 by Linda Anderson RN  Safety Promotion/Fall Prevention:   activity supervised   clutter free environment maintained   assistive device/personal items within reach   fall prevention program maintained   lighting adjusted   nonskid shoes/slippers when out of bed   room organization consistent   safety round/check completed  Taken 7/31/2025 0400 by Linda Anderson RN  Safety Promotion/Fall Prevention:   activity supervised   assistive device/personal items within reach   clutter free environment maintained   fall prevention program maintained   lighting adjusted   nonskid shoes/slippers when out of bed   room organization consistent   safety round/check completed  Taken 7/31/2025 0200 by Linda Anderson, RN  Safety Promotion/Fall Prevention:   activity supervised   assistive device/personal items within reach   clutter free environment maintained   fall prevention program maintained   lighting adjusted   nonskid shoes/slippers when out of bed   room organization consistent   safety round/check completed  Taken 7/31/2025 0000 by Linda Anderson,  RN  Safety Promotion/Fall Prevention:   activity supervised   assistive device/personal items within reach   clutter free environment maintained   fall prevention program maintained   lighting adjusted   nonskid shoes/slippers when out of bed   room organization consistent   safety round/check completed  Taken 7/30/2025 2200 by Linda Anderson RN  Safety Promotion/Fall Prevention:   activity supervised   assistive device/personal items within reach   clutter free environment maintained   fall prevention program maintained   lighting adjusted   nonskid shoes/slippers when out of bed   room organization consistent   safety round/check completed  Taken 7/30/2025 2027 by Linda Anderson RN  Safety Promotion/Fall Prevention:   activity supervised   assistive device/personal items within reach   clutter free environment maintained   fall prevention program maintained   lighting adjusted   nonskid shoes/slippers when out of bed   room organization consistent   safety round/check completed  Intervention: Prevent Skin Injury  Recent Flowsheet Documentation  Taken 7/31/2025 0600 by Linda Anderson RN  Body Position: position changed independently  Skin Protection: transparent dressing maintained  Taken 7/31/2025 0400 by Linda Anderson RN  Body Position: position changed independently  Skin Protection: transparent dressing maintained  Taken 7/31/2025 0200 by Linda Anderson RN  Body Position: position changed independently  Skin Protection: transparent dressing maintained  Taken 7/31/2025 0000 by Linda Anderson RN  Body Position: position changed independently  Skin Protection: transparent dressing maintained  Taken 7/30/2025 2200 by Linda Anderson RN  Body Position: position changed independently  Skin Protection: transparent dressing maintained  Taken 7/30/2025 2027 by Linda Anderson RN  Body Position: position changed independently  Skin Protection: transparent dressing  maintained  Intervention: Prevent and Manage VTE (Venous Thromboembolism) Risk  Recent Flowsheet Documentation  Taken 7/31/2025 0600 by Linda Anderson RN  VTE Prevention/Management: (see MAR) other (see comments)  Taken 7/31/2025 0400 by Linda Anderson RN  VTE Prevention/Management: (see MAR) other (see comments)  Taken 7/31/2025 0200 by Linda Anderson RN  VTE Prevention/Management: (see MAR) other (see comments)  Taken 7/31/2025 0000 by Linda Anderson RN  VTE Prevention/Management: (see MAR) other (see comments)  Taken 7/30/2025 2200 by Linda Anderson RN  VTE Prevention/Management: (see MAR) other (see comments)  Taken 7/30/2025 2027 by Linda Anderson RN  VTE Prevention/Management: (see MAR) other (see comments)  Intervention: Prevent Infection  Recent Flowsheet Documentation  Taken 7/31/2025 0600 by Linda Anderson RN  Infection Prevention:   environmental surveillance performed   hand hygiene promoted   personal protective equipment utilized   rest/sleep promoted  Taken 7/31/2025 0400 by Linda Anderson RN  Infection Prevention:   environmental surveillance performed   hand hygiene promoted   personal protective equipment utilized   rest/sleep promoted  Taken 7/31/2025 0200 by Linda Anderson RN  Infection Prevention:   environmental surveillance performed   hand hygiene promoted   personal protective equipment utilized   rest/sleep promoted  Taken 7/31/2025 0000 by Linda Anderson RN  Infection Prevention:   environmental surveillance performed   hand hygiene promoted   personal protective equipment utilized   rest/sleep promoted  Taken 7/30/2025 2200 by Linda Anderson RN  Infection Prevention:   environmental surveillance performed   hand hygiene promoted   personal protective equipment utilized   rest/sleep promoted  Taken 7/30/2025 2027 by Linda Anderson RN  Infection Prevention:   environmental surveillance performed   hand hygiene promoted   personal  protective equipment utilized   rest/sleep promoted  Goal: Optimal Comfort and Wellbeing  Outcome: Progressing  Intervention: Monitor Pain and Promote Comfort  Recent Flowsheet Documentation  Taken 7/31/2025 0530 by Linda Anderson RN  Pain Management Interventions: pain medication given  Taken 7/30/2025 2141 by Linda Anderson RN  Pain Management Interventions: pain medication given  Goal: Readiness for Transition of Care  Outcome: Progressing  Intervention: Mutually Develop Transition Plan  Recent Flowsheet Documentation  Taken 7/30/2025 2029 by Linda Anderson RN  Transportation Anticipated: family or friend will provide  Patient/Family Anticipated Services at Transition:   Patient/Family Anticipates Transition to: home  Taken 7/30/2025 2025 by Linda Anderson RN  Equipment Currently Used at Home: none     Problem: Infection  Goal: Absence of Infection Signs and Symptoms  Outcome: Progressing     Problem: Pain Acute  Goal: Optimal Pain Control and Function  Outcome: Progressing  Intervention: Develop Pain Management Plan  Recent Flowsheet Documentation  Taken 7/31/2025 0530 by Linda Anderson RN  Pain Management Interventions: pain medication given  Taken 7/30/2025 2141 by Linda Anderson RN  Pain Management Interventions: pain medication given  Intervention: Prevent or Manage Pain  Recent Flowsheet Documentation  Taken 7/31/2025 0600 by Linda Anderson RN  Medication Review/Management: medications reviewed  Taken 7/31/2025 0400 by Linda Anderson RN  Medication Review/Management: medications reviewed  Taken 7/31/2025 0200 by Linda Anderson RN  Medication Review/Management: medications reviewed  Taken 7/31/2025 0000 by Linda Anderson RN  Medication Review/Management: medications reviewed  Taken 7/30/2025 2200 by Linda Anderson RN  Medication Review/Management: medications reviewed  Taken 7/30/2025 2027 by Linda Anderson, RN  Medication  Review/Management: medications reviewed     Problem: Nausea and Vomiting  Goal: Nausea and Vomiting Relief  Outcome: Progressing   Goal Outcome Evaluation:  Plan of Care Reviewed With: patient        Progress: no change  Outcome Evaluation: VSS on RA, required 2L with sleep. + sepsis screen. IVF and abx infused as ordered. On eliquis for known RUE DVT. Hem/Onc consult, ID consult. Repeat blood cultures pending.

## 2025-07-31 NOTE — PROGRESS NOTES
"Pharmacy Consult - Vancomycin Dosing and Monitoring    Brooke Mendez is a 40 y.o. female receiving vancomycin therapy.     Indication: Bacteremia, pneumonia   Consulting Provider: Dr. Zapien  ID Consult: Yes    Goal AUC: 400-600 mg/L*hr    Current Antimicrobial Therapy  Anti-Infectives (From admission, onward)      Ordered     Dose/Rate Route Frequency Start Stop    07/30/25 2005  cefepime 2000 mg IVPB in 100 mL NS (MBP)        Ordering Provider: Shilpa Zapien II, DO    2,000 mg  over 4 Hours Intravenous Every 8 Hours 07/31/25 0100 08/03/25 0059    07/30/25 2005  Pharmacy to dose vancomycin        Ordering Provider: Shilpa Zapien II,      Not Applicable Continuous PRN 07/30/25 2005 08/04/25 2004 07/30/25 1508  vancomycin IVPB 2000 mg in 0.9% Sodium Chloride 500 mL        Ordering Provider: Gab Euceda MD    20 mg/kg × 104 kg  250 mL/hr over 120 Minutes Intravenous Once 07/30/25 1524 07/30/25 1958    07/30/25 1508  cefepime 2000 mg IVPB in 100 mL NS (MBP)        Ordering Provider: Gab Euceda MD    2,000 mg  over 30 Minutes Intravenous Once 07/30/25 1524 07/30/25 1804          Allergies  Allergies as of 07/30/2025 - Reviewed 07/30/2025   Allergen Reaction Noted    Erythromycin Other (See Comments) 03/05/2025    Pholcodine Other (See Comments) 03/05/2025    Penicillins Rash 03/05/2025     Labs  Results from last 7 days   Lab Units 07/30/25  1603 07/29/25  1758   BUN mg/dL 12.4 12.7   CREATININE mg/dL 0.99 0.82     Results from last 7 days   Lab Units 07/30/25  1603 07/29/25  1758   WBC 10*3/mm3 2.32* 0.92*     Evaluation of Dosing     Last Dose Received in the ED/Outside Facility: Vancomycin 2000 mg @ 1758  Is Patient on Dialysis or Renal Replacement: No    Height - 175.3 cm (69\")  Weight - 104 kg (230 lb)    Estimated Creatinine Clearance: 96 mL/min (by C-G formula based on SCr of 0.99 mg/dL).    I/O last 3 completed shifts:  In: 100 [IV Piggyback:100]  Out: -     Microbiology and " Radiology  Microbiology Results (last 10 days)       Procedure Component Value - Date/Time    MRSA Screen, PCR (Inpatient) - Swab, Nares [[id number]]  (Normal) Collected: 07/30/25 1837    Lab Status: Final result Specimen: Swab from Nares Updated: 07/30/25 2004     MRSA PCR Negative    Narrative:      The negative predictive value of this diagnostic test is high and should only be used to consider de-escalating anti-MRSA therapy. A positive result may indicate colonization with MRSA and must be correlated clinically.  MRSA Negative    Blood Culture - Blood, Arm, Left [[id number]]  (Abnormal) Collected: 07/29/25 2016    Lab Status: Preliminary result Specimen: Blood from Arm, Left Updated: 07/30/25 1306     Blood Culture Abnormal Stain     Gram Stain Aerobic Bottle Gram variable bacilli    Blood Culture ID, PCR - Blood, Arm, Left [[id number]] Collected: 07/29/25 2016    Lab Status: Final result Specimen: Blood from Arm, Left Updated: 07/30/25 1307     BCID, PCR Negative by BCID PCR. Culture to Follow.     BOTTLE TYPE Aerobic Bottle    COVID PRE-OP / PRE-PROCEDURE SCREENING ORDER (NO ISOLATION) - Swab, Nasal Cavity [[id number]]  (Normal) Collected: 07/29/25 1935    Lab Status: Final result Specimen: Swab from Nasal Cavity Updated: 07/29/25 2046    Narrative:      The following orders were created for panel order COVID PRE-OP / PRE-PROCEDURE SCREENING ORDER (NO ISOLATION) - Swab, Nasal Cavity.  Procedure                               Abnormality         Status                     ---------                               -----------         ------                     Respiratory Panel PCR w/...[[id number]]  Normal              Final result                 Please view results for these tests on the individual orders.    Respiratory Panel PCR w/COVID-19(SARS-CoV-2) EFREN/JENNIFER/MICHELLE/PAD/COR/CASSY In-House, NP Swab in UTM/VTM, 2 HR TAT - Swab, Nasopharynx [[id number]]  (Normal) Collected: 07/29/25 1935    Lab Status: Final result  Specimen: Swab from Nasopharynx Updated: 07/29/25 2046     ADENOVIRUS, PCR Not Detected     Coronavirus 229E Not Detected     Coronavirus HKU1 Not Detected     Coronavirus NL63 Not Detected     Coronavirus OC43 Not Detected     COVID19 Not Detected     Human Metapneumovirus Not Detected     Human Rhinovirus/Enterovirus Not Detected     Influenza A PCR Not Detected     Influenza B PCR Not Detected     Parainfluenza Virus 1 Not Detected     Parainfluenza Virus 2 Not Detected     Parainfluenza Virus 3 Not Detected     Parainfluenza Virus 4 Not Detected     RSV, PCR Not Detected     Bordetella pertussis pcr Not Detected     Bordetella parapertussis PCR Not Detected     Chlamydophila pneumoniae PCR Not Detected     Mycoplasma pneumo by PCR Not Detected    Narrative:      In the setting of a positive respiratory panel with a viral infection PLUS a negative procalcitonin without other underlying concern for bacterial infection, consider observing off antibiotics or discontinuation of antibiotics and continue supportive care. If the respiratory panel is positive for atypical bacterial infection (Bordetella pertussis, Chlamydophila pneumoniae, or Mycoplasma pneumoniae), consider antibiotic de-escalation to target atypical bacterial infection.    Respiratory Panel PCR w/COVID-19(SARS-CoV-2) EFREN/JENNIFER/MICHELLE/PAD/COR/CASSY In-House, NP Swab in UTM/VTM, 2 HR TAT - Swab, Nasopharynx [[id number]]  (Normal) Collected: 07/22/25 0910    Lab Status: Final result Specimen: Swab from Nasopharynx Updated: 07/22/25 1042     ADENOVIRUS, PCR Not Detected     Coronavirus 229E Not Detected     Coronavirus HKU1 Not Detected     Coronavirus NL63 Not Detected     Coronavirus OC43 Not Detected     COVID19 Not Detected     Human Metapneumovirus Not Detected     Human Rhinovirus/Enterovirus Not Detected     Influenza A PCR Not Detected     Influenza B PCR Not Detected     Parainfluenza Virus 1 Not Detected     Parainfluenza Virus 2 Not Detected      Parainfluenza Virus 3 Not Detected     Parainfluenza Virus 4 Not Detected     RSV, PCR Not Detected     Bordetella pertussis pcr Not Detected     Bordetella parapertussis PCR Not Detected     Chlamydophila pneumoniae PCR Not Detected     Mycoplasma pneumo by PCR Not Detected    Narrative:      In the setting of a positive respiratory panel with a viral infection PLUS a negative procalcitonin without other underlying concern for bacterial infection, consider observing off antibiotics or discontinuation of antibiotics and continue supportive care. If the respiratory panel is positive for atypical bacterial infection (Bordetella pertussis, Chlamydophila pneumoniae, or Mycoplasma pneumoniae), consider antibiotic de-escalation to target atypical bacterial infection.    Rapid Strep A Screen - Swab, Throat [[id number]]  (Normal) Collected: 07/22/25 0910    Lab Status: Final result Specimen: Swab from Throat Updated: 07/22/25 0925     Strep A Ag Negative    Narrative:      Test performed by Direct Antigen Testing.    Beta Strep Culture, Throat - Swab, Throat [[id number]]  (Normal) Collected: 07/22/25 0910    Lab Status: Final result Specimen: Swab from Throat Updated: 07/24/25 1112     Throat Culture, Beta Strep No Beta Hemolytic Streptococcus Isolated at 2 days    Narrative:      Group A Strep incidence is low in adults. Positive culture for Beta hemolytic Streptococcus species can reflect colonization and not true infection. Please correlate clinically.              [employer] AUC Calculation:    Current AUC: -- mg/L*hr    Predicted Steady State AUC on Current Dose: -- mg/L*hr  _________________________________    Predicted Steady State AUC on New Dose: 542 mg/L*hr    Assessment/Plan:     Pharmacy consulted to dose vancomycin for bacteremia/pneumonia, goal -600 mg/L*hr.  Patient loaded with vancomycin 2000 ( ~ 20 mg/kg) x1 7/30 @ 1758.   Repeat culture data is pending. Of note, previous cultures with negative BCID and  MRSA PCR also negative.   Serum creatinine is 0.99, BUN is 12.4, and WBC is 2.32.  Based on evaluation, initiate a maintenance regimen of vancomycin 1250 mg ( ~ 12 mg/kg) IV q12h beginning 7/31 @ 0600.   A vancomycin random will be assessed on 8/1 @ 1200.  Will continue to follow and adjust vancomycin dose as needed based on renal function, cultures, and patient clinical status.    Thank you,      Christian Mesa [facility]  7/30/2025  20:41 EDT

## 2025-07-31 NOTE — PLAN OF CARE
Goal Outcome Evaluation:  Plan of Care Reviewed With: patient           Outcome Evaluation: Pt a/o, VSS, 2 LNC, sat. 98%, c/o pain addressed with PRN meds, edema noted, MD notified, see orders. Continue monitoring.

## 2025-08-01 LAB
ALBUMIN SERPL-MCNC: 3.5 G/DL (ref 3.5–5.2)
ALBUMIN/GLOB SERPL: 1.5 G/DL
ALP SERPL-CCNC: 91 U/L (ref 39–117)
ALT SERPL W P-5'-P-CCNC: 24 U/L (ref 1–33)
ANION GAP SERPL CALCULATED.3IONS-SCNC: 12 MMOL/L (ref 5–15)
AST SERPL-CCNC: 19 U/L (ref 1–32)
BASOPHILS # BLD MANUAL: 0.07 10*3/MM3 (ref 0–0.2)
BASOPHILS NFR BLD MANUAL: 1 % (ref 0–1.5)
BILIRUB SERPL-MCNC: 0.2 MG/DL (ref 0–1.2)
BUN SERPL-MCNC: 7.8 MG/DL (ref 6–20)
BUN/CREAT SERPL: 9.3 (ref 7–25)
CALCIUM SPEC-SCNC: 8.5 MG/DL (ref 8.6–10.5)
CHLORIDE SERPL-SCNC: 103 MMOL/L (ref 98–107)
CO2 SERPL-SCNC: 23 MMOL/L (ref 22–29)
CREAT SERPL-MCNC: 0.84 MG/DL (ref 0.57–1)
DEPRECATED RDW RBC AUTO: 68.1 FL (ref 37–54)
EGFRCR SERPLBLD CKD-EPI 2021: 89.7 ML/MIN/1.73
EOSINOPHIL # BLD MANUAL: 0 10*3/MM3 (ref 0–0.4)
EOSINOPHIL NFR BLD MANUAL: 0 % (ref 0.3–6.2)
ERYTHROCYTE [DISTWIDTH] IN BLOOD BY AUTOMATED COUNT: 15.3 % (ref 12.3–15.4)
GLOBULIN UR ELPH-MCNC: 2.4 GM/DL
GLUCOSE SERPL-MCNC: 112 MG/DL (ref 65–99)
HCT VFR BLD AUTO: 29.2 % (ref 34–46.6)
HGB BLD-MCNC: 8.7 G/DL (ref 12–15.9)
LYMPHOCYTES # BLD MANUAL: 1.16 10*3/MM3 (ref 0.7–3.1)
LYMPHOCYTES NFR BLD MANUAL: 5 % (ref 5–12)
MACROCYTES BLD QL SMEAR: ABNORMAL
MCH RBC QN AUTO: 36 PG (ref 26.6–33)
MCHC RBC AUTO-ENTMCNC: 29.8 G/DL (ref 31.5–35.7)
MCV RBC AUTO: 120.7 FL (ref 79–97)
METAMYELOCYTES NFR BLD MANUAL: 17 % (ref 0–0)
MONOCYTES # BLD: 0.36 10*3/MM3 (ref 0.1–0.9)
MYELOCYTES NFR BLD MANUAL: 2 % (ref 0–0)
NEUTROPHILS # BLD AUTO: 4.27 10*3/MM3 (ref 1.7–7)
NEUTROPHILS NFR BLD MANUAL: 27 % (ref 42.7–76)
NEUTS BAND NFR BLD MANUAL: 32 % (ref 0–5)
NRBC SPEC MANUAL: 2 /100 WBC (ref 0–0.2)
PLATELET # BLD AUTO: 113 10*3/MM3 (ref 140–450)
PMV BLD AUTO: 11.3 FL (ref 6–12)
POTASSIUM SERPL-SCNC: 3.4 MMOL/L (ref 3.5–5.2)
POTASSIUM SERPL-SCNC: 3.7 MMOL/L (ref 3.5–5.2)
PROT SERPL-MCNC: 5.9 G/DL (ref 6–8.5)
QT INTERVAL: 338 MS
QT INTERVAL: 350 MS
QTC INTERVAL: 444 MS
QTC INTERVAL: 446 MS
RBC # BLD AUTO: 2.42 10*6/MM3 (ref 3.77–5.28)
SMALL PLATELETS BLD QL SMEAR: ABNORMAL
SODIUM SERPL-SCNC: 138 MMOL/L (ref 136–145)
TOXIC GRANULATION: ABNORMAL
VANCOMYCIN SERPL-MCNC: 18.8 MCG/ML (ref 5–40)
VARIANT LYMPHS NFR BLD MANUAL: 16 % (ref 19.6–45.3)
WBC NRBC COR # BLD AUTO: 7.23 10*3/MM3 (ref 3.4–10.8)

## 2025-08-01 PROCEDURE — 25810000003 SODIUM CHLORIDE 0.9 % SOLUTION 250 ML FLEX CONT

## 2025-08-01 PROCEDURE — 99232 SBSQ HOSP IP/OBS MODERATE 35: CPT | Performed by: INTERNAL MEDICINE

## 2025-08-01 PROCEDURE — 84132 ASSAY OF SERUM POTASSIUM: CPT | Performed by: INTERNAL MEDICINE

## 2025-08-01 PROCEDURE — 25010000002 VANCOMYCIN HCL 1.25 G RECONSTITUTED SOLUTION 1 EACH VIAL

## 2025-08-01 PROCEDURE — 85007 BL SMEAR W/DIFF WBC COUNT: CPT | Performed by: INTERNAL MEDICINE

## 2025-08-01 PROCEDURE — 80202 ASSAY OF VANCOMYCIN: CPT

## 2025-08-01 PROCEDURE — 85025 COMPLETE CBC W/AUTO DIFF WBC: CPT | Performed by: INTERNAL MEDICINE

## 2025-08-01 PROCEDURE — 97162 PT EVAL MOD COMPLEX 30 MIN: CPT

## 2025-08-01 PROCEDURE — 80053 COMPREHEN METABOLIC PANEL: CPT | Performed by: INTERNAL MEDICINE

## 2025-08-01 RX ORDER — FUROSEMIDE 20 MG/1
20 TABLET ORAL DAILY
Status: DISCONTINUED | OUTPATIENT
Start: 2025-08-01 | End: 2025-08-02 | Stop reason: HOSPADM

## 2025-08-01 RX ORDER — POTASSIUM CHLORIDE 1500 MG/1
40 TABLET, EXTENDED RELEASE ORAL EVERY 4 HOURS
Status: COMPLETED | OUTPATIENT
Start: 2025-08-01 | End: 2025-08-01

## 2025-08-01 RX ADMIN — FAMOTIDINE 20 MG: 20 TABLET, FILM COATED ORAL at 08:44

## 2025-08-01 RX ADMIN — METOPROLOL SUCCINATE 50 MG: 50 TABLET, EXTENDED RELEASE ORAL at 08:44

## 2025-08-01 RX ADMIN — GABAPENTIN 800 MG: 400 CAPSULE ORAL at 14:09

## 2025-08-01 RX ADMIN — FUROSEMIDE 20 MG: 20 TABLET ORAL at 10:29

## 2025-08-01 RX ADMIN — APIXABAN 5 MG: 5 TABLET, FILM COATED ORAL at 08:43

## 2025-08-01 RX ADMIN — VANCOMYCIN HYDROCHLORIDE 1250 MG: 1.25 INJECTION, POWDER, LYOPHILIZED, FOR SOLUTION INTRAVENOUS at 06:50

## 2025-08-01 RX ADMIN — ZOLPIDEM TARTRATE 10 MG: 5 TABLET ORAL at 22:04

## 2025-08-01 RX ADMIN — OXYCODONE 5 MG: 5 TABLET ORAL at 10:40

## 2025-08-01 RX ADMIN — OXYCODONE 5 MG: 5 TABLET ORAL at 22:51

## 2025-08-01 RX ADMIN — POTASSIUM CHLORIDE 40 MEQ: 1500 TABLET, EXTENDED RELEASE ORAL at 10:30

## 2025-08-01 RX ADMIN — LEVOTHYROXINE SODIUM 112 MCG: 112 TABLET ORAL at 06:56

## 2025-08-01 RX ADMIN — OXYCODONE 5 MG: 5 TABLET ORAL at 16:57

## 2025-08-01 RX ADMIN — VANCOMYCIN HYDROCHLORIDE 1250 MG: 1.25 INJECTION, POWDER, LYOPHILIZED, FOR SOLUTION INTRAVENOUS at 18:09

## 2025-08-01 RX ADMIN — Medication 10 ML: at 08:44

## 2025-08-01 RX ADMIN — GABAPENTIN 800 MG: 400 CAPSULE ORAL at 06:48

## 2025-08-01 RX ADMIN — APIXABAN 5 MG: 5 TABLET, FILM COATED ORAL at 22:04

## 2025-08-01 RX ADMIN — GABAPENTIN 400 MG: 400 CAPSULE ORAL at 22:04

## 2025-08-01 RX ADMIN — OXYCODONE 5 MG: 5 TABLET ORAL at 02:35

## 2025-08-01 RX ADMIN — POTASSIUM CHLORIDE 40 MEQ: 1500 TABLET, EXTENDED RELEASE ORAL at 14:09

## 2025-08-01 RX ADMIN — LAMOTRIGINE 200 MG: 100 TABLET ORAL at 08:43

## 2025-08-01 RX ADMIN — FAMOTIDINE 20 MG: 20 TABLET, FILM COATED ORAL at 16:57

## 2025-08-01 NOTE — PLAN OF CARE
Goal Outcome Evaluation:              Outcome Evaluation: Pt AOx4 during shift; VSS on 2L nasal canula; PRN pain medications administered during shift; Fall precautions in place and maintained; continue plan of care

## 2025-08-01 NOTE — PROGRESS NOTES
INFECTIOUS DISEASE CONSULT/INITIAL HOSPITAL VISIT    Brooke Mendez  1985  [id number]    Date of Consult: 2025    Admission Date: 2025      Requesting Provider: No ref. provider found  Evaluating Physician: Guero Lock MD    Reason for Consultation: Positive blood cultures    History of present illness:    Patient is a 40 y.o. female with breast cancer receiving neoadjuvant chemotherapy presented to [facility] emergency room for tachycardia and dyspnea.  Patient was given fluids and discharged home because of block positive blood culture for gram variable lilian patient had been admitted to the hospital.  Patient found to have a superficial thrombosis of right arm has a indwelling Mediport without any pain had a CT chest angio which excluded large pulmonary embolism.  Patient again denies fevers or chills denies headache fever cough dyspnea abdominal pain nausea vomit diarrhea or dysuria.    The blood cultures updated growing a bacillus speciesl and the additional Gram stain showed a aerobic Gram variable bacilli    25; doing well; no events overnight; no fever, rash, sore throat    Past Medical History:   Diagnosis Date    Breast cancer     Disease of thyroid gland     Hypertension        Past Surgical History:   Procedure Laterality Date     SECTION      TONSILLECTOMY         Family History   Problem Relation Age of Onset    Hypertension Mother     Diabetes Mother     Heart disease Father        Social History     Socioeconomic History    Marital status: Single   Tobacco Use    Smoking status: Former     Types: Cigarettes    Smokeless tobacco: Never   Vaping Use    Vaping status: Never Used   Substance and Sexual Activity    Alcohol use: Not Currently    Drug use: Defer    Sexual activity: Defer       Allergies   Allergen Reactions    Erythromycin Other (See Comments)    Pholcodine Other (See Comments)    Penicillins Rash     Childhood          Medication:    Current  Facility-Administered Medications:     acetaminophen (TYLENOL) tablet 650 mg, 650 mg, Oral, Q4H PRN, 650 mg at 07/31/25 0932 **OR** acetaminophen (TYLENOL) 160 MG/5ML oral solution 650 mg, 650 mg, Oral, Q4H PRN **OR** acetaminophen (TYLENOL) suppository 650 mg, 650 mg, Rectal, Q4H PRN, Shilpa Zapien II, DO    apixaban (ELIQUIS) tablet 5 mg, 5 mg, Oral, BID, RupaliShilpa II, DO, 5 mg at 08/01/25 0843    sennosides-docusate (PERICOLACE) 8.6-50 MG per tablet 2 tablet, 2 tablet, Oral, BID PRN, 2 tablet at 07/30/25 2141 **AND** polyethylene glycol (MIRALAX) packet 17 g, 17 g, Oral, Daily PRN **AND** bisacodyl (DULCOLAX) EC tablet 5 mg, 5 mg, Oral, Daily PRN **AND** bisacodyl (DULCOLAX) suppository 10 mg, 10 mg, Rectal, Daily PRN, Shilpa aZpien II, DO    Calcium Replacement - Follow Nurse / BPA Driven Protocol, , Not Applicable, PRN, Shilpa Zapien II, DO    cyclobenzaprine (FLEXERIL) tablet 10 mg, 10 mg, Oral, TID PRN, Shilpa Zapien II, DO    famotidine (PEPCID) tablet 20 mg, 20 mg, Oral, BID AC, Katty Maher MD, 20 mg at 08/01/25 0844    furosemide (LASIX) tablet 20 mg, 20 mg, Oral, Daily, Katty Maher MD, 20 mg at 08/01/25 1029    gabapentin (NEURONTIN) capsule 800 mg, 800 mg, Oral, Q8H, Shilpa Zapien II, DO, 800 mg at 08/01/25 1409    lamoTRIgine (LaMICtal) tablet 200 mg, 200 mg, Oral, Daily, Shilpa Zapien II, DO, 200 mg at 08/01/25 0843    levothyroxine (SYNTHROID, LEVOTHROID) tablet 112 mcg, 112 mcg, Oral, Q AM, Shilpa Zapien II, DO, 112 mcg at 08/01/25 0656    LORazepam (ATIVAN) tablet 1 mg, 1 mg, Oral, Q6H PRN, Katty Maher MD, 1 mg at 07/31/25 1804    Magnesium Standard Dose Replacement - Follow Nurse / BPA Driven Protocol, , Not Applicable, PRN, Shilpa Zapien II, DO    metoprolol succinate XL (TOPROL-XL) 24 hr tablet 50 mg, 50 mg, Oral, Daily, Shilpa Zapien II, DO, 50 mg at 08/01/25 0844    ondansetron ODT (ZOFRAN-ODT) disintegrating tablet 4 mg, 4 mg, Oral, Q6H PRN, 4  mg at 07/31/25 0536 **OR** ondansetron (ZOFRAN) injection 4 mg, 4 mg, Intravenous, Q6H PRN, Shilpa Zapien II, DO    oxyCODONE (ROXICODONE) immediate release tablet 5 mg, 5 mg, Oral, Q6H PRN, Shilpa Zapien II, DO, 5 mg at 08/01/25 1040    Pharmacy to dose vancomycin, , Not Applicable, Continuous PRN, Shilpa Zapien M II, DO    Phosphorus Replacement - Follow Nurse / BPA Driven Protocol, , Not Applicable, PRN, Shilpa Zapien M II, DO    Potassium Replacement - Follow Nurse / BPA Driven Protocol, , Not Applicable, PRN, Shilpa Zapien M II, DO    prochlorperazine (COMPAZINE) tablet 10 mg, 10 mg, Oral, Q6H PRN, Katty Maher MD    sodium chloride 0.9 % flush 10 mL, 10 mL, Intravenous, Q12H, Shilpa Zapien M II, DO, 10 mL at 08/01/25 0844    sodium chloride 0.9 % flush 10 mL, 10 mL, Intravenous, PRN, Shilpa Zapien M II, DO    sodium chloride 0.9 % infusion 40 mL, 40 mL, Intravenous, PRN, Shilpa Zapien M II, DO    Vancomycin HCl 1,250 mg in sodium chloride 0.9 % 250 mL VTB, 1,250 mg, Intravenous, Q12H, Christian Mesa RPH, Last Rate: 200 mL/hr at 08/01/25 0650, 1,250 mg at 08/01/25 0650    zolpidem (AMBIEN) tablet 10 mg, 10 mg, Oral, Nightly PRN, Shilpa Zapien II, DO, 10 mg at 07/31/25 2142    Antibiotics:  Anti-Infectives (From admission, onward)      Ordered     Dose/Rate Route Frequency Start Stop    07/30/25 2051  Vancomycin HCl 1,250 mg in sodium chloride 0.9 % 250 mL VTB        Ordering Provider: Christian Mesa RPH    1,250 mg  200 mL/hr over 75 Minutes Intravenous Every 12 Hours 07/31/25 0600 08/05/25 0559    07/30/25 2005  cefepime 2000 mg IVPB in 100 mL NS (MBP)  Status:  Discontinued        Ordering Provider: Shilpa Zapien II, DO    2,000 mg  over 4 Hours Intravenous Every 8 Hours 07/31/25 0100 07/31/25 1630    07/30/25 2005  Pharmacy to dose vancomycin        Ordering Provider: Shilpa Zapien II, DO     Not Applicable Continuous PRN 07/30/25 2005 08/04/25 2004 07/30/25 1508  vancomycin  IVPB 2000 mg in 0.9% Sodium Chloride 500 mL        Ordering Provider: Gab Euceda MD    20 mg/kg × 104 kg  250 mL/hr over 120 Minutes Intravenous Once 25 1524 25 1958    25 1508  cefepime 2000 mg IVPB in 100 mL NS (MBP)        Ordering Provider: Gab Euceda MD    2,000 mg  over 30 Minutes Intravenous Once 25 1524 25 1804              Review of Systems:  See hpi      Physical Exam:   Vital Signs  Temp (24hrs), Av.1 °F (36.7 °C), Min:97.7 °F (36.5 °C), Max:98.6 °F (37 °C)    Temp  Min: 97.7 °F (36.5 °C)  Max: 98.6 °F (37 °C)  BP  Min: 118/70  Max: 143/86  Pulse  Min: 99  Max: 122  Resp  Min: 16  Max: 18  SpO2  Min: 84 %  Max: 99 %    GENERAL: Awake and alert, in no acute distress.   HEENT: Normocephalic, atraumatic.  PERRL. EOMI. No conjunctival injection. No icterus. No ext oral lesions    HEART: RRR; No murmur  LUNGS: Clear to auscultation bilaterally  ABDOMEN: Soft, nontender  EXT:  No edema  :  Without Desir catheter.  MSK: No joint effusions or erythema  SKIN: Warm and dry without cutaneous eruptions on Inspection/palpation.    NEURO: Oriented to PPT.  Motor 5/5 strength  PSYCHIATRIC: Normal insight and judgment. Cooperative with PE    Laboratory Data    Results from last 7 days   Lab Units 25  0403 25  1603 25  1758   WBC 10*3/mm3 7.23 2.32* 0.92*   HEMOGLOBIN g/dL 8.7* 9.8* 9.7*   HEMATOCRIT % 29.2* 29.0* 29.1*   PLATELETS 10*3/mm3 113* 127* 157     Results from last 7 days   Lab Units 25  0403   SODIUM mmol/L 138   POTASSIUM mmol/L 3.4*   CHLORIDE mmol/L 103   CO2 mmol/L 23.0   BUN mg/dL 7.8   CREATININE mg/dL 0.84   GLUCOSE mg/dL 112*   CALCIUM mg/dL 8.5*     Results from last 7 days   Lab Units 25  0403   ALK PHOS U/L 91   BILIRUBIN mg/dL 0.2   ALT (SGPT) U/L 24   AST (SGOT) U/L 19             Results from last 7 days   Lab Units 25  1840   LACTATE mmol/L 1.6         Results from last 7 days   Lab Units 25  1232   VANCOMYCIN  "RM mcg/mL 18.80     Estimated Creatinine Clearance: 116.5 mL/min (by C-G formula based on SCr of 0.84 mg/dL).      Microbiology:  Blood Culture   Date Value Ref Range Status   07/29/2025 No growth at 24 hours  Preliminary     BCID, PCR   Date Value Ref Range Status   07/29/2025 Negative by BCID PCR. Culture to Follow. Negative by BCID PCR. Culture to Follow. Final     No results found for: \"CULTURES\", \"HSVCX\", \"URCX\"  No results found for: \"EYECULTURE\", \"GCCX\", \"HSVCULTURE\", \"LABHSV\"  No results found for: \"LEGIONELLA\", \"MRSACX\", \"MUMPSCX\", \"MYCOPLASCX\"  No results found for: \"NOCARDIACX\", \"STOOLCX\"  No results found for: \"THROATCX\", \"UNSTIMCULT\", \"URINECX\", \"CULTURE\", \"VZVCULTUR\"  No results found for: \"VIRALCULTU\", \"WOUNDCX\"        Radiology:  Imaging Results (Last 72 Hours)       Procedure Component Value Units Date/Time    XR Chest 1 View [[id number]] Collected: 07/30/25 1542     Updated: 07/30/25 1547    Narrative:      XR CHEST 1 VW    Date of Exam: 7/30/2025 3:22 PM EDT    Indication: short of breath    Comparison: None available.    Findings:  Cardiomediastinal silhouette is unremarkable.  No airspace disease, pneumothorax, nor pleural effusion. Subsegmental atelectasis or scarring at lung bases. Right chest wall port catheter with the tip in the SVC. No acute osseous abnormality identified.      Impression:      Impression:  Bibasilar atelectasis or scarring. No other significant cardiopulmonary abnormality.      Electronically Signed: Hansa Reyan MD    7/30/2025 3:43 PM EDT    Workstation ID: [id number]              Impression:   Lactic acidosis concerning for tissue perfusion mismatch  Absolute neutrophil count of 1500  Leukopenia  Superficial thrombophlebitis in basilic vein  Blood culture positive for bacillus species suspected to be contamination from 7/29  Indwelling Mediport  PLAN/RECOMMENDATIONS:   Thank you for asking us to see Brooke Mendez, I recommend the following:      Patient is a new med " port which does not appear inflamed based on absence of redness or tenderness.    I would suspect that if the Mediport was infected patient would again have recurrent positive blood cultures    A bacillus species may be corynebacterium or a diphtheroid in general represents a contamination of skin during venipuncture.    Repeat blood cultures have been ordered from yesterday 7/30; hopefully those were obtained off antibiotics and if those demonstrate there is no growth then I think we can make a case there is no active infection    Continue vancomycin as monotherapy per pharmacy dosing aim for area to cover 400 600    If repeat blood cultures are negative x 48 hours can stop vancomycin       D/w family, Dr. Maher; I am off until 8/11/25; call partners if problems arise    Guero Lock MD  8/1/2025  15:02 EDT

## 2025-08-01 NOTE — PROGRESS NOTES
"HEMATOLOGY/ONCOLOGY PROGRESS NOTE    S: Short of breath overnight, responded to IV lasix. Continues to experience weakness, diffuse swelling/bloating. Able to ambulate to bathroom and back, feeling somewhat stronger today    Medications:  The current medication list was reviewed in the EMR    ALLERGIES:    Allergies   Allergen Reactions    Erythromycin Other (See Comments)    Pholcodine Other (See Comments)    Penicillins Rash     Childhood          Physical Exam    VITAL SIGNS:  /78 (BP Location: Left leg, Patient Position: Lying)   Pulse 100   Temp 97.7 °F (36.5 °C) (Oral)   Resp 16   Ht 175.3 cm (69\")   Wt 110 kg (241 lb 11.2 oz)   LMP  (LMP Unknown) Comment: HAVE NOT HAD ONE SINCE SHE STARTED CHEMO.  SpO2 99%   BMI 35.69 kg/m²   Temp:  [97.7 °F (36.5 °C)-98.6 °F (37 °C)] 97.7 °F (36.5 °C)      Performance Status:                Physical Exam    General: well appearing, in no acute distress  HEENT: sclerae anicteric, oropharynx clear, neck is supple  Lymphatics: no cervical, supraclavicular, or axillary adenopathy  Cardiovascular: regular rate and rhythm, no murmurs, rubs or gallops  Lungs: clear to auscultation bilaterally  Abdomen: soft, nontender, nondistended.  No palpable organomegaly  Extremities: no lower extremity edema  Skin: no rashes, lesions, bruising, or petechiae  Msk:  Shows no weakness of the large muscle groups  Psych: Mood is stable        RECENT LABS:    Lab Results   Component Value Date    HGB 8.7 (L) 08/01/2025    HCT 29.2 (L) 08/01/2025    .7 (H) 08/01/2025     (L) 08/01/2025    WBC 7.23 08/01/2025    NEUTROABS 4.27 08/01/2025    LYMPHSABS 1.47 07/22/2025    MONOSABS 0.77 07/22/2025    EOSABS 0.00 08/01/2025    BASOSABS 0.07 08/01/2025       Lab Results   Component Value Date    GLUCOSE 112 (H) 08/01/2025    BUN 7.8 08/01/2025    CREATININE 0.84 08/01/2025     08/01/2025    K 3.4 (L) 08/01/2025     08/01/2025    CO2 23.0 08/01/2025    CALCIUM 8.5 (L) " 08/01/2025    PROTEINTOT 5.9 (L) 08/01/2025    ALBUMIN 3.5 08/01/2025    BILITOT 0.2 08/01/2025    ALKPHOS 91 08/01/2025    AST 19 08/01/2025    ALT 24 08/01/2025     Assessment/Plan  Bacteremia secondary to Gram variable bacilli  Breast cancer status post recent chemotherapy    -She has a history of triple negative breast cancer undergoing neoadjuvant chemotherapy, receiving cycle 2 of AC on 7/22/25.  She has had multiple complications and treatment delays.  She is now admitted with bacteremia.   -Labs reviewed.  -ID notes reviewed.   -Will need to hold her chemotherapy pending resolution.  She does have a follow-up with surgery next week and in light of her multiple complications/delays, recommend proceeding with surgical planning, may omit final 2 cycles of chemotherapy    3. Volume overload  -Recent echo with normal EF and strain  -S/p IV lasix overnight.       Fanny Baker MD  [facility]ematology and On[facility]    8/1/2025

## 2025-08-01 NOTE — THERAPY DISCHARGE NOTE
Patient Name: Brooke Mendez  : 1985    MRN: [id number]                              Today's Date: 2025       Admit Date: 2025    Visit Dx:     ICD-10-CM ICD-9-CM   1. Tachycardia  R00.0 785.0   2. Bacteremia  R78.81 790.7   3. Lactic acidemia  E87.20 276.2   4. Leukopenia, unspecified type  D72.819 288.50     Patient Active Problem List   Diagnosis    Malignant neoplasm of left breast in female, estrogen receptor negative    Rash    On antineoplastic chemotherapy and adjuvant immunotherapy regimen    Essential hypertension    Thyroid disease    Sepsis    Positive blood culture     Past Medical History:   Diagnosis Date    Breast cancer     Disease of thyroid gland     Hypertension      Past Surgical History:   Procedure Laterality Date     SECTION      TONSILLECTOMY        General Information       Row Name 25 1547          Physical Therapy Time and Intention    Document Type discharge evaluation/summary  -TT     Mode of Treatment physical therapy  -TT       Row Name 25 1547          General Information    Patient Profile Reviewed yes  -TT     Prior Level of Function independent:;all household mobility;community mobility;ADL's;home management;driving  Not recent decline in activity tolerance d/t rapid fatigue and edema following chemo. Increased time for showering; denied hx of falls  -TT     Existing Precautions/Restrictions fall  -TT     Barriers to Rehab none identified  -TT       Row Name 25 1547          Living Environment    Current Living Arrangements home  -TT     People in Home child(joe), adult;other (see comments)  mother available for assist upon d/c  -TT       Row Name 25 1547          Home Main Entrance    Number of Stairs, Main Entrance one  -TT     Stair Railings, Main Entrance none  -TT       Row Name 25 1547          Stairs Within Home, Primary    Number of Stairs, Within Home, Primary none  -TT       Row Name 25 1547           Cognition    Orientation Status (Cognition) oriented x 4  -TT       Row Name 08/01/25 1547          Safety Issues/Impairments Affecting Functional Mobility    Safety Issues Affecting Function (Mobility) --  None identified  -TT     Impairments Affecting Function (Mobility) shortness of breath;endurance/activity tolerance;sensation/sensory awareness  -TT               User Key  (r) = Recorded By, (t) = Taken By, (c) = Cosigned By      Initials Name Provider Type    TT Patricia Gamez, PT Physical Therapist                   Mobility       Row Name 08/01/25 1549          Bed Mobility    Bed Mobility supine-sit-supine  -TT     Supine-Sit-Supine [place] (Bed Mobility) modified independence  -TT     Assistive Device (Bed Mobility) head of bed elevated  -TT     Comment, (Bed Mobility) Pt demonstrating apporpriate sequencing w/ transition to/from EOB w/o need for additional cues. Increased time d/t slowed pacing w/ HOB elevated. Denied onset of dizziness.  -TT       Row Name 08/01/25 1549          Transfers    Comment, (Transfers) 2 STS performed w/o UE support: 1 from EOB and 1 from toilet. Increased time required to allow use of restroom this date.  -TT       Row Name 08/01/25 1549          Sit-Stand Transfer    Sit-Stand [place] (Transfers) supervision  -TT     Comment, (Sit-Stand Transfer) Demonstrating appropriate sequencing and initiation requiring no more than supervision. Denied onset of dizziness w/ positional change.  -TT       Row Name 08/01/25 1547          Gait/Stairs (Locomotion)    [place] Level (Gait) standby assist  -TT     Assistive Device (Gait) other (see comments)  no UE support  -TT     Patient was able to Ambulate yes  -TT     Distance in Feet (Gait) 75  + 75ft  -TT     Deviations/Abnormal Patterns (Gait) bilateral deviations;gait speed decreased  -TT     Bilateral Gait Deviations forward flexed posture  -TT     Comment, (Gait/Stairs) Pt demonstrating step through gait pattern w/  decreased overall gait speed and progression of mild forward flexed posture w/ activity. Pt noting intermittent low back pain at baseline; politely deferred further progression of mobility d/t increased discomfort. No LOB observed, pt noting fatigue experienced at baseline over last several months.  -TT               User Key  (r) = Recorded By, (t) = Taken By, (c) = Cosigned By      Initials Name Provider Type    TT Patricia Gamez, PT Physical Therapist                   Obj/Interventions       Row Name 08/01/25 1552          Range of Motion Comprehensive    General Range of Motion bilateral lower extremity ROM WFL  -TT       Row Name 08/01/25 1552          Strength Comprehensive (MMT)    General Manual Muscle Testing (MMT) Assessment no strength deficits identified  -TT     Comment, General Manual Muscle Testing (MMT) Assessment BLE grossly 4+/5 w/ functional mobility.  -TT       Row Name 08/01/25 1552          Balance    Balance Assessment sitting static balance;sitting dynamic balance;standing static balance;standing dynamic balance  -TT     Static Sitting Balance independent  -TT     Dynamic Sitting Balance independent  -TT     Position, Sitting Balance unsupported;sitting edge of bed  -TT     Static Standing Balance supervision  -TT     Dynamic Standing Balance standby assist  -TT     Position/Device Used, Standing Balance unsupported  -TT     Balance Interventions sitting;standing;sit to stand;static;dynamic;dynamic reaching;occupation based/functional task;weight shifting activity  -TT       Row Name 08/01/25 1556          Sensory Assessment (Somatosensory)    Sensory Assessment (Somatosensory) bilateral UE;bilateral LE  -TT     Bilateral UE Sensory Assessment impaired  -TT     Bilateral LE Sensory Assessment impaired  -TT     Sensory Subjective Reports numbness  -TT     Sensory Assessment Noting numbness in b/l hands and feet at baseline following chemotherapy treatment. Denied acute changes  -TT                User Key  (r) = Recorded By, (t) = Taken By, (c) = Cosigned By      Initials Name Provider Type    TT Patricia Gamez, PT Physical Therapist                   Goals/Plan    No documentation.                  Clinical Impression       Row Name 08/01/25 0971          Pain    Pretreatment Pain Rating 5/10  -TT     Posttreatment Pain Rating 7/10  -TT     Pain Location back  -TT     Pain Side/Orientation generalized  -TT     Pain Management Interventions activity modification encouraged;exercise or physical activity utilized;nursing notified;positioning techniques utilized  -TT     Response to Pain Interventions activity participation with increased pain  -TT       Row Name 08/01/25 4608          Plan of Care Review    Plan of Care Reviewed With patient;parent  -TT     Progress improving  -TT     Outcome Evaluation PT initial evaluation completed. Pt demonstrating appropriate sequencing and initiation w/ all performed mobility requiring no more than SBA. Ambulating multiple bouts of 75ft w/o UE support; further mobility primarily due to low back pain and fatigue related to recent chemotherapy treatments. Pt noting to be at baseline compared to last few months, denied needs for skilled IP PT interventions. PT to sign off at this time, however, if pt's status is to change, please re-consult as needed. PT recommending home w/ assist upon d/c.  -TT       Row Name 08/01/25 6132          Therapy Assessment/Plan (PT)    Patient/Family Therapy Goals Statement (PT) Return home.  -TT     Criteria for Skilled Interventions Met (PT) no problems identified which require skilled intervention  -TT     Therapy Frequency (PT) evaluation only  -TT       Row Name 08/01/25 8290          Vital Signs    Pre Systolic BP Rehab 129  -TT     Pre Treatment Diastolic BP 77  -TT     Pretreatment Heart Rate (beats/min) 99  -TT     Posttreatment Heart Rate (beats/min) 101  -TT     Pre SpO2 (%) 99  -TT     O2 Delivery Pre Treatment supplemental  O2  -TT     O2 Delivery Intra Treatment room air  -TT     Post SpO2 (%) 99  -TT     O2 Delivery Post Treatment room air  -TT     Pre Patient Position Supine  -TT     Intra Patient Position Standing  -TT     Post Patient Position Supine  -TT       Row Name 08/01/25 1553          Positioning and Restraints    Pre-Treatment Position in bed  -TT     Post Treatment Position bed  -TT     In Bed notified nsg;fowlers;call light within reach;encouraged to call for assist;exit alarm on;with family/caregiver;side rails up x2  -TT               User Key  (r) = Recorded By, (t) = Taken By, (c) = Cosigned By      Initials Name Provider Type    Patricia Sanchez PT Physical Therapist                   Outcome Measures       Row Name 08/01/25 1557 08/01/25 0800       How much help from another person do you currently need...    Turning from your back to your side while in flat bed without using bedrails? 4  -TT 4  -EL    Moving from lying on back to sitting on the side of a flat bed without bedrails? 4  -TT 4  -EL    Moving to and from a bed to a chair (including a wheelchair)? 4  -TT --    Standing up from a chair using your arms (e.g., wheelchair, bedside chair)? 4  -TT 4  -EL    Climbing 3-5 steps with a railing? 3  -TT 3  -EL    To walk in hospital room? 4  -TT 4  -EL    AM-PAC 6 Clicks Score (PT) 23  -TT --    Highest Level of Mobility Goal Walk 25 Feet or More-7  -TT --      Row Name 08/01/25 1557          Functional Assessment    Outcome Measure Options AM-PAC 6 Clicks Basic Mobility (PT)  -TT               User Key  (r) = Recorded By, (t) = Taken By, (c) = Cosigned By      Initials Name Provider Type    Lauryn Gerardo RN Registered Nurse    Patricia Sanchez, PT Physical Therapist                  Physical Therapy Education       Title: PT OT SLP Therapies (Done)       Topic: Physical Therapy (Done)       Point: Mobility training (Done)       Learning Progress Summary            Patient Acceptance, E, VU by  TT at 8/1/2025 1557   Family Acceptance, E, VU by TT at 8/1/2025 1557                      Point: Body mechanics (Done)       Learning Progress Summary            Patient Acceptance, E, VU by TT at 8/1/2025 1557   Family Acceptance, E, VU by TT at 8/1/2025 1557                      Point: Precautions (Done)       Learning Progress Summary            Patient Acceptance, E, VU by TT at 8/1/2025 1557   Family Acceptance, E, VU by TT at 8/1/2025 1557                                      User Key       Initials Effective Dates Name Provider Type Discipline    TT 05/30/25 -  Patricia Gamez, PT Physical Therapist PT                  PT Recommendation and Plan     Progress: improving  Outcome Evaluation: PT initial evaluation completed. Pt demonstrating appropriate sequencing and initiation w/ all performed mobility requiring no more than SBA. Ambulating multiple bouts of 75ft w/o UE support; further mobility primarily due to low back pain and fatigue related to recent chemotherapy treatments. Pt noting to be at baseline compared to last few months, denied needs for skilled IP PT interventions. PT to sign off at this time, however, if pt's status is to change, please re-consult as needed. PT recommending home w/ assist upon d/c.     Time Calculation:   PT Evaluation Complexity  History, PT Evaluation Complexity: 1-2 personal factors and/or comorbidities  Examination of Body Systems (PT Eval Complexity): total of 3 or more elements  Clinical Presentation (PT Evaluation Complexity): evolving  Clinical Decision Making (PT Evaluation Complexity): moderate complexity  Overall Complexity (PT Evaluation Complexity): moderate complexity     PT Charges       Row Name 08/01/25 1558             Time Calculation    Start Time 1436  -TT      PT Received On 08/01/25  -TT         Untimed Charges    PT Eval/Re-eval Minutes 46  -TT         Total Minutes    Untimed Charges Total Minutes 46  -TT       Total Minutes 46  -TT                 User Key  (r) = Recorded By, (t) = Taken By, (c) = Cosigned By      Initials Name Provider Type    TT Patricia Gamez, PT Physical Therapist                  Therapy Charges for Today       Code Description Service Date Service Provider Modifiers Qty    14846584480 HC PT EVAL MOD COMPLEXITY 4 8/1/2025 Patricia Gamez, RITA GP 1            PT G-Codes  Outcome Measure Options: AM-PAC 6 Clicks Basic Mobility (PT)  AM-PAC 6 Clicks Score (PT): 23    PT Discharge Summary  Anticipated Discharge Disposition (PT): home with assist    Patricia Gamez PT  8/1/2025

## 2025-08-01 NOTE — CASE MANAGEMENT/SOCIAL WORK
Continued Stay Note   [place]     Patient Name: Brooke Mendez  MRN: [id number]  Today's Date: 8/1/2025    Admit Date: 7/30/2025    Plan: home   Discharge Plan       Row Name 08/01/25 0836       Plan    Plan home    Patient/Family in Agreement with Plan yes    Plan Comments I met with this patient bedside. Her chemotherapy is on hold. ID is following. Her plan is home with her mother to transport. CM to follow.    Final Discharge Disposition Code 01 - home or self-care                   Discharge Codes    No documentation.                       Malia Vogel RN

## 2025-08-01 NOTE — PLAN OF CARE
Problem: Sepsis/Septic Shock  Goal: Optimal Coping  Outcome: Progressing  Intervention: Support Patient and Family Response  Recent Flowsheet Documentation  Taken 8/1/2025 1600 by Lauryn Guadarrama RN  Family/Support System Care: self-care encouraged  Taken 8/1/2025 1400 by Lauryn Guadarrama RN  Family/Support System Care: self-care encouraged  Taken 8/1/2025 1200 by Lauryn Guadarrama RN  Family/Support System Care: self-care encouraged  Taken 8/1/2025 1000 by Lauryn Guadarrama RN  Family/Support System Care: self-care encouraged  Taken 8/1/2025 0800 by Lauryn Guadarrama RN  Supportive Measures:   active listening utilized   positive reinforcement provided   problem-solving facilitated   verbalization of feelings encouraged  Family/Support System Care: self-care encouraged  Goal: Absence of Bleeding  Outcome: Progressing  Goal: Blood Glucose Level Within Target Range  Outcome: Progressing  Goal: Absence of Infection Signs and Symptoms  Outcome: Progressing  Intervention: Initiate Sepsis Management  Recent Flowsheet Documentation  Taken 8/1/2025 1600 by Lauryn Guadarrama RN  Infection Prevention:   environmental surveillance performed   rest/sleep promoted   single patient room provided  Isolation Precautions: (neutropenic)   precautions maintained   other (see comments)  Taken 8/1/2025 1400 by Lauryn Guadarrama RN  Infection Prevention:   environmental surveillance performed   rest/sleep promoted   single patient room provided  Isolation Precautions: (neuropenic)   precautions maintained   other (see comments)  Taken 8/1/2025 1200 by Lauryn Guadarrama RN  Infection Prevention:   environmental surveillance performed   rest/sleep promoted   single patient room provided  Isolation Precautions: (neutropenic)   precautions maintained   other (see comments)  Taken 8/1/2025 1000 by Lauryn Guadarrama RN  Infection Prevention:   environmental surveillance performed   rest/sleep  promoted   single patient room provided  Isolation Precautions: (neutropenic)   precautions maintained   other (see comments)  Taken 8/1/2025 0800 by Lauryn Guadarrama RN  Infection Prevention:   environmental surveillance performed   rest/sleep promoted   single patient room provided  Isolation Precautions: (neutropenic)   other (see comments)   precautions maintained  Intervention: Promote Recovery  Recent Flowsheet Documentation  Taken 8/1/2025 1600 by Lauryn Guadarrama RN  Activity Management: activity encouraged  Taken 8/1/2025 1400 by Lauryn Guadarrama RN  Activity Management: activity encouraged  Taken 8/1/2025 1200 by Lauryn Guadarrama RN  Activity Management: activity encouraged  Taken 8/1/2025 1000 by Lauryn Guadarrama RN  Activity Management: activity encouraged  Taken 8/1/2025 0800 by Lauryn Guadarrama RN  Activity Management: activity encouraged  Airway/Ventilation Management: oxygen therapy provided  Sleep/Rest Enhancement:   awakenings minimized   family presence promoted   noise level reduced   therapeutic touch utilized  Goal: Optimal Nutrition Delivery  Outcome: Progressing     Problem: Adult Inpatient Plan of Care  Goal: Plan of Care Review  Outcome: Progressing  Flowsheets  Taken 8/1/2025 1759 by Lauryn Guadarrama RN  Progress: improving  Outcome Evaluation:   AOx4   VSS on RA   patient rested for majority of shift   requested PRN oxycodone twice during shift for generalized pain   patient showed rash under breasts, stated it was due to skin sensitivities   vancomycin was hung at 1800   potential discharge tomorrow   patient is resting comfortably at this time   fall and safety precautions in place   nursing to monitor and follow  Taken 8/1/2025 1553 by Patricia Gamez, PT  Plan of Care Reviewed With:   patient   parent  Goal: Patient-Specific Goal (Individualized)  Outcome: Progressing  Flowsheets (Taken 8/1/2025 1759)  Patient/Family-Specific Goals (Include  Timeframe): Monitor and assess for pain q2hr and PRN  Goal: Absence of Hospital-Acquired Illness or Injury  Outcome: Progressing  Intervention: Identify and Manage Fall Risk  Recent Flowsheet Documentation  Taken 8/1/2025 1600 by Lauryn Guadarrama RN  Safety Promotion/Fall Prevention:   activity supervised   assistive device/personal items within reach   clutter free environment maintained   elopement precautions   fall prevention program maintained   lighting adjusted   mobility aid in reach   muscle strengthening facilitated   nonskid shoes/slippers when out of bed   room organization consistent   safety round/check completed   toileting scheduled  Taken 8/1/2025 1400 by Lauryn Guadarrama RN  Safety Promotion/Fall Prevention:   activity supervised   assistive device/personal items within reach   clutter free environment maintained   elopement precautions   fall prevention program maintained   lighting adjusted   mobility aid in reach   muscle strengthening facilitated   nonskid shoes/slippers when out of bed   room organization consistent   safety round/check completed   toileting scheduled  Taken 8/1/2025 1200 by Lauryn Guadarrama RN  Safety Promotion/Fall Prevention:   activity supervised   assistive device/personal items within reach   clutter free environment maintained   elopement precautions   fall prevention program maintained   lighting adjusted   mobility aid in reach   muscle strengthening facilitated   nonskid shoes/slippers when out of bed   room organization consistent   safety round/check completed   toileting scheduled  Taken 8/1/2025 1000 by Lauryn Guadarrama, RN  Safety Promotion/Fall Prevention:   activity supervised   assistive device/personal items within reach   clutter free environment maintained   elopement precautions   fall prevention program maintained   lighting adjusted   mobility aid in reach   muscle strengthening facilitated   nonskid shoes/slippers when out of bed    room organization consistent   safety round/check completed   toileting scheduled  Taken 8/1/2025 0800 by Lauryn Guadarrama RN  Safety Promotion/Fall Prevention:   activity supervised   assistive device/personal items within reach   clutter free environment maintained   elopement precautions   fall prevention program maintained   lighting adjusted   mobility aid in reach   muscle strengthening facilitated   nonskid shoes/slippers when out of bed   room organization consistent   safety round/check completed   toileting scheduled  Intervention: Prevent Skin Injury  Recent Flowsheet Documentation  Taken 8/1/2025 1600 by Lauryn Guadarrama RN  Body Position: position changed independently  Skin Protection:   drying agents applied   incontinence pads utilized   pulse oximeter probe site changed   transparent dressing maintained  Taken 8/1/2025 1400 by Lauryn Guadarrama RN  Body Position: position changed independently  Skin Protection:   drying agents applied   incontinence pads utilized   transparent dressing maintained  Taken 8/1/2025 1200 by Lauryn Guadarrama RN  Body Position: position changed independently  Skin Protection:   drying agents applied   incontinence pads utilized   pulse oximeter probe site changed   transparent dressing maintained  Taken 8/1/2025 1000 by Lauryn Guadarrama RN  Body Position: position changed independently  Skin Protection:   drying agents applied   incontinence pads utilized   pulse oximeter probe site changed   transparent dressing maintained  Taken 8/1/2025 0800 by Lauryn Guadarrama RN  Body Position: position changed independently  Skin Protection:   drying agents applied   incontinence pads utilized   pulse oximeter probe site changed   transparent dressing maintained  Intervention: Prevent and Manage VTE (Venous Thromboembolism) Risk  Recent Flowsheet Documentation  Taken 8/1/2025 1600 by Lauryn Guadarrama RN  VTE Prevention/Management: (eliquis) other  (see comments)  Taken 8/1/2025 1400 by Lauryn Guadarrama RN  VTE Prevention/Management: (eliquis) other (see comments)  Taken 8/1/2025 1200 by Lauryn Guadarrama RN  VTE Prevention/Management: (eliquis) other (see comments)  Taken 8/1/2025 1000 by Lauryn Guadarrama RN  VTE Prevention/Management: (eliquis) other (see comments)  Taken 8/1/2025 0800 by Lauryn Guadarrama RN  VTE Prevention/Management: (eliquis) other (see comments)  Intervention: Prevent Infection  Recent Flowsheet Documentation  Taken 8/1/2025 1600 by Lauryn Guadarrmaa RN  Infection Prevention:   environmental surveillance performed   rest/sleep promoted   single patient room provided  Taken 8/1/2025 1400 by Lauryn Guadarrama RN  Infection Prevention:   environmental surveillance performed   rest/sleep promoted   single patient room provided  Taken 8/1/2025 1200 by Lauryn Guadarrama RN  Infection Prevention:   environmental surveillance performed   rest/sleep promoted   single patient room provided  Taken 8/1/2025 1000 by Lauryn Guadarrama RN  Infection Prevention:   environmental surveillance performed   rest/sleep promoted   single patient room provided  Taken 8/1/2025 0800 by Lauryn Guadarrama RN  Infection Prevention:   environmental surveillance performed   rest/sleep promoted   single patient room provided  Goal: Optimal Comfort and Wellbeing  Outcome: Progressing  Intervention: Monitor Pain and Promote Comfort  Recent Flowsheet Documentation  Taken 8/1/2025 1600 by Lauryn Guadarrama RN  Pain Management Interventions:   pillow support provided   position adjusted   quiet environment facilitated   relaxation techniques promoted  Taken 8/1/2025 1400 by Lauryn Guadarrama RN  Pain Management Interventions:   pillow support provided   position adjusted   quiet environment facilitated   relaxation techniques promoted  Taken 8/1/2025 1200 by Lauryn Guadarrama RN  Pain Management Interventions:   pillow  support provided   position adjusted   quiet environment facilitated   relaxation techniques promoted  Taken 8/1/2025 1120 by Lauryn Guadarrama RN  Pain Management Interventions:   position adjusted   pillow support provided   quiet environment facilitated   relaxation techniques promoted  Taken 8/1/2025 1040 by Lauryn Guadarrama RN  Pain Management Interventions:   pain medication given   pillow support provided   position adjusted   quiet environment facilitated   relaxation techniques promoted  Taken 8/1/2025 1000 by Lauryn Guadarrama RN  Pain Management Interventions:   pillow support provided   position adjusted   quiet environment facilitated   relaxation techniques promoted  Taken 8/1/2025 0800 by Lauryn Guadarrama RN  Pain Management Interventions:   pillow support provided   position adjusted   quiet environment facilitated   relaxation techniques promoted  Intervention: Provide Person-Centered Care  Recent Flowsheet Documentation  Taken 8/1/2025 1600 by Lauryn Guadarrama RN  Trust Relationship/Rapport:   care explained   empathic listening provided   questions answered   thoughts/feelings acknowledged  Taken 8/1/2025 1400 by Lauryn Guadarrama RN  Trust Relationship/Rapport:   care explained   empathic listening provided   questions answered   thoughts/feelings acknowledged  Taken 8/1/2025 1200 by Lauryn Guadarrama RN  Trust Relationship/Rapport:   care explained   questions answered   empathic listening provided   thoughts/feelings acknowledged  Taken 8/1/2025 1000 by Lauryn Guadarrama RN  Trust Relationship/Rapport:   care explained   empathic listening provided   questions answered   thoughts/feelings acknowledged  Taken 8/1/2025 0800 by Lauryn Guadarrama RN  Trust Relationship/Rapport:   care explained   empathic listening provided   questions answered   thoughts/feelings acknowledged  Goal: Readiness for Transition of Care  Outcome: Progressing     Problem:  Infection  Goal: Absence of Infection Signs and Symptoms  Outcome: Progressing  Intervention: Prevent or Manage Infection  Recent Flowsheet Documentation  Taken 8/1/2025 1600 by Lauryn Guadarrama RN  Isolation Precautions: (neutropenic)   precautions maintained   other (see comments)  Taken 8/1/2025 1400 by Lauryn Guadarrama RN  Isolation Precautions: (neuropenic)   precautions maintained   other (see comments)  Taken 8/1/2025 1200 by Lauryn Guadarrama RN  Isolation Precautions: (neutropenic)   precautions maintained   other (see comments)  Taken 8/1/2025 1000 by Lauryn Guadarrama RN  Isolation Precautions: (neutropenic)   precautions maintained   other (see comments)  Taken 8/1/2025 0800 by Lauryn Guadarrama RN  Isolation Precautions: (neutropenic)   other (see comments)   precautions maintained     Problem: Pain Acute  Goal: Optimal Pain Control and Function  Outcome: Progressing  Intervention: Optimize Psychosocial Wellbeing  Recent Flowsheet Documentation  Taken 8/1/2025 1600 by Lauryn Guadarrama RN  Diversional Activities:   smartphone   television  Taken 8/1/2025 1400 by Lauryn Guadarrama RN  Diversional Activities:   smartphone   television  Taken 8/1/2025 1200 by Lauryn Guadarrama RN  Diversional Activities:   smartphone   television  Taken 8/1/2025 1000 by Lauryn Guadarrama RN  Diversional Activities:   smartphone   television  Taken 8/1/2025 0800 by Lauryn Guadarrama RN  Supportive Measures:   active listening utilized   positive reinforcement provided   problem-solving facilitated   verbalization of feelings encouraged  Diversional Activities:   television   smartphone  Intervention: Develop Pain Management Plan  Recent Flowsheet Documentation  Taken 8/1/2025 1600 by Lauryn Guadarrama RN  Pain Management Interventions:   pillow support provided   position adjusted   quiet environment facilitated   relaxation techniques promoted  Taken 8/1/2025 1400 by Chiara  LILLIE Combs  Pain Management Interventions:   pillow support provided   position adjusted   quiet environment facilitated   relaxation techniques promoted  Taken 8/1/2025 1200 by Lauryn Guadarrama RN  Pain Management Interventions:   pillow support provided   position adjusted   quiet environment facilitated   relaxation techniques promoted  Taken 8/1/2025 1120 by Lauryn Guadarrama RN  Pain Management Interventions:   position adjusted   pillow support provided   quiet environment facilitated   relaxation techniques promoted  Taken 8/1/2025 1040 by Lauryn Guadarrama RN  Pain Management Interventions:   pain medication given   pillow support provided   position adjusted   quiet environment facilitated   relaxation techniques promoted  Taken 8/1/2025 1000 by Lauryn Guadarrama RN  Pain Management Interventions:   pillow support provided   position adjusted   quiet environment facilitated   relaxation techniques promoted  Taken 8/1/2025 0800 by Lauryn Guadarrama RN  Pain Management Interventions:   pillow support provided   position adjusted   quiet environment facilitated   relaxation techniques promoted  Intervention: Prevent or Manage Pain  Recent Flowsheet Documentation  Taken 8/1/2025 1600 by Lauryn Guadarrama RN  Medication Review/Management: medications reviewed  Taken 8/1/2025 1400 by Lauryn Guadarrama RN  Medication Review/Management: medications reviewed  Taken 8/1/2025 1200 by Lauryn Guadarrama RN  Medication Review/Management: medications reviewed  Taken 8/1/2025 1000 by Lauryn Guadarrama RN  Medication Review/Management: medications reviewed  Taken 8/1/2025 0800 by Lauryn Guadarrama RN  Sensory Stimulation Regulation:   auditory stimulation minimized   care clustered   lighting decreased   quiet environment promoted   television on  Sleep/Rest Enhancement:   awakenings minimized   family presence promoted   noise level reduced   therapeutic touch  utilized  Medication Review/Management: medications reviewed     Problem: Nausea and Vomiting  Goal: Nausea and Vomiting Relief  Outcome: Progressing  Intervention: Prevent and Manage Nausea and Vomiting  Recent Flowsheet Documentation  Taken 8/1/2025 0800 by Lauryn Guadarrama RN  Environmental Support:   calm environment promoted   rest periods encouraged   distractions minimized     Problem: Fall Injury Risk  Goal: Absence of Fall and Fall-Related Injury  Outcome: Progressing  Intervention: Identify and Manage Contributors  Recent Flowsheet Documentation  Taken 8/1/2025 1600 by Lauryn Guadarrama RN  Medication Review/Management: medications reviewed  Taken 8/1/2025 1400 by Lauryn Guadarrama RN  Medication Review/Management: medications reviewed  Taken 8/1/2025 1200 by Lauryn Guadarrama RN  Medication Review/Management: medications reviewed  Taken 8/1/2025 1000 by Lauryn Guadarrama RN  Medication Review/Management: medications reviewed  Taken 8/1/2025 0800 by Lauryn Guadarrama RN  Medication Review/Management: medications reviewed  Intervention: Promote Injury-Free Environment  Recent Flowsheet Documentation  Taken 8/1/2025 1600 by Lauryn Guadarrama RN  Safety Promotion/Fall Prevention:   activity supervised   assistive device/personal items within reach   clutter free environment maintained   elopement precautions   fall prevention program maintained   lighting adjusted   mobility aid in reach   muscle strengthening facilitated   nonskid shoes/slippers when out of bed   room organization consistent   safety round/check completed   toileting scheduled  Taken 8/1/2025 1400 by Lauryn Guadarrama RN  Safety Promotion/Fall Prevention:   activity supervised   assistive device/personal items within reach   clutter free environment maintained   elopement precautions   fall prevention program maintained   lighting adjusted   mobility aid in reach   muscle strengthening facilitated   nonskid  shoes/slippers when out of bed   room organization consistent   safety round/check completed   toileting scheduled  Taken 8/1/2025 1200 by Lauryn Guadarrama RN  Safety Promotion/Fall Prevention:   activity supervised   assistive device/personal items within reach   clutter free environment maintained   elopement precautions   fall prevention program maintained   lighting adjusted   mobility aid in reach   muscle strengthening facilitated   nonskid shoes/slippers when out of bed   room organization consistent   safety round/check completed   toileting scheduled  Taken 8/1/2025 1000 by Lauryn Guadarrama, RN  Safety Promotion/Fall Prevention:   activity supervised   assistive device/personal items within reach   clutter free environment maintained   elopement precautions   fall prevention program maintained   lighting adjusted   mobility aid in reach   muscle strengthening facilitated   nonskid shoes/slippers when out of bed   room organization consistent   safety round/check completed   toileting scheduled  Taken 8/1/2025 0800 by Lauryn Guadarrama RN  Safety Promotion/Fall Prevention:   activity supervised   assistive device/personal items within reach   clutter free environment maintained   elopement precautions   fall prevention program maintained   lighting adjusted   mobility aid in reach   muscle strengthening facilitated   nonskid shoes/slippers when out of bed   room organization consistent   safety round/check completed   toileting scheduled   Goal Outcome Evaluation:           Progress: improving  Outcome Evaluation: AOx4; VSS on RA; patient rested for majority of shift; requested PRN oxycodone twice during shift for generalized pain; patient showed rash under breasts, stated it was due to skin sensitivities; vancomycin was hung at 1800; potential discharge tomorrow; patient is resting comfortably at this time; fall and safety precautions in place; nursing to monitor and follow

## 2025-08-01 NOTE — PLAN OF CARE
Goal Outcome Evaluation:  Plan of Care Reviewed With: patient, parent        Progress: improving  Outcome Evaluation: PT initial evaluation completed. Pt demonstrating appropriate sequencing and initiation w/ all performed mobility requiring no more than SBA. Ambulating multiple bouts of 75ft w/o UE support; further mobility primarily due to low back pain and fatigue related to recent chemotherapy treatments. Pt noting to be at baseline compared to last few months, denied needs for skilled IP PT interventions. PT to sign off at this time, however, if pt's status is to change, please re-consult as needed. PT recommending home w/ assist upon d/c.    Anticipated Discharge Disposition (PT): home with assist

## 2025-08-01 NOTE — PROGRESS NOTES
Pharmacy Consult - Vancomycin Dosing and Monitoring    Brooke Mendez is a 40 y.o. female receiving vancomycin therapy.     Indication: Bacteremia, pneumonia   Consulting Provider: Dr. Zapien  ID Consult: Yes    Goal AUC: 400-600 mg/L*hr    Current Antimicrobial Therapy  Anti-Infectives (From admission, onward)      Ordered     Dose/Rate Route Frequency Start Stop    07/30/25 2051  Vancomycin HCl 1,250 mg in sodium chloride 0.9 % 250 mL VTB        Ordering Provider: Christian Mesa RPH    1,250 mg  200 mL/hr over 75 Minutes Intravenous Every 12 Hours 07/31/25 0600 08/05/25 0559    07/30/25 2005  cefepime 2000 mg IVPB in 100 mL NS (MBP)  Status:  Discontinued        Ordering Provider: Shilpa Zapien II, DO    2,000 mg  over 4 Hours Intravenous Every 8 Hours 07/31/25 0100 07/31/25 1630    07/30/25 2005  Pharmacy to dose vancomycin        Ordering Provider: Shilpa Zapien II, DO     Not Applicable Continuous PRN 07/30/25 2005 08/04/25 2004 07/30/25 1508  vancomycin IVPB 2000 mg in 0.9% Sodium Chloride 500 mL        Ordering Provider: Gab Euceda MD    20 mg/kg × 104 kg  250 mL/hr over 120 Minutes Intravenous Once 07/30/25 1524 07/30/25 1958    07/30/25 1508  cefepime 2000 mg IVPB in 100 mL NS (MBP)        Ordering Provider: Gab Euceda MD    2,000 mg  over 30 Minutes Intravenous Once 07/30/25 1524 07/30/25 1804          Allergies  Allergies as of 07/30/2025 - Reviewed 07/30/2025   Allergen Reaction Noted    Erythromycin Other (See Comments) 03/05/2025    Pholcodine Other (See Comments) 03/05/2025    Penicillins Rash 03/05/2025     Labs  Results from last 7 days   Lab Units 08/01/25  0403 07/30/25  1603 07/29/25  1758   BUN mg/dL 7.8 12.4 12.7   CREATININE mg/dL 0.84 0.99 0.82     Results from last 7 days   Lab Units 08/01/25  0403 07/30/25  1603 07/29/25  1758   WBC 10*3/mm3 7.23 2.32* 0.92*     Evaluation of Dosing     Last Dose Received in the ED/Outside Facility: Vancomycin 2000 mg @ 1758  Is  "Patient on Dialysis or Renal Replacement: No    Height - 175.3 cm (69\")  Weight - 110 kg (241 lb 11.2 oz)    Estimated Creatinine Clearance: 116.5 mL/min (by C-G formula based on SCr of 0.84 mg/dL).    I/O last 3 completed shifts:  In: 941.7 [I.V.:941.7]  Out: -     Microbiology and Radiology  Microbiology Results (last 10 days)       Procedure Component Value - Date/Time    MRSA Screen, PCR (Inpatient) - Swab, Nares [[id number]]  (Normal) Collected: 07/30/25 1837    Lab Status: Final result Specimen: Swab from Nares Updated: 07/30/25 2004     MRSA PCR Negative    Narrative:      The negative predictive value of this diagnostic test is high and should only be used to consider de-escalating anti-MRSA therapy. A positive result may indicate colonization with MRSA and must be correlated clinically.  MRSA Negative    Blood Culture - Blood, Arm, Left [[id number]]  (Normal) Collected: 07/30/25 1646    Lab Status: Preliminary result Specimen: Blood from Arm, Left Updated: 07/31/25 1715     Blood Culture No growth at 24 hours    Narrative:      Aerobic Bottle Only    Less than seven (7) mL's of blood was collected.  Insufficient quantity may yield false negative results.    Blood Culture - Blood, Hand, Left [[id number]]  (Normal) Collected: 07/30/25 1603    Lab Status: Preliminary result Specimen: Blood from Hand, Left Updated: 07/31/25 1645     Blood Culture No growth at 24 hours    Narrative:      Less than seven (7) mL's of blood was collected.  Insufficient quantity may yield false negative results.    Blood Culture - Blood, Arm, Left [[id number]]  (Normal) Collected: 07/29/25 2024    Lab Status: Preliminary result Specimen: Blood from Arm, Left Updated: 07/31/25 2045     Blood Culture No growth at 2 days    Blood Culture - Blood, Arm, Left [[id number]]  (Abnormal) Collected: 07/29/25 2016    Lab Status: Final result Specimen: Blood from Arm, Left Updated: 07/31/25 0625     Blood Culture Bacillus species     Comment:   By " laboratory criteria, this organism is considered a contaminant. Clinical correlation is recommended. Most Bacillus species are susceptible to vancomycin, doxycycline, fluoroquinolones, and aminoglycosides.        Isolated from Aerobic Bottle     Gram Stain Aerobic Bottle Gram variable bacilli    Narrative:      Probable contaminant requires clinical correlation, susceptibility not performed unless requested by physician.      Blood Culture ID, PCR - Blood, Arm, Left [[id number]] Collected: 07/29/25 2016    Lab Status: Final result Specimen: Blood from Arm, Left Updated: 07/30/25 1307     BCID, PCR Negative by BCID PCR. Culture to Follow.     BOTTLE TYPE Aerobic Bottle    COVID PRE-OP / PRE-PROCEDURE SCREENING ORDER (NO ISOLATION) - Swab, Nasal Cavity [[id number]]  (Normal) Collected: 07/29/25 1935    Lab Status: Final result Specimen: Swab from Nasal Cavity Updated: 07/29/25 2046    Narrative:      The following orders were created for panel order COVID PRE-OP / PRE-PROCEDURE SCREENING ORDER (NO ISOLATION) - Swab, Nasal Cavity.  Procedure                               Abnormality         Status                     ---------                               -----------         ------                     Respiratory Panel PCR w/...[[id number]]  Normal              Final result                 Please view results for these tests on the individual orders.    Respiratory Panel PCR w/COVID-19(SARS-CoV-2) EFREN/JENNIFER/MICHELLE/PAD/COR/CASSY In-House, NP Swab in UTM/VTM, 2 HR TAT - Swab, Nasopharynx [[id number]]  (Normal) Collected: 07/29/25 1935    Lab Status: Final result Specimen: Swab from Nasopharynx Updated: 07/29/25 2046     ADENOVIRUS, PCR Not Detected     Coronavirus 229E Not Detected     Coronavirus HKU1 Not Detected     Coronavirus NL63 Not Detected     Coronavirus OC43 Not Detected     COVID19 Not Detected     Human Metapneumovirus Not Detected     Human Rhinovirus/Enterovirus Not Detected     Influenza A PCR Not Detected      Influenza B PCR Not Detected     Parainfluenza Virus 1 Not Detected     Parainfluenza Virus 2 Not Detected     Parainfluenza Virus 3 Not Detected     Parainfluenza Virus 4 Not Detected     RSV, PCR Not Detected     Bordetella pertussis pcr Not Detected     Bordetella parapertussis PCR Not Detected     Chlamydophila pneumoniae PCR Not Detected     Mycoplasma pneumo by PCR Not Detected    Narrative:      In the setting of a positive respiratory panel with a viral infection PLUS a negative procalcitonin without other underlying concern for bacterial infection, consider observing off antibiotics or discontinuation of antibiotics and continue supportive care. If the respiratory panel is positive for atypical bacterial infection (Bordetella pertussis, Chlamydophila pneumoniae, or Mycoplasma pneumoniae), consider antibiotic de-escalation to target atypical bacterial infection.            [employer] AUC Calculation:    Current AUC: 427 mg/L*hr    Predicted Steady State AUC on Current Dose: 475 mg/L*hr  _________________________________    Predicted Steady State AUC on New Dose: - mg/L*hr    Assessment/Plan:   Pharmacy consulted to dose vancomycin for bacteremia/pneumonia, goal -600 mg/L*hr.  Patient loaded with vancomycin 2000 ( ~20 mg/kg) x1 7/30 @ 1758.   Based on evaluation, maintenance regimen of vancomycin 1250 mg (~12 mg/kg) IV q12h began 7/31 @ 0600.   Continue current vancomycin regimen as AUC is within goal range and renal function is stable.  Can collect repeat vancomycin random in 5 to 7 days or earlier if clinically necessary.  Will continue to follow and adjust vancomycin dose as needed based on renal function, cultures, and patient clinical status.    Thank you,    Shira Woodard, [facility]  8/1/2025  13:51 EDT

## 2025-08-01 NOTE — PROGRESS NOTES
[facility] Medicine Services  PROGRESS NOTE    Patient Name: Brooke Mendez  : 1985  MRN: [id number]    Date of Admission: 2025  Primary Care Physician: Sheldon Starkey DO    Subjective   Subjective     CC:  Blood culture +    HPI:  Patient is resting in bed, her family present at bedside sleeping. Patient's biggest complaint is swelling diffusely and she had episode where she felt she couldn't breath yesterday. Did improve now with getting her home lasix dosing. Will resume this today.     ROS  As above      Objective   Objective     Vital Signs:   Temp:  [97.7 °F (36.5 °C)-98.6 °F (37 °C)] 97.7 °F (36.5 °C)  Heart Rate:  [108-122] 110  Resp:  [16-18] 16  BP: (118-143)/(69-86) 127/78  Flow (L/min) (Oxygen Therapy):  [2] 2     Physical Exam:  GEN: NAD, resting in bed, awake  HEENT: on room air, atraumatic, normocephalic, port in place over right chest  NECK: supple, no masses  RESP: on room air, normal effort  CV: on tele, sinus rhythm  PSYCH: normal affect, appropriate  NEURO: awake, alert, no focal deficits noted  MSK: 2+ edema noted all ext   SKIN: no rashes noted       Results Reviewed:  LAB RESULTS:      Lab 25  0403 25  1840 25  1603 25  1758   WBC 7.23  --  2.32*  --  0.92*   HEMOGLOBIN 8.7*  --  9.8*  --  9.7*   HEMATOCRIT 29.2*  --  29.0*  --  29.1*   PLATELETS 113*  --  127*  --  157   NEUTROS ABS 4.27  --  1.58*  --  0.07*   EOS ABS 0.00  --  0.00  --  0.00   .7*  --  106.2*  --  108.2*   PROCALCITONIN  --   --  0.19  --  0.19   LACTATE  --  1.6 3.9*  --  2.2*   PROTIME  --   --   --   --  14.0   APTT  --   --   --   --  21.4*   HSTROP T  --   --   --  7 7         Lab 25  0403 25  1119 25  1603 25  8958   SODIUM 138  --  138 135*   POTASSIUM 3.4* 4.0 3.6 4.0   CHLORIDE 103  --  99 97*   CO2 23.0  --  23.9 25.0   ANION GAP 12.0  --  15.1* 13.0   BUN 7.8  --  12.4 12.7   CREATININE 0.84   --  0.99 0.82   EGFR 89.7  --  73.6 92.3   GLUCOSE 112*  --  124* 99   CALCIUM 8.5*  --  9.5 9.3         Lab 08/01/25  0403 07/30/25  1603 07/29/25  1758   TOTAL PROTEIN 5.9* 7.3 6.9   ALBUMIN 3.5 4.3 4.1   GLOBULIN 2.4 3.0 2.8   ALT (SGPT) 24 38* 35*   AST (SGOT) 19 25 25   BILIRUBIN 0.2 0.4 0.5   ALK PHOS 91 90 91   LIPASE  --   --  14         Lab 07/29/25 2022 07/29/25  1758   PROBNP  --  <36.0   HSTROP T 7 7   PROTIME  --  14.0   INR  --  1.02                 Brief Urine Lab Results  (Last result in the past 365 days)        Color   Clarity   Blood   Leuk Est   Nitrite   Protein   CREAT   Urine HCG        07/31/25 1350 Yellow   Clear   Negative   Negative   Negative   Negative                   Microbiology Results Abnormal       None            XR Chest 1 View  Result Date: 7/30/2025  XR CHEST 1 VW Date of Exam: 7/30/2025 3:22 PM EDT Indication: short of breath Comparison: None available. Findings: Cardiomediastinal silhouette is unremarkable.  No airspace disease, pneumothorax, nor pleural effusion. Subsegmental atelectasis or scarring at lung bases. Right chest wall port catheter with the tip in the SVC. No acute osseous abnormality identified.     Impression: Impression: Bibasilar atelectasis or scarring. No other significant cardiopulmonary abnormality. Electronically Signed: Hansa Reyna MD  7/30/2025 3:43 PM EDT  Workstation ID: [id number]      Results for orders placed during the hospital encounter of 07/17/25    Adult Transthoracic Echo Complete W/ Cont if Necessary Per Protocol 07/17/2025  4:37 PM    Interpretation Summary    Left ventricular systolic function is normal. Left ventricular ejection fraction appears to be 56 - 60%.    Left ventricular wall thickness is consistent with borderline concentric hypertrophy.    Global longitudinal LV strain (GLS) = -17.6%      Current medications:  Scheduled Meds:apixaban, 5 mg, Oral, BID  famotidine, 20 mg, Oral, BID AC  [Held by provider] furosemide, 20 mg,  Oral, Daily  gabapentin, 800 mg, Oral, Q8H  lamoTRIgine, 200 mg, Oral, Daily  levothyroxine, 112 mcg, Oral, Q AM  metoprolol succinate XL, 50 mg, Oral, Daily  sodium chloride, 10 mL, Intravenous, Q12H  vancomycin, 1,250 mg, Intravenous, Q12H      Continuous Infusions:Pharmacy to dose vancomycin,       PRN Meds:.  acetaminophen **OR** acetaminophen **OR** acetaminophen    senna-docusate sodium **AND** polyethylene glycol **AND** bisacodyl **AND** bisacodyl    Calcium Replacement - Follow Nurse / BPA Driven Protocol    cyclobenzaprine    LORazepam    Magnesium Standard Dose Replacement - Follow Nurse / BPA Driven Protocol    ondansetron ODT **OR** ondansetron    oxyCODONE    Pharmacy to dose vancomycin    Phosphorus Replacement - Follow Nurse / BPA Driven Protocol    Potassium Replacement - Follow Nurse / BPA Driven Protocol    prochlorperazine    sodium chloride    sodium chloride    zolpidem    Assessment & Plan   Assessment & Plan     Active Hospital Problems    Diagnosis  POA    **Positive blood culture [R78.81]  Yes    Essential hypertension [I10]  Yes    On antineoplastic chemotherapy and adjuvant immunotherapy regimen [Z79.69]  Not Applicable    Thyroid disease [E07.9]  Yes    Malignant neoplasm of left breast in female, estrogen receptor negative [C50.912, Z17.1]  Not Applicable      Resolved Hospital Problems   No resolved problems to display.        Brief Hospital Course to date:  Brooke Mendez is a 40 y.o. female currently receiving chemotherapy w/ Dr. Jackson called to come back to hospital due to + blood cultures. She came to ED 7/29 feeling poorly with tachycardia. Rec'd IVF and improved and was d/c home. Called to come back today after blood culture returned +. She says she feels better today than she did yesterday and has no HA, fever, cough, SOA, abd pain, n/v/d, dysuria. Port without redness/drainage     Gram variable + blood cultures  Immunosuppressed on chemotherapy  Pancytopenia  Elevated  lactate- resolved   -Unclear significance of cultures w/ neg BCID , culture itself with bacillus specius- repeat cultures are pending. Will continue vanc and cefepime for now.   -ID/oncology following-appreciate   -Rec'd IVF overnight with normalization of lactate. Have now resumed her home lasix for edema- watch closely   -d/w Dr Lock this afternoon. If blood cultures NGTD x 48h, ok to dc home off abx. He is off for next week.     Intermittent hypoxia  -unclear etiology  -CTPE protocol this admission negative   -monitor closely     RUE Superficial venous thrombosis  -Diagnosed recently as outpt - continue eliquis    Expected Discharge Location and Transportation: suspect home tomorrow if blood cx continues to be negative  Expected Discharge   Expected discharge date/ time has not been documented.     VTE Prophylaxis:  Pharmacologic VTE prophylaxis orders are present.         AM-PAC 6 Clicks Score (PT): 23 (07/31/25 5659)    CODE STATUS:   Code Status and Medical Interventions: CPR (Attempt to Resuscitate); Full Support   Ordered at: 07/30/25 4097     Code Status (Patient has no pulse and is not breathing):    CPR (Attempt to Resuscitate)     Medical Interventions (Patient has pulse or is breathing):    Full Support       Katty Maher MD  08/01/25

## 2025-08-02 VITALS
TEMPERATURE: 98.5 F | BODY MASS INDEX: 35.8 KG/M2 | HEIGHT: 69 IN | SYSTOLIC BLOOD PRESSURE: 120 MMHG | WEIGHT: 241.7 LBS | HEART RATE: 106 BPM | RESPIRATION RATE: 16 BRPM | OXYGEN SATURATION: 96 % | DIASTOLIC BLOOD PRESSURE: 69 MMHG

## 2025-08-02 PROCEDURE — 25010000002 VANCOMYCIN HCL 1.25 G RECONSTITUTED SOLUTION 1 EACH VIAL

## 2025-08-02 PROCEDURE — 25810000003 SODIUM CHLORIDE 0.9 % SOLUTION 250 ML FLEX CONT

## 2025-08-02 PROCEDURE — 99239 HOSP IP/OBS DSCHRG MGMT >30: CPT | Performed by: INTERNAL MEDICINE

## 2025-08-02 RX ORDER — FUROSEMIDE 20 MG/1
40 TABLET ORAL DAILY
Qty: 30 TABLET | Refills: 0 | Status: SHIPPED | OUTPATIENT
Start: 2025-08-02

## 2025-08-02 RX ORDER — SPIRONOLACTONE 25 MG/1
25 TABLET ORAL DAILY
Qty: 30 TABLET | Refills: 0 | Status: SHIPPED | OUTPATIENT
Start: 2025-08-02 | End: 2025-08-02

## 2025-08-02 RX ORDER — FUROSEMIDE 20 MG/1
40 TABLET ORAL DAILY
Qty: 30 TABLET | Refills: 0 | Status: SHIPPED | OUTPATIENT
Start: 2025-08-02 | End: 2025-08-02

## 2025-08-02 RX ORDER — SPIRONOLACTONE 25 MG/1
25 TABLET ORAL DAILY
Qty: 30 TABLET | Refills: 0 | Status: SHIPPED | OUTPATIENT
Start: 2025-08-02

## 2025-08-02 RX ADMIN — VANCOMYCIN HYDROCHLORIDE 1250 MG: 1.25 INJECTION, POWDER, LYOPHILIZED, FOR SOLUTION INTRAVENOUS at 06:25

## 2025-08-02 RX ADMIN — Medication 10 ML: at 08:05

## 2025-08-02 RX ADMIN — SENNOSIDES, DOCUSATE SODIUM 2 TABLET: 50; 8.6 TABLET, FILM COATED ORAL at 10:26

## 2025-08-02 RX ADMIN — LEVOTHYROXINE SODIUM 112 MCG: 112 TABLET ORAL at 06:25

## 2025-08-02 RX ADMIN — FUROSEMIDE 20 MG: 20 TABLET ORAL at 08:05

## 2025-08-02 RX ADMIN — FAMOTIDINE 20 MG: 20 TABLET, FILM COATED ORAL at 08:04

## 2025-08-02 RX ADMIN — GABAPENTIN 800 MG: 400 CAPSULE ORAL at 06:25

## 2025-08-02 RX ADMIN — OXYCODONE 5 MG: 5 TABLET ORAL at 06:25

## 2025-08-02 RX ADMIN — LAMOTRIGINE 200 MG: 100 TABLET ORAL at 08:05

## 2025-08-02 RX ADMIN — APIXABAN 5 MG: 5 TABLET, FILM COATED ORAL at 08:05

## 2025-08-02 RX ADMIN — OXYCODONE 5 MG: 5 TABLET ORAL at 12:27

## 2025-08-02 RX ADMIN — METOPROLOL SUCCINATE 50 MG: 50 TABLET, EXTENDED RELEASE ORAL at 08:05

## 2025-08-02 NOTE — PLAN OF CARE
Problem: Sepsis/Septic Shock  Goal: Optimal Coping  Outcome: Progressing  Intervention: Support Patient and Family Response  Recent Flowsheet Documentation  Taken 8/1/2025 2000 by Eugenio Schroeder RN  Family/Support System Care: self-care encouraged  Goal: Absence of Bleeding  Outcome: Progressing  Goal: Blood Glucose Level Within Target Range  Outcome: Progressing  Goal: Absence of Infection Signs and Symptoms  Outcome: Progressing  Intervention: Initiate Sepsis Management  Recent Flowsheet Documentation  Taken 8/2/2025 0400 by Eugenio Schroeder RN  Infection Prevention:   environmental surveillance performed   equipment surfaces disinfected   hand hygiene promoted   rest/sleep promoted   personal protective equipment utilized   single patient room provided  Isolation Precautions: precautions maintained  Taken 8/2/2025 0200 by Eugenio Schroeder RN  Infection Prevention:   environmental surveillance performed   equipment surfaces disinfected   hand hygiene promoted   personal protective equipment utilized   rest/sleep promoted   single patient room provided  Isolation Precautions: precautions maintained  Taken 8/2/2025 0000 by Eugenio Schroeder RN  Infection Prevention:   environmental surveillance performed   equipment surfaces disinfected   hand hygiene promoted   personal protective equipment utilized   rest/sleep promoted   single patient room provided  Isolation Precautions: precautions maintained  Taken 8/1/2025 2251 by Eugenio Schroeder RN  Infection Prevention:   environmental surveillance performed   equipment surfaces disinfected   hand hygiene promoted   personal protective equipment utilized   rest/sleep promoted   single patient room provided  Isolation Precautions: precautions maintained  Taken 8/1/2025 2000 by Eugenio Schroeder RN  Infection Prevention:   environmental surveillance performed   hand hygiene promoted   equipment surfaces disinfected   personal protective equipment utilized    rest/sleep promoted   single patient room provided  Isolation Precautions: precautions maintained  Intervention: Promote Recovery  Recent Flowsheet Documentation  Taken 8/2/2025 0400 by Eugenio Schroeder, RN  Activity Management: activity encouraged  Taken 8/2/2025 0200 by Eugenio Schroeder, RN  Activity Management: activity encouraged  Taken 8/2/2025 0000 by Eugenio Schroeder, RN  Activity Management: activity encouraged  Taken 8/1/2025 2251 by Eugenio Schroeder, RN  Activity Management: activity encouraged  Taken 8/1/2025 2000 by Eugenio Schroeder, RN  Activity Management: activity encouraged  Goal: Optimal Nutrition Delivery  Outcome: Progressing     Problem: Adult Inpatient Plan of Care  Goal: Plan of Care Review  Outcome: Progressing  Flowsheets (Taken 8/2/2025 0532)  Outcome Evaluation:   AOx4   VSS on 2L nasal canula   New IV access obtained during shift   PRN pain medication administered once during shift at the time of this note   fall precautions in place   continue plan of care  Goal: Patient-Specific Goal (Individualized)  Outcome: Progressing  Goal: Absence of Hospital-Acquired Illness or Injury  Outcome: Progressing  Intervention: Identify and Manage Fall Risk  Recent Flowsheet Documentation  Taken 8/2/2025 0400 by Eugenio Schroeder, RN  Safety Promotion/Fall Prevention:   activity supervised   assistive device/personal items within reach   clutter free environment maintained   fall prevention program maintained   toileting scheduled   room organization consistent   safety round/check completed  Taken 8/2/2025 0200 by Eugenio Schroeder, RN  Safety Promotion/Fall Prevention:   activity supervised   assistive device/personal items within reach   clutter free environment maintained   fall prevention program maintained   safety round/check completed   toileting scheduled   room organization consistent  Taken 8/2/2025 0000 by Eugenio Schroeder, RN  Safety Promotion/Fall Prevention:   activity supervised   assistive  device/personal items within reach   clutter free environment maintained   fall prevention program maintained   toileting scheduled   safety round/check completed   room organization consistent  Taken 8/1/2025 2251 by Eugenio Schroeder RN  Safety Promotion/Fall Prevention:   activity supervised   assistive device/personal items within reach   clutter free environment maintained   fall prevention program maintained   toileting scheduled   safety round/check completed   room organization consistent  Taken 8/1/2025 2000 by Eugenio Schroeder RN  Safety Promotion/Fall Prevention:   assistive device/personal items within reach   activity supervised   clutter free environment maintained   fall prevention program maintained   toileting scheduled   safety round/check completed   room organization consistent  Intervention: Prevent Skin Injury  Recent Flowsheet Documentation  Taken 8/2/2025 0400 by Eugenio Schroeder RN  Body Position: position changed independently  Taken 8/2/2025 0200 by Eugenio Schroeder RN  Body Position: position changed independently  Taken 8/2/2025 0000 by Eugenio Schroeder RN  Body Position: position changed independently  Taken 8/1/2025 2251 by Eugenio Schroeder RN  Body Position: position changed independently  Taken 8/1/2025 2000 by Eugenio Schroeder RN  Body Position: position changed independently  Intervention: Prevent Infection  Recent Flowsheet Documentation  Taken 8/2/2025 0400 by Eugenio Schroeder RN  Infection Prevention:   environmental surveillance performed   equipment surfaces disinfected   hand hygiene promoted   rest/sleep promoted   personal protective equipment utilized   single patient room provided  Taken 8/2/2025 0200 by Eugenio Schroeder RN  Infection Prevention:   environmental surveillance performed   equipment surfaces disinfected   hand hygiene promoted   personal protective equipment utilized   rest/sleep promoted   single patient room provided  Taken 8/2/2025 0000 by  Eugenio Schroeder RN  Infection Prevention:   environmental surveillance performed   equipment surfaces disinfected   hand hygiene promoted   personal protective equipment utilized   rest/sleep promoted   single patient room provided  Taken 8/1/2025 2251 by Eugenio Schroeder RN  Infection Prevention:   environmental surveillance performed   equipment surfaces disinfected   hand hygiene promoted   personal protective equipment utilized   rest/sleep promoted   single patient room provided  Taken 8/1/2025 2000 by Eugenio Schroeder RN  Infection Prevention:   environmental surveillance performed   hand hygiene promoted   equipment surfaces disinfected   personal protective equipment utilized   rest/sleep promoted   single patient room provided  Goal: Optimal Comfort and Wellbeing  Outcome: Progressing  Intervention: Monitor Pain and Promote Comfort  Recent Flowsheet Documentation  Taken 8/1/2025 2251 by Eugenio Schroeder RN  Pain Management Interventions: pain medication given  Taken 8/1/2025 2000 by Eugenio Schroeder RN  Pain Management Interventions: pain management plan reviewed with patient/caregiver  Intervention: Provide Person-Centered Care  Recent Flowsheet Documentation  Taken 8/1/2025 2000 by Eugenio Schroeder RN  Trust Relationship/Rapport:   care explained   choices provided  Goal: Readiness for Transition of Care  Outcome: Progressing     Problem: Infection  Goal: Absence of Infection Signs and Symptoms  Outcome: Progressing  Intervention: Prevent or Manage Infection  Recent Flowsheet Documentation  Taken 8/2/2025 0400 by Eugenio Schroeder RN  Isolation Precautions: precautions maintained  Taken 8/2/2025 0200 by Eugenio Schroeder RN  Isolation Precautions: precautions maintained  Taken 8/2/2025 0000 by Eugenio Schroeder RN  Isolation Precautions: precautions maintained  Taken 8/1/2025 2251 by Eugenio Schroeder RN  Isolation Precautions: precautions maintained  Taken 8/1/2025 2000 by Eugenio Schroeder  RN  Isolation Precautions: precautions maintained     Problem: Pain Acute  Goal: Optimal Pain Control and Function  Outcome: Progressing  Intervention: Optimize Psychosocial Wellbeing  Recent Flowsheet Documentation  Taken 8/1/2025 2000 by Eugenio Schroeder RN  Diversional Activities: television  Intervention: Develop Pain Management Plan  Recent Flowsheet Documentation  Taken 8/1/2025 2251 by Eugenio Schroeder RN  Pain Management Interventions: pain medication given  Taken 8/1/2025 2000 by Eugenio Schroeder RN  Pain Management Interventions: pain management plan reviewed with patient/caregiver  Intervention: Prevent or Manage Pain  Recent Flowsheet Documentation  Taken 8/2/2025 0400 by Eugenio Schroeder RN  Medication Review/Management: medications reviewed  Taken 8/2/2025 0200 by Eugenio Schroeder RN  Medication Review/Management: medications reviewed  Taken 8/2/2025 0000 by Eugenio Schroeder RN  Medication Review/Management: medications reviewed  Taken 8/1/2025 2251 by Eugenio Schroeder RN  Medication Review/Management: medications reviewed  Taken 8/1/2025 2000 by Eugenio Schroeder RN  Medication Review/Management: medications reviewed     Problem: Nausea and Vomiting  Goal: Nausea and Vomiting Relief  Outcome: Progressing     Problem: Fall Injury Risk  Goal: Absence of Fall and Fall-Related Injury  Outcome: Progressing  Intervention: Identify and Manage Contributors  Recent Flowsheet Documentation  Taken 8/2/2025 0400 by Eugenio Schroeder RN  Medication Review/Management: medications reviewed  Taken 8/2/2025 0200 by Eugenio Schroeder RN  Medication Review/Management: medications reviewed  Taken 8/2/2025 0000 by Eugenio Schroeder RN  Medication Review/Management: medications reviewed  Taken 8/1/2025 2251 by Eugenio Schroeder RN  Medication Review/Management: medications reviewed  Taken 8/1/2025 2000 by Eugenio Schroeder RN  Medication Review/Management: medications reviewed  Intervention: Promote Injury-Free  Environment  Recent Flowsheet Documentation  Taken 8/2/2025 0400 by Eugenio Schroeder, RN  Safety Promotion/Fall Prevention:   activity supervised   assistive device/personal items within reach   clutter free environment maintained   fall prevention program maintained   toileting scheduled   room organization consistent   safety round/check completed  Taken 8/2/2025 0200 by Eugenio Schroeder RN  Safety Promotion/Fall Prevention:   activity supervised   assistive device/personal items within reach   clutter free environment maintained   fall prevention program maintained   safety round/check completed   toileting scheduled   room organization consistent  Taken 8/2/2025 0000 by Eugenio Schroeder RN  Safety Promotion/Fall Prevention:   activity supervised   assistive device/personal items within reach   clutter free environment maintained   fall prevention program maintained   toileting scheduled   safety round/check completed   room organization consistent  Taken 8/1/2025 2251 by Eugenio Schroeder RN  Safety Promotion/Fall Prevention:   activity supervised   assistive device/personal items within reach   clutter free environment maintained   fall prevention program maintained   toileting scheduled   safety round/check completed   room organization consistent  Taken 8/1/2025 2000 by Eugenio Schroeder RN  Safety Promotion/Fall Prevention:   assistive device/personal items within reach   activity supervised   clutter free environment maintained   fall prevention program maintained   toileting scheduled   safety round/check completed   room organization consistent   Goal Outcome Evaluation:              Outcome Evaluation: AOx4; VSS on 2L nasal canula; New IV access obtained during shift; PRN pain medication administered once during shift at the time of this note; fall precautions in place; continue plan of care

## 2025-08-02 NOTE — DISCHARGE SUMMARY
[facility] Medicine Services  DISCHARGE SUMMARY    Patient Name: Brooke Mendez  : 1985  MRN: [id number]    Date of Admission: 2025  2:58 PM  Date of Discharge:  2025  Primary Care Physician: Sheldon Starkey DO    Consults       Date and Time Order Name Status Description    2025  8:05 PM Inpatient Hematology & Oncology Consult      2025  8:05 PM Inpatient Infectious Diseases Consult Completed             Hospital Course     Presenting Problem: positive blood cultures     Active Hospital Problems    Diagnosis  POA    **Positive blood culture [R78.81]  Yes    Essential hypertension [I10]  Yes    On antineoplastic chemotherapy and adjuvant immunotherapy regimen [Z79.69]  Not Applicable    Thyroid disease [E07.9]  Yes    Malignant neoplasm of left breast in female, estrogen receptor negative [C50.912, Z17.1]  Not Applicable      Resolved Hospital Problems   No resolved problems to display.          Hospital Course:  Brooke Mendez is a 40 y.o. female w breast cancer, currently receiving chemotherapy w/ Dr. Baker.  She came to ED for malaise and was discharged, but was later called to come back to hospital due to + blood cultures in setting of WBC low.      Positive cultures:  Presumed contaminant.  Her culture grew Bacillus.  ID was consulted and she remained on empiric Vanc for two days while repeat blood cultures were watched.  Meanwhile her WBC bam to the normal range.  She had no fevers.  The repeat cultures stayed negative.  She is discharged home off abx.    - continue to monitor cultures     Diffuse edema:  Lasix is increased and spironolactone added.  Longterm prednisone likely played a role in edema.   - needs BMP next week to follow lytes    Dyspnea, tachycardia  - on AC chemotherapy.  Echo 25 showed nl EF.  CTA chest this admission showed no effusions/overload and no PE  - sats were nl at rest on RA but pt notes limited exercise  tolerance     Breast CA  - having a hard time w chemo   - Upcoming appt w Dr Katelynn Almeida to discuss surgery   - FU with Dr Baker     Discharge Follow Up Recommendations for outpatient labs/diagnostics:   - BMP in 5 days with surgeon appt (Dr FERRARA)     - FU with Dr Baker as scheduled     Day of Discharge     HPI:   still has persistent dyspnea on exertion.  No fevers.  At rest on RA her sats remained 94.  Diffuse edema is annoying her     Review of Systems  No chest pain     Vital Signs:   Temp:  [97.5 °F (36.4 °C)-98.7 °F (37.1 °C)] 98.5 °F (36.9 °C)  Heart Rate:  [] 107  Resp:  [16] 16  BP: (120-128)/(69-84) 120/69  Flow (L/min) (Oxygen Therapy):  [2] 2      Physical Exam:  Gen: alopecia, cushingoid appearance, alert NAD   Neuro: alert and oriented, clear speech, follows commands, grossly nonfocal  HEENT:  NC/AT   Neck:  Supple, no LAD  Heart tachy    Lungs CTA no wheeze   Abd:  Soft, nontender,   Extrem:  No c/c  Diffuse edema       Pertinent  and/or Most Recent Results     LAB RESULTS:      Lab 08/01/25  0403 07/30/25  1840 07/30/25  1603 07/29/25  1758   WBC 7.23  --  2.32* 0.92*   HEMOGLOBIN 8.7*  --  9.8* 9.7*   HEMATOCRIT 29.2*  --  29.0* 29.1*   PLATELETS 113*  --  127* 157   NEUTROS ABS 4.27  --  1.58* 0.07*   EOS ABS 0.00  --  0.00 0.00   .7*  --  106.2* 108.2*   PROCALCITONIN  --   --  0.19 0.19   LACTATE  --  1.6 3.9* 2.2*   PROTIME  --   --   --  14.0   APTT  --   --   --  21.4*         Lab 08/01/25  2122 08/01/25  0403 07/31/25  1119 07/30/25  1603 07/29/25  1758   SODIUM  --  138  --  138 135*   POTASSIUM 3.7 3.4* 4.0 3.6 4.0   CHLORIDE  --  103  --  99 97*   CO2  --  23.0  --  23.9 25.0   ANION GAP  --  12.0  --  15.1* 13.0   BUN  --  7.8  --  12.4 12.7   CREATININE  --  0.84  --  0.99 0.82   EGFR  --  89.7  --  73.6 92.3   GLUCOSE  --  112*  --  124* 99   CALCIUM  --  8.5*  --  9.5 9.3         Lab 08/01/25  0403 07/30/25  1603 07/29/25  1758   TOTAL PROTEIN 5.9* 7.3 6.9    ALBUMIN 3.5 4.3 4.1   GLOBULIN 2.4 3.0 2.8   ALT (SGPT) 24 38* 35*   AST (SGOT) 19 25 25   BILIRUBIN 0.2 0.4 0.5   ALK PHOS 91 90 91   LIPASE  --   --  14         Lab 07/29/25 2022 07/29/25  1758   PROBNP  --  <36.0   HSTROP T 7 7   PROTIME  --  14.0   INR  --  1.02                 Brief Urine Lab Results  (Last result in the past 365 days)        Color   Clarity   Blood   Leuk Est   Nitrite   Protein   CREAT   Urine HCG        07/31/25 1350 Yellow   Clear   Negative   Negative   Negative   Negative                 Microbiology Results (last 10 days)       Procedure Component Value - Date/Time    MRSA Screen, PCR (Inpatient) - Swab, Nares [[id number]]  (Normal) Collected: 07/30/25 1837    Lab Status: Final result Specimen: Swab from Nares Updated: 07/30/25 2004     MRSA PCR Negative    Narrative:      The negative predictive value of this diagnostic test is high and should only be used to consider de-escalating anti-MRSA therapy. A positive result may indicate colonization with MRSA and must be correlated clinically.  MRSA Negative    Blood Culture - Blood, Arm, Left [[id number]]  (Normal) Collected: 07/30/25 1646    Lab Status: Preliminary result Specimen: Blood from Arm, Left Updated: 08/01/25 1715     Blood Culture No growth at 2 days    Narrative:      Aerobic Bottle Only    Less than seven (7) mL's of blood was collected.  Insufficient quantity may yield false negative results.    Blood Culture - Blood, Hand, Left [[id number]]  (Normal) Collected: 07/30/25 1603    Lab Status: Preliminary result Specimen: Blood from Hand, Left Updated: 08/01/25 1645     Blood Culture No growth at 2 days    Narrative:      Less than seven (7) mL's of blood was collected.  Insufficient quantity may yield false negative results.    Blood Culture - Blood, Arm, Left [[id number]]  (Normal) Collected: 07/29/25 2024    Lab Status: Preliminary result Specimen: Blood from Arm, Left Updated: 08/01/25 2045     Blood Culture No growth at 3  days    Blood Culture - Blood, Arm, Left [[id number]]  (Abnormal) Collected: 07/29/25 2016    Lab Status: Final result Specimen: Blood from Arm, Left Updated: 07/31/25 0625     Blood Culture Bacillus species     Comment:   By laboratory criteria, this organism is considered a contaminant. Clinical correlation is recommended. Most Bacillus species are susceptible to vancomycin, doxycycline, fluoroquinolones, and aminoglycosides.        Isolated from Aerobic Bottle     Gram Stain Aerobic Bottle Gram variable bacilli    Narrative:      Probable contaminant requires clinical correlation, susceptibility not performed unless requested by physician.      Blood Culture ID, PCR - Blood, Arm, Left [[id number]] Collected: 07/29/25 2016    Lab Status: Final result Specimen: Blood from Arm, Left Updated: 07/30/25 1307     BCID, PCR Negative by BCID PCR. Culture to Follow.     BOTTLE TYPE Aerobic Bottle    COVID PRE-OP / PRE-PROCEDURE SCREENING ORDER (NO ISOLATION) - Swab, Nasal Cavity [[id number]]  (Normal) Collected: 07/29/25 1935    Lab Status: Final result Specimen: Swab from Nasal Cavity Updated: 07/29/25 2046    Narrative:      The following orders were created for panel order COVID PRE-OP / PRE-PROCEDURE SCREENING ORDER (NO ISOLATION) - Swab, Nasal Cavity.  Procedure                               Abnormality         Status                     ---------                               -----------         ------                     Respiratory Panel PCR w/...[[id number]]  Normal              Final result                 Please view results for these tests on the individual orders.    Respiratory Panel PCR w/COVID-19(SARS-CoV-2) EFREN/JENNIFER/MICHELLE/PAD/COR/[facility] In-House, NP Swab in [facility], 2 HR TAT - Swab, Nasopharynx [[id number]]  (Normal) Collected: 07/29/25 1935    Lab Status: Final result Specimen: Swab from Nasopharynx Updated: 07/29/25 2046     ADENOVIRUS, PCR Not Detected     Coronavirus 229E Not Detected     Coronavirus HKU1 Not  Detected     Coronavirus NL63 Not Detected     Coronavirus OC43 Not Detected     COVID19 Not Detected     Human Metapneumovirus Not Detected     Human Rhinovirus/Enterovirus Not Detected     Influenza A PCR Not Detected     Influenza B PCR Not Detected     Parainfluenza Virus 1 Not Detected     Parainfluenza Virus 2 Not Detected     Parainfluenza Virus 3 Not Detected     Parainfluenza Virus 4 Not Detected     RSV, PCR Not Detected     Bordetella pertussis pcr Not Detected     Bordetella parapertussis PCR Not Detected     Chlamydophila pneumoniae PCR Not Detected     Mycoplasma pneumo by PCR Not Detected    Narrative:      In the setting of a positive respiratory panel with a viral infection PLUS a negative procalcitonin without other underlying concern for bacterial infection, consider observing off antibiotics or discontinuation of antibiotics and continue supportive care. If the respiratory panel is positive for atypical bacterial infection (Bordetella pertussis, Chlamydophila pneumoniae, or Mycoplasma pneumoniae), consider antibiotic de-escalation to target atypical bacterial infection.            XR Chest 1 View  Result Date: 7/30/2025  XR CHEST 1 VW Date of Exam: 7/30/2025 3:22 PM EDT Indication: short of breath Comparison: None available. Findings: Cardiomediastinal silhouette is unremarkable.  No airspace disease, pneumothorax, nor pleural effusion. Subsegmental atelectasis or scarring at lung bases. Right chest wall port catheter with the tip in the SVC. No acute osseous abnormality identified.     Impression: Bibasilar atelectasis or scarring. No other significant cardiopulmonary abnormality. Electronically Signed: Hansa Reyna MD  7/30/2025 3:43 PM EDT  Workstation ID: [id number]    CT Angiogram Chest Pulmonary Embolism  Result Date: 7/29/2025  CT ANGIOGRAM CHEST PULMONARY EMBOLISM Date of Exam: 7/29/2025 6:50 PM EDT Indication: Pulmonary Embolism - right sided chest pain, metastatic breast cancer, known  superificial clots. Comparison: None available. Technique: Axial CT images were obtained of the chest after the uneventful intravenous administration of 85 cc Isovue-370 utilizing pulmonary embolism protocol.  In addition, a 3-D volume rendered image was created for interpretation.  Reconstructed coronal and sagittal images were also obtained. Automated exposure control and iterative construction methods were used. Findings: Pulmonary arteries:Adequate opacification of the pulmonary arteries. No evidence of acute pulmonary embolism. Aorta: Unremarkable. Lungs and Pleura: Mild linear scarring/atelectasis noted within the lung bases. Otherwise lungs are clear. The central airways are patent. No pleural effusion or pneumothorax. Mediastinum/Maria Dolores: No lymphadenopathy. No suspicious mass. Heart: Normal in size. No pericardial effusion. Normal RV/LV ratio. Soft Tissue: Unremarkable. Upper Abdomen: Moderate to severe hepatic steatosis. Small nonobstructing stones noted within the left kidney measuring up to 0.7 cm. Otherwise unremarkable Bones: No acute osseous abnormality.     Impression: 1.No evidence of pulmonary embolism. 2.No acute findings in the chest. 3.Moderate to severe hepatic steatosis. 4.Nonobstructing left renal stones. Electronically Signed: Huseyin Alston DO  7/29/2025 7:22 PM EDT  Workstation ID: [id number]      Results for orders placed during the hospital encounter of 07/15/25    Duplex Venous Upper Extremity - Right CAR 07/15/2025  3:09 PM    Interpretation Summary    Acute right upper extremity superficial thrombophlebitis noted in the basilic (upper arm).    All other right sided vessels appear normal.      Results for orders placed during the hospital encounter of 07/15/25    Duplex Venous Upper Extremity - Right CAR 07/15/2025  3:09 PM    Interpretation Summary    Acute right upper extremity superficial thrombophlebitis noted in the basilic (upper arm).    All other right sided vessels appear  normal.      Results for orders placed during the hospital encounter of 07/17/25    Adult Transthoracic Echo Complete W/ Cont if Necessary Per Protocol 07/17/2025  4:37 PM    Interpretation Summary    Left ventricular systolic function is normal. Left ventricular ejection fraction appears to be 56 - 60%.    Left ventricular wall thickness is consistent with borderline concentric hypertrophy.    Global longitudinal LV strain (GLS) = -17.6%      Plan for Follow-up of Pending Labs/Results: I will FU   Pending Labs       Order Current Status    Blood Culture - Blood, Arm, Left Preliminary result    Blood Culture - Blood, Hand, Left Preliminary result          Discharge Details        Discharge Medications        New Medications        Instructions Start Date   spironolactone 25 MG tablet  Commonly known as: Aldactone   25 mg, Oral, Daily, Until swelling has improved             Changes to Medications        Instructions Start Date   furosemide 20 MG tablet  Commonly known as: LASIX  What changed:   how much to take  additional instructions   40 mg, Oral, Daily, Until swelling has improved      levothyroxine 112 MCG tablet  Commonly known as: Synthroid  What changed: Another medication with the same name was removed. Continue taking this medication, and follow the directions you see here.   112 mcg, Oral, Every Early Morning             Continue These Medications        Instructions Start Date   Ambien 10 MG tablet  Generic drug: zolpidem   10 mg, Nightly PRN      apixaban 5 MG tablet tablet  Commonly known as: ELIQUIS   5 mg, Oral, 2 Times Daily      clonazePAM 0.5 MG tablet  Commonly known as: KlonoPIN   0.5 mg, 3 Times Daily PRN      cyclobenzaprine 10 MG tablet  Commonly known as: FLEXERIL   10 mg, Oral, 3 Times Daily PRN      Diphenhydramine-Aluminum-Magnesium-Simethicone-Lidocaine-Nystatin       famotidine 20 MG tablet  Commonly known as: PEPCID   20 mg, Oral, 2 Times Daily      gabapentin 400 MG capsule  Commonly  known as: NEURONTIN   2 capsules, 4 Times Daily      lamoTRIgine 100 MG tablet  Commonly known as: LaMICtal   3 tablets, Daily      lidocaine-prilocaine 2.5-2.5 % cream  Commonly known as: EMLA   1 Application, Topical, As Needed      LORazepam 1 MG tablet  Commonly known as: ATIVAN       metoprolol succinate XL 50 MG 24 hr tablet  Commonly known as: TOPROL-XL   75 mg, Oral, Daily      montelukast 10 MG tablet  Commonly known as: Singulair   10 mg, Oral, Nightly      nystatin susp + lidocaine viscous oral suspension  Commonly known as: MAGIC MOUTHWASH   5-10 ml swish and spit or swallow QID prn      ondansetron 8 MG tablet  Commonly known as: ZOFRAN   8 mg, Oral, 3 Times Daily PRN      oxyCODONE 5 MG immediate release tablet  Commonly known as: ROXICODONE   5 mg, Oral, Every 6 Hours PRN      prochlorperazine 10 MG tablet  Commonly known as: COMPAZINE   5-10 mg, Oral, Every 6 Hours PRN      triamcinolone 0.1 % ointment  Commonly known as: KENALOG   1 Application, Topical, 2 Times Daily      valACYclovir 500 MG tablet  Commonly known as: VALTREX   500 mg, Oral, Daily             Stop These Medications      cefpodoxime 200 MG tablet  Commonly known as: VANTIN     meloxicam 15 MG tablet  Commonly known as: MOBIC     nystatin 100,000 unit/mL suspension  Commonly known as: MYCOSTATIN     omeprazole 40 MG capsule  Commonly known as: priLOSEC     ondansetron ODT 8 MG disintegrating tablet  Commonly known as: ZOFRAN-ODT              Allergies   Allergen Reactions    Erythromycin Other (See Comments)    Pholcodine Other (See Comments)    Penicillins Rash     Childhood          Discharge Disposition:  Home or Self Care    Diet:  Hospital:  Diet Order   Procedures    Diet: Regular/House; Fluid Consistency: Thin (IDDSI 0)            Activity:         CODE STATUS:    Code Status and Medical Interventions: CPR (Attempt to Resuscitate); Full Support   Ordered at: 07/30/25 4782     Code Status (Patient has no pulse and is not  breathing):    CPR (Attempt to Resuscitate)     Medical Interventions (Patient has pulse or is breathing):    Full Support       Future Appointments   Date Time Provider Department Center   8/12/2025  7:45 AM ACCESS AND LAB DRAW CHAIR 1 [facility]  [facility]   8/12/2025  8:00 AM Fanny Baker MD [facility]   8/12/2025  9:00 AM CHAIR 8 [facility]       Additional Instructions for the Follow-ups that You Need to Schedule       Discharge Follow-up with Specified Provider: Dr Katelynn Almeida as scheduled; 4 Days   As directed      To: Dr Katelynn Almeida as scheduled   Follow Up: 4 Days        Discharge Follow-up with Specified Provider: Dr Baker 8/12   As directed      To: Dr Baker 8/12        Basic Metabolic Panel    Aug 06, 2025 (Approximate)      On diuretics    Order Comments: On diuretics    Release to patient: Routine Release                      Lori Butler MD  08/02/25      Time Spent on Discharge:  I spent  35  minutes on this discharge activity which included: face-to-face encounter with the patient, reviewing the data in the system, coordination of the care with the nursing staff as well as consultants, documentation, and entering orders.

## 2025-08-02 NOTE — PLAN OF CARE
Problem: Sepsis/Septic Shock  Goal: Optimal Coping  Outcome: Progressing  Intervention: Support Patient and Family Response  Recent Flowsheet Documentation  Taken 8/2/2025 1000 by Lauryn Guadarrama RN  Family/Support System Care: self-care encouraged  Taken 8/2/2025 0800 by Lauryn Guadarrama RN  Supportive Measures:   active listening utilized   mindfulness techniques promoted   problem-solving facilitated   verbalization of feelings encouraged  Family/Support System Care: self-care encouraged  Goal: Absence of Bleeding  Outcome: Progressing  Goal: Blood Glucose Level Within Target Range  Outcome: Progressing  Goal: Absence of Infection Signs and Symptoms  Outcome: Progressing  Intervention: Initiate Sepsis Management  Recent Flowsheet Documentation  Taken 8/2/2025 1000 by Lauryn Guadarrama RN  Infection Prevention:   equipment surfaces disinfected   rest/sleep promoted   single patient room provided  Isolation Precautions: (neutropenic)   precautions maintained   other (see comments)  Taken 8/2/2025 0800 by Lauryn Guadarrama RN  Infection Prevention:   environmental surveillance performed   rest/sleep promoted   single patient room provided  Isolation Precautions: (neutropenic)   precautions maintained   other (see comments)  Intervention: Promote Recovery  Recent Flowsheet Documentation  Taken 8/2/2025 1000 by Lauryn Guadarrama RN  Activity Management:   ambulated to bathroom   activity encouraged  Taken 8/2/2025 0800 by Lauryn Guadarrama RN  Activity Management: activity encouraged  Goal: Optimal Nutrition Delivery  Outcome: Progressing     Problem: Adult Inpatient Plan of Care  Goal: Plan of Care Review  Outcome: Progressing  Flowsheets  Taken 8/2/2025 1146 by Lauryn Guadarrama RN  Outcome Evaluation:   patient to be discharged   patient leaving unit via wheelchair to personal car, mother to drive patient home   all belongings to go home with patient   IV removed per protocol, tip  intact   reviewed discharge information, AVS summary, and medication side effects with patient   patient stated understanding of discharge information, AVS summary, and medication side effects   patient resting comfortably at this time  Taken 8/1/2025 1553 by Patricia Gamez, PT  Plan of Care Reviewed With:   patient   parent  Goal: Patient-Specific Goal (Individualized)  Outcome: Progressing  Goal: Absence of Hospital-Acquired Illness or Injury  Outcome: Progressing  Intervention: Identify and Manage Fall Risk  Recent Flowsheet Documentation  Taken 8/2/2025 1000 by Lauryn Guadarrama RN  Safety Promotion/Fall Prevention:   activity supervised   assistive device/personal items within reach   clutter free environment maintained   elopement precautions   fall prevention program maintained   lighting adjusted   muscle strengthening facilitated   mobility aid in reach   nonskid shoes/slippers when out of bed   safety round/check completed   room organization consistent   toileting scheduled  Taken 8/2/2025 0800 by Lauryn Guadarrama RN  Safety Promotion/Fall Prevention:   activity supervised   assistive device/personal items within reach   clutter free environment maintained   elopement precautions   fall prevention program maintained   lighting adjusted   mobility aid in reach   muscle strengthening facilitated   nonskid shoes/slippers when out of bed   room organization consistent   safety round/check completed   toileting scheduled  Intervention: Prevent Skin Injury  Recent Flowsheet Documentation  Taken 8/2/2025 1000 by Lauryn Guadarrama RN  Body Position: position changed independently  Skin Protection:   drying agents applied   incontinence pads utilized   pulse oximeter probe site changed   transparent dressing maintained  Taken 8/2/2025 0800 by Lauryn Guadarrama RN  Body Position: position changed independently  Skin Protection:   drying agents applied   incontinence pads utilized   pulse oximeter  probe site changed   transparent dressing maintained  Intervention: Prevent and Manage VTE (Venous Thromboembolism) Risk  Recent Flowsheet Documentation  Taken 8/2/2025 1000 by Lauryn Guadarrama RN  VTE Prevention/Management: (eliquis) other (see comments)  Taken 8/2/2025 0800 by Lauryn Guadarrama RN  VTE Prevention/Management: (eliquis) other (see comments)  Intervention: Prevent Infection  Recent Flowsheet Documentation  Taken 8/2/2025 1000 by Lauryn Guadarrama RN  Infection Prevention:   equipment surfaces disinfected   rest/sleep promoted   single patient room provided  Taken 8/2/2025 0800 by Lauryn Guadarrama RN  Infection Prevention:   environmental surveillance performed   rest/sleep promoted   single patient room provided  Goal: Optimal Comfort and Wellbeing  Outcome: Progressing  Intervention: Monitor Pain and Promote Comfort  Recent Flowsheet Documentation  Taken 8/2/2025 1000 by Lauryn Guadarrama RN  Pain Management Interventions:   pillow support provided   position adjusted   quiet environment facilitated   relaxation techniques promoted  Taken 8/2/2025 0800 by Lauryn Guadarrama RN  Pain Management Interventions:   pillow support provided   position adjusted  Intervention: Provide Person-Centered Care  Recent Flowsheet Documentation  Taken 8/2/2025 1000 by Lauryn Guadarrama RN  Trust Relationship/Rapport:   care explained   empathic listening provided   questions answered   thoughts/feelings acknowledged  Taken 8/2/2025 0800 by Lauryn Guadarrama RN  Trust Relationship/Rapport:   care explained   empathic listening provided   questions answered   thoughts/feelings acknowledged  Goal: Readiness for Transition of Care  Outcome: Progressing     Problem: Infection  Goal: Absence of Infection Signs and Symptoms  Outcome: Progressing  Intervention: Prevent or Manage Infection  Recent Flowsheet Documentation  Taken 8/2/2025 1000 by Lauryn Guadarrama RN  Isolation Precautions:  (neutropenic)   precautions maintained   other (see comments)  Taken 8/2/2025 0800 by Lauryn Guadarrama RN  Isolation Precautions: (neutropenic)   precautions maintained   other (see comments)     Problem: Pain Acute  Goal: Optimal Pain Control and Function  Outcome: Progressing  Intervention: Optimize Psychosocial Wellbeing  Recent Flowsheet Documentation  Taken 8/2/2025 1000 by Lauryn Guadarrama RN  Diversional Activities:   smartphone   television  Taken 8/2/2025 0800 by Lauryn Guadarrama RN  Supportive Measures:   active listening utilized   mindfulness techniques promoted   problem-solving facilitated   verbalization of feelings encouraged  Diversional Activities:   smartphone   television  Intervention: Develop Pain Management Plan  Recent Flowsheet Documentation  Taken 8/2/2025 1000 by Lauryn Guadarrama RN  Pain Management Interventions:   pillow support provided   position adjusted   quiet environment facilitated   relaxation techniques promoted  Taken 8/2/2025 0800 by Lauryn Guadarrama RN  Pain Management Interventions:   pillow support provided   position adjusted  Intervention: Prevent or Manage Pain  Recent Flowsheet Documentation  Taken 8/2/2025 1000 by Lauryn Guadarrama RN  Medication Review/Management: medications reviewed  Taken 8/2/2025 0800 by Lauryn Guadarrama RN  Sensory Stimulation Regulation:   auditory stimulation minimized   care clustered   lighting decreased   quiet environment promoted   television on   visual stimulation minimized  Medication Review/Management: medications reviewed     Problem: Nausea and Vomiting  Goal: Nausea and Vomiting Relief  Outcome: Progressing  Intervention: Prevent and Manage Nausea and Vomiting  Recent Flowsheet Documentation  Taken 8/2/2025 0800 by Lauryn Guadarrama RN  Environmental Support:   calm environment promoted   environmental consistency promoted   rest periods encouraged     Problem: Fall Injury Risk  Goal: Absence of  Fall and Fall-Related Injury  Outcome: Progressing  Intervention: Identify and Manage Contributors  Recent Flowsheet Documentation  Taken 8/2/2025 1000 by Lauryn Guadarrama RN  Medication Review/Management: medications reviewed  Taken 8/2/2025 0800 by Lauryn Guadarrama RN  Medication Review/Management: medications reviewed  Intervention: Promote Injury-Free Environment  Recent Flowsheet Documentation  Taken 8/2/2025 1000 by Lauryn Guadarrama RN  Safety Promotion/Fall Prevention:   activity supervised   assistive device/personal items within reach   clutter free environment maintained   elopement precautions   fall prevention program maintained   lighting adjusted   muscle strengthening facilitated   mobility aid in reach   nonskid shoes/slippers when out of bed   safety round/check completed   room organization consistent   toileting scheduled  Taken 8/2/2025 0800 by Lauryn Guadarrama RN  Safety Promotion/Fall Prevention:   activity supervised   assistive device/personal items within reach   clutter free environment maintained   elopement precautions   fall prevention program maintained   lighting adjusted   mobility aid in reach   muscle strengthening facilitated   nonskid shoes/slippers when out of bed   room organization consistent   safety round/check completed   toileting scheduled   Goal Outcome Evaluation:           Progress: improving  Outcome Evaluation: patient to be discharged; patient leaving unit via wheelchair to personal car, mother to drive patient home; all belongings to go home with patient; IV removed per protocol, tip intact; reviewed discharge information, AVS summary, and medication side effects with patient; patient stated understanding of discharge information, AVS summary, and medication side effects; patient resting comfortably at this time

## 2025-08-03 LAB — BACTERIA SPEC AEROBE CULT: NORMAL

## 2025-08-04 ENCOUNTER — READMISSION MANAGEMENT (OUTPATIENT)
Dept: CALL CENTER | Facility: HOSPITAL | Age: 40
End: 2025-08-04
Payer: COMMERCIAL

## 2025-08-04 LAB
BACTERIA SPEC AEROBE CULT: NORMAL
BACTERIA SPEC AEROBE CULT: NORMAL

## 2025-08-04 RX ORDER — FAMOTIDINE 20 MG/1
20 TABLET, FILM COATED ORAL 2 TIMES DAILY
Qty: 60 TABLET | Refills: 1 | Status: SHIPPED | OUTPATIENT
Start: 2025-08-04

## 2025-08-07 ENCOUNTER — TRANSCRIBE ORDERS (OUTPATIENT)
Dept: ADMINISTRATIVE | Facility: HOSPITAL | Age: 40
End: 2025-08-07
Payer: COMMERCIAL

## 2025-08-07 DIAGNOSIS — C50.112 MALIGNANT NEOPLASM OF CENTRAL PORTION OF LEFT FEMALE BREAST, UNSPECIFIED ESTROGEN RECEPTOR STATUS: Primary | ICD-10-CM

## 2025-08-11 DIAGNOSIS — C50.912 MALIGNANT NEOPLASM OF LEFT BREAST IN FEMALE, ESTROGEN RECEPTOR NEGATIVE, UNSPECIFIED SITE OF BREAST: ICD-10-CM

## 2025-08-11 DIAGNOSIS — G89.3 CANCER RELATED PAIN: ICD-10-CM

## 2025-08-11 DIAGNOSIS — Z17.1 MALIGNANT NEOPLASM OF LEFT BREAST IN FEMALE, ESTROGEN RECEPTOR NEGATIVE, UNSPECIFIED SITE OF BREAST: ICD-10-CM

## 2025-08-11 RX ORDER — OXYCODONE HYDROCHLORIDE 5 MG/1
5 TABLET ORAL EVERY 6 HOURS PRN
Qty: 60 TABLET | Refills: 0 | Status: SHIPPED | OUTPATIENT
Start: 2025-08-11

## 2025-08-12 ENCOUNTER — READMISSION MANAGEMENT (OUTPATIENT)
Dept: CALL CENTER | Facility: HOSPITAL | Age: 40
End: 2025-08-12
Payer: COMMERCIAL

## 2025-08-12 ENCOUNTER — OFFICE VISIT (OUTPATIENT)
Dept: ONCOLOGY | Facility: CLINIC | Age: 40
End: 2025-08-12
Payer: COMMERCIAL

## 2025-08-12 ENCOUNTER — APPOINTMENT (OUTPATIENT)
Facility: HOSPITAL | Age: 40
End: 2025-08-12
Payer: COMMERCIAL

## 2025-08-12 ENCOUNTER — INFUSION (OUTPATIENT)
Facility: HOSPITAL | Age: 40
End: 2025-08-12
Payer: COMMERCIAL

## 2025-08-12 ENCOUNTER — RESEARCH ENCOUNTER (OUTPATIENT)
Dept: OTHER | Facility: OTHER | Age: 40
End: 2025-08-12
Payer: COMMERCIAL

## 2025-08-12 VITALS
BODY MASS INDEX: 33.77 KG/M2 | TEMPERATURE: 97.2 F | HEART RATE: 102 BPM | SYSTOLIC BLOOD PRESSURE: 124 MMHG | DIASTOLIC BLOOD PRESSURE: 87 MMHG | WEIGHT: 228 LBS | OXYGEN SATURATION: 98 % | HEIGHT: 69 IN

## 2025-08-12 DIAGNOSIS — C50.912 MALIGNANT NEOPLASM OF LEFT BREAST IN FEMALE, ESTROGEN RECEPTOR NEGATIVE, UNSPECIFIED SITE OF BREAST: Primary | ICD-10-CM

## 2025-08-12 DIAGNOSIS — Z17.1 MALIGNANT NEOPLASM OF LEFT BREAST IN FEMALE, ESTROGEN RECEPTOR NEGATIVE, UNSPECIFIED SITE OF BREAST: Primary | ICD-10-CM

## 2025-08-12 LAB
ALBUMIN SERPL-MCNC: 3.8 G/DL (ref 3.5–5.2)
ALBUMIN/GLOB SERPL: 1.3 G/DL
ALP SERPL-CCNC: 67 U/L (ref 39–117)
ALT SERPL W P-5'-P-CCNC: 41 U/L (ref 1–33)
ANION GAP SERPL CALCULATED.3IONS-SCNC: 12.9 MMOL/L (ref 5–15)
AST SERPL-CCNC: 40 U/L (ref 1–32)
BASOPHILS # BLD AUTO: 0.1 10*3/MM3 (ref 0–0.2)
BASOPHILS NFR BLD AUTO: 1.1 % (ref 0–1.5)
BILIRUB SERPL-MCNC: 0.3 MG/DL (ref 0–1.2)
BUN SERPL-MCNC: 11 MG/DL (ref 6–20)
BUN/CREAT SERPL: 11.2 (ref 7–25)
CALCIUM SPEC-SCNC: 9.2 MG/DL (ref 8.6–10.5)
CHLORIDE SERPL-SCNC: 102 MMOL/L (ref 98–107)
CO2 SERPL-SCNC: 24.1 MMOL/L (ref 22–29)
CREAT SERPL-MCNC: 0.98 MG/DL (ref 0.57–1)
DEPRECATED RDW RBC AUTO: 58.4 FL (ref 37–54)
EGFRCR SERPLBLD CKD-EPI 2021: 74.5 ML/MIN/1.73
EOSINOPHIL # BLD AUTO: 0 10*3/MM3 (ref 0–0.4)
EOSINOPHIL NFR BLD AUTO: 0 % (ref 0.3–6.2)
ERYTHROCYTE [DISTWIDTH] IN BLOOD BY AUTOMATED COUNT: 14.8 % (ref 12.3–15.4)
GLOBULIN UR ELPH-MCNC: 3 GM/DL
GLUCOSE SERPL-MCNC: 112 MG/DL (ref 65–99)
HCT VFR BLD AUTO: 29.8 % (ref 34–46.6)
HGB BLD-MCNC: 9.6 G/DL (ref 12–15.9)
IMM GRANULOCYTES # BLD AUTO: 0.28 10*3/MM3 (ref 0–0.05)
IMM GRANULOCYTES NFR BLD AUTO: 3.1 % (ref 0–0.5)
LYMPHOCYTES # BLD AUTO: 1.16 10*3/MM3 (ref 0.7–3.1)
LYMPHOCYTES NFR BLD AUTO: 13 % (ref 19.6–45.3)
MCH RBC QN AUTO: 34.2 PG (ref 26.6–33)
MCHC RBC AUTO-ENTMCNC: 32.2 G/DL (ref 31.5–35.7)
MCV RBC AUTO: 106 FL (ref 79–97)
MONOCYTES # BLD AUTO: 0.9 10*3/MM3 (ref 0.1–0.9)
MONOCYTES NFR BLD AUTO: 10.1 % (ref 5–12)
NEUTROPHILS NFR BLD AUTO: 6.47 10*3/MM3 (ref 1.7–7)
NEUTROPHILS NFR BLD AUTO: 72.7 % (ref 42.7–76)
PLATELET # BLD AUTO: 485 10*3/MM3 (ref 140–450)
PMV BLD AUTO: 9.2 FL (ref 6–12)
POTASSIUM SERPL-SCNC: 3.9 MMOL/L (ref 3.5–5.2)
PROT SERPL-MCNC: 6.8 G/DL (ref 6–8.5)
RBC # BLD AUTO: 2.81 10*6/MM3 (ref 3.77–5.28)
SODIUM SERPL-SCNC: 139 MMOL/L (ref 136–145)
T4 FREE SERPL-MCNC: 1.01 NG/DL (ref 0.92–1.68)
TSH SERPL DL<=0.05 MIU/L-ACNC: 66.9 UIU/ML (ref 0.27–4.2)
WBC NRBC COR # BLD AUTO: 8.91 10*3/MM3 (ref 3.4–10.8)

## 2025-08-12 PROCEDURE — 36591 DRAW BLOOD OFF VENOUS DEVICE: CPT

## 2025-08-12 PROCEDURE — 25010000002 HEPARIN LOCK FLUSH PER 10 UNITS: Performed by: INTERNAL MEDICINE

## 2025-08-12 PROCEDURE — 80050 GENERAL HEALTH PANEL: CPT

## 2025-08-12 PROCEDURE — 84439 ASSAY OF FREE THYROXINE: CPT

## 2025-08-12 PROCEDURE — 99214 OFFICE O/P EST MOD 30 MIN: CPT | Performed by: INTERNAL MEDICINE

## 2025-08-12 RX ORDER — SODIUM CHLORIDE 0.9 % (FLUSH) 0.9 %
10 SYRINGE (ML) INJECTION AS NEEDED
OUTPATIENT
Start: 2025-08-12

## 2025-08-12 RX ORDER — HEPARIN SODIUM (PORCINE) LOCK FLUSH IV SOLN 100 UNIT/ML 100 UNIT/ML
500 SOLUTION INTRAVENOUS AS NEEDED
Status: DISCONTINUED | OUTPATIENT
Start: 2025-08-12 | End: 2025-08-12 | Stop reason: HOSPADM

## 2025-08-12 RX ORDER — SODIUM CHLORIDE 0.9 % (FLUSH) 0.9 %
20 SYRINGE (ML) INJECTION AS NEEDED
OUTPATIENT
Start: 2025-08-12

## 2025-08-12 RX ORDER — HEPARIN SODIUM (PORCINE) LOCK FLUSH IV SOLN 100 UNIT/ML 100 UNIT/ML
300 SOLUTION INTRAVENOUS ONCE
OUTPATIENT
Start: 2025-08-12

## 2025-08-12 RX ORDER — HEPARIN SODIUM (PORCINE) LOCK FLUSH IV SOLN 100 UNIT/ML 100 UNIT/ML
500 SOLUTION INTRAVENOUS AS NEEDED
OUTPATIENT
Start: 2025-08-12

## 2025-08-12 RX ADMIN — Medication 500 UNITS: at 09:35

## 2025-08-19 ENCOUNTER — PRE-ADMISSION TESTING (OUTPATIENT)
Dept: PREADMISSION TESTING | Facility: HOSPITAL | Age: 40
End: 2025-08-19
Payer: COMMERCIAL

## 2025-08-19 VITALS — WEIGHT: 224.87 LBS | BODY MASS INDEX: 33.31 KG/M2 | HEIGHT: 69 IN

## 2025-08-19 LAB
ALBUMIN SERPL-MCNC: 4.2 G/DL (ref 3.5–5.2)
ALBUMIN/GLOB SERPL: 1.3 G/DL
ALP SERPL-CCNC: 77 U/L (ref 39–117)
ALT SERPL W P-5'-P-CCNC: 64 U/L (ref 1–33)
ANION GAP SERPL CALCULATED.3IONS-SCNC: 11.7 MMOL/L (ref 5–15)
AST SERPL-CCNC: 52 U/L (ref 1–32)
BILIRUB SERPL-MCNC: 0.4 MG/DL (ref 0–1.2)
BUN SERPL-MCNC: 13.1 MG/DL (ref 6–20)
BUN/CREAT SERPL: 14.4 (ref 7–25)
CALCIUM SPEC-SCNC: 9.8 MG/DL (ref 8.6–10.5)
CHLORIDE SERPL-SCNC: 102 MMOL/L (ref 98–107)
CO2 SERPL-SCNC: 24.3 MMOL/L (ref 22–29)
CREAT SERPL-MCNC: 0.91 MG/DL (ref 0.57–1)
DEPRECATED RDW RBC AUTO: 53.6 FL (ref 37–54)
EGFRCR SERPLBLD CKD-EPI 2021: 81.4 ML/MIN/1.73
ERYTHROCYTE [DISTWIDTH] IN BLOOD BY AUTOMATED COUNT: 14.4 % (ref 12.3–15.4)
GLOBULIN UR ELPH-MCNC: 3.3 GM/DL
GLUCOSE SERPL-MCNC: 102 MG/DL (ref 65–99)
HCT VFR BLD AUTO: 32.9 % (ref 34–46.6)
HGB BLD-MCNC: 10.9 G/DL (ref 12–15.9)
INR PPP: 1.26 (ref 0.89–1.12)
MCH RBC QN AUTO: 33.5 PG (ref 26.6–33)
MCHC RBC AUTO-ENTMCNC: 33.1 G/DL (ref 31.5–35.7)
MCV RBC AUTO: 101.2 FL (ref 79–97)
PLATELET # BLD AUTO: 367 10*3/MM3 (ref 140–450)
PMV BLD AUTO: 9.7 FL (ref 6–12)
POTASSIUM SERPL-SCNC: 4.3 MMOL/L (ref 3.5–5.2)
PROT SERPL-MCNC: 7.5 G/DL (ref 6–8.5)
PROTHROMBIN TIME: 16.6 SECONDS (ref 12.2–15.3)
RBC # BLD AUTO: 3.25 10*6/MM3 (ref 3.77–5.28)
SODIUM SERPL-SCNC: 138 MMOL/L (ref 136–145)
WBC NRBC COR # BLD AUTO: 9.32 10*3/MM3 (ref 3.4–10.8)

## 2025-08-19 PROCEDURE — 80053 COMPREHEN METABOLIC PANEL: CPT

## 2025-08-19 PROCEDURE — 93005 ELECTROCARDIOGRAM TRACING: CPT

## 2025-08-19 PROCEDURE — 85610 PROTHROMBIN TIME: CPT

## 2025-08-19 PROCEDURE — 85027 COMPLETE CBC AUTOMATED: CPT

## 2025-08-19 PROCEDURE — 36415 COLL VENOUS BLD VENIPUNCTURE: CPT

## 2025-08-22 LAB
QT INTERVAL: 354 MS
QTC INTERVAL: 428 MS

## 2025-08-24 DIAGNOSIS — C50.912 MALIGNANT NEOPLASM OF LEFT BREAST IN FEMALE, ESTROGEN RECEPTOR NEGATIVE, UNSPECIFIED SITE OF BREAST: ICD-10-CM

## 2025-08-24 DIAGNOSIS — Z17.1 MALIGNANT NEOPLASM OF LEFT BREAST IN FEMALE, ESTROGEN RECEPTOR NEGATIVE, UNSPECIFIED SITE OF BREAST: ICD-10-CM

## 2025-08-24 DIAGNOSIS — G89.3 CANCER RELATED PAIN: ICD-10-CM

## 2025-08-25 ENCOUNTER — TELEPHONE (OUTPATIENT)
Dept: ONCOLOGY | Facility: CLINIC | Age: 40
End: 2025-08-25
Payer: COMMERCIAL

## 2025-08-25 DIAGNOSIS — Z17.1 MALIGNANT NEOPLASM OF LEFT BREAST IN FEMALE, ESTROGEN RECEPTOR NEGATIVE, UNSPECIFIED SITE OF BREAST: ICD-10-CM

## 2025-08-25 DIAGNOSIS — C50.912 MALIGNANT NEOPLASM OF LEFT BREAST IN FEMALE, ESTROGEN RECEPTOR NEGATIVE, UNSPECIFIED SITE OF BREAST: ICD-10-CM

## 2025-08-25 DIAGNOSIS — G89.3 CANCER RELATED PAIN: Primary | ICD-10-CM

## 2025-08-25 RX ORDER — OXYCODONE HYDROCHLORIDE 5 MG/1
5 TABLET ORAL EVERY 6 HOURS PRN
Qty: 60 TABLET | Refills: 0 | Status: SHIPPED | OUTPATIENT
Start: 2025-08-25

## 2025-08-26 ENCOUNTER — ANESTHESIA EVENT CONVERTED (OUTPATIENT)
Dept: ANESTHESIOLOGY | Facility: HOSPITAL | Age: 40
End: 2025-08-26
Payer: COMMERCIAL

## 2025-08-26 ENCOUNTER — HOSPITAL ENCOUNTER (OUTPATIENT)
Facility: HOSPITAL | Age: 40
Discharge: HOME | End: 2025-08-27
Attending: SURGERY | Admitting: SURGERY
Payer: COMMERCIAL

## 2025-08-26 ENCOUNTER — HOSPITAL ENCOUNTER (OUTPATIENT)
Dept: NUCLEAR MEDICINE | Facility: HOSPITAL | Age: 40
Setting detail: SURGERY ADMIT
Discharge: HOME | End: 2025-08-26
Payer: COMMERCIAL

## 2025-08-26 ENCOUNTER — ANESTHESIA (OUTPATIENT)
Dept: PERIOP | Facility: HOSPITAL | Age: 40
End: 2025-08-26
Payer: COMMERCIAL

## 2025-08-26 ENCOUNTER — ANESTHESIA EVENT (OUTPATIENT)
Dept: PERIOP | Facility: HOSPITAL | Age: 40
End: 2025-08-26
Payer: COMMERCIAL

## 2025-08-26 DIAGNOSIS — C50.912 MALIGNANT NEOPLASM OF LEFT BREAST: ICD-10-CM

## 2025-08-26 DIAGNOSIS — C50.112 MALIGNANT NEOPLASM OF CENTRAL PORTION OF LEFT FEMALE BREAST, UNSPECIFIED ESTROGEN RECEPTOR STATUS: ICD-10-CM

## 2025-08-26 LAB
B-HCG UR QL: NEGATIVE
EXPIRATION DATE: NORMAL
INTERNAL NEGATIVE CONTROL: NORMAL
INTERNAL POSITIVE CONTROL: NORMAL
Lab: NORMAL

## 2025-08-26 PROCEDURE — 25010000002 ONDANSETRON PER 1 MG: Performed by: ANESTHESIOLOGY

## 2025-08-26 PROCEDURE — 81025 URINE PREGNANCY TEST: CPT | Performed by: ANESTHESIOLOGY

## 2025-08-26 PROCEDURE — 25010000002 NEOSTIGMINE 10 MG/10ML SOLUTION: Performed by: ANESTHESIOLOGY

## 2025-08-26 PROCEDURE — 88307 TISSUE EXAM BY PATHOLOGIST: CPT | Performed by: SURGERY

## 2025-08-26 PROCEDURE — 88333 PATH CONSLTJ SURG CYTO XM 1: CPT | Performed by: SURGERY

## 2025-08-26 PROCEDURE — 38792 RA TRACER ID OF SENTINL NODE: CPT

## 2025-08-26 PROCEDURE — 25010000002 HYDROMORPHONE 1 MG/ML SOLUTION

## 2025-08-26 PROCEDURE — 25010000002 LIDOCAINE PF 1% 1 % SOLUTION: Performed by: NURSE ANESTHETIST, CERTIFIED REGISTERED

## 2025-08-26 PROCEDURE — 25010000002 LIDOCAINE PF 1% 1 % SOLUTION: Performed by: ANESTHESIOLOGY

## 2025-08-26 PROCEDURE — 25010000002 BUPIVACAINE (PF) 0.25 % SOLUTION: Performed by: ANESTHESIOLOGY

## 2025-08-26 PROCEDURE — 25010000002 PROPOFOL 10 MG/ML EMULSION: Performed by: NURSE ANESTHETIST, CERTIFIED REGISTERED

## 2025-08-26 PROCEDURE — 25010000002 CEFAZOLIN PER 500 MG: Performed by: SURGERY

## 2025-08-26 PROCEDURE — 25010000002 FENTANYL CITRATE (PF) 100 MCG/2ML SOLUTION: Performed by: NURSE ANESTHETIST, CERTIFIED REGISTERED

## 2025-08-26 PROCEDURE — 25810000003 LACTATED RINGERS PER 1000 ML: Performed by: ANESTHESIOLOGY

## 2025-08-26 PROCEDURE — 34310000005 TECHNETIUM FILTERED SULFUR COLLOID: Performed by: SURGERY

## 2025-08-26 PROCEDURE — 25010000002 MIDAZOLAM PER 1 MG: Performed by: ANESTHESIOLOGY

## 2025-08-26 PROCEDURE — A9541 TC99M SULFUR COLLOID: HCPCS | Performed by: SURGERY

## 2025-08-26 PROCEDURE — 25010000002 GLYCOPYRROLATE 1 MG/5ML SOLUTION: Performed by: ANESTHESIOLOGY

## 2025-08-26 PROCEDURE — 25010000002 HYDROMORPHONE PER 4 MG

## 2025-08-26 PROCEDURE — 88331 PATH CONSLTJ SURG 1 BLK 1SPC: CPT | Performed by: PATHOLOGY

## 2025-08-26 PROCEDURE — 25810000003 LACTATED RINGERS PER 1000 ML: Performed by: NURSE ANESTHETIST, CERTIFIED REGISTERED

## 2025-08-26 PROCEDURE — 25010000002 DEXAMETHASONE SODIUM PHOSPHATE 10 MG/ML SOLUTION: Performed by: ANESTHESIOLOGY

## 2025-08-26 DEVICE — SEAL HEMO SURG ARISTA/AH ABS/PWDR 3GM: Type: IMPLANTABLE DEVICE | Site: BREAST | Status: FUNCTIONAL

## 2025-08-26 RX ORDER — GLYCOPYRROLATE 0.2 MG/ML
INJECTION INTRAMUSCULAR; INTRAVENOUS AS NEEDED
Status: DISCONTINUED | OUTPATIENT
Start: 2025-08-26 | End: 2025-08-26 | Stop reason: SURG

## 2025-08-26 RX ORDER — SODIUM CHLORIDE 0.9 % (FLUSH) 0.9 %
3 SYRINGE (ML) INJECTION EVERY 12 HOURS SCHEDULED
Status: DISCONTINUED | OUTPATIENT
Start: 2025-08-26 | End: 2025-08-26 | Stop reason: HOSPADM

## 2025-08-26 RX ORDER — FENTANYL CITRATE 50 UG/ML
50 INJECTION, SOLUTION INTRAMUSCULAR; INTRAVENOUS
Status: DISCONTINUED | OUTPATIENT
Start: 2025-08-26 | End: 2025-08-26 | Stop reason: HOSPADM

## 2025-08-26 RX ORDER — HYDROCODONE BITARTRATE AND ACETAMINOPHEN 7.5; 325 MG/1; MG/1
1 TABLET ORAL EVERY 4 HOURS PRN
Status: DISCONTINUED | OUTPATIENT
Start: 2025-08-26 | End: 2025-08-26 | Stop reason: HOSPADM

## 2025-08-26 RX ORDER — HYDROCODONE BITARTRATE AND ACETAMINOPHEN 7.5; 325 MG/1; MG/1
TABLET ORAL
Status: COMPLETED
Start: 2025-08-26 | End: 2025-08-26

## 2025-08-26 RX ORDER — GABAPENTIN 400 MG/1
800 CAPSULE ORAL EVERY 6 HOURS
Status: DISCONTINUED | OUTPATIENT
Start: 2025-08-26 | End: 2025-08-27 | Stop reason: HOSPADM

## 2025-08-26 RX ORDER — MIDAZOLAM HYDROCHLORIDE 1 MG/ML
1 INJECTION, SOLUTION INTRAMUSCULAR; INTRAVENOUS
Status: COMPLETED | OUTPATIENT
Start: 2025-08-26 | End: 2025-08-26

## 2025-08-26 RX ORDER — DROPERIDOL 2.5 MG/ML
0.62 INJECTION, SOLUTION INTRAMUSCULAR; INTRAVENOUS ONCE AS NEEDED
Status: DISCONTINUED | OUTPATIENT
Start: 2025-08-26 | End: 2025-08-26 | Stop reason: HOSPADM

## 2025-08-26 RX ORDER — SODIUM CHLORIDE, SODIUM LACTATE, POTASSIUM CHLORIDE, CALCIUM CHLORIDE 600; 310; 30; 20 MG/100ML; MG/100ML; MG/100ML; MG/100ML
INJECTION, SOLUTION INTRAVENOUS CONTINUOUS PRN
Status: DISCONTINUED | OUTPATIENT
Start: 2025-08-26 | End: 2025-08-26 | Stop reason: SURG

## 2025-08-26 RX ORDER — FAMOTIDINE 20 MG/1
20 TABLET, FILM COATED ORAL 2 TIMES DAILY
Status: DISCONTINUED | OUTPATIENT
Start: 2025-08-26 | End: 2025-08-27 | Stop reason: HOSPADM

## 2025-08-26 RX ORDER — OXYCODONE HYDROCHLORIDE 5 MG/1
5 TABLET ORAL EVERY 4 HOURS PRN
Refills: 0 | Status: DISCONTINUED | OUTPATIENT
Start: 2025-08-26 | End: 2025-08-27 | Stop reason: HOSPADM

## 2025-08-26 RX ORDER — PROPOFOL 10 MG/ML
VIAL (ML) INTRAVENOUS AS NEEDED
Status: DISCONTINUED | OUTPATIENT
Start: 2025-08-26 | End: 2025-08-26 | Stop reason: SURG

## 2025-08-26 RX ORDER — HYDROCODONE BITARTRATE AND ACETAMINOPHEN 5; 325 MG/1; MG/1
1 TABLET ORAL ONCE AS NEEDED
Status: DISCONTINUED | OUTPATIENT
Start: 2025-08-26 | End: 2025-08-26 | Stop reason: HOSPADM

## 2025-08-26 RX ORDER — NALOXONE HCL 0.4 MG/ML
0.1 VIAL (ML) INJECTION
Status: DISCONTINUED | OUTPATIENT
Start: 2025-08-26 | End: 2025-08-27 | Stop reason: HOSPADM

## 2025-08-26 RX ORDER — HYDRALAZINE HYDROCHLORIDE 20 MG/ML
5 INJECTION INTRAMUSCULAR; INTRAVENOUS
Status: DISCONTINUED | OUTPATIENT
Start: 2025-08-26 | End: 2025-08-26 | Stop reason: HOSPADM

## 2025-08-26 RX ORDER — ONDANSETRON 2 MG/ML
4 INJECTION INTRAMUSCULAR; INTRAVENOUS ONCE AS NEEDED
Status: DISCONTINUED | OUTPATIENT
Start: 2025-08-26 | End: 2025-08-26 | Stop reason: HOSPADM

## 2025-08-26 RX ORDER — SODIUM CHLORIDE, SODIUM LACTATE, POTASSIUM CHLORIDE, CALCIUM CHLORIDE 600; 310; 30; 20 MG/100ML; MG/100ML; MG/100ML; MG/100ML
9 INJECTION, SOLUTION INTRAVENOUS CONTINUOUS
Status: DISCONTINUED | OUTPATIENT
Start: 2025-08-26 | End: 2025-08-27 | Stop reason: HOSPADM

## 2025-08-26 RX ORDER — PROMETHAZINE HYDROCHLORIDE 12.5 MG/1
6.25 TABLET ORAL EVERY 6 HOURS PRN
Status: DISCONTINUED | OUTPATIENT
Start: 2025-08-26 | End: 2025-08-27 | Stop reason: HOSPADM

## 2025-08-26 RX ORDER — ACETAMINOPHEN 500 MG
1000 TABLET ORAL EVERY 6 HOURS
Status: DISCONTINUED | OUTPATIENT
Start: 2025-08-26 | End: 2025-08-27 | Stop reason: HOSPADM

## 2025-08-26 RX ORDER — IPRATROPIUM BROMIDE AND ALBUTEROL SULFATE 2.5; .5 MG/3ML; MG/3ML
3 SOLUTION RESPIRATORY (INHALATION) ONCE AS NEEDED
Status: DISCONTINUED | OUTPATIENT
Start: 2025-08-26 | End: 2025-08-26 | Stop reason: HOSPADM

## 2025-08-26 RX ORDER — MELOXICAM 15 MG/1
15 TABLET ORAL ONCE
Status: COMPLETED | OUTPATIENT
Start: 2025-08-26 | End: 2025-08-26

## 2025-08-26 RX ORDER — NALOXONE HCL 0.4 MG/ML
0.1 VIAL (ML) INJECTION
Status: DISCONTINUED | OUTPATIENT
Start: 2025-08-26 | End: 2025-08-26

## 2025-08-26 RX ORDER — MELOXICAM 15 MG/1
15 TABLET ORAL DAILY
Status: DISCONTINUED | OUTPATIENT
Start: 2025-08-27 | End: 2025-08-27 | Stop reason: HOSPADM

## 2025-08-26 RX ORDER — ROCURONIUM BROMIDE 10 MG/ML
INJECTION, SOLUTION INTRAVENOUS AS NEEDED
Status: DISCONTINUED | OUTPATIENT
Start: 2025-08-26 | End: 2025-08-26 | Stop reason: SURG

## 2025-08-26 RX ORDER — LEVOTHYROXINE SODIUM 112 UG/1
112 TABLET ORAL
Status: DISCONTINUED | OUTPATIENT
Start: 2025-08-27 | End: 2025-08-27 | Stop reason: HOSPADM

## 2025-08-26 RX ORDER — HYDROMORPHONE HYDROCHLORIDE 2 MG/ML
0.3 INJECTION, SOLUTION INTRAMUSCULAR; INTRAVENOUS; SUBCUTANEOUS
Status: DISCONTINUED | OUTPATIENT
Start: 2025-08-26 | End: 2025-08-26

## 2025-08-26 RX ORDER — LIDOCAINE HYDROCHLORIDE 10 MG/ML
0.5 INJECTION, SOLUTION EPIDURAL; INFILTRATION; INTRACAUDAL; PERINEURAL ONCE AS NEEDED
Status: COMPLETED | OUTPATIENT
Start: 2025-08-26 | End: 2025-08-26

## 2025-08-26 RX ORDER — ACETAMINOPHEN 500 MG
1000 TABLET ORAL ONCE
Status: COMPLETED | OUTPATIENT
Start: 2025-08-26 | End: 2025-08-26

## 2025-08-26 RX ORDER — ONDANSETRON 2 MG/ML
4 INJECTION INTRAMUSCULAR; INTRAVENOUS EVERY 6 HOURS PRN
Status: DISCONTINUED | OUTPATIENT
Start: 2025-08-26 | End: 2025-08-27 | Stop reason: HOSPADM

## 2025-08-26 RX ORDER — HYDROMORPHONE HYDROCHLORIDE 1 MG/ML
0.5 INJECTION, SOLUTION INTRAMUSCULAR; INTRAVENOUS; SUBCUTANEOUS
Status: DISCONTINUED | OUTPATIENT
Start: 2025-08-26 | End: 2025-08-26 | Stop reason: HOSPADM

## 2025-08-26 RX ORDER — SODIUM CHLORIDE 9 MG/ML
50 INJECTION, SOLUTION INTRAVENOUS CONTINUOUS
Status: ACTIVE | OUTPATIENT
Start: 2025-08-26 | End: 2025-08-27

## 2025-08-26 RX ORDER — DEXAMETHASONE SODIUM PHOSPHATE 10 MG/ML
INJECTION, SOLUTION INTRAMUSCULAR; INTRAVENOUS
Status: COMPLETED | OUTPATIENT
Start: 2025-08-26 | End: 2025-08-26

## 2025-08-26 RX ORDER — SODIUM CHLORIDE 0.9 % (FLUSH) 0.9 %
10 SYRINGE (ML) INJECTION AS NEEDED
Status: DISCONTINUED | OUTPATIENT
Start: 2025-08-26 | End: 2025-08-26 | Stop reason: HOSPADM

## 2025-08-26 RX ORDER — DIPHENHYDRAMINE HYDROCHLORIDE 50 MG/ML
12.5 INJECTION, SOLUTION INTRAMUSCULAR; INTRAVENOUS EVERY 6 HOURS PRN
Status: DISCONTINUED | OUTPATIENT
Start: 2025-08-26 | End: 2025-08-27 | Stop reason: HOSPADM

## 2025-08-26 RX ORDER — PROMETHAZINE HYDROCHLORIDE 25 MG/1
25 TABLET ORAL ONCE AS NEEDED
Status: DISCONTINUED | OUTPATIENT
Start: 2025-08-26 | End: 2025-08-26 | Stop reason: HOSPADM

## 2025-08-26 RX ORDER — LABETALOL HYDROCHLORIDE 5 MG/ML
INJECTION, SOLUTION INTRAVENOUS AS NEEDED
Status: DISCONTINUED | OUTPATIENT
Start: 2025-08-26 | End: 2025-08-26 | Stop reason: SURG

## 2025-08-26 RX ORDER — DROPERIDOL 2.5 MG/ML
0.62 INJECTION, SOLUTION INTRAMUSCULAR; INTRAVENOUS
Status: DISCONTINUED | OUTPATIENT
Start: 2025-08-26 | End: 2025-08-26 | Stop reason: HOSPADM

## 2025-08-26 RX ORDER — DEXMEDETOMIDINE HYDROCHLORIDE 4 UG/ML
INJECTION, SOLUTION INTRAVENOUS AS NEEDED
Status: DISCONTINUED | OUTPATIENT
Start: 2025-08-26 | End: 2025-08-26 | Stop reason: SURG

## 2025-08-26 RX ORDER — DIAZEPAM 2 MG/1
2 TABLET ORAL EVERY 8 HOURS PRN
Status: DISCONTINUED | OUTPATIENT
Start: 2025-08-26 | End: 2025-08-27 | Stop reason: HOSPADM

## 2025-08-26 RX ORDER — BUPIVACAINE HYDROCHLORIDE 2.5 MG/ML
INJECTION, SOLUTION EPIDURAL; INFILTRATION; INTRACAUDAL; PERINEURAL
Status: COMPLETED | OUTPATIENT
Start: 2025-08-26 | End: 2025-08-26

## 2025-08-26 RX ORDER — NEOSTIGMINE METHYLSULFATE 1 MG/ML
INJECTION INTRAVENOUS AS NEEDED
Status: DISCONTINUED | OUTPATIENT
Start: 2025-08-26 | End: 2025-08-26 | Stop reason: SURG

## 2025-08-26 RX ORDER — LABETALOL HYDROCHLORIDE 5 MG/ML
5 INJECTION, SOLUTION INTRAVENOUS
Status: DISCONTINUED | OUTPATIENT
Start: 2025-08-26 | End: 2025-08-26 | Stop reason: HOSPADM

## 2025-08-26 RX ORDER — SODIUM CHLORIDE 9 MG/ML
9 INJECTION, SOLUTION INTRAVENOUS AS NEEDED
Status: DISCONTINUED | OUTPATIENT
Start: 2025-08-26 | End: 2025-08-26 | Stop reason: HOSPADM

## 2025-08-26 RX ORDER — LAMOTRIGINE 100 MG/1
200 TABLET ORAL DAILY
Status: DISCONTINUED | OUTPATIENT
Start: 2025-08-27 | End: 2025-08-27 | Stop reason: HOSPADM

## 2025-08-26 RX ORDER — DEXAMETHASONE SODIUM PHOSPHATE 10 MG/ML
INJECTION, SOLUTION INTRAMUSCULAR; INTRAVENOUS AS NEEDED
Status: DISCONTINUED | OUTPATIENT
Start: 2025-08-26 | End: 2025-08-26 | Stop reason: SURG

## 2025-08-26 RX ORDER — SODIUM CHLORIDE 0.9 % (FLUSH) 0.9 %
3-10 SYRINGE (ML) INJECTION AS NEEDED
Status: DISCONTINUED | OUTPATIENT
Start: 2025-08-26 | End: 2025-08-26 | Stop reason: HOSPADM

## 2025-08-26 RX ORDER — LORAZEPAM 0.5 MG/1
0.5 TABLET ORAL EVERY 8 HOURS PRN
Status: DISCONTINUED | OUTPATIENT
Start: 2025-08-26 | End: 2025-08-27 | Stop reason: HOSPADM

## 2025-08-26 RX ORDER — PROMETHAZINE HYDROCHLORIDE 12.5 MG/1
6.25 SUPPOSITORY RECTAL EVERY 6 HOURS PRN
Status: DISCONTINUED | OUTPATIENT
Start: 2025-08-26 | End: 2025-08-27 | Stop reason: HOSPADM

## 2025-08-26 RX ORDER — FAMOTIDINE 10 MG/ML
20 INJECTION, SOLUTION INTRAVENOUS
Status: DISCONTINUED | OUTPATIENT
Start: 2025-08-26 | End: 2025-08-26 | Stop reason: HOSPADM

## 2025-08-26 RX ORDER — SCOPOLAMINE 1 MG/3D
1 PATCH, EXTENDED RELEASE TRANSDERMAL ONCE
Status: DISCONTINUED | OUTPATIENT
Start: 2025-08-26 | End: 2025-08-27 | Stop reason: HOSPADM

## 2025-08-26 RX ORDER — ENALAPRILAT 1.25 MG/ML
1.25 INJECTION INTRAVENOUS EVERY 6 HOURS PRN
Status: DISCONTINUED | OUTPATIENT
Start: 2025-08-26 | End: 2025-08-27 | Stop reason: HOSPADM

## 2025-08-26 RX ORDER — ZOLPIDEM TARTRATE 5 MG/1
10 TABLET ORAL NIGHTLY PRN
Status: DISCONTINUED | OUTPATIENT
Start: 2025-08-26 | End: 2025-08-27 | Stop reason: HOSPADM

## 2025-08-26 RX ORDER — SODIUM CHLORIDE, SODIUM LACTATE, POTASSIUM CHLORIDE, CALCIUM CHLORIDE 600; 310; 30; 20 MG/100ML; MG/100ML; MG/100ML; MG/100ML
9 INJECTION, SOLUTION INTRAVENOUS CONTINUOUS
Status: ACTIVE | OUTPATIENT
Start: 2025-08-26 | End: 2025-08-26

## 2025-08-26 RX ORDER — PROMETHAZINE HYDROCHLORIDE 25 MG/1
25 SUPPOSITORY RECTAL ONCE AS NEEDED
Status: DISCONTINUED | OUTPATIENT
Start: 2025-08-26 | End: 2025-08-26 | Stop reason: HOSPADM

## 2025-08-26 RX ORDER — LIDOCAINE HYDROCHLORIDE 10 MG/ML
INJECTION, SOLUTION EPIDURAL; INFILTRATION; INTRACAUDAL; PERINEURAL AS NEEDED
Status: DISCONTINUED | OUTPATIENT
Start: 2025-08-26 | End: 2025-08-26 | Stop reason: SURG

## 2025-08-26 RX ORDER — FAMOTIDINE 20 MG/1
20 TABLET, FILM COATED ORAL
Status: DISCONTINUED | OUTPATIENT
Start: 2025-08-26 | End: 2025-08-26 | Stop reason: HOSPADM

## 2025-08-26 RX ORDER — SPIRONOLACTONE 25 MG/1
25 TABLET ORAL DAILY
Status: DISCONTINUED | OUTPATIENT
Start: 2025-08-27 | End: 2025-08-27 | Stop reason: HOSPADM

## 2025-08-26 RX ORDER — NALOXONE HCL 0.4 MG/ML
0.4 VIAL (ML) INJECTION AS NEEDED
Status: DISCONTINUED | OUTPATIENT
Start: 2025-08-26 | End: 2025-08-26 | Stop reason: HOSPADM

## 2025-08-26 RX ORDER — PREGABALIN 75 MG/1
75 CAPSULE ORAL ONCE
Status: COMPLETED | OUTPATIENT
Start: 2025-08-26 | End: 2025-08-26

## 2025-08-26 RX ORDER — HYDROMORPHONE HYDROCHLORIDE 1 MG/ML
0.3 INJECTION, SOLUTION INTRAMUSCULAR; INTRAVENOUS; SUBCUTANEOUS
Refills: 0 | Status: DISCONTINUED | OUTPATIENT
Start: 2025-08-26 | End: 2025-08-27 | Stop reason: HOSPADM

## 2025-08-26 RX ORDER — FENTANYL CITRATE 50 UG/ML
INJECTION, SOLUTION INTRAMUSCULAR; INTRAVENOUS AS NEEDED
Status: DISCONTINUED | OUTPATIENT
Start: 2025-08-26 | End: 2025-08-26 | Stop reason: SURG

## 2025-08-26 RX ORDER — ONDANSETRON 2 MG/ML
INJECTION INTRAMUSCULAR; INTRAVENOUS AS NEEDED
Status: DISCONTINUED | OUTPATIENT
Start: 2025-08-26 | End: 2025-08-26 | Stop reason: SURG

## 2025-08-26 RX ADMIN — ZOLPIDEM TARTRATE 10 MG: 5 TABLET ORAL at 22:28

## 2025-08-26 RX ADMIN — FENTANYL CITRATE 50 MCG: 50 INJECTION, SOLUTION INTRAMUSCULAR; INTRAVENOUS at 15:22

## 2025-08-26 RX ADMIN — LIDOCAINE HYDROCHLORIDE 0.5 ML: 10 INJECTION, SOLUTION EPIDURAL; INFILTRATION; INTRACAUDAL; PERINEURAL at 14:28

## 2025-08-26 RX ADMIN — HYDROMORPHONE HYDROCHLORIDE 0.5 MG: 1 INJECTION, SOLUTION INTRAMUSCULAR; INTRAVENOUS; SUBCUTANEOUS at 18:00

## 2025-08-26 RX ADMIN — ROCURONIUM BROMIDE 50 MG: 10 INJECTION INTRAVENOUS at 14:47

## 2025-08-26 RX ADMIN — HYDROCODONE BITARTRATE AND ACETAMINOPHEN 1 TABLET: 7.5; 325 TABLET ORAL at 18:00

## 2025-08-26 RX ADMIN — SCOPOLAMINE 1 PATCH: 1.5 PATCH, EXTENDED RELEASE TRANSDERMAL at 11:04

## 2025-08-26 RX ADMIN — DEXMEDETOMIDINE HYDROCHLORIDE IN 0.9% SODIUM CHLORIDE 8 MCG: 4 INJECTION INTRAVENOUS at 14:51

## 2025-08-26 RX ADMIN — PROPOFOL 25 MCG/KG/MIN: 10 INJECTION, EMULSION INTRAVENOUS at 15:00

## 2025-08-26 RX ADMIN — Medication 5 MG: at 15:29

## 2025-08-26 RX ADMIN — FENTANYL CITRATE 50 MCG: 50 INJECTION, SOLUTION INTRAMUSCULAR; INTRAVENOUS at 15:12

## 2025-08-26 RX ADMIN — SODIUM CHLORIDE 2000 MG: 900 INJECTION INTRAVENOUS at 14:53

## 2025-08-26 RX ADMIN — TECHNETIUM TC 99M SULFUR COLLOID 1 DOSE: KIT at 14:40

## 2025-08-26 RX ADMIN — CEFAZOLIN 2000 MG: 2 INJECTION, POWDER, FOR SOLUTION INTRAMUSCULAR; INTRAVENOUS at 22:28

## 2025-08-26 RX ADMIN — Medication 5 MG: at 15:39

## 2025-08-26 RX ADMIN — DEXMEDETOMIDINE HYDROCHLORIDE IN 0.9% SODIUM CHLORIDE 8 MCG: 4 INJECTION INTRAVENOUS at 15:05

## 2025-08-26 RX ADMIN — LIDOCAINE HYDROCHLORIDE 50 MG: 10 INJECTION, SOLUTION EPIDURAL; INFILTRATION; INTRACAUDAL; PERINEURAL at 14:46

## 2025-08-26 RX ADMIN — ACETAMINOPHEN 1000 MG: 500 TABLET, FILM COATED ORAL at 11:03

## 2025-08-26 RX ADMIN — DEXAMETHASONE SODIUM PHOSPHATE 6 MG: 10 INJECTION, SOLUTION INTRAMUSCULAR; INTRAVENOUS at 14:54

## 2025-08-26 RX ADMIN — OXYCODONE HYDROCHLORIDE 5 MG: 5 TABLET ORAL at 20:09

## 2025-08-26 RX ADMIN — ONDANSETRON 4 MG: 2 INJECTION, SOLUTION INTRAMUSCULAR; INTRAVENOUS at 16:37

## 2025-08-26 RX ADMIN — FENTANYL CITRATE 100 MCG: 50 INJECTION, SOLUTION INTRAMUSCULAR; INTRAVENOUS at 14:46

## 2025-08-26 RX ADMIN — FAMOTIDINE 20 MG: 20 TABLET, FILM COATED ORAL at 20:09

## 2025-08-26 RX ADMIN — DEXMEDETOMIDINE HYDROCHLORIDE IN 0.9% SODIUM CHLORIDE 8 MCG: 4 INJECTION INTRAVENOUS at 15:24

## 2025-08-26 RX ADMIN — BUPIVACAINE HYDROCHLORIDE 30 ML: 2.5 INJECTION, SOLUTION EPIDURAL; INFILTRATION; INTRACAUDAL; PERINEURAL at 15:00

## 2025-08-26 RX ADMIN — PROPOFOL 200 MG: 10 INJECTION, EMULSION INTRAVENOUS at 14:46

## 2025-08-26 RX ADMIN — HYDROMORPHONE HYDROCHLORIDE 0.3 MG: 1 INJECTION, SOLUTION INTRAMUSCULAR; INTRAVENOUS; SUBCUTANEOUS at 22:29

## 2025-08-26 RX ADMIN — SODIUM CHLORIDE, POTASSIUM CHLORIDE, SODIUM LACTATE AND CALCIUM CHLORIDE: 600; 310; 30; 20 INJECTION, SOLUTION INTRAVENOUS at 14:42

## 2025-08-26 RX ADMIN — NEOSTIGMINE 2.5 MG: 1 INJECTION INTRAVENOUS at 16:37

## 2025-08-26 RX ADMIN — DEXAMETHASONE SODIUM PHOSPHATE 2 MG: 10 INJECTION, SOLUTION INTRAMUSCULAR; INTRAVENOUS at 15:00

## 2025-08-26 RX ADMIN — SODIUM CHLORIDE, SODIUM LACTATE, POTASSIUM CHLORIDE, CALCIUM CHLORIDE 9 ML/HR: 20; 30; 600; 310 INJECTION, SOLUTION INTRAVENOUS at 11:11

## 2025-08-26 RX ADMIN — GABAPENTIN 800 MG: 400 CAPSULE ORAL at 20:09

## 2025-08-26 RX ADMIN — ACETAMINOPHEN 1000 MG: 500 TABLET, FILM COATED ORAL at 20:09

## 2025-08-26 RX ADMIN — MELOXICAM 15 MG: 15 TABLET ORAL at 11:04

## 2025-08-26 RX ADMIN — DIAZEPAM 2 MG: 2 TABLET ORAL at 20:09

## 2025-08-26 RX ADMIN — GLYCOPYRROLATE 0.5 MG: 0.2 SOLUTION INTRAMUSCULAR; INTRAVENOUS at 16:37

## 2025-08-26 RX ADMIN — MIDAZOLAM HYDROCHLORIDE 1 MG: 1 INJECTION, SOLUTION INTRAMUSCULAR; INTRAVENOUS at 14:28

## 2025-08-26 RX ADMIN — MIDAZOLAM HYDROCHLORIDE 1 MG: 1 INJECTION, SOLUTION INTRAMUSCULAR; INTRAVENOUS at 14:04

## 2025-08-26 RX ADMIN — PREGABALIN 75 MG: 75 CAPSULE ORAL at 11:04

## 2025-08-27 VITALS
TEMPERATURE: 97.2 F | SYSTOLIC BLOOD PRESSURE: 127 MMHG | HEART RATE: 92 BPM | DIASTOLIC BLOOD PRESSURE: 69 MMHG | OXYGEN SATURATION: 97 % | HEIGHT: 69 IN | BODY MASS INDEX: 33.18 KG/M2 | WEIGHT: 224 LBS | RESPIRATION RATE: 14 BRPM

## 2025-08-27 PROCEDURE — 25010000002 CEFAZOLIN PER 500 MG: Performed by: SURGERY

## 2025-08-27 RX ADMIN — ACETAMINOPHEN 1000 MG: 500 TABLET, FILM COATED ORAL at 05:39

## 2025-08-27 RX ADMIN — GABAPENTIN 800 MG: 400 CAPSULE ORAL at 05:39

## 2025-08-27 RX ADMIN — ACETAMINOPHEN 500 MG: 500 TABLET, FILM COATED ORAL at 08:43

## 2025-08-27 RX ADMIN — METOPROLOL SUCCINATE 75 MG: 50 TABLET, EXTENDED RELEASE ORAL at 08:42

## 2025-08-27 RX ADMIN — OXYCODONE HYDROCHLORIDE 5 MG: 5 TABLET ORAL at 05:39

## 2025-08-27 RX ADMIN — LEVOTHYROXINE SODIUM 112 MCG: 112 TABLET ORAL at 05:38

## 2025-08-27 RX ADMIN — OXYCODONE HYDROCHLORIDE 5 MG: 5 TABLET ORAL at 01:14

## 2025-08-27 RX ADMIN — MELOXICAM 15 MG: 15 TABLET ORAL at 08:41

## 2025-08-27 RX ADMIN — GABAPENTIN 800 MG: 400 CAPSULE ORAL at 08:41

## 2025-08-27 RX ADMIN — FAMOTIDINE 20 MG: 20 TABLET, FILM COATED ORAL at 08:41

## 2025-08-27 RX ADMIN — CEFAZOLIN 2000 MG: 2 INJECTION, POWDER, FOR SOLUTION INTRAMUSCULAR; INTRAVENOUS at 06:44

## 2025-08-27 RX ADMIN — LAMOTRIGINE 200 MG: 100 TABLET ORAL at 08:41

## 2025-08-27 RX ADMIN — SPIRONOLACTONE 25 MG: 25 TABLET ORAL at 08:42

## 2025-08-28 LAB
CYTO UR: NORMAL
LAB AP CASE REPORT: NORMAL
LAB AP CLINICAL INFORMATION: NORMAL
LAB AP SYNOPTIC CHECKLIST: NORMAL
Lab: NORMAL
PATH REPORT.FINAL DX SPEC: NORMAL
PATH REPORT.GROSS SPEC: NORMAL

## 2025-08-29 ENCOUNTER — TELEPHONE (OUTPATIENT)
Dept: ONCOLOGY | Facility: CLINIC | Age: 40
End: 2025-08-29
Payer: COMMERCIAL

## 2025-08-29 DIAGNOSIS — C50.912 MALIGNANT NEOPLASM OF LEFT BREAST IN FEMALE, ESTROGEN RECEPTOR NEGATIVE, UNSPECIFIED SITE OF BREAST: ICD-10-CM

## 2025-08-29 DIAGNOSIS — Z17.1 MALIGNANT NEOPLASM OF LEFT BREAST IN FEMALE, ESTROGEN RECEPTOR NEGATIVE, UNSPECIFIED SITE OF BREAST: ICD-10-CM

## 2025-08-29 DIAGNOSIS — G89.3 CANCER RELATED PAIN: Primary | ICD-10-CM

## (undated) DEVICE — APPL CHLORAPREP TINTED 26ML TEAL

## (undated) DEVICE — HDRST PAD EA/20PC

## (undated) DEVICE — ELECTRD BLD EZ CLN MOD XLNG 2.75IN

## (undated) DEVICE — HDRST POSTIN FM CRDL TRACH SLOT NONCOMRESS 9X8X4IN

## (undated) DEVICE — DISH PETRI 3.5IN MD STRL LF

## (undated) DEVICE — RESERVOIR,SUCTION,100CC,SILICONE: Brand: MEDLINE

## (undated) DEVICE — BNDG,ELSTC,MATRIX,STRL,6"X5YD,LF,HOOK&LP: Brand: MEDLINE

## (undated) DEVICE — SUT SILK 3/0 TIES 18IN A184H

## (undated) DEVICE — GLV SURG SENSICARE PI ORTHO SZ7.5 LF STRL

## (undated) DEVICE — PK MINOR SPLT 10

## (undated) DEVICE — DRSNG SURESITE WNDW 4X4.5

## (undated) DEVICE — PAD,ABDOMINAL,8"X10",STERILE,LF,1/PK: Brand: MEDLINE

## (undated) DEVICE — LEX GENERAL BREAST: Brand: MEDLINE INDUSTRIES, INC.

## (undated) DEVICE — SUT SILK 2/0 PS 18IN 1588H

## (undated) DEVICE — 3M™ STERI-DRAPE™ INSTRUMENT POUCH 1018: Brand: STERI-DRAPE™

## (undated) DEVICE — DRAIN JACKSON PRATT ROUND 15FR: Brand: CARDINAL HEALTH

## (undated) DEVICE — GOWN,SIRUS,NONRNF,SETINSLV,XL,20/CS: Brand: MEDLINE

## (undated) DEVICE — BIOPATCH™ ANTIMICROBIAL DRESSING WITH CHLORHEXIDINE GLUCONATE IS A HYDROPHILLIC POLYURETHANE ABSORPTIVE FOAM WITH CHLORHEXIDINE GLUCONATE (CHG) WHICH INHIBITS BACTERIAL GROWTH UNDER THE DRESSING. THE DRESSING IS INTENDED TO BE USED TO ABSORB EXUDATE, COVER A WOUND CAUSED BY VASCULAR AND NONVASCULAR PERCUTANEOUS MEDICAL DEVICES DURING SURGERY, AS WELL AS REDUCE LOCAL INFECTION AND COLONIZATION OF MICROORGANISMS.: Brand: BIOPATCH

## (undated) DEVICE — TRAP FLD MINIVAC MEGADYNE 100ML

## (undated) DEVICE — SUT MNCRYL PLS ANTIB UD 4/0 PS2 18IN

## (undated) DEVICE — ANTIBACTERIAL UNDYED BRAIDED (POLYGLACTIN 910), SYNTHETIC ABSORBABLE SUTURE: Brand: COATED VICRYL

## (undated) DEVICE — CVR HNDL LIGHT RIGID